# Patient Record
Sex: FEMALE | Race: WHITE | NOT HISPANIC OR LATINO | Employment: OTHER | URBAN - METROPOLITAN AREA
[De-identification: names, ages, dates, MRNs, and addresses within clinical notes are randomized per-mention and may not be internally consistent; named-entity substitution may affect disease eponyms.]

---

## 2017-01-02 ENCOUNTER — COMMUNICATION - HEALTHEAST (OUTPATIENT)
Dept: CARDIOLOGY | Facility: CLINIC | Age: 72
End: 2017-01-02

## 2017-01-02 DIAGNOSIS — I48.91 A-FIB (H): ICD-10-CM

## 2017-02-06 DIAGNOSIS — I10 HTN, GOAL BELOW 140/90: ICD-10-CM

## 2017-02-06 DIAGNOSIS — E11.9 TYPE 2 DIABETES MELLITUS WITHOUT COMPLICATION, WITHOUT LONG-TERM CURRENT USE OF INSULIN (H): Primary | ICD-10-CM

## 2017-02-06 DIAGNOSIS — Z13.6 CARDIOVASCULAR SCREENING; LDL GOAL LESS THAN 130: ICD-10-CM

## 2017-02-06 NOTE — TELEPHONE ENCOUNTER
Metformin         Last Written Prescription Date: 12/12/2016  Last Fill Quantity: 30, # refills: 0  Last Office Visit with [facility] or [facility] prescribing provider:  03/10/2016        BP Readings from Last 3 Encounters:   06/27/16 104/75   03/10/16 113/82   12/09/14 121/79     No results found for this basename: microl  No results found for this basename: microalbumin  CREATININE   Date Value Ref Range Status   03/10/2016 1.19* 0.52 - 1.04 mg/dL Final   ]  GFR ESTIMATE   Date Value Ref Range Status   03/10/2016 45* >60 mL/min/1.7m2 Final     Comment:     Non  GFR Calc   12/09/2014 46* >60 mL/min/1.7m2 Final     Comment:     Non  GFR Calc   08/16/2013 58* >60 mL/min/1.7m2 Final     GFR ESTIMATE IF BLACK   Date Value Ref Range Status   03/10/2016 54* >60 mL/min/1.7m2 Final     Comment:      GFR Calc   12/09/2014 55* >60 mL/min/1.7m2 Final     Comment:      GFR Calc   08/16/2013 70 >60 mL/min/1.7m2 Final     CHOL      185   3/10/2016  HDL       45   3/10/2016  LDL      113   3/10/2016  TRIG      134   3/10/2016  CHOLHDLRATIO      3.7   12/9/2014  AST       27   4/10/2009  No results found for this basename: alt  A1C      6.6   10/26/2016  A1C      6.9   3/10/2016  A1C      6.4   12/9/2014  POTASSIUM   Date Value Ref Range Status   03/10/2016 4.3 3.4 - 5.3 mmol/L Final       Serafin TOMLINSON (R)

## 2017-02-07 RX ORDER — SIMVASTATIN 20 MG
20 TABLET ORAL AT BEDTIME
Qty: 30 TABLET | Refills: 0 | Status: SHIPPED | OUTPATIENT
Start: 2017-02-07 | End: 2017-02-22

## 2017-02-07 NOTE — TELEPHONE ENCOUNTER
Patient called and told of the need to have an appt, scheduled for 2/22/17 at 9:20 Dr. Stockton.  Labs are ordered for the patient to and the refills granted 1 more time. Sofia MOTT RN

## 2017-02-22 ENCOUNTER — OFFICE VISIT (OUTPATIENT)
Dept: FAMILY MEDICINE | Facility: CLINIC | Age: 72
End: 2017-02-22
Payer: COMMERCIAL

## 2017-02-22 VITALS
RESPIRATION RATE: 18 BRPM | DIASTOLIC BLOOD PRESSURE: 82 MMHG | HEIGHT: 68 IN | BODY MASS INDEX: 44.41 KG/M2 | WEIGHT: 293 LBS | HEART RATE: 93 BPM | SYSTOLIC BLOOD PRESSURE: 129 MMHG

## 2017-02-22 DIAGNOSIS — E11.9 TYPE 2 DIABETES MELLITUS WITHOUT COMPLICATION, WITHOUT LONG-TERM CURRENT USE OF INSULIN (H): Primary | ICD-10-CM

## 2017-02-22 DIAGNOSIS — I10 HTN, GOAL BELOW 140/90: ICD-10-CM

## 2017-02-22 DIAGNOSIS — J45.20 MILD INTERMITTENT ASTHMA WITHOUT COMPLICATION: ICD-10-CM

## 2017-02-22 DIAGNOSIS — Z11.59 NEED FOR HEPATITIS C SCREENING TEST: ICD-10-CM

## 2017-02-22 DIAGNOSIS — I48.0 PAROXYSMAL ATRIAL FIBRILLATION (H): ICD-10-CM

## 2017-02-22 DIAGNOSIS — G47.00 PERSISTENT INSOMNIA: ICD-10-CM

## 2017-02-22 LAB
ANION GAP SERPL CALCULATED.3IONS-SCNC: 6 MMOL/L (ref 3–14)
BUN SERPL-MCNC: 30 MG/DL (ref 7–30)
CALCIUM SERPL-MCNC: 9.1 MG/DL (ref 8.5–10.1)
CHLORIDE SERPL-SCNC: 97 MMOL/L (ref 94–109)
CHOLEST SERPL-MCNC: 124 MG/DL
CO2 SERPL-SCNC: 30 MMOL/L (ref 20–32)
CREAT SERPL-MCNC: 1.06 MG/DL (ref 0.52–1.04)
CREAT UR-MCNC: 187 MG/DL
GFR SERPL CREATININE-BSD FRML MDRD: 51 ML/MIN/1.7M2
GLUCOSE SERPL-MCNC: 125 MG/DL (ref 70–99)
HBA1C MFR BLD: 6.4 % (ref 4.3–6)
HCV AB SERPL QL IA: NORMAL
HDLC SERPL-MCNC: 47 MG/DL
LDLC SERPL CALC-MCNC: 57 MG/DL
MICROALBUMIN UR-MCNC: 9 MG/L
MICROALBUMIN/CREAT UR: 5.02 MG/G CR (ref 0–25)
NONHDLC SERPL-MCNC: 77 MG/DL
POTASSIUM SERPL-SCNC: 4 MMOL/L (ref 3.4–5.3)
SODIUM SERPL-SCNC: 133 MMOL/L (ref 133–144)
TRIGL SERPL-MCNC: 99 MG/DL
TSH SERPL DL<=0.005 MIU/L-ACNC: 2.16 MU/L (ref 0.4–4)

## 2017-02-22 PROCEDURE — 99214 OFFICE O/P EST MOD 30 MIN: CPT | Performed by: FAMILY MEDICINE

## 2017-02-22 PROCEDURE — 84443 ASSAY THYROID STIM HORMONE: CPT | Performed by: FAMILY MEDICINE

## 2017-02-22 PROCEDURE — 83036 HEMOGLOBIN GLYCOSYLATED A1C: CPT | Performed by: FAMILY MEDICINE

## 2017-02-22 PROCEDURE — 86803 HEPATITIS C AB TEST: CPT | Performed by: FAMILY MEDICINE

## 2017-02-22 PROCEDURE — 36415 COLL VENOUS BLD VENIPUNCTURE: CPT | Performed by: FAMILY MEDICINE

## 2017-02-22 PROCEDURE — 80048 BASIC METABOLIC PNL TOTAL CA: CPT | Performed by: FAMILY MEDICINE

## 2017-02-22 PROCEDURE — 99207 C FOOT EXAM  NO CHARGE: CPT | Performed by: FAMILY MEDICINE

## 2017-02-22 PROCEDURE — 82043 UR ALBUMIN QUANTITATIVE: CPT | Performed by: FAMILY MEDICINE

## 2017-02-22 PROCEDURE — 80061 LIPID PANEL: CPT | Performed by: FAMILY MEDICINE

## 2017-02-22 RX ORDER — TEMAZEPAM 15 MG/1
15 CAPSULE ORAL
Qty: 90 CAPSULE | Refills: 1 | Status: SHIPPED | OUTPATIENT
Start: 2017-02-22 | End: 2017-10-30

## 2017-02-22 RX ORDER — SIMVASTATIN 20 MG
20 TABLET ORAL AT BEDTIME
Qty: 90 TABLET | Refills: 3 | Status: SHIPPED | OUTPATIENT
Start: 2017-02-22 | End: 2018-01-22

## 2017-02-22 RX ORDER — VALSARTAN AND HYDROCHLOROTHIAZIDE 160; 25 MG/1; MG/1
1 TABLET ORAL EVERY MORNING
Qty: 90 TABLET | Refills: 3 | Status: SHIPPED | OUTPATIENT
Start: 2017-02-22 | End: 2018-01-22

## 2017-02-22 NOTE — MR AVS SNAPSHOT
After Visit Summary   2/22/2017    Romy Hurley    MRN: [id number]           Patient Information     Date Of Birth          1945        Visit Information        Provider Department      2/22/2017 9:20 AM Adeola Stockton MD [facility]        Today's Diagnoses     Type 2 diabetes mellitus without complication, without long-term current use of insulin (H)    -  1    HTN, goal below 140/90        Persistent insomnia        Paroxysmal atrial fibrillation (H)        Need for hepatitis C screening test        Mild intermittent asthma without complication          Care Instructions          Thank you for choosing [facility].  You may be receiving a survey in the mail from [employer] regarding your visit today.  Please take a few minutes to complete and return the survey to let us know how we are doing.      Our Clinic hours are:  Mondays    7:20 am - 7 pm  Tues -  Fri  7:20 am - 5 pm    Clinic Phone: [phone]    The clinic lab opens at 7:30 am Mon - Fri and appointments are required.    [facility]  Ph. [phone]  Monday-Thursday 8 am - 7pm  Tues/Wed/Fri 8 am - 5:30 pm               Follow-ups after your visit        Your next 10 appointments already scheduled     May 09, 2017  1:00 PM CDT   Diabetic Education with Adeola [address], [address]   [facility]    [address] [place] [address] [phone]              Who to contact     If you have questions or need follow up information about today's clinic visit or your schedule please contact [facility] directly at [phone].  Normal or non-critical lab and imaging results will be communicated to you by MyChart, letter or phone within 4 business days after the clinic has received the results. If you do not hear from us within 7 days, please contact the clinic through MyChart or phone. If you have a critical or abnormal  "lab result, we will notify you by phone as soon as possible.  Submit refill requests through [employer] or call your pharmacy and they will forward the refill request to us. Please allow 3 business days for your refill to be completed.          Additional Information About Your Visit        [employer] Information     [employer] lets you send messages to your doctor, view your test results, renew your prescriptions, schedule appointments and more. To sign up, go to [email or web address] . Click on \"Log in\" on the left side of the screen, which will take you to the Welcome page. Then click on \"Sign up Now\" on the right side of the page.     You will be asked to enter the access code listed below, as well as some personal information. Please follow the directions to create your username and password.     Your access code is: [email or web address]  Expires: 2017  9:48 AM     Your access code will  in 90 days. If you need help or a new code, please call your [place] clinic or [phone].        Care EveryWhere ID     This is your Care EveryWhere ID. This could be used by other organizations to access your [place] medical records  [id number]        Your Vitals Were     Pulse Respirations Height Breastfeeding? BMI (Body Mass Index)       93 18 5' 8\" (1.727 m) No 45.01 kg/m2        Blood Pressure from Last 3 Encounters:   17 129/82   16 104/75   03/10/16 113/82    Weight from Last 3 Encounters:   17 296 lb (134.3 kg)   16 299 lb 12.8 oz (136 kg)   16 299 lb (135.6 kg)              We Performed the Following     Albumin Random Urine Quantitative     Basic metabolic panel     Hemoglobin A1c     Hepatitis C antibody     Lipid panel reflex to direct LDL     TSH with free T4 reflex          Today's Medication Changes          These changes are accurate as of: 17  9:49 AM.  If you have any questions, ask your nurse or doctor.               These medicines have changed or have updated " prescriptions.        Dose/Directions    metFORMIN 500 MG tablet   Commonly known as:  GLUCOPHAGE   This may have changed:  additional instructions   Used for:  Type 2 diabetes mellitus without complication, without long-term current use of insulin (H)   Changed by:  Adeola Stockton MD        Dose:  500 mg   Take 1 tablet (500 mg) by mouth daily (with dinner)   Quantity:  90 tablet   Refills:  3       simvastatin 20 MG tablet   Commonly known as:  ZOCOR   This may have changed:  additional instructions   Used for:  Type 2 diabetes mellitus without complication, without long-term current use of insulin (H)   Changed by:  Adeola Stockton MD        Dose:  20 mg   Take 1 tablet (20 mg) by mouth At Bedtime   Quantity:  90 tablet   Refills:  3            Where to get your medicines      These medications were sent to [facility] [address] [place] [address] AT [address]  [address] [facility] [address]     Phone:  [phone]     metFORMIN 500 MG tablet    simvastatin 20 MG tablet    valsartan-hydrochlorothiazide 160-25 MG per tablet         Some of these will need a paper prescription and others can be bought over the counter.  Ask your nurse if you have questions.     Bring a paper prescription for each of these medications     temazepam 15 MG capsule                Primary Care Provider Office Phone # Fax #    Adeola Stockton [phone]       [facility] [address] [facility] [address]        Thank you!     Thank you for choosing [facility]  for your care. Our goal is always to provide you with excellent care. Hearing back from our patients is one way we can continue to improve our services. Please take a few minutes to complete the written survey that you may receive in the mail after your visit with us. Thank you!             Your Updated Medication List - Protect others around you: Learn how to safely use,  store and throw away your medicines at www.disposemymeds.org.          This list is accurate as of: 2/22/17  9:49 AM.  Always use your most recent med list.                   Brand Name Dispense Instructions for use    blood glucose monitoring lancets     1 Box    Use to test blood sugars 1 times daily or as directed.       blood glucose monitoring test strip    ONE TOUCH VERIO IQ    100 each    Use to test blood sugars 1 times daily or as directed.       FLECAINIDE ACETATE PO      Take 50 mg by mouth 2 times daily.    Indications: Atrial Fibrillation       fluticasone-salmeterol 250-50 MCG/DOSE diskus inhaler    ADVAIR DISKUS    3 Inhaler    Inhale 1 puff into the lungs 2 times daily       levalbuterol 45 MCG/ACT Inhaler    XOPENEX HFA    1 Inhaler    Inhale 2 puffs into the lungs every 8 hours as needed for shortness of breath / dyspnea Needs an appt for next refills       metFORMIN 500 MG tablet    GLUCOPHAGE    90 tablet    Take 1 tablet (500 mg) by mouth daily (with dinner)       metoprolol 100 MG tablet    LOPRESSOR    1 tablet    Take 1 tablet by mouth 2 times daily.       simvastatin 20 MG tablet    ZOCOR    90 tablet    Take 1 tablet (20 mg) by mouth At Bedtime       temazepam 15 MG capsule    RESTORIL    90 capsule    Take 1 capsule (15 mg) by mouth nightly as needed for sleep       valsartan-hydrochlorothiazide 160-25 MG per tablet    DIOVAN HCT    90 tablet    Take 1 tablet by mouth every morning       XARELTO 20 MG Tabs tablet   Generic drug:  rivaroxaban ANTICOAGULANT      Take 20 mg by mouth daily (with dinner)

## 2017-02-22 NOTE — NURSING NOTE
"Chief Complaint   Patient presents with     Diabetes       Initial /82  Pulse 93  Resp 18  Ht 5' 8\" (1.727 m)  Wt 296 lb (134.3 kg)  Breastfeeding? No  BMI 45.01 kg/m2 Estimated body mass index is 45.01 kg/(m^2) as calculated from the following:    Height as of this encounter: 5' 8\" (1.727 m).    Weight as of this encounter: 296 lb (134.3 kg).  Medication Reconciliation: complete    "

## 2017-02-22 NOTE — PATIENT INSTRUCTIONS
Thank you for choosing [facility].  You may be receiving a survey in the mail from [facility] regarding your visit today.  Please take a few minutes to complete and return the survey to let us know how we are doing.      Our Clinic hours are:  Mondays    7:20 am - 7 pm  Tues -  Fri  7:20 am - 5 pm    Clinic Phone: [phone]    The clinic lab opens at 7:30 am Mon - Fri and appointments are required.    [facility]. [phone]  Monday-Thursday 8 am - 7pm  Tues/Wed/Fri 8 am - 5:30 pm

## 2017-02-22 NOTE — PROGRESS NOTES
"  SUBJECTIVE:                                                    Romy Hurley is a 71 year old female who presents to clinic today for the following health issues:        Diabetes Follow-up      Patient is checking blood sugars: 2 times a week.  Numbers run about 100-120.     Diabetic concerns: None     Symptoms of hypoglycemia (low blood sugar): none     Paresthesias (numbness or burning in feet) or sores: No     Date of last diabetic eye exam: due     Hyperlipidemia Follow-Up      Rate your low fat/cholesterol diet?: fair    Taking statin?  Yes, possible muscle aches from statin    Other lipid medications/supplements?:  none     Hypertension Follow-up      Outpatient blood pressures are being checked at home.  Results are 120/70.    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  Now they're both going [place] three times a week.  Some days better than others.  Trying to get him more active and moving.    She's working on treating a bedsore for him.   He's got advanced scleroderma and he got his G tube out for a while.      Also has a new grandbaby.  This is their first biologic grandchild.  Baby girl.  They live only 10 miles from them.       ROS: 5 point ROS negative except as noted above in HPI, including Gen., Resp., CV, GI &  system review.      /82  Pulse 93  Resp 18  Ht 5' 8\" (1.727 m)  Wt 296 lb (134.3 kg)  Breastfeeding? No  BMI 45.01 kg/m2     EXAM: GENERAL APPEARANCE: Alert, no acute distress  RESP: lungs clear to auscultation   CV: irregular rhythm-irregularly irregular, no murmur  ABDOMEN: soft, no organomegaly, masses or tenderness  MS: extremities normal, no peripheral edema  Foot exam normal - normal monofilament exam  PSYCH: mentation " appears normal., affect and mood normal, tearful when talking about her husbands CABG    Results for orders placed or performed in visit on 02/22/17   Hemoglobin A1c   Result Value Ref Range    Hemoglobin A1C 6.4 (H) 4.3 - 6.0 %         ASSESSMENT/PLAN:      ICD-10-CM    1. Type 2 diabetes mellitus without complication, without long-term current use of insulin (H) E11.9 metFORMIN (GLUCOPHAGE) 500 MG tablet     simvastatin (ZOCOR) 20 MG tablet     TSH with free T4 reflex     Albumin Random Urine Quantitative     Hemoglobin A1c     Lipid panel reflex to direct LDL     Basic metabolic panel   2. HTN, goal below 140/90 I10 valsartan-hydrochlorothiazide (DIOVAN HCT) 160-25 MG per tablet     Basic metabolic panel   3. Persistent insomnia G47.00 temazepam (RESTORIL) 15 MG capsule   4. Paroxysmal atrial fibrillation (H) I48.0    5. Need for hepatitis C screening test Z11.59 Hepatitis C antibody   6. Mild intermittent asthma without complication J45.20      Overall doing well.  Medications refill.  Labs obtained today.         Patient Instructions         Thank you for choosing [facility].  You may be receiving a survey in the mail from [employer] regarding your visit today.  Please take a few minutes to complete and return the survey to let us know how we are doing.      Our Clinic hours are:  Mondays    7:20 am - 7 pm  Tues -  Fri  7:20 am - 5 pm    Clinic Phone: [phone]    The clinic lab opens at 7:30 am Mon - Fri and appointments are required.    [facility]. [phone]  Monday-Thursday 8 am - 7pm  Tues/Wed/Fri 8 am - 5:30 pm

## 2017-02-22 NOTE — PROGRESS NOTES
Kidney function stable to slightly improved, good news.    Cholesterol is good.   HgbA1c is very good at 6.4%.   Thyroid is in normal range.     Adeola Stockton M.D.

## 2017-02-23 ASSESSMENT — ASTHMA QUESTIONNAIRES: ACT_TOTALSCORE: 25

## 2017-03-13 ENCOUNTER — COMMUNICATION - HEALTHEAST (OUTPATIENT)
Dept: ADMINISTRATIVE | Facility: CLINIC | Age: 72
End: 2017-03-13

## 2017-07-10 ENCOUNTER — COMMUNICATION - HEALTHEAST (OUTPATIENT)
Dept: CARDIOLOGY | Facility: CLINIC | Age: 72
End: 2017-07-10

## 2017-07-10 DIAGNOSIS — I48.91 A-FIB (H): ICD-10-CM

## 2017-08-31 ENCOUNTER — OFFICE VISIT - HEALTHEAST (OUTPATIENT)
Dept: CARDIOLOGY | Facility: CLINIC | Age: 72
End: 2017-08-31

## 2017-08-31 ENCOUNTER — TRANSFERRED RECORDS (OUTPATIENT)
Dept: HEALTH INFORMATION MANAGEMENT | Facility: CLINIC | Age: 72
End: 2017-08-31

## 2017-08-31 DIAGNOSIS — E66.9 OBESITY, UNSPECIFIED: ICD-10-CM

## 2017-08-31 DIAGNOSIS — I48.19 PERSISTENT ATRIAL FIBRILLATION (H): ICD-10-CM

## 2017-08-31 DIAGNOSIS — I10 ESSENTIAL HYPERTENSION WITH GOAL BLOOD PRESSURE LESS THAN 140/90: ICD-10-CM

## 2017-08-31 LAB
ATRIAL RATE - MUSE: 44 BPM
DIASTOLIC BLOOD PRESSURE - MUSE: NORMAL MMHG
INTERPRETATION ECG - MUSE: NORMAL
P AXIS - MUSE: NORMAL DEGREES
PR INTERVAL - MUSE: NORMAL MS
QRS DURATION - MUSE: 94 MS
QT - MUSE: 396 MS
QTC - MUSE: 465 MS
R AXIS - MUSE: -19 DEGREES
SYSTOLIC BLOOD PRESSURE - MUSE: NORMAL MMHG
T AXIS - MUSE: -6 DEGREES
VENTRICULAR RATE- MUSE: 83 BPM

## 2017-08-31 ASSESSMENT — MIFFLIN-ST. JEOR: SCORE: 1878.67

## 2017-09-11 ENCOUNTER — HOSPITAL ENCOUNTER (OUTPATIENT)
Dept: CARDIOLOGY | Facility: HOSPITAL | Age: 72
Discharge: HOME | End: 2017-09-11
Attending: INTERNAL MEDICINE

## 2017-09-11 DIAGNOSIS — I48.19 PERSISTENT ATRIAL FIBRILLATION (H): ICD-10-CM

## 2017-09-11 LAB
AORTIC ROOT: 2.9 CM
AR DECEL SLOPE: 1930 MM/S2
AR PEAK VELOCITY: 353 CM/S
AV REGURGITANT PEAK GRADIENT: 49.8 MMHG
AV REGURGITATION PRESSURE HALF TIME: 534 MS
BSA FOR ECHO PROCEDURE: 2.52 M2
CV BLOOD PRESSURE: NORMAL MMHG
CV ECHO HEIGHT: 67.5 IN
CV ECHO WEIGHT: 295 LBS
DOP CALC LVOT AREA: 2.83 CM2
DOP CALC LVOT DIAMETER: 1.9 CM
DOP CALC LVOT PEAK VEL: 134 CM/S
DOP CALC LVOT STROKE VOLUME: 67.7 CM3
DOP CALCLVOT PEAK VEL VTI: 23.9 CM
EJECTION FRACTION: 65 %
EJECTION FRACTION: 65 % (ref 55–75)
LEFT ATRIUM AREA: 21.7 CM2
LEFT ATRIUM LENGTH: 5.6 CM
LEFT ATRIUM SIZE: 4.9 CM
LEFT ATRIUM TO AORTIC ROOT RATIO: 1.69 NO UNITS
LEFT VENTRICLE DIASTOLIC VOLUME INDEX: 27.6 CM3/M2 (ref 34–74)
LEFT VENTRICLE DIASTOLIC VOLUME: 69.6 CM3 (ref 46–106)
LEFT VENTRICLE SYSTOLIC VOLUME INDEX: 9.6 CM3/M2 (ref 11–31)
LEFT VENTRICLE SYSTOLIC VOLUME: 24.1 CM3 (ref 14–42)
LEFT VENTRICULAR OUTFLOW TRACT MEAN GRADIENT: 4 MMHG
LEFT VENTRICULAR OUTFLOW TRACT MEAN VELOCITY: 85.9 CM/S
LEFT VENTRICULAR OUTFLOW TRACT PEAK GRADIENT: 7 MMHG
LV STROKE VOLUME INDEX: 26.9 ML/M2
MITRAL VALVE DECELERATION SLOPE: 7170 MM/S2
MITRAL VALVE PRESSURE HALF-TIME: 57 MS
MV AVERAGE E/E' RATIO: 11.6 CM/S
MV E'TISSUE VEL-LAT: 12.5 CM/S
MV E'TISSUE VEL-MED: 11.2 CM/S
MV LATERAL E/E' RATIO: 11
MV MEDIAL E/E' RATIO: 12.3
MV PEAK E VELOCITY: 138 CM/S
MV VALVE AREA PRESSURE 1/2 METHOD: 3.9 CM2
NUC REST DIASTOLIC VOLUME INDEX: 4720 LBS
NUC REST SYSTOLIC VOLUME INDEX: 67.5 IN
TRICUSPID REGURGITATION PEAK PRESSURE GRADIENT: 27.7 MMHG
TRICUSPID VALVE ANULAR PLANE SYSTOLIC EXCURSION: 2.4 CM
TRICUSPID VALVE PEAK REGURGITANT VELOCITY: 263 CM/S

## 2017-09-11 ASSESSMENT — MIFFLIN-ST. JEOR: SCORE: 1878.67

## 2017-09-18 ENCOUNTER — TRANSFERRED RECORDS (OUTPATIENT)
Dept: HEALTH INFORMATION MANAGEMENT | Facility: CLINIC | Age: 72
End: 2017-09-18

## 2017-09-18 ENCOUNTER — AMBULATORY - HEALTHEAST (OUTPATIENT)
Dept: CARDIOLOGY | Facility: CLINIC | Age: 72
End: 2017-09-18

## 2017-09-18 ENCOUNTER — OFFICE VISIT - HEALTHEAST (OUTPATIENT)
Dept: CARDIOLOGY | Facility: CLINIC | Age: 72
End: 2017-09-18

## 2017-09-18 DIAGNOSIS — I10 ESSENTIAL HYPERTENSION WITH GOAL BLOOD PRESSURE LESS THAN 140/90: ICD-10-CM

## 2017-09-18 DIAGNOSIS — I48.19 PERSISTENT ATRIAL FIBRILLATION (H): ICD-10-CM

## 2017-09-18 ASSESSMENT — MIFFLIN-ST. JEOR: SCORE: 1911.56

## 2017-09-26 ENCOUNTER — TRANSFERRED RECORDS (OUTPATIENT)
Dept: HEALTH INFORMATION MANAGEMENT | Facility: CLINIC | Age: 72
End: 2017-09-26

## 2017-09-26 ENCOUNTER — AMBULATORY - HEALTHEAST (OUTPATIENT)
Dept: CARDIOLOGY | Facility: CLINIC | Age: 72
End: 2017-09-26

## 2017-10-02 ENCOUNTER — TRANSFERRED RECORDS (OUTPATIENT)
Dept: HEALTH INFORMATION MANAGEMENT | Facility: CLINIC | Age: 72
End: 2017-10-02

## 2017-10-02 ENCOUNTER — AMBULATORY - HEALTHEAST (OUTPATIENT)
Dept: CARDIOLOGY | Facility: CLINIC | Age: 72
End: 2017-10-02

## 2017-10-06 ENCOUNTER — COMMUNICATION - HEALTHEAST (OUTPATIENT)
Dept: CARDIOLOGY | Facility: CLINIC | Age: 72
End: 2017-10-06

## 2017-10-06 DIAGNOSIS — I48.91 A-FIB (H): ICD-10-CM

## 2017-10-11 ENCOUNTER — TRANSFERRED RECORDS (OUTPATIENT)
Dept: HEALTH INFORMATION MANAGEMENT | Facility: CLINIC | Age: 72
End: 2017-10-11

## 2017-10-11 ENCOUNTER — ANESTHESIA - HEALTHEAST (OUTPATIENT)
Dept: CARDIOLOGY | Facility: CLINIC | Age: 72
End: 2017-10-11

## 2017-10-19 ENCOUNTER — COMMUNICATION - HEALTHEAST (OUTPATIENT)
Dept: CARDIOLOGY | Facility: CLINIC | Age: 72
End: 2017-10-19

## 2017-10-19 DIAGNOSIS — I48.91 A-FIB (H): ICD-10-CM

## 2017-10-24 ENCOUNTER — OFFICE VISIT - HEALTHEAST (OUTPATIENT)
Dept: CARDIOLOGY | Facility: CLINIC | Age: 72
End: 2017-10-24

## 2017-10-24 ENCOUNTER — TRANSFERRED RECORDS (OUTPATIENT)
Dept: HEALTH INFORMATION MANAGEMENT | Facility: CLINIC | Age: 72
End: 2017-10-24

## 2017-10-24 ENCOUNTER — COMMUNICATION - HEALTHEAST (OUTPATIENT)
Dept: CARDIOLOGY | Facility: CLINIC | Age: 72
End: 2017-10-24

## 2017-10-24 ENCOUNTER — AMBULATORY - HEALTHEAST (OUTPATIENT)
Dept: CARDIOLOGY | Facility: CLINIC | Age: 72
End: 2017-10-24

## 2017-10-24 DIAGNOSIS — I48.19 PERSISTENT ATRIAL FIBRILLATION (H): ICD-10-CM

## 2017-10-24 DIAGNOSIS — F41.1 GENERALIZED ANXIETY DISORDER: ICD-10-CM

## 2017-10-24 ASSESSMENT — MIFFLIN-ST. JEOR: SCORE: 1885.48

## 2017-10-30 DIAGNOSIS — G47.00 PERSISTENT INSOMNIA: ICD-10-CM

## 2017-10-31 ENCOUNTER — AMBULATORY - HEALTHEAST (OUTPATIENT)
Dept: CARDIOLOGY | Facility: CLINIC | Age: 72
End: 2017-10-31

## 2017-10-31 ENCOUNTER — COMMUNICATION - HEALTHEAST (OUTPATIENT)
Dept: CARDIOLOGY | Facility: CLINIC | Age: 72
End: 2017-10-31

## 2017-10-31 DIAGNOSIS — I48.91 A-FIB (H): ICD-10-CM

## 2017-11-01 RX ORDER — TEMAZEPAM 15 MG/1
CAPSULE ORAL
Qty: 90 CAPSULE | Refills: 0 | Status: SHIPPED | OUTPATIENT
Start: 2017-11-01 | End: 2018-01-22

## 2017-11-01 NOTE — TELEPHONE ENCOUNTER
Routing refill request to provider for review/approval because:  Drug not on the [facility] refill protocol     Routing to provider.    Noemí KWONG Rn

## 2017-11-02 ENCOUNTER — ANESTHESIA - HEALTHEAST (OUTPATIENT)
Dept: CARDIOLOGY | Facility: CLINIC | Age: 72
End: 2017-11-02

## 2017-11-02 ENCOUNTER — TRANSFERRED RECORDS (OUTPATIENT)
Dept: HEALTH INFORMATION MANAGEMENT | Facility: CLINIC | Age: 72
End: 2017-11-02

## 2017-11-04 ENCOUNTER — COMMUNICATION - HEALTHEAST (OUTPATIENT)
Dept: CARDIOLOGY | Facility: CLINIC | Age: 72
End: 2017-11-04

## 2017-11-04 DIAGNOSIS — I48.91 A-FIB (H): ICD-10-CM

## 2017-11-20 ENCOUNTER — TRANSFERRED RECORDS (OUTPATIENT)
Dept: HEALTH INFORMATION MANAGEMENT | Facility: CLINIC | Age: 72
End: 2017-11-20

## 2017-11-20 ENCOUNTER — SURGERY - HEALTHEAST (OUTPATIENT)
Dept: CARDIOLOGY | Facility: CLINIC | Age: 72
End: 2017-11-20

## 2017-11-20 ENCOUNTER — OFFICE VISIT - HEALTHEAST (OUTPATIENT)
Dept: CARDIOLOGY | Facility: CLINIC | Age: 72
End: 2017-11-20

## 2017-11-20 ENCOUNTER — AMBULATORY - HEALTHEAST (OUTPATIENT)
Dept: CARDIOLOGY | Facility: CLINIC | Age: 72
End: 2017-11-20

## 2017-11-20 DIAGNOSIS — I48.91 ATRIAL FIBRILLATION (H): ICD-10-CM

## 2017-11-20 DIAGNOSIS — I48.19 PERSISTENT ATRIAL FIBRILLATION (H): ICD-10-CM

## 2017-11-20 ASSESSMENT — MIFFLIN-ST. JEOR: SCORE: 1900.22

## 2017-11-22 ENCOUNTER — COMMUNICATION - HEALTHEAST (OUTPATIENT)
Dept: CARDIOLOGY | Facility: CLINIC | Age: 72
End: 2017-11-22

## 2017-12-06 ENCOUNTER — HOSPITAL ENCOUNTER (OUTPATIENT)
Dept: CARDIOLOGY | Facility: CLINIC | Age: 72
Discharge: HOME | End: 2017-12-06
Attending: INTERNAL MEDICINE | Admitting: INTERNAL MEDICINE

## 2017-12-06 ENCOUNTER — ANESTHESIA - HEALTHEAST (OUTPATIENT)
Dept: CARDIOLOGY | Facility: CLINIC | Age: 72
End: 2017-12-06

## 2017-12-06 DIAGNOSIS — I48.91 ATRIAL FIBRILLATION (H): ICD-10-CM

## 2017-12-06 LAB
ATRIAL RATE - MUSE: 60 BPM
DIASTOLIC BLOOD PRESSURE - MUSE: NORMAL MMHG
INTERPRETATION ECG - MUSE: NORMAL
P AXIS - MUSE: 41 DEGREES
PR INTERVAL - MUSE: 218 MS
QRS DURATION - MUSE: 94 MS
QT - MUSE: 476 MS
QTC - MUSE: 476 MS
R AXIS - MUSE: -15 DEGREES
SYSTOLIC BLOOD PRESSURE - MUSE: NORMAL MMHG
T AXIS - MUSE: -11 DEGREES
VENTRICULAR RATE- MUSE: 60 BPM

## 2017-12-06 ASSESSMENT — MIFFLIN-ST. JEOR: SCORE: 1929.38

## 2018-01-08 ENCOUNTER — OFFICE VISIT - HEALTHEAST (OUTPATIENT)
Dept: CARDIOLOGY | Facility: CLINIC | Age: 73
End: 2018-01-08

## 2018-01-08 ENCOUNTER — TRANSFERRED RECORDS (OUTPATIENT)
Dept: HEALTH INFORMATION MANAGEMENT | Facility: CLINIC | Age: 73
End: 2018-01-08

## 2018-01-08 DIAGNOSIS — I10 ESSENTIAL HYPERTENSION: ICD-10-CM

## 2018-01-08 DIAGNOSIS — I48.19 PERSISTENT ATRIAL FIBRILLATION (H): ICD-10-CM

## 2018-01-08 DIAGNOSIS — N18.30 STAGE III CHRONIC KIDNEY DISEASE (H): ICD-10-CM

## 2018-01-08 LAB
ANION GAP SERPL CALCULATED.3IONS-SCNC: 11 MMOL/L (ref 5–18)
ATRIAL RATE - MUSE: 61 BPM
BUN SERPL-MCNC: 26 MG/DL (ref 8–28)
CALCIUM SERPL-MCNC: 9.5 MG/DL (ref 8.5–10.5)
CHLORIDE BLD-SCNC: 96 MMOL/L (ref 98–107)
CO2 SERPL-SCNC: 31 MMOL/L (ref 22–31)
CREAT SERPL-MCNC: 0.86 MG/DL (ref 0.6–1.1)
CREAT SERPL-MCNC: 0.86 MG/DL (ref 0.6–1.1)
DIASTOLIC BLOOD PRESSURE - MUSE: NORMAL MMHG
GFR SERPL CREATININE-BSD FRML MDRD: >60 ML/MIN/1.73M2
GFR SERPL CREATININE-BSD FRML MDRD: >60 ML/MIN/1.73M2
GLUCOSE BLD-MCNC: 79 MG/DL (ref 70–125)
GLUCOSE SERPL-MCNC: 79 MG/DL (ref 70–125)
INTERPRETATION ECG - MUSE: NORMAL
P AXIS - MUSE: 31 DEGREES
POTASSIUM BLD-SCNC: 4.2 MMOL/L (ref 3.5–5)
POTASSIUM SERPL-SCNC: 4.2 MMOL/L (ref 3.5–5)
PR INTERVAL - MUSE: 194 MS
QRS DURATION - MUSE: 92 MS
QT - MUSE: 448 MS
QTC - MUSE: 450 MS
R AXIS - MUSE: -17 DEGREES
SODIUM SERPL-SCNC: 138 MMOL/L (ref 136–145)
SYSTOLIC BLOOD PRESSURE - MUSE: NORMAL MMHG
T AXIS - MUSE: -4 DEGREES
VENTRICULAR RATE- MUSE: 61 BPM

## 2018-01-08 ASSESSMENT — MIFFLIN-ST. JEOR: SCORE: 1902.49

## 2018-01-09 ENCOUNTER — COMMUNICATION - HEALTHEAST (OUTPATIENT)
Dept: CARDIOLOGY | Facility: CLINIC | Age: 73
End: 2018-01-09

## 2018-01-09 DIAGNOSIS — I48.19 PERSISTENT ATRIAL FIBRILLATION (H): ICD-10-CM

## 2018-01-10 ENCOUNTER — OFFICE VISIT - HEALTHEAST (OUTPATIENT)
Dept: SLEEP MEDICINE | Facility: CLINIC | Age: 73
End: 2018-01-10

## 2018-01-10 ENCOUNTER — TRANSFERRED RECORDS (OUTPATIENT)
Dept: HEALTH INFORMATION MANAGEMENT | Facility: CLINIC | Age: 73
End: 2018-01-10

## 2018-01-10 DIAGNOSIS — R06.83 SNORING: ICD-10-CM

## 2018-01-10 DIAGNOSIS — G47.21 CIRCADIAN RHYTHM SLEEP DISORDER, DELAYED SLEEP PHASE TYPE: ICD-10-CM

## 2018-01-10 DIAGNOSIS — G47.8 SLEEP DYSFUNCTION WITH SLEEP STAGE DISTURBANCE: ICD-10-CM

## 2018-01-10 DIAGNOSIS — G47.00 PERSISTENT INSOMNIA: ICD-10-CM

## 2018-01-10 ASSESSMENT — MIFFLIN-ST. JEOR: SCORE: 1895.68

## 2018-01-11 ENCOUNTER — AMBULATORY - HEALTHEAST (OUTPATIENT)
Dept: SLEEP MEDICINE | Facility: CLINIC | Age: 73
End: 2018-01-11

## 2018-01-22 ENCOUNTER — TELEPHONE (OUTPATIENT)
Dept: FAMILY MEDICINE | Facility: CLINIC | Age: 73
End: 2018-01-22

## 2018-01-22 ENCOUNTER — RECORDS - HEALTHEAST (OUTPATIENT)
Dept: ADMINISTRATIVE | Facility: OTHER | Age: 73
End: 2018-01-22

## 2018-01-22 DIAGNOSIS — I10 HTN, GOAL BELOW 140/90: ICD-10-CM

## 2018-01-22 DIAGNOSIS — G47.00 PERSISTENT INSOMNIA: ICD-10-CM

## 2018-01-22 DIAGNOSIS — E11.9 TYPE 2 DIABETES MELLITUS WITHOUT COMPLICATION, WITHOUT LONG-TERM CURRENT USE OF INSULIN (H): ICD-10-CM

## 2018-01-22 RX ORDER — VALSARTAN AND HYDROCHLOROTHIAZIDE 160; 25 MG/1; MG/1
1 TABLET ORAL EVERY MORNING
Qty: 90 TABLET | Refills: 0 | Status: SHIPPED | OUTPATIENT
Start: 2018-01-22 | End: 2018-03-21

## 2018-01-22 RX ORDER — PAROXETINE 10 MG/1
10 TABLET, FILM COATED ORAL AT BEDTIME
Qty: 1 TABLET | Refills: 0 | COMMUNITY
Start: 2018-01-22 | End: 2018-03-21

## 2018-01-22 RX ORDER — SIMVASTATIN 20 MG
20 TABLET ORAL AT BEDTIME
Qty: 90 TABLET | Refills: 0 | Status: SHIPPED | OUTPATIENT
Start: 2018-01-22 | End: 2018-03-21

## 2018-01-22 RX ORDER — TEMAZEPAM 15 MG/1
CAPSULE ORAL
Qty: 90 CAPSULE | Refills: 0 | Status: SHIPPED | OUTPATIENT
Start: 2018-01-22 | End: 2018-03-21

## 2018-01-22 NOTE — TELEPHONE ENCOUNTER
Panel Management Review      Patient has the following on her problem list:     Asthma review     ACT Total Scores 2/22/2017   ACT TOTAL SCORE -   ASTHMA ER VISITS -   ASTHMA HOSPITALIZATIONS -   ACT TOTAL SCORE (Goal Greater than or Equal to 20) 25   In the past 12 months, how many times did you visit the emergency room for your asthma without being admitted to the hospital? 0   In the past 12 months, how many times were you hospitalized overnight because of your asthma? 0      1. Is Asthma diagnosis on the Problem List? Yes    2. Is Asthma listed on Health Maintenance? Yes    3. Patient is due for:  ACT and AAP    Diabetes    ASA: Not Required     Last A1C  Lab Results   Component Value Date    A1C 6.4 02/22/2017    A1C 6.6 10/26/2016    A1C 6.9 03/10/2016    A1C 6.4 12/09/2014     A1C tested: Passed    Last LDL:    Lab Results   Component Value Date    CHOL 124 02/22/2017     Lab Results   Component Value Date    HDL 47 02/22/2017     Lab Results   Component Value Date    LDL 57 02/22/2017     Lab Results   Component Value Date    TRIG 99 02/22/2017     Lab Results   Component Value Date    CHOLHDLRATIO 3.7 12/09/2014     Lab Results   Component Value Date    NHDL 77 02/22/2017       Is the patient on a Statin? YES             Is the patient on Aspirin? NO    Medications     HMG CoA Reductase Inhibitors    simvastatin (ZOCOR) 20 MG tablet          Last three blood pressure readings:  BP Readings from Last 3 Encounters:   02/22/17 129/82   06/27/16 104/75   03/10/16 113/82       Date of last diabetes office visit: 2/22/17     Tobacco History:     History   Smoking Status     Former Smoker     Quit date: 1/1/1981   Smokeless Tobacco     Never Used               Composite cancer screening  Chart review shows that this patient is due/due soon for the following Fecal Colorectal (FIT)  Summary:    Patient is due/failing the following:   A1C, FIT and LDL    Action needed:   Patient needs office visit for diabetes and  to complete Fit test.    Type of outreach:    Phone, spoke to patient.  Patient is in [place] and will be coming back in March and at that time will be scheduling when she returns. Patient is hoping to get a refill until she can get back.     Questions for provider review:    None                                                                                                                                    Ashwini Weems MA       Chart routed to Care Team .    \

## 2018-01-26 ENCOUNTER — COMMUNICATION - HEALTHEAST (OUTPATIENT)
Dept: SLEEP MEDICINE | Facility: CLINIC | Age: 73
End: 2018-01-26

## 2018-01-26 DIAGNOSIS — R06.83 SNORING: ICD-10-CM

## 2018-01-26 DIAGNOSIS — G47.34 SLEEP RELATED HYPOXIA: ICD-10-CM

## 2018-03-05 ENCOUNTER — COMMUNICATION - HEALTHEAST (OUTPATIENT)
Dept: CARDIOLOGY | Facility: CLINIC | Age: 73
End: 2018-03-05

## 2018-03-05 ENCOUNTER — TELEPHONE (OUTPATIENT)
Dept: FAMILY MEDICINE | Facility: CLINIC | Age: 73
End: 2018-03-05

## 2018-03-05 DIAGNOSIS — I48.19 PERSISTENT ATRIAL FIBRILLATION (H): ICD-10-CM

## 2018-03-05 DIAGNOSIS — E11.9 TYPE 2 DIABETES MELLITUS WITHOUT COMPLICATION, WITHOUT LONG-TERM CURRENT USE OF INSULIN (H): ICD-10-CM

## 2018-03-05 NOTE — TELEPHONE ENCOUNTER
"Requested Prescriptions   Pending Prescriptions Disp Refills     metFORMIN (GLUCOPHAGE) 500 MG tablet [Pharmacy Med Name: METFORMIN 500MG TABLETS]  Last Written Prescription Date:  02/22/17  Last Fill Quantity: 90,  # refills: 3   Last office visit: 2/22/2017 with prescribing provider:  02/22/17   Future Office Visit:     90 tablet 0     Sig: TAKE 1 TABLET(500 MG) BY MOUTH DAILY WITH DINNER    Biguanide Agents Failed    3/5/2018 12:36 PM       Failed - Blood pressure less than 140/90 in past 6 months    BP Readings from Last 3 Encounters:   02/22/17 129/82   06/27/16 104/75   03/10/16 113/82          Failed - Patient has documented LDL within the past 12 mos.    Recent Labs   Lab Test  02/22/17   0953   LDL  57          Failed - Patient has had a Microalbumin in the past 12 mos.    Recent Labs   Lab Test  02/22/17   0953   MICROL  9   UMALCR  5.02          Failed - Patient has documented A1c within the specified period of time.    Recent Labs   Lab Test  02/22/17   0953   A1C  6.4*          Failed - Recent (6 mo) or future (30 days) visit within the authorizing provider's specialty    Patient had office visit in the last 6 months or has a visit in the next 30 days with authorizing provider.  See \"Patient Info\" tab in inbasket, or \"Choose Columns\" in Meds & Orders section of the refill encounter.           Passed - Patient is age 10 or older       Passed - Patient's CR is NOT>1.4 OR Patient's EGFR is NOT<45 within past 12 mos.    Recent Labs   Lab Test  02/22/17   0953   GFRESTIMATED  51*   GFRESTBLACK  62     Recent Labs   Lab Test  02/22/17   0953   CR  1.06*          Passed - Patient does NOT have a diagnosis of CHF.       Passed - Patient is not pregnant       Passed - Patient has not had a positive pregnancy test within the past 12 mos.           "

## 2018-03-05 NOTE — TELEPHONE ENCOUNTER
Routing refill request to provider for review/approval because:  Labs out of range:  See below    Johanne Lovelace RN

## 2018-03-19 ENCOUNTER — RECORDS - HEALTHEAST (OUTPATIENT)
Dept: SLEEP MEDICINE | Facility: CLINIC | Age: 73
End: 2018-03-19

## 2018-03-19 DIAGNOSIS — G47.34 IDIOPATHIC SLEEP RELATED NONOBSTRUCTIVE ALVEOLAR HYPOVENTILATION: ICD-10-CM

## 2018-03-19 DIAGNOSIS — R06.83 SNORING: ICD-10-CM

## 2018-03-21 ENCOUNTER — TELEPHONE (OUTPATIENT)
Dept: FAMILY MEDICINE | Facility: CLINIC | Age: 73
End: 2018-03-21

## 2018-03-21 ENCOUNTER — OFFICE VISIT (OUTPATIENT)
Dept: FAMILY MEDICINE | Facility: CLINIC | Age: 73
End: 2018-03-21
Payer: COMMERCIAL

## 2018-03-21 VITALS
SYSTOLIC BLOOD PRESSURE: 117 MMHG | WEIGHT: 293 LBS | DIASTOLIC BLOOD PRESSURE: 74 MMHG | OXYGEN SATURATION: 93 % | HEIGHT: 68 IN | HEART RATE: 60 BPM | RESPIRATION RATE: 18 BRPM | TEMPERATURE: 96.7 F | BODY MASS INDEX: 44.41 KG/M2

## 2018-03-21 DIAGNOSIS — J45.20 MILD INTERMITTENT ASTHMA WITHOUT COMPLICATION: ICD-10-CM

## 2018-03-21 DIAGNOSIS — Z00.00 MEDICARE ANNUAL WELLNESS VISIT, SUBSEQUENT: Primary | ICD-10-CM

## 2018-03-21 DIAGNOSIS — F43.23 ADJUSTMENT DISORDER WITH MIXED ANXIETY AND DEPRESSED MOOD: ICD-10-CM

## 2018-03-21 DIAGNOSIS — I10 HTN, GOAL BELOW 140/90: ICD-10-CM

## 2018-03-21 DIAGNOSIS — G47.00 PERSISTENT INSOMNIA: ICD-10-CM

## 2018-03-21 DIAGNOSIS — Z12.11 SPECIAL SCREENING FOR MALIGNANT NEOPLASMS, COLON: ICD-10-CM

## 2018-03-21 DIAGNOSIS — E11.9 TYPE 2 DIABETES MELLITUS WITHOUT COMPLICATION, WITHOUT LONG-TERM CURRENT USE OF INSULIN (H): ICD-10-CM

## 2018-03-21 LAB
ANION GAP SERPL CALCULATED.3IONS-SCNC: 5 MMOL/L (ref 3–14)
BUN SERPL-MCNC: 22 MG/DL (ref 7–30)
CALCIUM SERPL-MCNC: 8.8 MG/DL (ref 8.5–10.1)
CHLORIDE SERPL-SCNC: 101 MMOL/L (ref 94–109)
CHOLEST SERPL-MCNC: 117 MG/DL
CO2 SERPL-SCNC: 32 MMOL/L (ref 20–32)
CREAT SERPL-MCNC: 0.88 MG/DL (ref 0.52–1.04)
CREAT UR-MCNC: 151 MG/DL
GFR SERPL CREATININE-BSD FRML MDRD: 64 ML/MIN/1.7M2
GLUCOSE SERPL-MCNC: 131 MG/DL (ref 70–99)
HBA1C MFR BLD: 6.4 % (ref 4.3–6)
HDLC SERPL-MCNC: 43 MG/DL
LDLC SERPL CALC-MCNC: 50 MG/DL
MICROALBUMIN UR-MCNC: 8 MG/L
MICROALBUMIN/CREAT UR: 4.99 MG/G CR (ref 0–25)
NONHDLC SERPL-MCNC: 74 MG/DL
POTASSIUM SERPL-SCNC: 4.3 MMOL/L (ref 3.4–5.3)
SODIUM SERPL-SCNC: 138 MMOL/L (ref 133–144)
TRIGL SERPL-MCNC: 120 MG/DL

## 2018-03-21 PROCEDURE — 82043 UR ALBUMIN QUANTITATIVE: CPT | Performed by: FAMILY MEDICINE

## 2018-03-21 PROCEDURE — 36415 COLL VENOUS BLD VENIPUNCTURE: CPT | Performed by: FAMILY MEDICINE

## 2018-03-21 PROCEDURE — 80061 LIPID PANEL: CPT | Performed by: FAMILY MEDICINE

## 2018-03-21 PROCEDURE — G0439 PPPS, SUBSEQ VISIT: HCPCS | Performed by: FAMILY MEDICINE

## 2018-03-21 PROCEDURE — 80048 BASIC METABOLIC PNL TOTAL CA: CPT | Performed by: FAMILY MEDICINE

## 2018-03-21 PROCEDURE — 83036 HEMOGLOBIN GLYCOSYLATED A1C: CPT | Performed by: FAMILY MEDICINE

## 2018-03-21 PROCEDURE — 99213 OFFICE O/P EST LOW 20 MIN: CPT | Mod: 25 | Performed by: FAMILY MEDICINE

## 2018-03-21 RX ORDER — LEVALBUTEROL TARTRATE 45 UG/1
2 AEROSOL, METERED ORAL EVERY 8 HOURS PRN
Qty: 1 INHALER | Refills: 3 | Status: SHIPPED | OUTPATIENT
Start: 2018-03-21 | End: 2019-03-21

## 2018-03-21 RX ORDER — SIMVASTATIN 20 MG
20 TABLET ORAL AT BEDTIME
Qty: 90 TABLET | Refills: 3 | Status: SHIPPED | OUTPATIENT
Start: 2018-03-21 | End: 2019-03-21

## 2018-03-21 RX ORDER — MAGNESIUM CARB/ALUMINUM HYDROX 105-160MG
250 TABLET,CHEWABLE ORAL
COMMUNITY
End: 2019-12-16

## 2018-03-21 RX ORDER — PAROXETINE 10 MG/1
10 TABLET, FILM COATED ORAL AT BEDTIME
Qty: 90 TABLET | Refills: 3 | Status: SHIPPED | OUTPATIENT
Start: 2018-03-21 | End: 2019-03-21

## 2018-03-21 RX ORDER — SOTALOL HYDROCHLORIDE 120 MG/1
120 TABLET ORAL
COMMUNITY
Start: 2018-01-09 | End: 2019-12-16

## 2018-03-21 RX ORDER — VALSARTAN AND HYDROCHLOROTHIAZIDE 160; 25 MG/1; MG/1
1 TABLET ORAL EVERY MORNING
Qty: 90 TABLET | Refills: 3 | Status: SHIPPED | OUTPATIENT
Start: 2018-03-21 | End: 2018-07-27

## 2018-03-21 RX ORDER — CHOLECALCIFEROL (VITAMIN D3) 50 MCG
4000 TABLET ORAL
COMMUNITY
End: 2022-08-23

## 2018-03-21 RX ORDER — TEMAZEPAM 15 MG/1
CAPSULE ORAL
Qty: 90 CAPSULE | Refills: 3 | Status: SHIPPED | OUTPATIENT
Start: 2018-03-21 | End: 2018-11-15

## 2018-03-21 ASSESSMENT — PAIN SCALES - GENERAL: PAINLEVEL: NO PAIN (0)

## 2018-03-21 NOTE — TELEPHONE ENCOUNTER
[place] [facility] [place] 3/21/18 by Rosa Larry    Prior Authorization Retail Medication Request    Medication/Dose: Levalbuterol  ICD code (if different than what is on RX):    Previously Tried and Failed:  Advair; albuterol; proventil; ventolin; proair; singulair  Rationale:       Insurance Name:  [facility]  Insurance ID:  [id number]      Pharmacy Information (if different than what is on RX)  Name:    Phone:

## 2018-03-21 NOTE — TELEPHONE ENCOUNTER
Per fax received from Amanda - Levalbuterol is not covered by patient's Insurance Company  Dr. Stockton - Please choose:  1.  Change medication that is not covered to a different medication and send new prescription to patient's pharmacy?  2.  Patient will need to pay for the non-covered medication out-of-pocket?   3.  Try for Prior Authorization with Insurance Company to get medication covered?        P.A. Phone #:  [phone]       P.A.  ID#:  [id number]

## 2018-03-21 NOTE — MR AVS SNAPSHOT
After Visit Summary   3/21/2018    Romy Hurley    MRN: [id number]           Patient Information     Date Of Birth          1945        Visit Information        Provider Department      3/21/2018 9:40 AM Adeola Stockton MD [facility]        Today's Diagnoses     Medicare annual wellness visit, subsequent    -  1    Special screening for malignant neoplasms, colon        Type 2 diabetes mellitus without complication, without long-term current use of insulin (H)        HTN, goal below 140/90        Persistent insomnia        Mild intermittent asthma without complication        Adjustment disorder with mixed anxiety and depressed mood          Care Instructions          Thank you for choosing [facility].  You may be receiving a survey in the mail from [employer] regarding your visit today.  Please take a few minutes to complete and return the survey to let us know how we are doing.      Our Clinic hours are:  Mondays    7:20 am - 7 pm  Tues -  Fri  7:20 am - 5 pm    Clinic Phone: [phone]    The clinic lab opens at 7:30 am Mon - Fri and appointments are required.    [facility]  Ph. [phone]  Monday-Thursday 8 am - 7pm  Tues/Wed/Fri 8 am - 5:30 pm           Preventive Health Recommendations    Female Ages 65 +    Yearly exam:     See your health care provider every year in order to  o Review health changes.   o Discuss preventive care.    o Review your medicines if your doctor has prescribed any.      You no longer need a yearly Pap test unless you've had an abnormal Pap test in the past 10 years. If you have vaginal symptoms, such as bleeding or discharge, be sure to talk with your provider about a Pap test.      Every 1 to 2 years, have a mammogram.  If you are over 69, talk with your health care provider about whether or not you want to continue having screening mammograms.      Every 10 years, have a colonoscopy. Or, have a yearly FIT test  (stool test). These exams will check for colon cancer.       Have a cholesterol test every 5 years, or more often if your doctor advises it.       Have a diabetes test (fasting glucose) every three years. If you are at risk for diabetes, you should have this test more often.       At age 65, have a bone density scan (DEXA) to check for osteoporosis (brittle bone disease).    Shots:    Get a flu shot each year.    Get a tetanus shot every 10 years.    Talk to your doctor about your pneumonia vaccines. There are now two you should receive - Pneumovax (PPSV 23) and Prevnar (PCV 13).    Talk to your doctor about the shingles vaccine.    Talk to your doctor about the hepatitis B vaccine.    Nutrition:     Eat at least 5 servings of fruits and vegetables each day.      Eat whole-grain bread, whole-wheat pasta and brown rice instead of white grains and rice.      Talk to your provider about Calcium and Vitamin D.     Lifestyle    Exercise at least 150 minutes a week (30 minutes a day, 5 days a week). This will help you control your weight and prevent disease.      Limit alcohol to one drink per day.      No smoking.       Wear sunscreen to prevent skin cancer.       See your dentist twice a year for an exam and cleaning.      See your eye doctor every 1 to 2 years to screen for conditions such as glaucoma, macular degeneration and cataracts.          Follow-ups after your visit        Future tests that were ordered for you today     Open Future Orders        Priority Expected Expires Ordered    Fecal colorectal cancer screen (FIT) Routine 4/11/2018 6/13/2018 3/21/2018            Who to contact     If you have questions or need follow up information about today's clinic visit or your schedule please contact [facility] directly at [phone].  Normal or non-critical lab and imaging results will be communicated to you by MyChart, letter or phone within 4 business days after the clinic has received the  "results. If you do not hear from us within 7 days, please contact the clinic through [employer] or phone. If you have a critical or abnormal lab result, we will notify you by phone as soon as possible.  Submit refill requests through [employer] or call your pharmacy and they will forward the refill request to us. Please allow 3 business days for your refill to be completed.          Additional Information About Your Visit        [employer] Information     [employer] gives you secure access to your electronic health record. If you see a primary care provider, you can also send messages to your care team and make appointments. If you have questions, please call your primary care clinic.  If you do not have a primary care provider, please call [phone] and they will assist you.        Care EveryWhere ID     This is your Care EveryWhere ID. This could be used by other organizations to access your [facility] medical records  [id number]        Your Vitals Were     Pulse Temperature Respirations Height Pulse Oximetry Breastfeeding?    60 96.7  F (35.9  C) (Tympanic) 18 5' 7.5\" (1.715 m) 93% No    BMI (Body Mass Index)                   45.83 kg/m2            Blood Pressure from Last 3 Encounters:   03/21/18 117/74   02/22/17 129/82   06/27/16 104/75    Weight from Last 3 Encounters:   03/21/18 297 lb (134.7 kg)   02/22/17 296 lb (134.3 kg)   11/08/16 299 lb 12.8 oz (136 kg)              We Performed the Following     Albumin Random Urine Quantitative with Creat Ratio     Asthma Action Plan (AAP)     Basic metabolic panel     Hemoglobin A1c     Lipid panel reflex to direct LDL Fasting     OFFICE/OUTPT VISIT,JILLEVL III          Today's Medication Changes          These changes are accurate as of 3/21/18 10:21 AM.  If you have any questions, ask your nurse or doctor.               Stop taking these medicines if you haven't already. Please contact your care team if you have questions.     FLECAINIDE ACETATE PO   Stopped by:  " Adeola Stockton MD           metoprolol tartrate 100 MG tablet   Commonly known as:  LOPRESSOR   Stopped by:  Adeola Stockton MD                Where to get your medicines      These medications were sent to [id number] - [place] [id number] [facility] AT [facility] OF [address]  [address] [facility] [address]     Phone:  [phone]     fluticasone-salmeterol 250-50 MCG/DOSE diskus inhaler    levalbuterol 45 MCG/ACT Inhaler    metFORMIN 500 MG tablet    PARoxetine 10 MG tablet    simvastatin 20 MG tablet    valsartan-hydrochlorothiazide 160-25 MG per tablet         Some of these will need a paper prescription and others can be bought over the counter.  Ask your nurse if you have questions.     Bring a paper prescription for each of these medications     temazepam 15 MG capsule                Primary Care Provider Office Phone # Fax #    Adeola Stockton [phone] [address] [facility] [address]        Equal Access to Services     [facility] AH: Hadii aad ku hadasho Soomaali, waaxda luqadaha, qaybta kaalmada adeegyada, waxay idiin hayaan adeeg kharash lajayshree . So [facility] [phone].    ATENCIÓN: Si habla español, tiene a mendez disposición servicios gratuitos de asistencia lingüística. [facility] [phone].    We comply with applicable federal civil rights laws and [place] laws. We do not discriminate on the basis of race, color, national origin, age, disability, sex, sexual orientation, or gender identity.            Thank you!     Thank you for choosing [facility]  for your care. Our goal is always to provide you with excellent care. Hearing back from our patients is one way we can continue to improve our services. Please take a few minutes to complete the written survey that you may receive in the mail after your visit with us. Thank you!             Your Updated Medication List - Protect others around you: Learn how to  safely use, store and throw away your medicines at www.disposemymeds.org.          This list is accurate as of 3/21/18 10:21 AM.  Always use your most recent med list.                   Brand Name Dispense Instructions for use Diagnosis    blood glucose monitoring lancets     1 Box    Use to test blood sugars 1 times daily or as directed.    Uncomplicated type 2 diabetes mellitus (H)       blood glucose monitoring test strip    ONETOUCH VERIO IQ    100 each    Use to test blood sugars 1 times daily or as directed.    Uncomplicated type 2 diabetes mellitus (H)       ELIQUIS 5 MG tablet   Generic drug:  apixaban ANTICOAGULANT      TAKE ONE TABLET BY MOUTH TWICE DAILY        fluticasone-salmeterol 250-50 MCG/DOSE diskus inhaler    ADVAIR DISKUS    1 Inhaler    Inhale 1 puff into the lungs 2 times daily    Mild intermittent asthma without complication       levalbuterol 45 MCG/ACT Inhaler    XOPENEX HFA    1 Inhaler    Inhale 2 puffs into the lungs every 8 hours as needed for shortness of breath / dyspnea Needs an appt for next refills    Mild intermittent asthma without complication       metFORMIN 500 MG tablet    GLUCOPHAGE    90 tablet    TAKE 1 TABLET(500 MG) BY MOUTH DAILY WITH DINNER    Type 2 diabetes mellitus without complication, without long-term current use of insulin (H)       MULTIVITAMIN+ PO           PARoxetine 10 MG tablet    PAXIL    90 tablet    Take 1 tablet (10 mg) by mouth At Bedtime    Adjustment disorder with mixed anxiety and depressed mood       simvastatin 20 MG tablet    ZOCOR    90 tablet    Take 1 tablet (20 mg) by mouth At Bedtime    Type 2 diabetes mellitus without complication, without long-term current use of insulin (H)       SM MAGNESIUM CITRATE 1.745 GM/30ML solution   Generic drug:  magnesium citrate      Take 250 mg by mouth        sotalol 120 MG tablet    BETAPACE     Take 120 mg by mouth        temazepam 15 MG capsule    RESTORIL    90 capsule    TAKE 1 CAPSULE BY MOUTH NIGHTLY  AS NEEDED FOR SLEEP    Persistent insomnia       valsartan-hydrochlorothiazide 160-25 MG per tablet    DIOVAN HCT    90 tablet    Take 1 tablet by mouth every morning    HTN, goal below 140/90       vitamin D 2000 UNITS tablet      Take 4,000 Units by mouth        XARELTO 20 MG Tabs tablet   Generic drug:  rivaroxaban ANTICOAGULANT      Take 20 mg by mouth daily (with dinner)

## 2018-03-21 NOTE — PROGRESS NOTES
HgbA1c stable at 6.4%, kidney function stable.  No protein in the urine.  Lipids look good.    Adeola Stockton M.D.

## 2018-03-21 NOTE — NURSING NOTE
"Chief Complaint   Patient presents with     Wellness Visit       Initial /74  Pulse 60  Temp 96.7  F (35.9  C) (Tympanic)  Resp 18  Ht 5' 7.5\" (1.715 m)  Wt 297 lb (134.7 kg)  SpO2 93%  Breastfeeding? No  BMI 45.83 kg/m2 Estimated body mass index is 45.83 kg/(m^2) as calculated from the following:    Height as of this encounter: 5' 7.5\" (1.715 m).    Weight as of this encounter: 297 lb (134.7 kg).  Medication Reconciliation: complete    "

## 2018-03-21 NOTE — PATIENT INSTRUCTIONS
Thank you for choosing [facility].  You may be receiving a survey in the mail from [employer] regarding your visit today.  Please take a few minutes to complete and return the survey to let us know how we are doing.      Our Clinic hours are:  Mondays    7:20 am - 7 pm  Tues - Fri  7:20 am - 5 pm    Clinic Phone: [phone]    The clinic lab opens at 7:30 am Mon - Fri and appointments are required.    [facility]. [phone]  Monday-Thursday 8 am - 7pm  Tues/Wed/Fri 8 am - 5:30 pm           Preventive Health Recommendations    Female Ages 65 +    Yearly exam:     See your health care provider every year in order to  o Review health changes.   o Discuss preventive care.    o Review your medicines if your doctor has prescribed any.      You no longer need a yearly Pap test unless you've had an abnormal Pap test in the past 10 years. If you have vaginal symptoms, such as bleeding or discharge, be sure to talk with your provider about a Pap test.      Every 1 to 2 years, have a mammogram.  If you are over 69, talk with your health care provider about whether or not you want to continue having screening mammograms.      Every 10 years, have a colonoscopy. Or, have a yearly FIT test (stool test). These exams will check for colon cancer.       Have a cholesterol test every 5 years, or more often if your doctor advises it.       Have a diabetes test (fasting glucose) every three years. If you are at risk for diabetes, you should have this test more often.       At age 65, have a bone density scan (DEXA) to check for osteoporosis (brittle bone disease).    Shots:    Get a flu shot each year.    Get a tetanus shot every 10 years.    Talk to your doctor about your pneumonia vaccines. There are now two you should receive - Pneumovax (PPSV 23) and Prevnar (PCV 13).    Talk to your doctor about the shingles vaccine.    Talk to your doctor about the hepatitis B vaccine.    Nutrition:      Eat at least 5 servings of fruits and vegetables each day.      Eat whole-grain bread, whole-wheat pasta and brown rice instead of white grains and rice.      Talk to your provider about Calcium and Vitamin D.     Lifestyle    Exercise at least 150 minutes a week (30 minutes a day, 5 days a week). This will help you control your weight and prevent disease.      Limit alcohol to one drink per day.      No smoking.       Wear sunscreen to prevent skin cancer.       See your dentist twice a year for an exam and cleaning.      See your eye doctor every 1 to 2 years to screen for conditions such as glaucoma, macular degeneration and cataracts.

## 2018-03-21 NOTE — TELEPHONE ENCOUNTER
Please do prior authorization.  Patient has paroxysmal atrial fibrillation that has been made worse by regular albuterol.  The levalbuterol has less side effects and less tachycardia associated with it an she has been stable on this for many years.    Adeola Stockton M.D.

## 2018-03-22 ENCOUNTER — RECORDS - HEALTHEAST (OUTPATIENT)
Dept: ADMINISTRATIVE | Facility: OTHER | Age: 73
End: 2018-03-22

## 2018-03-22 ASSESSMENT — ASTHMA QUESTIONNAIRES: ACT_TOTALSCORE: 20

## 2018-03-23 ENCOUNTER — COMMUNICATION - HEALTHEAST (OUTPATIENT)
Dept: SLEEP MEDICINE | Facility: CLINIC | Age: 73
End: 2018-03-23

## 2018-03-23 NOTE — TELEPHONE ENCOUNTER
Central Prior Authorization Team   Phone: [phone]    PA Initiation    Medication: Levalbuterol  Insurance Company: [employer] - Phone [phone] Fax [phone]  Pharmacy Filling the Rx: [email or web address] [address] [place] [address] AVE AT [address]  Filling Pharmacy Phone: [phone]  Filling Pharmacy Fax:    Start Date: 3/23/2018

## 2018-03-26 ENCOUNTER — COMMUNICATION - HEALTHEAST (OUTPATIENT)
Dept: SLEEP MEDICINE | Facility: CLINIC | Age: 73
End: 2018-03-26

## 2018-03-27 ENCOUNTER — OFFICE VISIT - HEALTHEAST (OUTPATIENT)
Dept: CARDIOLOGY | Facility: CLINIC | Age: 73
End: 2018-03-27

## 2018-03-27 ENCOUNTER — TRANSFERRED RECORDS (OUTPATIENT)
Dept: HEALTH INFORMATION MANAGEMENT | Facility: CLINIC | Age: 73
End: 2018-03-27

## 2018-03-27 DIAGNOSIS — I48.19 PERSISTENT ATRIAL FIBRILLATION (H): ICD-10-CM

## 2018-03-27 DIAGNOSIS — I49.9 ARRHYTHMIA: ICD-10-CM

## 2018-03-27 ASSESSMENT — MIFFLIN-ST. JEOR: SCORE: 1909.29

## 2018-03-27 NOTE — TELEPHONE ENCOUNTER
Prior Authorization Approval    Authorization Effective Date: 2/21/2018  Authorization Expiration Date: 3/23/2019  Medication: Levalbuterol  Approved Dose/Quantity:    Reference #:     Insurance Company: [employer] - Phone [phone] Fax [phone]  Expected CoPay: 54.99     CoPay Card Available:      Foundation Assistance Needed:    Which Pharmacy is filling the prescription (Not needed for infusion/clinic administered): [facility] [address] [place] [address] AT [address]  Pharmacy Notified: Yes  Patient Notified: Yes

## 2018-03-27 NOTE — TELEPHONE ENCOUNTER
Prior Authorization Approval    Authorization Effective Date: 2/21/2018  Authorization Expiration Date: 3/23/2019  Medication: Levalbuterol  Approved Dose/Quantity:    Reference #:     Insurance Company: [employer] - Phone [phone] Fax [phone]  Expected CoPay: 54.99     CoPay Card Available:      Foundation Assistance Needed:    Which Pharmacy is filling the prescription (Not needed for infusion/clinic administered): [facility] [address] [place] [address] AT [address]  Pharmacy Notified: Yes  Patient Notified: Yes

## 2018-03-28 LAB
ATRIAL RATE - MUSE: 77 BPM
DIASTOLIC BLOOD PRESSURE - MUSE: NORMAL MMHG
INTERPRETATION ECG - MUSE: NORMAL
P AXIS - MUSE: NORMAL DEGREES
PR INTERVAL - MUSE: NORMAL MS
QRS DURATION - MUSE: 80 MS
QT - MUSE: 374 MS
QTC - MUSE: 467 MS
R AXIS - MUSE: -11 DEGREES
SYSTOLIC BLOOD PRESSURE - MUSE: NORMAL MMHG
T AXIS - MUSE: 10 DEGREES
VENTRICULAR RATE- MUSE: 94 BPM

## 2018-03-29 PROCEDURE — 82274 ASSAY TEST FOR BLOOD FECAL: CPT | Performed by: FAMILY MEDICINE

## 2018-03-31 LAB — HEMOCCULT STL QL IA: NEGATIVE

## 2018-04-02 DIAGNOSIS — Z12.11 SPECIAL SCREENING FOR MALIGNANT NEOPLASMS, COLON: ICD-10-CM

## 2018-04-06 ENCOUNTER — COMMUNICATION - HEALTHEAST (OUTPATIENT)
Dept: CARDIOLOGY | Facility: CLINIC | Age: 73
End: 2018-04-06

## 2018-04-10 ENCOUNTER — OFFICE VISIT - HEALTHEAST (OUTPATIENT)
Dept: SLEEP MEDICINE | Facility: CLINIC | Age: 73
End: 2018-04-10

## 2018-04-10 DIAGNOSIS — G47.21 CIRCADIAN RHYTHM SLEEP DISORDER, DELAYED SLEEP PHASE TYPE: ICD-10-CM

## 2018-04-10 DIAGNOSIS — G47.8 SLEEP DYSFUNCTION WITH SLEEP STAGE DISTURBANCE: ICD-10-CM

## 2018-04-10 DIAGNOSIS — G47.33 OSA (OBSTRUCTIVE SLEEP APNEA): ICD-10-CM

## 2018-04-10 ASSESSMENT — MIFFLIN-ST. JEOR: SCORE: 1873

## 2018-05-03 ENCOUNTER — MYC REFILL (OUTPATIENT)
Dept: FAMILY MEDICINE | Facility: CLINIC | Age: 73
End: 2018-05-03

## 2018-05-03 DIAGNOSIS — I10 HTN, GOAL BELOW 140/90: ICD-10-CM

## 2018-05-03 DIAGNOSIS — E11.9 TYPE 2 DIABETES MELLITUS WITHOUT COMPLICATION, WITHOUT LONG-TERM CURRENT USE OF INSULIN (H): ICD-10-CM

## 2018-05-03 RX ORDER — VALSARTAN AND HYDROCHLOROTHIAZIDE 160; 25 MG/1; MG/1
1 TABLET ORAL EVERY MORNING
Qty: 90 TABLET | Refills: 3 | Status: CANCELLED | OUTPATIENT
Start: 2018-05-03

## 2018-05-03 RX ORDER — SIMVASTATIN 20 MG
20 TABLET ORAL AT BEDTIME
Qty: 90 TABLET | Refills: 3 | Status: CANCELLED | OUTPATIENT
Start: 2018-05-03

## 2018-05-03 NOTE — TELEPHONE ENCOUNTER
Message from [employer]:  Original authorizing provider: MD Romy Jung would like a refill of the following medications:  valsartan-hydrochlorothiazide (DIOVAN HCT) 160-25 MG per tablet [Adeola Stockton MD]  simvastatin (ZOCOR) 20 MG tablet [Adeola Stockton MD]    Preferred pharmacy: [facility] [address] [place] [address] AT [address]    Comment:

## 2018-06-04 ENCOUNTER — COMMUNICATION - HEALTHEAST (OUTPATIENT)
Dept: CARDIOLOGY | Facility: CLINIC | Age: 73
End: 2018-06-04

## 2018-06-04 DIAGNOSIS — I48.19 PERSISTENT ATRIAL FIBRILLATION (H): ICD-10-CM

## 2018-06-25 ENCOUNTER — OFFICE VISIT - HEALTHEAST (OUTPATIENT)
Dept: CARDIOLOGY | Facility: CLINIC | Age: 73
End: 2018-06-25

## 2018-06-25 ENCOUNTER — TRANSFERRED RECORDS (OUTPATIENT)
Dept: HEALTH INFORMATION MANAGEMENT | Facility: CLINIC | Age: 73
End: 2018-06-25

## 2018-06-25 DIAGNOSIS — G47.33 OSA (OBSTRUCTIVE SLEEP APNEA): ICD-10-CM

## 2018-06-25 DIAGNOSIS — I48.19 PERSISTENT ATRIAL FIBRILLATION (H): ICD-10-CM

## 2018-06-25 DIAGNOSIS — I10 ESSENTIAL HYPERTENSION: ICD-10-CM

## 2018-06-25 ASSESSMENT — MIFFLIN-ST. JEOR: SCORE: 1818.57

## 2018-07-26 ENCOUNTER — MYC MEDICAL ADVICE (OUTPATIENT)
Dept: FAMILY MEDICINE | Facility: CLINIC | Age: 73
End: 2018-07-26

## 2018-07-26 DIAGNOSIS — I10 HTN, GOAL BELOW 140/90: Primary | ICD-10-CM

## 2018-07-26 NOTE — TELEPHONE ENCOUNTER
Dr. Stockton,    This patient is on Valsartan/HCTZ and has received a letter from [employer] about the impurity problem with it.  She is asking for a substitute for the Valsartan/HCTZ. Sofia MOTT RN

## 2018-07-27 RX ORDER — LOSARTAN POTASSIUM AND HYDROCHLOROTHIAZIDE 25; 100 MG/1; MG/1
1 TABLET ORAL DAILY
Qty: 90 TABLET | Refills: 3 | Status: SHIPPED | OUTPATIENT
Start: 2018-07-27 | End: 2019-03-21

## 2018-07-27 NOTE — TELEPHONE ENCOUNTER
Change to losartan/hctz 100/25mg.  Sent to pharmacy.    Notify patient.     Adeola Stockton M.D.

## 2018-09-04 ENCOUNTER — COMMUNICATION - HEALTHEAST (OUTPATIENT)
Dept: CARDIOLOGY | Facility: CLINIC | Age: 73
End: 2018-09-04

## 2018-09-04 DIAGNOSIS — I48.19 PERSISTENT ATRIAL FIBRILLATION (H): ICD-10-CM

## 2018-10-06 ENCOUNTER — COMMUNICATION - HEALTHEAST (OUTPATIENT)
Dept: CARDIOLOGY | Facility: CLINIC | Age: 73
End: 2018-10-06

## 2018-10-06 DIAGNOSIS — I48.19 PERSISTENT ATRIAL FIBRILLATION (H): ICD-10-CM

## 2018-11-12 ENCOUNTER — TELEPHONE (OUTPATIENT)
Dept: FAMILY MEDICINE | Facility: CLINIC | Age: 73
End: 2018-11-12

## 2018-11-12 DIAGNOSIS — G47.00 PERSISTENT INSOMNIA: ICD-10-CM

## 2018-11-12 NOTE — TELEPHONE ENCOUNTER
Requested Prescriptions   Pending Prescriptions Disp Refills     temazepam (RESTORIL) 15 MG capsule [Pharmacy Med Name: TEMAZEPAM 15MG CAPSULES] 90 capsule 0     Sig: TAKE ONE CAPSULE BY MOUTH EVERY NIGHT AT BEDTIME AS NEEDED FOR SLEEP    There is no refill protocol information for this order        temazepam (RESTORIL) 15 MG capsule [Pharmacy Med Name: TEMAZEPAM 15MG CAPSULES] 90 capsule 0     Sig: TAKE ONE CAPSULE BY MOUTH EVERY NIGHT AT BEDTIME AS NEEDED FOR SLEEP    There is no refill protocol information for this order        Last Written Prescription Date:  3/21/18  Last Fill Quantity: 90,  # refills: 3   Last office visit: 3/21/2018 with prescribing provider:  SONIA   Future Office Visit:

## 2018-11-13 RX ORDER — TEMAZEPAM 15 MG/1
CAPSULE ORAL
Qty: 90 CAPSULE | Refills: 0 | OUTPATIENT
Start: 2018-11-13

## 2018-11-13 NOTE — TELEPHONE ENCOUNTER
Duplicate  Medication Detail      Disp Refills Start End SOLOMON   temazepam (RESTORIL) 15 MG capsule 90 capsule 3 3/21/2018  No   Sig: TAKE 1 CAPSULE BY MOUTH NIGHTLY AS NEEDED FOR SLEEP   Class: Local Print   Order: [id number]     Geir CLEMENS RN

## 2018-11-15 RX ORDER — TEMAZEPAM 15 MG/1
CAPSULE ORAL
Qty: 90 CAPSULE | Refills: 1 | Status: SHIPPED | OUTPATIENT
Start: 2018-11-15 | End: 2019-03-21

## 2018-11-15 NOTE — TELEPHONE ENCOUNTER
Routing refill request to provider for review/approval because:  Drug not on the [facility] refill protocol   Geri CLEMENS RN

## 2018-11-15 NOTE — TELEPHONE ENCOUNTER
Pt called for refill on Temazepam - Pt needs new Rx sent to pharmacy - This med Rx is only good for 6 mo, even though it was written for 1 year.  No need to call patient back, unless there are questions or problems.

## 2018-12-22 ENCOUNTER — HOSPITAL ENCOUNTER (EMERGENCY)
Facility: CLINIC | Age: 73
Discharge: HOME | End: 2018-12-22
Attending: PHYSICIAN ASSISTANT | Admitting: PHYSICIAN ASSISTANT
Payer: COMMERCIAL

## 2018-12-22 VITALS
DIASTOLIC BLOOD PRESSURE: 83 MMHG | RESPIRATION RATE: 14 BRPM | TEMPERATURE: 96 F | HEART RATE: 95 BPM | OXYGEN SATURATION: 99 % | SYSTOLIC BLOOD PRESSURE: 123 MMHG

## 2018-12-22 DIAGNOSIS — M79.675 TOE PAIN, LEFT: ICD-10-CM

## 2018-12-22 PROCEDURE — 99214 OFFICE O/P EST MOD 30 MIN: CPT | Mod: Z6 | Performed by: PHYSICIAN ASSISTANT

## 2018-12-22 PROCEDURE — G0463 HOSPITAL OUTPT CLINIC VISIT: HCPCS | Performed by: PHYSICIAN ASSISTANT

## 2018-12-22 RX ORDER — CEPHALEXIN 500 MG/1
500 CAPSULE ORAL 4 TIMES DAILY
Qty: 28 CAPSULE | Refills: 0 | Status: SHIPPED | OUTPATIENT
Start: 2018-12-22 | End: 2019-03-21

## 2018-12-22 NOTE — ED PROVIDER NOTES
History     Chief Complaint   Patient presents with     Toe Pain     HPI  Romy Hurley is a 73 year old female asthmatic with history significant for type 2 diabetes, paroxysmal atrial fibrillation, mild intermittent asthma, adjustment disorder who presents with urgent care with concern over possible infection in her left great toe.  Patient states beginning yesterday she noted pruritus, erythema, swelling, warmth to the touch in her left great toe.  She did have some pain initially which seems to have improved.  She denies any fever, chills, myalgias, new onset cough, chest pains, dyspnea, wheezing he however states that she has likely been in atrial fibrillation since last night.  She is on sotalol and Eliquis for this.  She states concern over the possibility of infection given her history of diabetes mellitus and that given upcoming holiday she may not be able to get into see her PCP if symptoms worsen.      Problem List:    Patient Active Problem List    Diagnosis Date Noted     Adjustment disorder with mixed anxiety and depressed mood 03/21/2018     Priority: Medium     Type 2 diabetes mellitus without complication, without long-term current use of insulin (H) 02/22/2017     Priority: Medium     Mild intermittent asthma without complication 03/10/2016     Priority: Medium     Persistent insomnia 03/10/2016     Priority: Medium     Health Care Home 08/06/2013     Priority: Medium     Yeni Hidalgo RN-PHN   [phone]  A / [facility] Care Coordinator /                   Advanced directives, counseling/discussion 05/21/2012     Priority: Medium     Advance Directive received and scanned. Click on Code in the patient header to view. 6/21/2012          Paroxysmal atrial fibrillation (H) 04/05/2012     Priority: Medium     Presented to [facility] with chest pain  Afib with RVR    Lexican nuclear stress done 3/29/2012  Negative for inducible ischemia  EF 70%  Increased  "metoprolol from 25 to 50 mg twice daily     Cardioversion 2012       CARDIOVASCULAR SCREENING; LDL GOAL LESS THAN 130 10/31/2010     Priority: Medium     Insomnia 03/10/2010     Priority: Medium     HTN, goal below 140/90 03/10/2010     Priority: Medium     3/10/10- pt has with her results over the past two months- all normal at home and was \"low\" in the hospital with her knee replacement.       Total knee replacement status 2010     Priority: Medium     Osteoarthritis 2010     Priority: Medium     Mild intermittent asthma 10/31/2007     Priority: Medium      Past Medical History:    Past Medical History:   Diagnosis Date     Mild intermittent asthma      Past Surgical History:    Past Surgical History:   Procedure Laterality Date     ARTHROSCOPY SHOULDER RT/LT  11    right with subacromial decompression and rotator cuff repair, massive     HC CARDIOVERSION  2012    [facility] - done for a fib     JOINT REPLACEMTN, KNEE RT/LT  2010    LEFT     JOINT REPLACEMTN, KNEE RT/LT  2010    right     Family History:    Family History   Problem Relation Age of Onset     Genitourinary Problems Mother         Ovarian Cancer     Gastrointestinal Disease Mother         Had gallbladder romoved     Cancer Father         Lung     Gastrointestinal Disease Father         Had gallbladder romoved     C.A.D. Paternal Grandfather         Coronary     Gastrointestinal Disease Brother         Had gallbladder romoved     Depression Sister         DDD     Social History:  Marital Status:   [2]  Social History     Tobacco Use     Smoking status: Former Smoker     Last attempt to quit: 1981     Years since quittin.9     Smokeless tobacco: Never Used   Substance Use Topics     Alcohol use: Yes     Comment: very rarely     Drug use: No      Medications:      cephALEXin (KEFLEX) 500 MG capsule   apixaban ANTICOAGULANT (ELIQUIS) 5 MG tablet   blood glucose monitoring (ONE TOUCH DELICA) lancets "   blood glucose monitoring (ONE TOUCH VERIO IQ) test strip   Cholecalciferol (VITAMIN D) 2000 UNITS tablet   fluticasone-salmeterol (ADVAIR DISKUS) 250-50 MCG/DOSE diskus inhaler   levalbuterol (XOPENEX HFA) 45 MCG/ACT Inhaler   losartan-hydrochlorothiazide (HYZAAR) 100-25 MG per tablet   magnesium citrate (SM MAGNESIUM CITRATE) 1.745 GM/30ML solution   metFORMIN (GLUCOPHAGE) 500 MG tablet   Multiple Vitamin (MULTIVITAMIN+ PO)   PARoxetine (PAXIL) 10 MG tablet   rivaroxaban ANTICOAGULANT (XARELTO) 20 MG TABS tablet   simvastatin (ZOCOR) 20 MG tablet   sotalol (BETAPACE) 120 MG tablet   temazepam (RESTORIL) 15 MG capsule     Review of Systems  CONSTITUTIONAL:NEGATIVE for fever, chills, change in weight  INTEGUMENTARY/SKIN: POSITIVE for erythema of left toe NEGATIVE for ecchymosis, lacerations, abrasions or rashes   RESP:NEGATIVE for significant cough or SOB  MUSCULOSKELETAL: POSITIVE  for left great toe pain, pruritis and NEGATIVE for other concerning new onset arthralgias or myalgias   NEURO: NEGATIVE for new onset numbness, weakness   Physical Exam   BP: 123/83  Pulse: 95  Temp: 96  F (35.6  C)  Resp: 14  SpO2: 99 %  Physical Exam   Constitutional: She appears well-developed and well-nourished. No distress.   Cardiovascular: An irregularly irregular rhythm present. Exam reveals no gallop and no friction rub.   No murmur heard.  Pulmonary/Chest: Effort normal and breath sounds normal. No respiratory distress. She has no wheezes. She has no rales.   Musculoskeletal:        Left ankle: Normal.        Left foot: There is swelling. There is normal range of motion, no bony tenderness, normal capillary refill, no crepitus, no deformity and no laceration.        Feet:    Skin: Skin is warm and dry. Capillary refill takes less than 2 seconds. No rash noted. There is erythema (on the plantar lateral surface of left great toe). No pallor.     ED Course        Procedures               Critical Care time:  none             No  results found for this or any previous visit (from the past 24 hour(s)).    Medications - No data to display    Assessments & Plan (with Medical Decision Making)     I have reviewed the nursing notes.    I have reviewed the findings, diagnosis, plan and need for follow up with the patient.          Medication List      Started    cephALEXin 500 MG capsule  Commonly known as:  KEFLEX  500 mg, Oral, 4 TIMES DAILY          Final diagnoses:   Toe pain, left     73-year-old female with past medical history significant for paroxysmal atrial fibrillation, type 2 diabetes mellitus, asth,a, adjustment disorder who presents to the urgent care with concern over possible infection of her left great toe after developing left great toe pain, erythema, swelling, pruritus within the last 24 hours.  Patient had stable vital signs upon arrival.  Physical exam findings as described above did show some mild erythema, swelling of the left great toe.  There is no significant warmth to suggest cellulitis.  No evidence of paronychia, felon.  No evidence of joint involvement to suggest gout/inflammatory arthritis, septic arthritis.  I did discuss risk/benefits of obtaining imaging of her toe to rule out bony abnormality however given absence of recent trauma patient agreed to defer.  I have low suspicion for bacterial infection at this time however given upcoming holidays and potential difficulty in seeing primary provider, I did agree to provide prescription for antibiotic to be used only if symptoms worsen.  Differential would also include allergic reaction, insect bite/sting, tinea pedis.  Follow up with primary care provider if no improvement within the next 5-7 days.  Worrisome reasons to return to the ER/UC sooner discussed.    Disclaimer: This note consists of symbols derived from keyboarding, dictation, and/or voice recognition software. As a result, there may be errors in the script that have gone undetected.  Please consider this  when interpreting information found in the chart.      12/22/2018   [facility] EMERGENCY DEPARTMENT     Elizabeth Taveras PA-C  12/27/18 1800

## 2018-12-22 NOTE — ED AVS SNAPSHOT
[facility] Emergency Department  [address] [address]  Phone:  [phone]  Fax:  [phone]                                    Romy Hurley   MRN: [id number]    Department:  [facility] Emergency Department   Date of Visit:  12/22/2018           After Visit Summary Signature Page    I have received my discharge instructions, and my questions have been answered. I have discussed any challenges I see with this plan with the nurse or doctor.    ..........................................................................................................................................  Patient/Patient Representative Signature      ..........................................................................................................................................  Patient Representative Print Name and Relationship to Patient    ..................................................               ................................................  Date                                   Time    ..........................................................................................................................................  Reviewed by Signature/Title    ...................................................              ..............................................  Date                                               Time          22EPIC Rev 08/18

## 2019-02-21 ENCOUNTER — TELEPHONE (OUTPATIENT)
Dept: FAMILY MEDICINE | Facility: CLINIC | Age: 74
End: 2019-02-21

## 2019-02-21 NOTE — TELEPHONE ENCOUNTER
Panel Management Review      Patient has the following on her problem list:     Asthma review     ACT Total Scores 3/21/2018   ACT TOTAL SCORE -   ASTHMA ER VISITS -   ASTHMA HOSPITALIZATIONS -   ACT TOTAL SCORE (Goal Greater than or Equal to 20) 20   In the past 12 months, how many times did you visit the emergency room for your asthma without being admitted to the hospital? 0   In the past 12 months, how many times were you hospitalized overnight because of your asthma? 0      1. Is Asthma diagnosis on the Problem List? Yes    2. Is Asthma listed on Health Maintenance? Yes    3. Patient is due for:  ACT    Diabetes    ASA: Failed    Last A1C  Lab Results   Component Value Date    A1C 6.4 03/21/2018    A1C 6.4 02/22/2017    A1C 6.6 10/26/2016    A1C 6.9 03/10/2016    A1C 6.4 12/09/2014     A1C tested: MONITOR    Last LDL:    Lab Results   Component Value Date    CHOL 117 03/21/2018     Lab Results   Component Value Date    HDL 43 03/21/2018     Lab Results   Component Value Date    LDL 50 03/21/2018     Lab Results   Component Value Date    TRIG 120 03/21/2018     Lab Results   Component Value Date    CHOLHDLRATIO 3.7 12/09/2014     Lab Results   Component Value Date    NHDL 74 03/21/2018       Is the patient on a Statin? YES             Is the patient on Aspirin? NO    Medications     HMG CoA Reductase Inhibitors     simvastatin (ZOCOR) 20 MG tablet             Last three blood pressure readings:  BP Readings from Last 3 Encounters:   12/22/18 123/83   03/21/18 117/74   02/22/17 129/82       Date of last diabetes office visit: 3/21/18     Tobacco History:     History   Smoking Status     Former Smoker     Quit date: 1/1/1981   Smokeless Tobacco     Never Used           Composite cancer screening  Chart review shows that this patient is due/due soon for the following Mammogram  Summary:    Patient is due/failing the following:   ACT, MAMMOGRAM and PHQ9    Action needed:   Patient needs to do ACT., Patient needs  to do PHQ9. and Patient needs referral/order: mammogram    Type of outreach:    Phone, spoke to patient.  in [place] and will schedule HM screening when she returns.     Questions for provider review:    None                                                                                                                                    Ashwini Weems MA       Chart routed to Care Team .

## 2019-02-26 ENCOUNTER — COMMUNICATION - HEALTHEAST (OUTPATIENT)
Dept: CARDIOLOGY | Facility: CLINIC | Age: 74
End: 2019-02-26

## 2019-02-26 DIAGNOSIS — I48.19 PERSISTENT ATRIAL FIBRILLATION (H): ICD-10-CM

## 2019-03-20 ENCOUNTER — OFFICE VISIT - HEALTHEAST (OUTPATIENT)
Dept: CARDIOLOGY | Facility: CLINIC | Age: 74
End: 2019-03-20

## 2019-03-20 ENCOUNTER — TRANSFERRED RECORDS (OUTPATIENT)
Dept: HEALTH INFORMATION MANAGEMENT | Facility: CLINIC | Age: 74
End: 2019-03-20

## 2019-03-20 DIAGNOSIS — G47.33 OSA (OBSTRUCTIVE SLEEP APNEA): ICD-10-CM

## 2019-03-20 DIAGNOSIS — I48.19 PERSISTENT ATRIAL FIBRILLATION (H): ICD-10-CM

## 2019-03-20 DIAGNOSIS — I10 ESSENTIAL HYPERTENSION: ICD-10-CM

## 2019-03-20 ASSESSMENT — MIFFLIN-ST. JEOR: SCORE: 1818.57

## 2019-03-21 ENCOUNTER — TRANSFERRED RECORDS (OUTPATIENT)
Dept: HEALTH INFORMATION MANAGEMENT | Facility: CLINIC | Age: 74
End: 2019-03-21

## 2019-03-21 ENCOUNTER — OFFICE VISIT (OUTPATIENT)
Dept: FAMILY MEDICINE | Facility: CLINIC | Age: 74
End: 2019-03-21
Payer: COMMERCIAL

## 2019-03-21 VITALS
SYSTOLIC BLOOD PRESSURE: 126 MMHG | RESPIRATION RATE: 18 BRPM | BODY MASS INDEX: 42.28 KG/M2 | OXYGEN SATURATION: 98 % | HEIGHT: 68 IN | TEMPERATURE: 97.8 F | DIASTOLIC BLOOD PRESSURE: 74 MMHG | WEIGHT: 279 LBS | HEART RATE: 61 BPM

## 2019-03-21 DIAGNOSIS — F43.23 ADJUSTMENT DISORDER WITH MIXED ANXIETY AND DEPRESSED MOOD: ICD-10-CM

## 2019-03-21 DIAGNOSIS — Z00.00 MEDICARE ANNUAL WELLNESS VISIT, SUBSEQUENT: Primary | ICD-10-CM

## 2019-03-21 DIAGNOSIS — J45.20 MILD INTERMITTENT ASTHMA WITHOUT COMPLICATION: ICD-10-CM

## 2019-03-21 DIAGNOSIS — G47.00 PERSISTENT INSOMNIA: ICD-10-CM

## 2019-03-21 DIAGNOSIS — I10 HTN, GOAL BELOW 140/90: ICD-10-CM

## 2019-03-21 DIAGNOSIS — Z12.11 SPECIAL SCREENING FOR MALIGNANT NEOPLASMS, COLON: ICD-10-CM

## 2019-03-21 DIAGNOSIS — E66.01 MORBID OBESITY (H): ICD-10-CM

## 2019-03-21 DIAGNOSIS — E11.9 TYPE 2 DIABETES MELLITUS WITHOUT COMPLICATION, WITHOUT LONG-TERM CURRENT USE OF INSULIN (H): ICD-10-CM

## 2019-03-21 DIAGNOSIS — I48.0 PAROXYSMAL ATRIAL FIBRILLATION (H): ICD-10-CM

## 2019-03-21 LAB
ANION GAP SERPL CALCULATED.3IONS-SCNC: 1 MMOL/L (ref 3–14)
ATRIAL RATE - MUSE: 56 BPM
BUN SERPL-MCNC: 30 MG/DL (ref 7–30)
CALCIUM SERPL-MCNC: 9.5 MG/DL (ref 8.5–10.1)
CHLORIDE SERPL-SCNC: 99 MMOL/L (ref 94–109)
CHOLEST SERPL-MCNC: 121 MG/DL
CO2 SERPL-SCNC: 34 MMOL/L (ref 20–32)
CREAT SERPL-MCNC: 0.96 MG/DL (ref 0.52–1.04)
CREAT UR-MCNC: 81 MG/DL
DIASTOLIC BLOOD PRESSURE - MUSE: NORMAL MMHG
GFR SERPL CREATININE-BSD FRML MDRD: 59 ML/MIN/{1.73_M2}
GLUCOSE SERPL-MCNC: 114 MG/DL (ref 70–99)
HBA1C MFR BLD: 6.2 % (ref 0–5.6)
HDLC SERPL-MCNC: 51 MG/DL
INTERPRETATION ECG - MUSE: NORMAL
LDLC SERPL CALC-MCNC: 53 MG/DL
MICROALBUMIN UR-MCNC: <5 MG/L
MICROALBUMIN/CREAT UR: NORMAL MG/G CR (ref 0–25)
NONHDLC SERPL-MCNC: 70 MG/DL
P AXIS - MUSE: 0 DEGREES
POTASSIUM SERPL-SCNC: 4.2 MMOL/L (ref 3.4–5.3)
PR INTERVAL - MUSE: 216 MS
QRS DURATION - MUSE: 84 MS
QT - MUSE: 462 MS
QTC - MUSE: 445 MS
R AXIS - MUSE: -19 DEGREES
SODIUM SERPL-SCNC: 134 MMOL/L (ref 133–144)
SYSTOLIC BLOOD PRESSURE - MUSE: NORMAL MMHG
T AXIS - MUSE: -12 DEGREES
TRIGL SERPL-MCNC: 85 MG/DL
TSH SERPL DL<=0.005 MIU/L-ACNC: 1.59 MU/L (ref 0.4–4)
VENTRICULAR RATE- MUSE: 56 BPM

## 2019-03-21 PROCEDURE — 80061 LIPID PANEL: CPT | Performed by: FAMILY MEDICINE

## 2019-03-21 PROCEDURE — 80048 BASIC METABOLIC PNL TOTAL CA: CPT | Performed by: FAMILY MEDICINE

## 2019-03-21 PROCEDURE — 36415 COLL VENOUS BLD VENIPUNCTURE: CPT | Performed by: FAMILY MEDICINE

## 2019-03-21 PROCEDURE — G0439 PPPS, SUBSEQ VISIT: HCPCS | Performed by: FAMILY MEDICINE

## 2019-03-21 PROCEDURE — 84443 ASSAY THYROID STIM HORMONE: CPT | Performed by: FAMILY MEDICINE

## 2019-03-21 PROCEDURE — 99213 OFFICE O/P EST LOW 20 MIN: CPT | Mod: 25 | Performed by: FAMILY MEDICINE

## 2019-03-21 PROCEDURE — 83036 HEMOGLOBIN GLYCOSYLATED A1C: CPT | Performed by: FAMILY MEDICINE

## 2019-03-21 PROCEDURE — 82043 UR ALBUMIN QUANTITATIVE: CPT | Performed by: FAMILY MEDICINE

## 2019-03-21 PROCEDURE — 82274 ASSAY TEST FOR BLOOD FECAL: CPT | Performed by: FAMILY MEDICINE

## 2019-03-21 RX ORDER — LOSARTAN POTASSIUM AND HYDROCHLOROTHIAZIDE 25; 100 MG/1; MG/1
1 TABLET ORAL DAILY
Qty: 90 TABLET | Refills: 3 | Status: SHIPPED | OUTPATIENT
Start: 2019-03-21 | End: 2019-10-21 | Stop reason: ALTCHOICE

## 2019-03-21 RX ORDER — LEVALBUTEROL TARTRATE 45 UG/1
2 AEROSOL, METERED ORAL EVERY 8 HOURS PRN
Qty: 1 INHALER | Refills: 3 | Status: SHIPPED | OUTPATIENT
Start: 2019-03-21 | End: 2020-10-01

## 2019-03-21 RX ORDER — SIMVASTATIN 20 MG
20 TABLET ORAL AT BEDTIME
Qty: 90 TABLET | Refills: 3 | Status: SHIPPED | OUTPATIENT
Start: 2019-03-21 | End: 2020-03-31

## 2019-03-21 RX ORDER — TEMAZEPAM 15 MG/1
CAPSULE ORAL
Qty: 90 CAPSULE | Refills: 1 | Status: SHIPPED | OUTPATIENT
Start: 2019-03-21 | End: 2019-09-25

## 2019-03-21 RX ORDER — PAROXETINE 10 MG/1
10 TABLET, FILM COATED ORAL AT BEDTIME
Qty: 90 TABLET | Refills: 3 | Status: SHIPPED | OUTPATIENT
Start: 2019-03-21 | End: 2020-03-31

## 2019-03-21 ASSESSMENT — MIFFLIN-ST. JEOR: SCORE: 1821.1

## 2019-03-21 ASSESSMENT — PAIN SCALES - GENERAL: PAINLEVEL: NO PAIN (0)

## 2019-03-21 ASSESSMENT — PATIENT HEALTH QUESTIONNAIRE - PHQ9: SUM OF ALL RESPONSES TO PHQ QUESTIONS 1-9: 0

## 2019-03-21 NOTE — PROGRESS NOTES
"  SUBJECTIVE:   Romy Hurley is a 73 year old female who presents for Preventive Visit.      Are you in the first 12 months of your Medicare Part B coverage?  No    Physical Health:    In general, how would you rate your overall physical health? Fair due to Afib    Outside of work, how many days during the week do you exercise? 2-3 days/week    Outside of work, approximately how many minutes a day do you exercise?15-30 minutes    If you drink alcohol do you typically have >3 drinks per day or >7 drinks per week? No    Do you usually eat at least 4 servings of fruit and vegetables a day, include whole grains & fiber and avoid regularly eating high fat or \"junk\" foods? Yes    Do you have any problems taking medications regularly?  No    Do you have any side effects from medications? none    Needs assistance for the following daily activities: no assistance needed    Which of the following safety concerns are present in your home?  none identified     Hearing impairment: No- wears hearing aids    In the past 6 months, have you been bothered by leaking of urine? Yes when laughing and coughing    Mental Health:    In general, how would you rate your overall mental or emotional health? good  PHQ-2 Score:      Do you feel safe in your environment? Yes    Do you have a Health Care Directive? Yes: Advance Directive has been received and scanned.    Additional concerns to address?  No    Fall risk:  Fallen 2 or more times in the past year?: No  Any fall with injury in the past year?: No    Cognitive Screenin) Repeat 3 items (Leader, Season, Table)    2) Clock draw: NORMAL  3) 3 item recall: Recalls 3 objects  Results: 3 items recalled: COGNITIVE IMPAIRMENT LESS LIKELY    Mini-CogTM Copyright KANCHAN Crump. Licensed by the author for use in [facility]; reprinted with permission (heidi@.[facility]). All rights reserved.      Do you have sleep apnea, excessive snoring or daytime drowsiness?: yes- sees Sleep " MD        Diabetes Follow-up      Patient is checking blood sugars: rarely.  Results range from 100 to 110    Diabetic concerns: None     Symptoms of hypoglycemia (low blood sugar): none     Paresthesias (numbness or burning in feet) or sores: No     Date of last diabetic eye exam: due    Diabetes Management Resources    Hyperlipidemia Follow-Up      Rate your low fat/cholesterol diet?: good    Taking statin?  Yes, no muscle aches from statin    Other lipid medications/supplements?:  none    Hypertension Follow-up      Outpatient blood pressures are not being checked.    Low Salt Diet: no added salt    BP Readings from Last 2 Encounters:   19 126/74   18 123/83     Hemoglobin A1C (%)   Date Value   2018 6.4 (H)   2017 6.4 (H)     LDL Cholesterol Calculated (mg/dL)   Date Value   2018 50   2017 57       Reviewed and updated as needed this visit by clinical staff  Tobacco  Allergies  Meds  Med Hx  Surg Hx  Fam Hx  Soc Hx        Reviewed and updated as needed this visit by Provider        Social History     Tobacco Use     Smoking status: Former Smoker     Last attempt to quit: 1981     Years since quittin.2     Smokeless tobacco: Never Used   Substance Use Topics     Alcohol use: Yes     Comment: very rarely                           Current providers sharing in care for this patient include:   Patient Care Team:  Adeola Stockton MD as PCP - General  Adeola Stockton MD as Assigned PCP    The following health maintenance items are reviewed in Epic and correct as of today:  Health Maintenance   Topic Date Due     EYE EXAM Q1 YEAR  1946     DEPRESSION ACTION PLAN  1965     PHQ-9 Q6 MONTHS  1965     DEXA SCAN SCREENING (SYSTEM ASSIGNED)  2012     ZOSTER IMMUNIZATION (2 of 3) 2013     ADVANCE DIRECTIVE PLANNING Q5 YRS  2017     FOOT EXAM Q1 YEAR  2018     INFLUENZA VACCINE (1) 2018     ASTHMA CONTROL TEST Q6 MOS  2018  "    A1C Q6 MO  09/21/2018     MAMMO SCREEN Q2 YR (SYSTEM ASSIGNED)  10/26/2018     TSH W/ FREE T4 REFLEX Q2 YEAR  02/22/2019     CREATININE Q1 YEAR  03/21/2019     MEDICARE ANNUAL WELLNESS VISIT  03/21/2019     ASTHMA ACTION PLAN Q1 YR  03/21/2019     FALL RISK ASSESSMENT  03/21/2019     LIPID MONITORING Q1 YEAR  03/21/2019     MICROALBUMIN Q1 YEAR  03/21/2019     FIT Q1 YR  03/29/2019     DTAP/TDAP/TD IMMUNIZATION (2 - Td) 07/01/2021     PNEUMOCOCCAL IMMUNIZATION 65+ LOW/MEDIUM RISK  Completed     HEPATITIS C SCREENING  Completed     IPV IMMUNIZATION  Aged Out     MENINGITIS IMMUNIZATION  Aged Out     Labs reviewed in EPIC  BP Readings from Last 3 Encounters:   03/21/19 126/74   12/22/18 123/83   03/21/18 117/74    Wt Readings from Last 3 Encounters:   03/21/19 126.6 kg (279 lb)   03/21/18 134.7 kg (297 lb)   02/22/17 134.3 kg (296 lb)                  Pneumonia Vaccine: UTD  Mammogram Screening: DUE    ROS:  Constitutional, HEENT, pulmonary, gi and gu systems are negative, except as otherwise noted.    Still in and out of a fib - has an apt with EP cardiologist to consider ablation.      OBJECTIVE:   /74   Pulse 61   Temp 97.8  F (36.6  C) (Tympanic)   Resp 18   Ht 1.715 m (5' 7.5\")   Wt 126.6 kg (279 lb)   SpO2 98%   BMI 43.05 kg/m   Estimated body mass index is 43.05 kg/m  as calculated from the following:    Height as of this encounter: 1.715 m (5' 7.5\").    Weight as of this encounter: 126.6 kg (279 lb).  EXAM:   GENERAL: healthy, alert and no distress  EYES: Eyes grossly normal to inspection, PERRL and conjunctivae and sclerae normal  HENT: ear canals and TM's normal, nose and mouth without ulcers or lesions  NECK: no adenopathy, no asymmetry, masses, or scars and thyroid normal to palpation  RESP: lungs clear to auscultation - no rales, rhonchi or wheezes  CV: regular rate and rhythm, normal S1 S2, no S3 or S4, no murmur, click or rub, no peripheral edema and peripheral pulses strong  ABDOMEN: " soft, nontender, no hepatosplenomegaly, no masses and bowel sounds normal  MS: no gross musculoskeletal defects noted, no edema  SKIN: no suspicious lesions or rashes  NEURO: Normal strength and tone, mentation intact and speech normal  PSYCH: mentation appears normal, affect normal/bright    Diagnostic Test Results:  pending    ASSESSMENT / PLAN:   1. Medicare annual wellness visit, subsequent       2. Mild intermittent asthma without complication     - fluticasone-salmeterol (ADVAIR DISKUS) 250-50 MCG/DOSE inhaler; Inhale 1 puff into the lungs 2 times daily  Dispense: 1 Inhaler; Refill: 3  - levalbuterol (XOPENEX HFA) 45 MCG/ACT inhaler; Inhale 2 puffs into the lungs every 8 hours as needed for shortness of breath / dyspnea Needs an appt for next refills  Dispense: 1 Inhaler; Refill: 3    3. HTN, goal below 140/90   well controlled  - losartan-hydrochlorothiazide (HYZAAR) 100-25 MG tablet; Take 1 tablet by mouth daily  Dispense: 90 tablet; Refill: 3  - Basic metabolic panel    4. Type 2 diabetes mellitus without complication, without long-term current use of insulin (H)   HgbA1c stable/good control at 6.2%  - Lipid panel reflex to direct LDL Fasting  - Albumin Random Urine Quantitative with Creat Ratio  - Hemoglobin A1c  - blood glucose monitoring (ONE TOUCH DELICA) lancets; Use to test blood sugars 1 times daily or as directed.  Dispense: 100 each; Refill: 3  - blood glucose (ONETOUCH VERIO IQ) test strip; Use to test blood sugars 1 times daily or as directed.  Dispense: 100 each; Refill: 11  - metFORMIN (GLUCOPHAGE) 500 MG tablet; TAKE 1 TABLET(500 MG) BY MOUTH DAILY WITH DINNER  Dispense: 90 tablet; Refill: 3  - simvastatin (ZOCOR) 20 MG tablet; Take 1 tablet (20 mg) by mouth At Bedtime  Dispense: 90 tablet; Refill: 3  - TSH with free T4 reflex    5. Adjustment disorder with mixed anxiety and depressed mood     - PARoxetine (PAXIL) 10 MG tablet; Take 1 tablet (10 mg) by mouth At Bedtime  Dispense: 90 tablet;  "Refill: 3    6. Persistent insomnia     - temazepam (RESTORIL) 15 MG capsule; TAKE 1 CAPSULE BY MOUTH NIGHTLY AS NEEDED FOR SLEEP  Dispense: 90 capsule; Refill: 1    7. Special screening for malignant neoplasms, colon     - Fecal colorectal cancer screen (FIT); Future    8. Paroxysmal atrial fibrillation (H)  Seeing cardiology, considering ablation    9. Morbid obesity (H)         End of Life Planning:  Patient currently has an advanced directive: No.  I have verified the patient's ablity to prepare an advanced directive/make health care decisions.  Literature was provided to assist patient in preparing an advanced directive.    COUNSELING:  Reviewed preventive health counseling, as reflected in patient instructions       Regular exercise       Healthy diet/nutrition    BP Readings from Last 1 Encounters:   03/21/19 126/74     Estimated body mass index is 43.05 kg/m  as calculated from the following:    Height as of this encounter: 1.715 m (5' 7.5\").    Weight as of this encounter: 126.6 kg (279 lb).      Weight management plan: Discussed healthy diet and exercise guidelines     reports that she quit smoking about 38 years ago. she has never used smokeless tobacco.      Appropriate preventive services were discussed with this patient, including applicable screening as appropriate for cardiovascular disease, diabetes, osteopenia/osteoporosis, and glaucoma.  As appropriate for age/gender, discussed screening for colorectal cancer, prostate cancer, breast cancer, and cervical cancer. Checklist reviewing preventive services available has been given to the patient.    Reviewed patients plan of care and provided an AVS. The Basic Care Plan (routine screening as documented in Health Maintenance) for Romy meets the Care Plan requirement. This Care Plan has been established and reviewed with the Patient.    Counseling Resources:  ATP IV Guidelines  Pooled Cohorts Equation Calculator  Breast Cancer Risk Calculator  FRAX " Risk Assessment  ICSI Preventive Guidelines  Dietary Guidelines for Americans, 2010  USDA's MyPlate  ASA Prophylaxis  Lung CA Screening    Adeola Stockton MD  [facility]

## 2019-03-21 NOTE — PATIENT INSTRUCTIONS
Health Maintenance reviewed and plan for update discussed.  Patient asked to schedule her mammogram  complete Fit   Have diabetic eye exam and send us the result    Shingles shot:  The new Shingrix is supposed to be more effective at preventing shingles.  Even if you had Zostavax, this is recommended. I encourage people to check with their pharmacy (or ours) and have them run it through to check the cost.  It's often better covered through your pharmacy benefit.  It is a two part vaccine series - one now and one in 2 months.      Our Clinic hours are:  Mondays    7:20 am - 7 pm  Tues -  Fri  7:20 am - 5 pm    Clinic Phone: [phone]    The clinic lab opens at 7:30 am Mon - Fri and appointments are required.    [facility]. [phone]  Monday  8 am - 7pm  Tues - Fri 8 am - 5:30 pm         Preventive Health Recommendations    See your health care provider every year to    Review health changes.     Discuss preventive care.      Review your medicines if your doctor has prescribed any.      You no longer need a yearly Pap test unless you've had an abnormal Pap test in the past 10 years. If you have vaginal symptoms, such as bleeding or discharge, be sure to talk with your provider about a Pap test.      Every 1 to 2 years, have a mammogram.  If you are over 69, talk with your health care provider about whether or not you want to continue having screening mammograms.      Every 10 years, have a colonoscopy. Or, have a yearly FIT test (stool test). These exams will check for colon cancer.       Have a cholesterol test every 5 years, or more often if your doctor advises it.       Have a diabetes test (fasting glucose) every three years. If you are at risk for diabetes, you should have this test more often.       At age 65, have a bone density scan (DEXA) to check for osteoporosis (brittle bone disease).    Shots:    Get a flu shot each year.    Get a tetanus shot every 10 years.    Talk to  your doctor about your pneumonia vaccines. There are now two you should receive - Pneumovax (PPSV 23) and Prevnar (PCV 13).    Talk to your pharmacist about the shingles vaccine.    Talk to your doctor about the hepatitis B vaccine.    Nutrition:     Eat at least 5 servings of fruits and vegetables each day.      Eat whole-grain bread, whole-wheat pasta and brown rice instead of white grains and rice.      Get adequate Calcium and Vitamin D.     Lifestyle    Exercise at least 150 minutes a week (30 minutes a day, 5 days a week). This will help you control your weight and prevent disease.      Limit alcohol to one drink per day.      No smoking.       Wear sunscreen to prevent skin cancer.       See your dentist twice a year for an exam and cleaning.      See your eye doctor every 1 to 2 years to screen for conditions such as glaucoma, macular degeneration and cataracts.    Personalized Prevention Plan  You are due for the preventive services outlined below.  Your care team is available to assist you in scheduling these services.  If you have already completed any of these items, please share that information with your care team to update in your medical record.  Health Maintenance Due   Topic Date Due     Eye Exam - yearly  05/04/1946     Depression Action Plan Review  05/04/1965     Depression Assessment - every 6 months  05/04/1965     Bone Density Screening (Dexa)  05/04/2012     Zoster (Shingles) Vaccine (2 of 3) 02/21/2013     Discuss Advance Directive Planning  05/21/2017     Diabetic Foot Exam - yearly  02/22/2018     Flu Vaccine (1) 09/01/2018     Asthma Control Test - every 6 months  09/21/2018     A1C (Diabetes) Lab - every 6 months  09/21/2018     Mammogram - every 2 years  10/26/2018     Thyroid Function Lab (TSH) - every 2 years  02/22/2019     Creatinine Lab - yearly  03/21/2019     Annual Wellness Visit  03/21/2019     Asthma Action Plan - yearly  03/21/2019     FALL RISK ASSESSMENT  03/21/2019      Cholesterol Lab - yearly  03/21/2019     Microalbumin Lab - yearly  03/21/2019     Colon Cancer Screening - FIT Test - yearly  03/29/2019

## 2019-03-21 NOTE — RESULT ENCOUNTER NOTE
Romy,    All of the labs were normal or acceptable. Kidney function is mildly decreased, make sure you're drinking plenty of water.    Please contact my office if you have questions.    Adeola Stockton M.D.

## 2019-03-22 ASSESSMENT — ASTHMA QUESTIONNAIRES: ACT_TOTALSCORE: 25

## 2019-03-23 LAB — HEMOCCULT STL QL IA: NEGATIVE

## 2019-03-25 DIAGNOSIS — Z12.11 SPECIAL SCREENING FOR MALIGNANT NEOPLASMS, COLON: ICD-10-CM

## 2019-04-04 ENCOUNTER — COMMUNICATION - HEALTHEAST (OUTPATIENT)
Dept: CARDIOLOGY | Facility: CLINIC | Age: 74
End: 2019-04-04

## 2019-04-04 DIAGNOSIS — I48.19 PERSISTENT ATRIAL FIBRILLATION (H): ICD-10-CM

## 2019-05-13 ENCOUNTER — TELEPHONE (OUTPATIENT)
Dept: FAMILY MEDICINE | Facility: CLINIC | Age: 74
End: 2019-05-13

## 2019-05-24 ENCOUNTER — COMMUNICATION - HEALTHEAST (OUTPATIENT)
Dept: CARDIOLOGY | Facility: CLINIC | Age: 74
End: 2019-05-24

## 2019-05-24 ENCOUNTER — AMBULATORY - HEALTHEAST (OUTPATIENT)
Dept: CARDIOLOGY | Facility: CLINIC | Age: 74
End: 2019-05-24

## 2019-05-24 DIAGNOSIS — I48.91 AFIB (H): ICD-10-CM

## 2019-05-24 DIAGNOSIS — I48.19 PERSISTENT ATRIAL FIBRILLATION (H): ICD-10-CM

## 2019-05-28 ENCOUNTER — TRANSFERRED RECORDS (OUTPATIENT)
Dept: HEALTH INFORMATION MANAGEMENT | Facility: CLINIC | Age: 74
End: 2019-05-28

## 2019-06-03 ENCOUNTER — TRANSFERRED RECORDS (OUTPATIENT)
Dept: HEALTH INFORMATION MANAGEMENT | Facility: CLINIC | Age: 74
End: 2019-06-03

## 2019-06-12 ENCOUNTER — HOSPITAL ENCOUNTER (OUTPATIENT)
Dept: MAMMOGRAPHY | Facility: CLINIC | Age: 74
Discharge: HOME | End: 2019-06-12
Attending: FAMILY MEDICINE | Admitting: FAMILY MEDICINE
Payer: COMMERCIAL

## 2019-06-12 DIAGNOSIS — Z12.31 SCREENING MAMMOGRAM, ENCOUNTER FOR: ICD-10-CM

## 2019-06-12 PROCEDURE — 77063 BREAST TOMOSYNTHESIS BI: CPT

## 2019-07-15 ENCOUNTER — TRANSFERRED RECORDS (OUTPATIENT)
Dept: HEALTH INFORMATION MANAGEMENT | Facility: CLINIC | Age: 74
End: 2019-07-15

## 2019-07-23 ENCOUNTER — TRANSFERRED RECORDS (OUTPATIENT)
Dept: HEALTH INFORMATION MANAGEMENT | Facility: CLINIC | Age: 74
End: 2019-07-23

## 2019-08-23 ENCOUNTER — COMMUNICATION - HEALTHEAST (OUTPATIENT)
Dept: CARDIOLOGY | Facility: CLINIC | Age: 74
End: 2019-08-23

## 2019-08-23 DIAGNOSIS — I48.19 PERSISTENT ATRIAL FIBRILLATION (H): ICD-10-CM

## 2019-08-26 ENCOUNTER — TRANSFERRED RECORDS (OUTPATIENT)
Dept: HEALTH INFORMATION MANAGEMENT | Facility: CLINIC | Age: 74
End: 2019-08-26

## 2019-09-25 DIAGNOSIS — G47.00 PERSISTENT INSOMNIA: ICD-10-CM

## 2019-09-26 RX ORDER — TEMAZEPAM 15 MG/1
CAPSULE ORAL
Qty: 90 CAPSULE | Refills: 1 | Status: SHIPPED | OUTPATIENT
Start: 2019-09-26 | End: 2019-12-16 | Stop reason: ALTCHOICE

## 2019-09-26 NOTE — TELEPHONE ENCOUNTER
Requested Prescriptions   Pending Prescriptions Disp Refills     temazepam (RESTORIL) 15 MG capsule [Pharmacy Med Name: TEMAZEPAM 15MG CAPSULES] 90 capsule 0     Sig: TAKE ONE CAPSULE BY MOUTH NIGHTLY AS NEEDED FOR SLEEP       There is no refill protocol information for this order        Last Written Prescription Date:  3/21/19  Last Fill Quantity: 90,  # refills: 1   Last office visit: 3/21/2019 with prescribing provider:     Future Office Visit:    Routing refill request to provider for review/approval because:  Drug not on the [facility], P or [facility] refill protocol or controlled substance

## 2019-10-15 ENCOUNTER — TRANSFERRED RECORDS (OUTPATIENT)
Dept: HEALTH INFORMATION MANAGEMENT | Facility: CLINIC | Age: 74
End: 2019-10-15

## 2019-10-15 ENCOUNTER — OFFICE VISIT - HEALTHEAST (OUTPATIENT)
Dept: CARDIOLOGY | Facility: CLINIC | Age: 74
End: 2019-10-15

## 2019-10-15 DIAGNOSIS — I48.19 PERSISTENT ATRIAL FIBRILLATION (H): ICD-10-CM

## 2019-10-15 DIAGNOSIS — G47.33 OSA (OBSTRUCTIVE SLEEP APNEA): ICD-10-CM

## 2019-10-15 DIAGNOSIS — I10 ESSENTIAL HYPERTENSION: ICD-10-CM

## 2019-10-15 ASSESSMENT — MIFFLIN-ST. JEOR: SCORE: 1835.35

## 2019-10-21 ENCOUNTER — TELEPHONE (OUTPATIENT)
Dept: FAMILY MEDICINE | Facility: CLINIC | Age: 74
End: 2019-10-21

## 2019-10-21 ENCOUNTER — MYC MEDICAL ADVICE (OUTPATIENT)
Dept: FAMILY MEDICINE | Facility: CLINIC | Age: 74
End: 2019-10-21

## 2019-10-21 ENCOUNTER — COMMUNICATION - HEALTHEAST (OUTPATIENT)
Dept: SLEEP MEDICINE | Facility: CLINIC | Age: 74
End: 2019-10-21

## 2019-10-21 ENCOUNTER — COMMUNICATION - HEALTHEAST (OUTPATIENT)
Dept: CARDIOLOGY | Facility: CLINIC | Age: 74
End: 2019-10-21

## 2019-10-21 DIAGNOSIS — I10 HTN, GOAL BELOW 140/90: Primary | ICD-10-CM

## 2019-10-21 RX ORDER — HYDROCHLOROTHIAZIDE 25 MG/1
25 TABLET ORAL DAILY
Qty: 90 TABLET | Refills: 1 | Status: SHIPPED | OUTPATIENT
Start: 2019-10-21 | End: 2020-03-24

## 2019-10-21 RX ORDER — LOSARTAN POTASSIUM 100 MG/1
100 TABLET ORAL DAILY
Qty: 90 TABLET | Refills: 1 | Status: SHIPPED | OUTPATIENT
Start: 2019-10-21 | End: 2020-03-24

## 2019-10-21 NOTE — TELEPHONE ENCOUNTER
Reason for Call:  Prescription Isssue    Detailed comments: Per fax from Amanda - Losartan/HCTZ is on long term backorder.  Can med be changed to 2 separate Rxs?  Fax to Clinic RN's desk.      Phone Number Pharmacy can be reached at: Other phone number:  [phone]    Best Time: any    Can we leave a detailed message on this number? YES    Call taken on 10/21/2019 at 8:01 AM by Sandra Dorado

## 2019-10-22 ENCOUNTER — COMMUNICATION - HEALTHEAST (OUTPATIENT)
Dept: CARDIOLOGY | Facility: CLINIC | Age: 74
End: 2019-10-22

## 2019-10-23 ENCOUNTER — AMBULATORY - HEALTHEAST (OUTPATIENT)
Dept: CARDIOLOGY | Facility: CLINIC | Age: 74
End: 2019-10-23

## 2019-10-23 ENCOUNTER — SURGERY - HEALTHEAST (OUTPATIENT)
Dept: CARDIOLOGY | Facility: CLINIC | Age: 74
End: 2019-10-23

## 2019-10-23 ENCOUNTER — COMMUNICATION - HEALTHEAST (OUTPATIENT)
Dept: CARDIOLOGY | Facility: CLINIC | Age: 74
End: 2019-10-23

## 2019-10-23 DIAGNOSIS — I48.19 PERSISTENT ATRIAL FIBRILLATION (H): ICD-10-CM

## 2019-10-23 DIAGNOSIS — I48.91 ATRIAL FIBRILLATION, UNSPECIFIED TYPE (H): ICD-10-CM

## 2019-10-25 ENCOUNTER — OFFICE VISIT - HEALTHEAST (OUTPATIENT)
Dept: SLEEP MEDICINE | Facility: CLINIC | Age: 74
End: 2019-10-25

## 2019-10-25 DIAGNOSIS — G47.33 OBSTRUCTIVE SLEEP APNEA: ICD-10-CM

## 2019-10-25 DIAGNOSIS — G47.8 SLEEP DYSFUNCTION WITH SLEEP STAGE DISTURBANCE: ICD-10-CM

## 2019-10-25 DIAGNOSIS — E66.01 MORBID OBESITY (H): ICD-10-CM

## 2019-10-25 ASSESSMENT — MIFFLIN-ST. JEOR: SCORE: 1850.32

## 2019-10-28 NOTE — NURSING NOTE
Faxed prescription to patient's pharmacy.     How Severe Is Your Skin Lesion?: mild Has Your Skin Lesion Been Treated?: not been treated Is This A New Presentation, Or A Follow-Up?: Skin Lesions

## 2019-11-01 ENCOUNTER — TRANSFERRED RECORDS (OUTPATIENT)
Dept: HEALTH INFORMATION MANAGEMENT | Facility: CLINIC | Age: 74
End: 2019-11-01

## 2019-11-01 LAB — RETINOPATHY: NORMAL

## 2019-11-04 ENCOUNTER — HOSPITAL ENCOUNTER (OUTPATIENT)
Dept: CT IMAGING | Facility: CLINIC | Age: 74
Discharge: HOME | End: 2019-11-04
Attending: INTERNAL MEDICINE

## 2019-11-04 DIAGNOSIS — I48.19 PERSISTENT ATRIAL FIBRILLATION (H): ICD-10-CM

## 2019-11-04 DIAGNOSIS — I48.91 ATRIAL FIBRILLATION, UNSPECIFIED TYPE (H): ICD-10-CM

## 2019-11-04 LAB
BSA FOR ECHO PROCEDURE: 2.5 M2
CREAT BLD-MCNC: 1.1 MG/DL (ref 0.6–1.1)
GFR SERPL CREATININE-BSD FRML MDRD: 49 ML/MIN/1.73M2

## 2019-11-04 ASSESSMENT — MIFFLIN-ST. JEOR: SCORE: 1850.32

## 2019-11-06 ENCOUNTER — AMBULATORY - HEALTHEAST (OUTPATIENT)
Dept: CARDIOLOGY | Facility: CLINIC | Age: 74
End: 2019-11-06

## 2019-11-06 ENCOUNTER — COMMUNICATION - HEALTHEAST (OUTPATIENT)
Dept: CARDIOLOGY | Facility: CLINIC | Age: 74
End: 2019-11-06

## 2019-11-06 DIAGNOSIS — I48.19 PERSISTENT ATRIAL FIBRILLATION (H): ICD-10-CM

## 2019-11-08 ENCOUNTER — HEALTH MAINTENANCE LETTER (OUTPATIENT)
Age: 74
End: 2019-11-08

## 2019-11-20 ENCOUNTER — COMMUNICATION - HEALTHEAST (OUTPATIENT)
Dept: CARDIOLOGY | Facility: CLINIC | Age: 74
End: 2019-11-20

## 2019-11-20 DIAGNOSIS — I48.19 PERSISTENT ATRIAL FIBRILLATION (H): ICD-10-CM

## 2019-12-03 ENCOUNTER — OFFICE VISIT - HEALTHEAST (OUTPATIENT)
Dept: CARDIOLOGY | Facility: CLINIC | Age: 74
End: 2019-12-03

## 2019-12-03 ENCOUNTER — COMMUNICATION - HEALTHEAST (OUTPATIENT)
Dept: CARDIOLOGY | Facility: CLINIC | Age: 74
End: 2019-12-03

## 2019-12-03 ENCOUNTER — AMBULATORY - HEALTHEAST (OUTPATIENT)
Dept: CARDIOLOGY | Facility: CLINIC | Age: 74
End: 2019-12-03

## 2019-12-03 DIAGNOSIS — I48.19 PERSISTENT ATRIAL FIBRILLATION (H): ICD-10-CM

## 2019-12-03 DIAGNOSIS — G47.33 OSA (OBSTRUCTIVE SLEEP APNEA): ICD-10-CM

## 2019-12-03 DIAGNOSIS — I10 ESSENTIAL HYPERTENSION: ICD-10-CM

## 2019-12-03 LAB
ANION GAP SERPL CALCULATED.3IONS-SCNC: 9 MMOL/L (ref 5–18)
BUN SERPL-MCNC: 23 MG/DL (ref 8–28)
CALCIUM SERPL-MCNC: 9.5 MG/DL (ref 8.5–10.5)
CHLORIDE BLD-SCNC: 99 MMOL/L (ref 98–107)
CO2 SERPL-SCNC: 30 MMOL/L (ref 22–31)
CREAT SERPL-MCNC: 1.05 MG/DL (ref 0.6–1.1)
ERYTHROCYTE [DISTWIDTH] IN BLOOD BY AUTOMATED COUNT: 14.4 % (ref 11–14.5)
GFR SERPL CREATININE-BSD FRML MDRD: 52 ML/MIN/1.73M2
GLUCOSE BLD-MCNC: 115 MG/DL (ref 70–125)
HCT VFR BLD AUTO: 41.4 % (ref 35–47)
HGB BLD-MCNC: 13.4 G/DL (ref 12–16)
MCH RBC QN AUTO: 28.8 PG (ref 27–34)
MCHC RBC AUTO-ENTMCNC: 32.4 G/DL (ref 32–36)
MCV RBC AUTO: 89 FL (ref 80–100)
PLATELET # BLD AUTO: 255 THOU/UL (ref 140–440)
PMV BLD AUTO: 9.5 FL (ref 8.5–12.5)
POTASSIUM BLD-SCNC: 4.3 MMOL/L (ref 3.5–5)
RBC # BLD AUTO: 4.65 MILL/UL (ref 3.8–5.4)
SODIUM SERPL-SCNC: 138 MMOL/L (ref 136–145)
WBC: 8.2 THOU/UL (ref 4–11)

## 2019-12-04 LAB
ATRIAL RATE - MUSE: 54 BPM
DIASTOLIC BLOOD PRESSURE - MUSE: NORMAL
INTERPRETATION ECG - MUSE: NORMAL
P AXIS - MUSE: 65 DEGREES
PR INTERVAL - MUSE: 210 MS
QRS DURATION - MUSE: 92 MS
QT - MUSE: 464 MS
QTC - MUSE: 440 MS
R AXIS - MUSE: -15 DEGREES
SYSTOLIC BLOOD PRESSURE - MUSE: NORMAL
T AXIS - MUSE: -4 DEGREES
VENTRICULAR RATE- MUSE: 54 BPM

## 2019-12-05 ENCOUNTER — ANESTHESIA - HEALTHEAST (OUTPATIENT)
Dept: CARDIOLOGY | Facility: CLINIC | Age: 74
End: 2019-12-05

## 2019-12-05 ENCOUNTER — SURGERY - HEALTHEAST (OUTPATIENT)
Dept: CARDIOLOGY | Facility: CLINIC | Age: 74
End: 2019-12-05

## 2019-12-06 ENCOUNTER — SURGERY - HEALTHEAST (OUTPATIENT)
Dept: CARDIOLOGY | Facility: CLINIC | Age: 74
End: 2019-12-06

## 2019-12-06 ASSESSMENT — MIFFLIN-ST. JEOR: SCORE: 1838.98

## 2019-12-07 ASSESSMENT — MIFFLIN-ST. JEOR
SCORE: 1850.78
SCORE: 1851.68

## 2019-12-10 ENCOUNTER — AMBULATORY - HEALTHEAST (OUTPATIENT)
Dept: CARDIOLOGY | Facility: CLINIC | Age: 74
End: 2019-12-10

## 2019-12-10 DIAGNOSIS — I48.19 PERSISTENT ATRIAL FIBRILLATION (H): ICD-10-CM

## 2019-12-10 ASSESSMENT — MIFFLIN-ST. JEOR: SCORE: 1858.94

## 2019-12-13 NOTE — PROGRESS NOTES
Subjective     Romy Hurley is a 74 year old female who presents to clinic today for the following health issues:    HPI       Hospital Follow-up Visit:    Hospital/Nursing Home/IP Rehab Facility: [facility]  Date of Admission: 12/6/19  Date of Discharge: 12/7/19  Reason(s) for Admission: A-fib - ablation            Problems taking medications regularly:  None       Medication changes since discharge: start sotalol       Problems adhering to non-medication therapy:      Summary of hospitalization:  CareEverywhere information obtained and reviewed  Diagnostic Tests/Treatments reviewed.  Follow up needed: cardiology   Other Healthcare Providers Involved in Patient s Care:         None  Update since discharge: improved, energy level has improved.     Post Discharge Medication Reconciliation: discharge medications reconciled and changed, per note/orders (see AVS).  Plan of care communicated with patient     Coding guidelines for this visit:  Type of Medical   Decision Making Face-to-Face Visit       within 7 Days of discharge Face-to-Face Visit        within 14 days of discharge   Moderate Complexity [id number]   High Complexity [id number]            Diabetes Follow-up      How often are you checking your blood sugar? Not at all    What concerns do you have today about your diabetes? None     Do you have any of these symptoms? (Select all that apply)  No numbness or tingling in feet.  No redness, sores or blisters on feet.  No complaints of excessive thirst.  No reports of blurry vision.  No significant changes to weight.     Have you had a diabetic eye exam in the last 12 months? Yes- Date of last eye exam: 9/4/2019    BP Readings from Last 2 Encounters:   12/16/19 118/70   03/21/19 126/74     Hemoglobin A1C (%)   Date Value   03/21/2019 6.2 (H)   03/21/2018 6.4 (H)     LDL Cholesterol Calculated (mg/dL)   Date Value   03/21/2019 53   03/21/2018 50         Wants to try something other than temazepam.   "Waking at 4 am (not going to bed until midnight or 1 am).    Has not tried other sleep aids.       Reviewed and updated as needed this visit by Provider         Review of Systems   ROS COMP: Constitutional, HEENT, cardiovascular, pulmonary, gi and gu systems are negative, except as otherwise noted.      Objective    /70   Pulse 70   Temp 97.6  F (36.4  C) (Tympanic)   Resp 20   Ht 1.715 m (5' 7.5\")   Wt 128.8 kg (284 lb)   SpO2 99%   BMI 43.82 kg/m    Body mass index is 43.82 kg/m .  Physical Exam   GENERAL: healthy, alert and no distress  NECK: no adenopathy, no asymmetry, masses, or scars and thyroid normal to palpation  RESP: lungs clear to auscultation - no rales, rhonchi or wheezes  CV: regular rate and rhythm, normal S1 S2, no S3 or S4, no murmur, click or rub, no peripheral edema and peripheral pulses strong  ABDOMEN: soft, nontender, no hepatosplenomegaly, no masses and bowel sounds normal  MS: no gross musculoskeletal defects noted, no edema    Diagnostic Test Results:  Labs reviewed in Epic- outside records reviewed        Assessment & Plan     1. Paroxysmal atrial fibrillation (H)  S/p ablation 12/6/2019 with cardiology  On metoprolol 50 mg twice daily now.   - metoprolol tartrate (LOPRESSOR) 50 MG tablet; Take 1 tablet (50 mg) by mouth 2 times daily  Dispense: 1 tablet; Refill: 0    2. Type 2 diabetes mellitus without complication, without long-term current use of insulin (H)  Not checking blood sugars at home.  Due for full diabetes check in March/April ([place] go [place] 2 [place] so she will schedule when they are back).   - Hemoglobin A1c    3. Persistent insomnia  Discontinue the temazepam.   Start trazodone 50 mg daily  - traZODone (DESYREL) 50 MG tablet; Take 1 tablet (50 mg) by mouth At Bedtime  Dispense: 90 tablet; Refill: 1     BMI:   Estimated body mass index is 43.82 kg/m  as calculated from the following:    Height as of this encounter: 1.715 m (5' 7.5\").    Weight as of this " encounter: 128.8 kg (284 lb).   Weight management plan: Discussed healthy diet and exercise guidelines            Return in about 3 months (around 3/16/2020) for diabetes recheck.    Adeola Stockton MD  [facility]

## 2019-12-16 ENCOUNTER — OFFICE VISIT (OUTPATIENT)
Dept: FAMILY MEDICINE | Facility: CLINIC | Age: 74
End: 2019-12-16
Payer: COMMERCIAL

## 2019-12-16 VITALS
RESPIRATION RATE: 20 BRPM | HEIGHT: 68 IN | OXYGEN SATURATION: 99 % | SYSTOLIC BLOOD PRESSURE: 118 MMHG | TEMPERATURE: 97.6 F | BODY MASS INDEX: 43.04 KG/M2 | DIASTOLIC BLOOD PRESSURE: 70 MMHG | HEART RATE: 70 BPM | WEIGHT: 284 LBS

## 2019-12-16 DIAGNOSIS — G47.00 PERSISTENT INSOMNIA: ICD-10-CM

## 2019-12-16 DIAGNOSIS — I48.0 PAROXYSMAL ATRIAL FIBRILLATION (H): Primary | ICD-10-CM

## 2019-12-16 DIAGNOSIS — E11.9 TYPE 2 DIABETES MELLITUS WITHOUT COMPLICATION, WITHOUT LONG-TERM CURRENT USE OF INSULIN (H): ICD-10-CM

## 2019-12-16 LAB — HBA1C MFR BLD: 6 % (ref 0–5.6)

## 2019-12-16 PROCEDURE — 83036 HEMOGLOBIN GLYCOSYLATED A1C: CPT | Performed by: FAMILY MEDICINE

## 2019-12-16 PROCEDURE — 36415 COLL VENOUS BLD VENIPUNCTURE: CPT | Performed by: FAMILY MEDICINE

## 2019-12-16 PROCEDURE — 99214 OFFICE O/P EST MOD 30 MIN: CPT | Performed by: FAMILY MEDICINE

## 2019-12-16 RX ORDER — TRAZODONE HYDROCHLORIDE 50 MG/1
50 TABLET, FILM COATED ORAL AT BEDTIME
Qty: 90 TABLET | Refills: 1 | Status: SHIPPED | OUTPATIENT
Start: 2019-12-16 | End: 2020-06-25

## 2019-12-16 RX ORDER — METOPROLOL TARTRATE 50 MG
50 TABLET ORAL 2 TIMES DAILY
Qty: 1 TABLET | Refills: 0 | COMMUNITY
Start: 2019-12-16 | End: 2020-10-01

## 2019-12-16 ASSESSMENT — MIFFLIN-ST. JEOR: SCORE: 1838.78

## 2019-12-16 ASSESSMENT — PAIN SCALES - GENERAL: PAINLEVEL: NO PAIN (0)

## 2019-12-16 NOTE — PATIENT INSTRUCTIONS
Our Clinic hours are:  Mondays    7:20 am - 7 pm  Tues -  Fri  7:20 am - 5 pm    Clinic Phone: [phone]    The clinic lab opens at 7:30 am Mon - Fri and appointments are required.    [facility]. [phone]  Monday  8 am - 7pm  Tues - Fri 8 am - 5:30 pm

## 2019-12-17 ASSESSMENT — ASTHMA QUESTIONNAIRES: ACT_TOTALSCORE: 25

## 2019-12-27 ENCOUNTER — COMMUNICATION - HEALTHEAST (OUTPATIENT)
Dept: CARDIOLOGY | Facility: CLINIC | Age: 74
End: 2019-12-27

## 2019-12-27 ENCOUNTER — AMBULATORY - HEALTHEAST (OUTPATIENT)
Dept: CARDIOLOGY | Facility: CLINIC | Age: 74
End: 2019-12-27

## 2019-12-27 DIAGNOSIS — I48.0 PAROXYSMAL ATRIAL FIBRILLATION (H): ICD-10-CM

## 2019-12-27 LAB
ATRIAL RATE - MUSE: 375 BPM
DIASTOLIC BLOOD PRESSURE - MUSE: NORMAL
INTERPRETATION ECG - MUSE: NORMAL
P AXIS - MUSE: NORMAL
PR INTERVAL - MUSE: NORMAL
QRS DURATION - MUSE: 70 MS
QT - MUSE: 340 MS
QTC - MUSE: 406 MS
R AXIS - MUSE: -11 DEGREES
SYSTOLIC BLOOD PRESSURE - MUSE: NORMAL
T AXIS - MUSE: -3 DEGREES
VENTRICULAR RATE- MUSE: 86 BPM

## 2019-12-27 ASSESSMENT — MIFFLIN-ST. JEOR: SCORE: 1845.79

## 2019-12-30 ENCOUNTER — AMBULATORY - HEALTHEAST (OUTPATIENT)
Dept: CARDIOLOGY | Facility: CLINIC | Age: 74
End: 2019-12-30

## 2019-12-30 DIAGNOSIS — I10 ESSENTIAL HYPERTENSION: ICD-10-CM

## 2019-12-30 DIAGNOSIS — I48.19 PERSISTENT ATRIAL FIBRILLATION (H): ICD-10-CM

## 2019-12-30 DIAGNOSIS — I48.0 PAROXYSMAL ATRIAL FIBRILLATION (H): ICD-10-CM

## 2020-01-01 DIAGNOSIS — E11.9 TYPE 2 DIABETES MELLITUS WITHOUT COMPLICATION, WITHOUT LONG-TERM CURRENT USE OF INSULIN (H): ICD-10-CM

## 2020-01-01 DIAGNOSIS — I10 HTN, GOAL BELOW 140/90: ICD-10-CM

## 2020-01-02 RX ORDER — HYDROCHLOROTHIAZIDE 25 MG/1
TABLET ORAL
Qty: 90 TABLET | Refills: 1 | OUTPATIENT
Start: 2020-01-02

## 2020-01-02 NOTE — TELEPHONE ENCOUNTER
Requested Prescriptions   Pending Prescriptions Disp Refills     metFORMIN (GLUCOPHAGE) 500 MG tablet [Pharmacy Med Name: METFORMIN 500MG TABLETS] 90 tablet 3     Sig: TAKE 1 TABLET(500 MG) BY MOUTH DAILY WITH DINNER  Last Written Prescription Date:  3/21/2019  Last Fill Quantity: 90,  # refills: 3   Last office visit: 12/16/2019 with prescribing provider:  Mahin    Future Office Visit:           Biguanide Agents Passed - 1/1/2020  7:09 PM        Passed - Blood pressure less than 140/90 in past 6 months     BP Readings from Last 3 Encounters:   12/16/19 118/70   03/21/19 126/74   12/22/18 123/83                 Passed - Patient has documented LDL within the past 12 mos.     Recent Labs   Lab Test 03/21/19  1103   LDL 53             Passed - Patient has had a Microalbumin in the past 15 mos.     Recent Labs   Lab Test 03/21/19  1104   MICROL <5   UMALCR Unable to calculate due to low value             Passed - Patient is age 10 or older        Passed - Patient has documented A1c within the specified period of time.     If HgbA1C is 8 or greater, it needs to be on file within the past 3 months.  If less than 8, must be on file within the past 6 months.     Recent Labs   Lab Test 12/16/19  1003   A1C 6.0*             Passed - Patient's CR is NOT>1.4 OR Patient's EGFR is NOT<45 within past 12 mos.     Recent Labs   Lab Test 03/21/19  1103   GFRESTIMATED 59*   GFRESTBLACK 69       Recent Labs   Lab Test 03/21/19  1103   CR 0.96             Passed - Patient does NOT have a diagnosis of CHF.        Passed - Medication is active on med list        Passed - Patient is not pregnant        Passed - Patient has not had a positive pregnancy test within the past 12 mos.         Passed - Recent (6 mo) or future (30 days) visit within the authorizing provider's specialty     Patient had office visit in the last 6 months or has a visit in the next 30 days with authorizing provider or within the authorizing provider's specialty.   "See \"Patient Info\" tab in inbasket, or \"Choose Columns\" in Meds & Orders section of the refill encounter.            hydrochlorothiazide (HYDRODIURIL) 25 MG tablet [Pharmacy Med Name: HYDROCHLOROTHIAZIDE 25MG TABLETS] 90 tablet 1     Sig: TAKE 1 TABLET BY MOUTH DAILY  Last Written Prescription Date:  10/21/2019  Last Fill Quantity: 90,  # refills: 1   Last office visit: 12/16/2019 with prescribing provider:  Mahin    Future Office Visit:           Diuretics (Including Combos) Protocol Passed - 1/1/2020  7:09 PM        Passed - Blood pressure under 140/90 in past 12 months     BP Readings from Last 3 Encounters:   12/16/19 118/70   03/21/19 126/74   12/22/18 123/83                 Passed - Recent (12 mo) or future (30 days) visit within the authorizing provider's specialty     Patient has had an office visit with the authorizing provider or a provider within the authorizing providers department within the previous 12 mos or has a future within next 30 days. See \"Patient Info\" tab in [employer], or \"Choose Columns\" in Meds & Orders section of the refill encounter.              Passed - Medication is active on med list        Passed - Patient is age 18 or older        Passed - No active pregancy on record        Passed - Normal serum creatinine on file in past 12 months     Recent Labs   Lab Test 03/21/19  1103   CR 0.96              Passed - Normal serum potassium on file in past 12 months     Recent Labs   Lab Test 03/21/19  1103   POTASSIUM 4.2                    Passed - Normal serum sodium on file in past 12 months     Recent Labs   Lab Test 03/21/19  1103                 Passed - No positive pregnancy test in past 12 months          "

## 2020-01-06 ENCOUNTER — COMMUNICATION - HEALTHEAST (OUTPATIENT)
Dept: CARDIOLOGY | Facility: CLINIC | Age: 75
End: 2020-01-06

## 2020-01-06 DIAGNOSIS — I48.19 PERSISTENT ATRIAL FIBRILLATION (H): ICD-10-CM

## 2020-01-08 ENCOUNTER — OFFICE VISIT - HEALTHEAST (OUTPATIENT)
Dept: SLEEP MEDICINE | Facility: CLINIC | Age: 75
End: 2020-01-08

## 2020-01-08 DIAGNOSIS — G47.33 OBSTRUCTIVE SLEEP APNEA: ICD-10-CM

## 2020-01-08 DIAGNOSIS — G47.10 HYPERSOMNIA: ICD-10-CM

## 2020-01-08 ASSESSMENT — MIFFLIN-ST. JEOR: SCORE: 1845.79

## 2020-01-14 ENCOUNTER — OFFICE VISIT - HEALTHEAST (OUTPATIENT)
Dept: CARDIOLOGY | Facility: CLINIC | Age: 75
End: 2020-01-14

## 2020-01-14 DIAGNOSIS — I48.19 PERSISTENT ATRIAL FIBRILLATION (H): ICD-10-CM

## 2020-01-14 DIAGNOSIS — Z86.79 STATUS POST ABLATION OPERATION FOR ARRHYTHMIA: ICD-10-CM

## 2020-01-14 DIAGNOSIS — G47.33 OSA ON CPAP: ICD-10-CM

## 2020-01-14 DIAGNOSIS — I10 ESSENTIAL HYPERTENSION: ICD-10-CM

## 2020-01-14 DIAGNOSIS — Z98.890 STATUS POST ABLATION OPERATION FOR ARRHYTHMIA: ICD-10-CM

## 2020-01-14 ASSESSMENT — MIFFLIN-ST. JEOR: SCORE: 1836.72

## 2020-01-20 ENCOUNTER — AMBULATORY - HEALTHEAST (OUTPATIENT)
Dept: CARDIOLOGY | Facility: CLINIC | Age: 75
End: 2020-01-20

## 2020-01-20 ENCOUNTER — SURGERY - HEALTHEAST (OUTPATIENT)
Dept: CARDIOLOGY | Facility: CLINIC | Age: 75
End: 2020-01-20

## 2020-01-20 ENCOUNTER — COMMUNICATION - HEALTHEAST (OUTPATIENT)
Dept: CARDIOLOGY | Facility: CLINIC | Age: 75
End: 2020-01-20

## 2020-01-20 DIAGNOSIS — I48.0 PAROXYSMAL ATRIAL FIBRILLATION (H): ICD-10-CM

## 2020-01-20 LAB
ATRIAL RATE - MUSE: NORMAL
DIASTOLIC BLOOD PRESSURE - MUSE: NORMAL
INTERPRETATION ECG - MUSE: NORMAL
P AXIS - MUSE: NORMAL
PR INTERVAL - MUSE: NORMAL
QRS DURATION - MUSE: 74 MS
QT - MUSE: 344 MS
QTC - MUSE: 411 MS
R AXIS - MUSE: -11 DEGREES
SYSTOLIC BLOOD PRESSURE - MUSE: NORMAL
T AXIS - MUSE: 6 DEGREES
VENTRICULAR RATE- MUSE: 86 BPM

## 2020-01-20 ASSESSMENT — MIFFLIN-ST. JEOR: SCORE: 1849.87

## 2020-01-24 ENCOUNTER — HOSPITAL ENCOUNTER (OUTPATIENT)
Dept: CARDIOLOGY | Facility: CLINIC | Age: 75
Discharge: HOME | End: 2020-01-24
Attending: INTERNAL MEDICINE | Admitting: INTERNAL MEDICINE

## 2020-01-24 DIAGNOSIS — I48.0 PAROXYSMAL ATRIAL FIBRILLATION (H): ICD-10-CM

## 2020-01-24 LAB
ATRIAL RATE - MUSE: 63 BPM
DIASTOLIC BLOOD PRESSURE - MUSE: NORMAL
INTERPRETATION ECG - MUSE: NORMAL
P AXIS - MUSE: 47 DEGREES
PR INTERVAL - MUSE: 206 MS
QRS DURATION - MUSE: 86 MS
QT - MUSE: 444 MS
QTC - MUSE: 454 MS
R AXIS - MUSE: -12 DEGREES
SYSTOLIC BLOOD PRESSURE - MUSE: NORMAL
T AXIS - MUSE: -12 DEGREES
VENTRICULAR RATE- MUSE: 63 BPM

## 2020-01-25 ENCOUNTER — ANESTHESIA - HEALTHEAST (OUTPATIENT)
Dept: CARDIOLOGY | Facility: CLINIC | Age: 75
End: 2020-01-25

## 2020-03-23 ENCOUNTER — TRANSFERRED RECORDS (OUTPATIENT)
Dept: HEALTH INFORMATION MANAGEMENT | Facility: CLINIC | Age: 75
End: 2020-03-23

## 2020-03-23 LAB — PHQ9 SCORE: 5

## 2020-03-24 DIAGNOSIS — E11.9 TYPE 2 DIABETES MELLITUS WITHOUT COMPLICATION, WITHOUT LONG-TERM CURRENT USE OF INSULIN (H): ICD-10-CM

## 2020-03-24 DIAGNOSIS — I10 HTN, GOAL BELOW 140/90: ICD-10-CM

## 2020-03-24 RX ORDER — HYDROCHLOROTHIAZIDE 25 MG/1
TABLET ORAL
Qty: 90 TABLET | Refills: 1 | Status: SHIPPED | OUTPATIENT
Start: 2020-03-24 | End: 2020-10-01

## 2020-03-24 RX ORDER — LOSARTAN POTASSIUM 100 MG/1
TABLET ORAL
Qty: 90 TABLET | Refills: 1 | Status: SHIPPED | OUTPATIENT
Start: 2020-03-24 | End: 2020-10-01

## 2020-03-31 DIAGNOSIS — E11.9 TYPE 2 DIABETES MELLITUS WITHOUT COMPLICATION, WITHOUT LONG-TERM CURRENT USE OF INSULIN (H): ICD-10-CM

## 2020-03-31 DIAGNOSIS — F43.23 ADJUSTMENT DISORDER WITH MIXED ANXIETY AND DEPRESSED MOOD: ICD-10-CM

## 2020-03-31 RX ORDER — PAROXETINE 10 MG/1
TABLET, FILM COATED ORAL
Qty: 90 TABLET | Refills: 3 | Status: SHIPPED | OUTPATIENT
Start: 2020-03-31 | End: 2020-10-01

## 2020-03-31 RX ORDER — SIMVASTATIN 20 MG
TABLET ORAL
Qty: 90 TABLET | Refills: 3 | Status: SHIPPED | OUTPATIENT
Start: 2020-03-31 | End: 2021-03-18

## 2020-04-20 ENCOUNTER — COMMUNICATION - HEALTHEAST (OUTPATIENT)
Dept: SLEEP MEDICINE | Facility: CLINIC | Age: 75
End: 2020-04-20

## 2020-06-24 DIAGNOSIS — G47.00 PERSISTENT INSOMNIA: ICD-10-CM

## 2020-06-25 RX ORDER — TRAZODONE HYDROCHLORIDE 50 MG/1
TABLET, FILM COATED ORAL
Qty: 90 TABLET | Refills: 1 | Status: SHIPPED | OUTPATIENT
Start: 2020-06-25 | End: 2020-10-01

## 2020-10-01 ENCOUNTER — NURSE TRIAGE (OUTPATIENT)
Dept: NURSING | Facility: CLINIC | Age: 75
End: 2020-10-01

## 2020-10-01 ENCOUNTER — OFFICE VISIT (OUTPATIENT)
Dept: FAMILY MEDICINE | Facility: CLINIC | Age: 75
End: 2020-10-01
Payer: COMMERCIAL

## 2020-10-01 VITALS
HEART RATE: 80 BPM | OXYGEN SATURATION: 98 % | RESPIRATION RATE: 20 BRPM | HEIGHT: 68 IN | SYSTOLIC BLOOD PRESSURE: 132 MMHG | WEIGHT: 279 LBS | BODY MASS INDEX: 42.28 KG/M2 | TEMPERATURE: 97.8 F | DIASTOLIC BLOOD PRESSURE: 78 MMHG

## 2020-10-01 DIAGNOSIS — I10 HTN, GOAL BELOW 140/90: ICD-10-CM

## 2020-10-01 DIAGNOSIS — E66.01 MORBID OBESITY (H): ICD-10-CM

## 2020-10-01 DIAGNOSIS — N18.31 STAGE 3A CHRONIC KIDNEY DISEASE (H): ICD-10-CM

## 2020-10-01 DIAGNOSIS — Z23 NEED FOR PROPHYLACTIC VACCINATION AND INOCULATION AGAINST INFLUENZA: ICD-10-CM

## 2020-10-01 DIAGNOSIS — E11.9 TYPE 2 DIABETES MELLITUS WITHOUT COMPLICATION, WITHOUT LONG-TERM CURRENT USE OF INSULIN (H): Primary | ICD-10-CM

## 2020-10-01 DIAGNOSIS — Z12.11 SPECIAL SCREENING FOR MALIGNANT NEOPLASMS, COLON: ICD-10-CM

## 2020-10-01 DIAGNOSIS — F43.23 ADJUSTMENT DISORDER WITH MIXED ANXIETY AND DEPRESSED MOOD: ICD-10-CM

## 2020-10-01 DIAGNOSIS — G47.00 PERSISTENT INSOMNIA: ICD-10-CM

## 2020-10-01 DIAGNOSIS — J45.20 MILD INTERMITTENT ASTHMA WITHOUT COMPLICATION: ICD-10-CM

## 2020-10-01 PROBLEM — N18.30 CKD (CHRONIC KIDNEY DISEASE) STAGE 3, GFR 30-59 ML/MIN (H): Status: ACTIVE | Noted: 2020-10-01

## 2020-10-01 LAB
ALBUMIN SERPL-MCNC: 3.5 G/DL (ref 3.4–5)
ALP SERPL-CCNC: 63 U/L (ref 40–150)
ALT SERPL W P-5'-P-CCNC: 25 U/L (ref 0–50)
ANION GAP SERPL CALCULATED.3IONS-SCNC: 3 MMOL/L (ref 3–14)
AST SERPL W P-5'-P-CCNC: 19 U/L (ref 0–45)
BILIRUB SERPL-MCNC: 0.4 MG/DL (ref 0.2–1.3)
BUN SERPL-MCNC: 25 MG/DL (ref 7–30)
CALCIUM SERPL-MCNC: 9.7 MG/DL (ref 8.5–10.1)
CHLORIDE SERPL-SCNC: 104 MMOL/L (ref 94–109)
CHOLEST SERPL-MCNC: 133 MG/DL
CO2 SERPL-SCNC: 31 MMOL/L (ref 20–32)
CREAT SERPL-MCNC: 1.23 MG/DL (ref 0.52–1.04)
CREAT UR-MCNC: 99 MG/DL
GFR SERPL CREATININE-BSD FRML MDRD: 43 ML/MIN/{1.73_M2}
GLUCOSE SERPL-MCNC: 122 MG/DL (ref 70–99)
HBA1C MFR BLD: 6.3 % (ref 0–5.6)
HDLC SERPL-MCNC: 46 MG/DL
LDLC SERPL CALC-MCNC: 48 MG/DL
MICROALBUMIN UR-MCNC: 7 MG/L
MICROALBUMIN/CREAT UR: 7.06 MG/G CR (ref 0–25)
NONHDLC SERPL-MCNC: 87 MG/DL
POTASSIUM SERPL-SCNC: 3.8 MMOL/L (ref 3.4–5.3)
PROT SERPL-MCNC: 8.2 G/DL (ref 6.8–8.8)
SODIUM SERPL-SCNC: 138 MMOL/L (ref 133–144)
TRIGL SERPL-MCNC: 197 MG/DL

## 2020-10-01 PROCEDURE — 90662 IIV NO PRSV INCREASED AG IM: CPT | Performed by: FAMILY MEDICINE

## 2020-10-01 PROCEDURE — G0008 ADMIN INFLUENZA VIRUS VAC: HCPCS | Performed by: FAMILY MEDICINE

## 2020-10-01 PROCEDURE — 99214 OFFICE O/P EST MOD 30 MIN: CPT | Mod: 25 | Performed by: FAMILY MEDICINE

## 2020-10-01 PROCEDURE — 96127 BRIEF EMOTIONAL/BEHAV ASSMT: CPT | Performed by: FAMILY MEDICINE

## 2020-10-01 PROCEDURE — 80053 COMPREHEN METABOLIC PANEL: CPT | Performed by: FAMILY MEDICINE

## 2020-10-01 PROCEDURE — 80061 LIPID PANEL: CPT | Performed by: FAMILY MEDICINE

## 2020-10-01 PROCEDURE — 82043 UR ALBUMIN QUANTITATIVE: CPT | Performed by: FAMILY MEDICINE

## 2020-10-01 PROCEDURE — 99207 PR FOOT EXAM NO CHARGE: CPT | Performed by: FAMILY MEDICINE

## 2020-10-01 PROCEDURE — 36415 COLL VENOUS BLD VENIPUNCTURE: CPT | Performed by: FAMILY MEDICINE

## 2020-10-01 PROCEDURE — 83036 HEMOGLOBIN GLYCOSYLATED A1C: CPT | Performed by: FAMILY MEDICINE

## 2020-10-01 RX ORDER — BLOOD SUGAR DIAGNOSTIC
STRIP MISCELLANEOUS
Qty: 100 EACH | Refills: 11 | Status: SHIPPED | OUTPATIENT
Start: 2020-10-01

## 2020-10-01 RX ORDER — LEVALBUTEROL TARTRATE 45 UG/1
2 AEROSOL, METERED ORAL EVERY 8 HOURS PRN
Qty: 1 INHALER | Refills: 3 | Status: SHIPPED | OUTPATIENT
Start: 2020-10-01 | End: 2021-07-23

## 2020-10-01 RX ORDER — LOSARTAN POTASSIUM 100 MG/1
100 TABLET ORAL DAILY
Qty: 90 TABLET | Refills: 1 | Status: CANCELLED
Start: 2020-10-01

## 2020-10-01 RX ORDER — LEVALBUTEROL TARTRATE 45 UG/1
2 AEROSOL, METERED ORAL EVERY 8 HOURS PRN
Qty: 1 INHALER | Refills: 3 | Status: SHIPPED | OUTPATIENT
Start: 2020-10-01 | End: 2020-10-01

## 2020-10-01 RX ORDER — HYDROCHLOROTHIAZIDE 25 MG/1
25 TABLET ORAL DAILY
Qty: 90 TABLET | Refills: 3 | Status: SHIPPED | OUTPATIENT
Start: 2020-10-01 | End: 2021-07-23

## 2020-10-01 RX ORDER — PAROXETINE 10 MG/1
TABLET, FILM COATED ORAL
Qty: 90 TABLET | Refills: 3 | Status: SHIPPED | OUTPATIENT
Start: 2020-10-01 | End: 2021-07-23

## 2020-10-01 RX ORDER — LOSARTAN POTASSIUM 100 MG/1
100 TABLET ORAL DAILY
Qty: 90 TABLET | Refills: 3 | Status: SHIPPED | OUTPATIENT
Start: 2020-10-01 | End: 2021-07-23

## 2020-10-01 RX ORDER — TRAZODONE HYDROCHLORIDE 50 MG/1
50 TABLET, FILM COATED ORAL AT BEDTIME
Qty: 90 TABLET | Refills: 3 | Status: SHIPPED | OUTPATIENT
Start: 2020-10-01 | End: 2020-12-29

## 2020-10-01 RX ORDER — SOTALOL HYDROCHLORIDE 80 MG/1
80 TABLET ORAL 2 TIMES DAILY
COMMUNITY
Start: 2020-08-22 | End: 2022-10-13

## 2020-10-01 RX ORDER — HYDROCHLOROTHIAZIDE 25 MG/1
25 TABLET ORAL DAILY
Qty: 90 TABLET | Refills: 1 | Status: CANCELLED
Start: 2020-10-01

## 2020-10-01 ASSESSMENT — ASTHMA QUESTIONNAIRES
QUESTION_4 LAST FOUR WEEKS HOW OFTEN HAVE YOU USED YOUR RESCUE INHALER OR NEBULIZER MEDICATION (SUCH AS ALBUTEROL): NOT AT ALL
QUESTION_3 LAST FOUR WEEKS HOW OFTEN DID YOUR ASTHMA SYMPTOMS (WHEEZING, COUGHING, SHORTNESS OF BREATH, CHEST TIGHTNESS OR PAIN) WAKE YOU UP AT NIGHT OR EARLIER THAN USUAL IN THE MORNING: NOT AT ALL
ACT_TOTALSCORE: 25
QUESTION_1 LAST FOUR WEEKS HOW MUCH OF THE TIME DID YOUR ASTHMA KEEP YOU FROM GETTING AS MUCH DONE AT WORK, SCHOOL OR AT HOME: NONE OF THE TIME
QUESTION_2 LAST FOUR WEEKS HOW OFTEN HAVE YOU HAD SHORTNESS OF BREATH: NOT AT ALL
QUESTION_5 LAST FOUR WEEKS HOW WOULD YOU RATE YOUR ASTHMA CONTROL: COMPLETELY CONTROLLED

## 2020-10-01 ASSESSMENT — PATIENT HEALTH QUESTIONNAIRE - PHQ9
5. POOR APPETITE OR OVEREATING: NEARLY EVERY DAY
SUM OF ALL RESPONSES TO PHQ QUESTIONS 1-9: 7

## 2020-10-01 ASSESSMENT — PAIN SCALES - GENERAL: PAINLEVEL: EXTREME PAIN (8)

## 2020-10-01 ASSESSMENT — ANXIETY QUESTIONNAIRES
7. FEELING AFRAID AS IF SOMETHING AWFUL MIGHT HAPPEN: NEARLY EVERY DAY
3. WORRYING TOO MUCH ABOUT DIFFERENT THINGS: NEARLY EVERY DAY
IF YOU CHECKED OFF ANY PROBLEMS ON THIS QUESTIONNAIRE, HOW DIFFICULT HAVE THESE PROBLEMS MADE IT FOR YOU TO DO YOUR WORK, TAKE CARE OF THINGS AT HOME, OR GET ALONG WITH OTHER PEOPLE: VERY DIFFICULT
2. NOT BEING ABLE TO STOP OR CONTROL WORRYING: NEARLY EVERY DAY
1. FEELING NERVOUS, ANXIOUS, OR ON EDGE: MORE THAN HALF THE DAYS
5. BEING SO RESTLESS THAT IT IS HARD TO SIT STILL: NOT AT ALL
GAD7 TOTAL SCORE: 16
6. BECOMING EASILY ANNOYED OR IRRITABLE: MORE THAN HALF THE DAYS

## 2020-10-01 ASSESSMENT — MIFFLIN-ST. JEOR: SCORE: 1811.1

## 2020-10-01 NOTE — PROGRESS NOTES
Subjective     Romy Hurley is a 75 year old female who presents to clinic today for the following health issues:    HPI       Chief Complaint   Patient presents with     Diabetes     Flu Shot     Health Maintenance     fit     Medication Reconciliation     just re-started prrozac          Diabetes Follow-up    How often are you checking your blood sugar? A few times a week  What time of day are you checking your blood sugars (select all that apply)?  Before meals  Have you had any blood sugars above 200?  No  Have you had any blood sugars below 70?  No    What symptoms do you notice when your blood sugar is low?  None    What concerns do you have today about your diabetes? None     Do you have any of these symptoms? (Select all that apply)  Numbness in feet    Have you had a diabetic eye exam in the last 12 months? Yes- Date of last eye exam: 11/2019,  Location: [facility]      Hyperlipidemia Follow-Up      Are you regularly taking any medication or supplement to lower your cholesterol?   Yes- simvastatin    Are you having muscle aches or other side effects that you think could be caused by your cholesterol lowering medication?  No    Hypertension Follow-up      Do you check your blood pressure regularly outside of the clinic? Yes     Are you following a low salt diet? No    Are your blood pressures ever more than 140 on the top number (systolic) OR more   than 90 on the bottom number (diastolic), for example 140/90? No    BP Readings from Last 2 Encounters:   10/01/20 132/78   12/16/19 118/70     Hemoglobin A1C (%)   Date Value   12/16/2019 6.0 (H)   03/21/2019 6.2 (H)     LDL Cholesterol Calculated (mg/dL)   Date Value   03/21/2019 53   03/21/2018 50       Depression and Anxiety Follow-Up    How are you doing with your depression since your last visit? Worsened due to the world    How are you doing with your anxiety since your last visit?  Worsened     Are you having other symptoms that might be associated with  depression or anxiety? Yes:  worrying more so    Have you had a significant life event? OTHER: covid-19     Do you have any concerns with your use of alcohol or other drugs? No    Social History     Tobacco Use     Smoking status: Former Smoker     Quit date: 1981     Years since quittin.7     Smokeless tobacco: Never Used   Substance Use Topics     Alcohol use: Yes     Comment: very rarely     Drug use: No     PHQ 3/21/2019 10/1/2020   PHQ-9 Total Score 0 7   Q9: Thoughts of better off dead/self-harm past 2 weeks Not at all Not at all     SURINDER-7 SCORE 10/1/2020   Total Score 16     Last PHQ-9 10/1/2020   1.  Little interest or pleasure in doing things 1   2.  Feeling down, depressed, or hopeless 3   3.  Trouble falling or staying asleep, or sleeping too much 0   4.  Feeling tired or having little energy 3   5.  Poor appetite or overeating 0   6.  Feeling bad about yourself 0   7.  Trouble concentrating 0   8.  Moving slowly or restless 0   Q9: Thoughts of better off dead/self-harm past 2 weeks 0   PHQ-9 Total Score 7   Difficulty at work, home, or with people -     SURINDER-7  10/1/2020   1. Feeling nervous, anxious, or on edge 2   2. Not being able to stop or control worrying 3   3. Worrying too much about different things 3   4. Trouble relaxing 3   5. Being so restless that it is hard to sit still 0   6. Becoming easily annoyed or irritable 2   7. Feeling afraid, as if something awful might happen 3   SURINDER-7 Total Score 16   If you checked any problems, how difficult have they made it for you to do your work, take care of things at home, or get along with other people? Very difficult       Suicide Assessment Five-step Evaluation and Treatment (SAFE-T)    Asthma Follow-Up    Was ACT completed today?    Yes    ACT Total Scores 10/1/2020   ACT TOTAL SCORE -   ASTHMA ER VISITS -   ASTHMA HOSPITALIZATIONS -   ACT TOTAL SCORE (Goal Greater than or Equal to 20) 25   In the past 12 months, how many times did you visit  "the emergency room for your asthma without being admitted to the hospital? 0   In the past 12 months, how many times were you hospitalized overnight because of your asthma? 0          How many days per week do you miss taking your asthma controller medication?  0    Please describe any recent triggers for your asthma: seasonal allergies    Have you had any Emergency Room Visits, Urgent Care Visits, or Hospital Admissions since your last office visit?  No      How many servings of fruits and vegetables do you eat daily?  2-3    On average, how many sweetened beverages do you drink each day (Examples: soda, juice, sweet tea, etc.  Do NOT count diet or artificially sweetened beverages)?   0    How many days per week do you exercise enough to make your heart beat faster? 3 or less    How many minutes a day do you exercise enough to make your heart beat faster? 9 or less    How many days per week do you miss taking your medication? 0        Review of Systems   Constitutional, HEENT, cardiovascular, pulmonary, gi and gu systems are negative, except as otherwise noted.      Objective    /78   Pulse 80   Temp 97.8  F (36.6  C) (Tympanic)   Resp 20   Ht 1.715 m (5' 7.5\")   Wt 126.6 kg (279 lb)   SpO2 98%   Breastfeeding No   BMI 43.05 kg/m    Body mass index is 43.05 kg/m .  Physical Exam   GENERAL: healthy, alert and no distress  EYES: Eyes grossly normal to inspection, PERRL and conjunctivae and sclerae normal  NECK: no adenopathy, no asymmetry, masses, or scars and thyroid normal to palpation  RESP: lungs clear to auscultation - no rales, rhonchi or wheezes  CV: regular rate and rhythm, normal S1 S2, no S3 or S4, no murmur, click or rub, no peripheral edema and peripheral pulses strong  ABDOMEN: soft, nontender, no hepatosplenomegaly, no masses and bowel sounds normal  MS: no gross musculoskeletal defects noted, no edema  SKIN: no suspicious lesions or rashes  NEURO: Normal strength and tone, mentation " intact and speech normal  PSYCH: tearful    Diabetic Foot Screen:  Any complaints of increased pain or numbness ? No  Is there a foot ulcer now or a history of foot ulcer? No  Does the foot have an abnormal shape? No  Are the nails thick, too long or ingrown? No  Are there any redness or open areas? No         Sensation Testing done at all points on the diagram with monofilament     Right Foot: Sensation Normal at all points  Left Foot: Sensation Normal at all points     Risk Category: 0- No loss of protective sensation  Performed by Adeola Stockton MD                  Results for orders placed or performed in visit on 10/01/20 (from the past 24 hour(s))   Hemoglobin A1c   Result Value Ref Range    Hemoglobin A1C 6.3 (H) 0 - 5.6 %           Assessment & Plan     Type 2 diabetes mellitus without complication, without long-term current use of insulin (H)   well controlled  - Hemoglobin A1c  - Lipid panel reflex to direct LDL Non-fasting  - FOOT EXAM  - blood glucose (ONETOUCH VERIO IQ) test strip; Use to test blood sugars 1 times daily or as directed.    Stage 3a chronic kidney disease     - Comprehensive metabolic panel  - Albumin Random Urine Quantitative with Creat Ratio    HTN, goal below 140/90  Stable, well controlled  - Comprehensive metabolic panel  - hydrochlorothiazide (HYDRODIURIL) 25 MG tablet; Take 1 tablet (25 mg) by mouth daily  - losartan (COZAAR) 100 MG tablet; Take 1 tablet (100 mg) by mouth daily    Morbid obesity (H)       Special screening for malignant neoplasms, colon     - Fecal colorectal cancer screen (FIT); Future    Mild intermittent asthma without complication     - fluticasone-salmeterol (ADVAIR DISKUS) 250-50 MCG/DOSE inhaler; Inhale 1 puff into the lungs 2 times daily  - levalbuterol (XOPENEX HFA) 45 MCG/ACT inhaler; Inhale 2 puffs into the lungs every 8 hours as needed for shortness of breath / dyspnea Needs an appt for next refills    Adjustment disorder with mixed anxiety and  depressed mood  Just restarted this  - PARoxetine (PAXIL) 10 MG tablet; TAKE 1 TABLET(10 MG) BY MOUTH AT BEDTIME    Persistent insomnia     - traZODone (DESYREL) 50 MG tablet; Take 1 tablet (50 mg) by mouth At Bedtime              No follow-ups on file.    Adeola Stockton MD  [facility]

## 2020-10-01 NOTE — TELEPHONE ENCOUNTER
"Requested Prescriptions   Pending Prescriptions Disp Refills     hydrochlorothiazide (HYDRODIURIL) 25 MG tablet 90 tablet 1     Sig: Take 1 tablet (25 mg) by mouth daily       Diuretics (Including Combos) Protocol Failed - 10/1/2020 10:13 AM        Failed - Recent (12 mo) or future (30 days) visit within the authorizing provider's specialty     Patient has had an office visit with the authorizing provider or a provider within the authorizing providers department within the previous 12 mos or has a future within next 30 days. See \"Patient Info\" tab in inbasket, or \"Choose Columns\" in Meds & Orders section of the refill encounter.              Failed - Normal serum creatinine on file in past 12 months     Recent Labs   Lab Test 03/21/19  1103   CR 0.96              Failed - Normal serum potassium on file in past 12 months     Recent Labs   Lab Test 03/21/19  1103   POTASSIUM 4.2                    Failed - Normal serum sodium on file in past 12 months     Recent Labs   Lab Test 03/21/19  1103                 Passed - Blood pressure under 140/90 in past 12 months     BP Readings from Last 3 Encounters:   12/16/19 118/70   03/21/19 126/74   12/22/18 123/83                 Passed - Medication is active on med list        Passed - Patient is age 18 or older        Passed - No active pregancy on record        Passed - No positive pregnancy test in past 12 months           losartan (COZAAR) 100 MG tablet 90 tablet 1     Sig: Take 1 tablet (100 mg) by mouth daily       Angiotensin-II Receptors Failed - 10/1/2020 10:13 AM        Failed - Recent (12 mo) or future (30 days) visit within the authorizing provider's specialty     Patient has had an office visit with the authorizing provider or a provider within the authorizing providers department within the previous 12 mos or has a future within next 30 days. See \"Patient Info\" tab in inbasket, or \"Choose Columns\" in Meds & Orders section of the refill encounter.          "     Failed - Normal serum creatinine on file in past 12 months     Recent Labs   Lab Test 03/21/19  1103   CR 0.96       Ok to refill medication if creatinine is low          Failed - Normal serum potassium on file in past 12 months     Recent Labs   Lab Test 03/21/19  1103   POTASSIUM 4.2                    Passed - Last blood pressure under 140/90 in past 12 months     BP Readings from Last 3 Encounters:   12/16/19 118/70   03/21/19 126/74   12/22/18 123/83                 Passed - Medication is active on med list        Passed - Patient is age 18 or older        Passed - No active pregnancy on record        Passed - No positive pregnancy test in past 12 months

## 2020-10-01 NOTE — TELEPHONE ENCOUNTER
Patient requesting refill for the following medications:    Hydrochlorothiazide (HYDRODIURIL) 25 mg tablet  Last filled 3/24/2020, 90 tablets, 1 refill    Losartan (COZAAR) 100 mg tablet  Last filled 3/24/2020, 90 tablets, 1 refill    [facility] (on file)    Requests call back to confirm prescriptions have been sent [phone].    Goldie Mittal RN  [facility]    Reason for Disposition    Caller requesting a NON-URGENT new prescription or refill and triager unable to refill per unit policy    Protocols used: MEDICATION QUESTION CALL-A-AH

## 2020-10-01 NOTE — LETTER
October 2, 2020      Romy Hurley  [address] [facility] [place] [id number]        Dear ,    We are writing to inform you of your test results.    Kidney function has declined slightly since last year.  I would like you to try increasing fluid intake and rechecking this number in a month (lab only).     Liver function is normal.       Cholesterol looks good.     HgbA1c is 6.3% - well controlled as we discussed.     Resulted Orders   Comprehensive metabolic panel   Result Value Ref Range    Sodium 138 133 - 144 mmol/L    Potassium 3.8 3.4 - 5.3 mmol/L    Chloride 104 94 - 109 mmol/L    Carbon Dioxide 31 20 - 32 mmol/L    Anion Gap 3 3 - 14 mmol/L    Glucose 122 (H) 70 - 99 mg/dL      Comment:      Non Fasting    Urea Nitrogen 25 7 - 30 mg/dL    Creatinine 1.23 (H) 0.52 - 1.04 mg/dL    GFR Estimate 43 (L) >60 mL/min/[1.73_m2]      Comment:      Non  GFR Calc  Starting 12/18/2018, serum creatinine based estimated GFR (eGFR) will be   calculated using the Chronic Kidney Disease Epidemiology Collaboration   (CKD-EPI) equation.      GFR Estimate If Black 50 (L) >60 mL/min/[1.73_m2]      Comment:       GFR Calc  Starting 12/18/2018, serum creatinine based estimated GFR (eGFR) will be   calculated using the Chronic Kidney Disease Epidemiology Collaboration   (CKD-EPI) equation.      Calcium 9.7 8.5 - 10.1 mg/dL    Bilirubin Total 0.4 0.2 - 1.3 mg/dL    Albumin 3.5 3.4 - 5.0 g/dL    Protein Total 8.2 6.8 - 8.8 g/dL    Alkaline Phosphatase 63 40 - 150 U/L    ALT 25 0 - 50 U/L    AST 19 0 - 45 U/L   Hemoglobin A1c   Result Value Ref Range    Hemoglobin A1C 6.3 (H) 0 - 5.6 %      Comment:      Normal <5.7% Prediabetes 5.7-6.4%  Diabetes 6.5% or higher - adopted from ADA   consensus guidelines.     Lipid panel reflex to direct LDL Non-fasting   Result Value Ref Range    Cholesterol 133 <200 mg/dL    Triglycerides 197 (H) <150 mg/dL      Comment:      Borderline high:   150-199 mg/dl  High:             200-499 mg/dl  Very high:       >499 mg/dl  Non Fasting      HDL Cholesterol 46 (L) >49 mg/dL    LDL Cholesterol Calculated 48 <100 mg/dL      Comment:      Desirable:       <100 mg/dl    Non HDL Cholesterol 87 <130 mg/dL   Albumin Random Urine Quantitative with Creat Ratio   Result Value Ref Range    Creatinine Urine 99 mg/dL    Albumin Urine mg/L 7 mg/L    Albumin Urine mg/g Cr 7.06 0 - 25 mg/g Cr       If you have any questions or concerns, please call the clinic at the number listed above.       Sincerely,        Adeola Stockton MD

## 2020-10-02 ASSESSMENT — ASTHMA QUESTIONNAIRES: ACT_TOTALSCORE: 25

## 2020-10-02 ASSESSMENT — ANXIETY QUESTIONNAIRES: GAD7 TOTAL SCORE: 16

## 2020-10-21 ENCOUNTER — TRANSFERRED RECORDS (OUTPATIENT)
Dept: HEALTH INFORMATION MANAGEMENT | Facility: CLINIC | Age: 75
End: 2020-10-21

## 2020-10-26 ENCOUNTER — TRANSFERRED RECORDS (OUTPATIENT)
Dept: HEALTH INFORMATION MANAGEMENT | Facility: CLINIC | Age: 75
End: 2020-10-26

## 2020-11-16 ENCOUNTER — COMMUNICATION - HEALTHEAST (OUTPATIENT)
Dept: CARDIOLOGY | Facility: CLINIC | Age: 75
End: 2020-11-16

## 2020-11-16 DIAGNOSIS — I48.0 PAROXYSMAL ATRIAL FIBRILLATION (H): ICD-10-CM

## 2020-11-23 ENCOUNTER — COMMUNICATION - HEALTHEAST (OUTPATIENT)
Dept: CARDIOLOGY | Facility: CLINIC | Age: 75
End: 2020-11-23

## 2020-11-23 DIAGNOSIS — I48.19 PERSISTENT ATRIAL FIBRILLATION (H): ICD-10-CM

## 2020-12-06 ENCOUNTER — HEALTH MAINTENANCE LETTER (OUTPATIENT)
Age: 75
End: 2020-12-06

## 2020-12-14 ENCOUNTER — AMBULATORY - HEALTHEAST (OUTPATIENT)
Dept: CARDIOLOGY | Facility: CLINIC | Age: 75
End: 2020-12-14

## 2020-12-14 ENCOUNTER — COMMUNICATION - HEALTHEAST (OUTPATIENT)
Dept: CARDIOLOGY | Facility: CLINIC | Age: 75
End: 2020-12-14

## 2020-12-14 DIAGNOSIS — I48.0 PAROXYSMAL ATRIAL FIBRILLATION (H): ICD-10-CM

## 2020-12-18 ENCOUNTER — COMMUNICATION - HEALTHEAST (OUTPATIENT)
Dept: CARDIOLOGY | Facility: CLINIC | Age: 75
End: 2020-12-18

## 2020-12-18 DIAGNOSIS — I48.0 PAROXYSMAL ATRIAL FIBRILLATION (H): ICD-10-CM

## 2020-12-29 ENCOUNTER — MYC MEDICAL ADVICE (OUTPATIENT)
Dept: FAMILY MEDICINE | Facility: CLINIC | Age: 75
End: 2020-12-29

## 2020-12-29 DIAGNOSIS — G47.00 PERSISTENT INSOMNIA: ICD-10-CM

## 2020-12-29 RX ORDER — TRAZODONE HYDROCHLORIDE 50 MG/1
50 TABLET, FILM COATED ORAL AT BEDTIME
Qty: 90 TABLET | Refills: 2 | Status: SHIPPED | OUTPATIENT
Start: 2020-12-29 | End: 2021-12-06

## 2020-12-29 NOTE — TELEPHONE ENCOUNTER
Prescription approved per [facility] [place].  Vivian Zamorano RN

## 2020-12-30 NOTE — TELEPHONE ENCOUNTER
Care team: is there a system wide plan for this that I was not aware of?  Are we keeping a list.  Please notify patient what the system plan is (check with management to see if there is one).    Agree patient is high risk, but I don't know that we have a plan for this as of yet.    Adeola Stockton M.D.

## 2021-01-18 ENCOUNTER — TRANSFERRED RECORDS (OUTPATIENT)
Dept: HEALTH INFORMATION MANAGEMENT | Facility: CLINIC | Age: 76
End: 2021-01-18

## 2021-01-31 ENCOUNTER — AMBULATORY - HEALTHEAST (OUTPATIENT)
Dept: SURGERY | Facility: CLINIC | Age: 76
End: 2021-01-31

## 2021-01-31 DIAGNOSIS — Z11.59 ENCOUNTER FOR SCREENING FOR OTHER VIRAL DISEASES: ICD-10-CM

## 2021-02-01 ENCOUNTER — OFFICE VISIT (OUTPATIENT)
Dept: FAMILY MEDICINE | Facility: CLINIC | Age: 76
End: 2021-02-01
Payer: COMMERCIAL

## 2021-02-01 VITALS
HEART RATE: 63 BPM | OXYGEN SATURATION: 97 % | RESPIRATION RATE: 16 BRPM | WEIGHT: 275 LBS | SYSTOLIC BLOOD PRESSURE: 112 MMHG | BODY MASS INDEX: 41.68 KG/M2 | TEMPERATURE: 97.1 F | DIASTOLIC BLOOD PRESSURE: 62 MMHG | HEIGHT: 68 IN

## 2021-02-01 DIAGNOSIS — M19.079 ANKLE ARTHRITIS: ICD-10-CM

## 2021-02-01 DIAGNOSIS — I10 HTN, GOAL BELOW 140/90: ICD-10-CM

## 2021-02-01 DIAGNOSIS — N18.31 STAGE 3A CHRONIC KIDNEY DISEASE (H): ICD-10-CM

## 2021-02-01 DIAGNOSIS — J45.20 MILD INTERMITTENT ASTHMA WITHOUT COMPLICATION: ICD-10-CM

## 2021-02-01 DIAGNOSIS — E66.01 MORBID OBESITY (H): ICD-10-CM

## 2021-02-01 DIAGNOSIS — I48.0 PAROXYSMAL ATRIAL FIBRILLATION (H): ICD-10-CM

## 2021-02-01 DIAGNOSIS — Z01.818 PREOPERATIVE EXAMINATION: Primary | ICD-10-CM

## 2021-02-01 DIAGNOSIS — E11.9 TYPE 2 DIABETES MELLITUS WITHOUT COMPLICATION, WITHOUT LONG-TERM CURRENT USE OF INSULIN (H): ICD-10-CM

## 2021-02-01 LAB
ANION GAP SERPL CALCULATED.3IONS-SCNC: 2 MMOL/L (ref 3–14)
BASOPHILS # BLD AUTO: 0 10E9/L (ref 0–0.2)
BASOPHILS NFR BLD AUTO: 0.3 %
BUN SERPL-MCNC: 28 MG/DL (ref 7–30)
CALCIUM SERPL-MCNC: 9.5 MG/DL (ref 8.5–10.1)
CHLORIDE SERPL-SCNC: 100 MMOL/L (ref 94–109)
CO2 SERPL-SCNC: 33 MMOL/L (ref 20–32)
CREAT SERPL-MCNC: 1.08 MG/DL (ref 0.52–1.04)
DIFFERENTIAL METHOD BLD: NORMAL
EOSINOPHIL # BLD AUTO: 0.1 10E9/L (ref 0–0.7)
EOSINOPHIL NFR BLD AUTO: 1.9 %
ERYTHROCYTE [DISTWIDTH] IN BLOOD BY AUTOMATED COUNT: 14.5 % (ref 10–15)
GFR SERPL CREATININE-BSD FRML MDRD: 51 ML/MIN/{1.73_M2}
GLUCOSE SERPL-MCNC: 94 MG/DL (ref 70–99)
HCT VFR BLD AUTO: 40.2 % (ref 35–47)
HGB BLD-MCNC: 12.7 G/DL (ref 11.7–15.7)
LYMPHOCYTES # BLD AUTO: 2 10E9/L (ref 0.8–5.3)
LYMPHOCYTES NFR BLD AUTO: 26.3 %
MCH RBC QN AUTO: 28.5 PG (ref 26.5–33)
MCHC RBC AUTO-ENTMCNC: 31.6 G/DL (ref 31.5–36.5)
MCV RBC AUTO: 90 FL (ref 78–100)
MONOCYTES # BLD AUTO: 0.7 10E9/L (ref 0–1.3)
MONOCYTES NFR BLD AUTO: 9.2 %
NEUTROPHILS # BLD AUTO: 4.7 10E9/L (ref 1.6–8.3)
NEUTROPHILS NFR BLD AUTO: 62.3 %
PLATELET # BLD AUTO: 270 10E9/L (ref 150–450)
POTASSIUM SERPL-SCNC: 3.9 MMOL/L (ref 3.4–5.3)
RBC # BLD AUTO: 4.46 10E12/L (ref 3.8–5.2)
SODIUM SERPL-SCNC: 135 MMOL/L (ref 133–144)
WBC # BLD AUTO: 7.5 10E9/L (ref 4–11)

## 2021-02-01 PROCEDURE — 80048 BASIC METABOLIC PNL TOTAL CA: CPT | Performed by: FAMILY MEDICINE

## 2021-02-01 PROCEDURE — 36415 COLL VENOUS BLD VENIPUNCTURE: CPT | Performed by: FAMILY MEDICINE

## 2021-02-01 PROCEDURE — 93000 ELECTROCARDIOGRAM COMPLETE: CPT | Performed by: FAMILY MEDICINE

## 2021-02-01 PROCEDURE — 99214 OFFICE O/P EST MOD 30 MIN: CPT | Performed by: FAMILY MEDICINE

## 2021-02-01 PROCEDURE — 85025 COMPLETE CBC W/AUTO DIFF WBC: CPT | Performed by: FAMILY MEDICINE

## 2021-02-01 ASSESSMENT — MIFFLIN-ST. JEOR: SCORE: 1796.92

## 2021-02-01 NOTE — NURSING NOTE
"Initial /62   Pulse 63   Temp 97.1  F (36.2  C) (Tympanic)   Resp 16   Ht 1.721 m (5' 7.75\")   Wt 124.7 kg (275 lb)   SpO2 97%   BMI 42.12 kg/m   Estimated body mass index is 42.12 kg/m  as calculated from the following:    Height as of this encounter: 1.721 m (5' 7.75\").    Weight as of this encounter: 124.7 kg (275 lb). .      "

## 2021-02-01 NOTE — PROGRESS NOTES
[facility]  [address]  [facility] [address]  Phone: [phone]  Primary Provider: Adeola Scott  Pre-op Performing Provider: ADEOLA SCOTT    PREOPERATIVE EVALUATION:  Today's date: 2/1/2021    Romy Hurley is a 75 year old female who presents for a preoperative evaluation.    Surgical Information:  Surgery/Procedure: Right Ankle replacement or fusion  Surgery Location: [facility]  Surgeon: Vidal  Surgery Date: 2/19/2021  Time of Surgery: TBD  Where patient plans to recover: At home with family  Fax number for surgical facility: Note does not need to be faxed, will be available electronically in Epic.    Type of Anesthesia Anticipated: General    Subjective     HPI related to upcoming procedure: patient with right ankle arthritis that has failed conservative therapy.    Preop Questions 2/1/2021   1. Have you ever had a heart attack or stroke? No   2. Have you ever had surgery on your heart or blood vessels, such as a stent placement, a coronary artery bypass, or surgery on an artery in your head, neck, heart, or legs? No   3. Do you have chest pain with activity? No   4. Do you have a history of  heart failure? No   5. Do you currently have a cold, bronchitis or symptoms of other infection? No   6. Do you have a cough, shortness of breath, or wheezing? No   7. Do you or anyone in your family have previous history of blood clots? No   8. Do you or does anyone in your family have a serious bleeding problem such as prolonged bleeding following surgeries or cuts? No   9. Have you ever had problems with anemia or been told to take iron pills? No   10. Have you had any abnormal blood loss such as black, tarry or bloody stools, or abnormal vaginal bleeding? No   11. Have you ever had a blood transfusion? No   12. Are you willing to have a blood transfusion if it is medically needed before, during, or after your surgery? Yes   13. Have you or any of your relatives ever had  problems with anesthesia? No   14. Do you have sleep apnea, excessive snoring or daytime drowsiness? No   15. Do you have any artifical heart valves or other implanted medical devices like a pacemaker, defibrillator, or continuous glucose monitor? No   16. Do you have artificial joints? YES - both knees   17. Are you allergic to latex? No       Health Care Directive:  Patient does not have a Health Care Directive or Living Will: Advance Directive received and scanned. Click on Code in the patient header to view.    Preoperative Review of :   reviewed - no record of controlled substances prescribed.       Status of Chronic Conditions:  A-FIB - Patient has a longstanding history of chronic A-fib currently on rhythm control. Current treatment regimen includes Apixaban for stroke prevention and denies significant symptoms of lightheadedness, palpitations or dyspnea.     ASTHMA - Patient has a longstanding history of moderate-severe Asthma . Patient has been doing well overall noting NO SYMPTOMS and continues on medication regimen consisting of as needed Advair without adverse reactions or side effects.     DEPRESSION - Patient has a long history of Depression of moderate severity requiring medication for control with recent symptoms being stable..Current symptoms of depression include none.     DIABETES - Patient has a longstanding history of DiabetesType Type II . Patient is being treated with diet and denies significant side effects. Control has been good. Complicating factors include but are not limited to: hypertension and hyperlipidemia.     HYPERTENSION - Patient has longstanding history of HTN , currently denies any symptoms referable to elevated blood pressure. Specifically denies chest pain, palpitations, dyspnea, orthopnea, PND or peripheral edema. Blood pressure readings have been in normal range. Current medication regimen is as listed below. Patient denies any side effects of medication.       Review  "of Systems  CONSTITUTIONAL: NEGATIVE for fever, chills, change in weight  ENT/MOUTH: NEGATIVE for ear, mouth and throat problems  RESP: NEGATIVE for significant cough or SOB  CV: NEGATIVE for chest pain, palpitations or peripheral edema    Patient Active Problem List    Diagnosis Date Noted     Type 2 diabetes mellitus without complication, without long-term current use of insulin (H) 02/22/2017     Priority: High     CKD (chronic kidney disease) stage 3, GFR 30-59 ml/min (H) 10/01/2020     Priority: Medium     Morbid obesity (H) 03/21/2019     Priority: Medium     Adjustment disorder with mixed anxiety and depressed mood 03/21/2018     Priority: Medium     Mild intermittent asthma without complication 03/10/2016     Priority: Medium     Persistent insomnia 03/10/2016     Priority: Medium     Advanced directives, counseling/discussion 05/21/2012     Priority: Medium     Advance Directive received and scanned. Click on Code in the patient header to view. 6/21/2012          Paroxysmal atrial fibrillation (H) 04/05/2012     Priority: Medium     [facility] with chest pain  Afib with RVR    [facility] nuclear stress done 3/29/2012  Negative for inducible ischemia  EF 70%  Increased metoprolol from 25 to 50 mg twice daily     Cardioversion 5/2012       Insomnia 03/10/2010     Priority: Medium     HTN, goal below 140/90 03/10/2010     Priority: Medium     3/10/10- pt has with her results over the past two months- all normal at home and was \"low\" in the hospital with her knee replacement.       Mild intermittent asthma 10/31/2007     Priority: Medium     Health Care Home 08/06/2013     Priority: Low     Yeni Hidalgo RN-PHN   [phone]  [facility] Care Coordinator /                   CARDIOVASCULAR SCREENING; LDL GOAL LESS THAN 130 10/31/2010     Priority: Low     Total knee replacement status 02/17/2010     Priority: Low     Osteoarthritis 01/25/2010     Priority: Low    "   Past Medical History:   Diagnosis Date     Mild intermittent asthma      Past Surgical History:   Procedure Laterality Date     ARTHROSCOPY SHOULDER RT/LT  7/6/11    right with subacromial decompression and rotator cuff repair, massive     HC CARDIOVERSION  5/31/2012    [facility] - done for a fib     JOINT REPLACEMTN, KNEE RT/LT  2/1/2010    LEFT     JOINT REPLACEMTN, KNEE RT/LT  6/7/2010    right     Current Outpatient Medications   Medication Sig Dispense Refill     apixaban ANTICOAGULANT (ELIQUIS) 5 MG tablet TAKE ONE TABLET BY MOUTH TWICE DAILY       blood glucose (ONETOUCH VERIO IQ) test strip Use to test blood sugars 1 times daily or as directed. 100 each 11     blood glucose monitoring (ONE TOUCH DELICA) lancets Use to test blood sugars 1 times daily or as directed. 100 each 3     Cholecalciferol (VITAMIN D) 2000 UNITS tablet Take 4,000 Units by mouth       CRANBERRY PO        fluticasone-salmeterol (ADVAIR DISKUS) 250-50 MCG/DOSE inhaler Inhale 1 puff into the lungs 2 times daily 1 Inhaler 3     hydrochlorothiazide (HYDRODIURIL) 25 MG tablet Take 1 tablet (25 mg) by mouth daily 90 tablet 3     levalbuterol (XOPENEX HFA) 45 MCG/ACT inhaler Inhale 2 puffs into the lungs every 8 hours as needed for shortness of breath / dyspnea 1 Inhaler 3     losartan (COZAAR) 100 MG tablet Take 1 tablet (100 mg) by mouth daily 90 tablet 3     metFORMIN (GLUCOPHAGE) 500 MG tablet TAKE 1 TABLET(500 MG) BY MOUTH DAILY WITH DINNER 90 tablet 3     PARoxetine (PAXIL) 10 MG tablet TAKE 1 TABLET(10 MG) BY MOUTH AT BEDTIME 90 tablet 3     simvastatin (ZOCOR) 20 MG tablet TAKE 1 TABLET(20 MG) BY MOUTH AT BEDTIME 90 tablet 3     sotalol (BETAPACE) 80 MG tablet        traZODone (DESYREL) 50 MG tablet Take 1 tablet (50 mg) by mouth At Bedtime 90 tablet 2       Allergies   Allergen Reactions     Macrobid [Nitrofurantoin Anhydrous] Shortness Of Breath     Ace Inhibitors Cough     Corticosteroids         Social History     Tobacco  "Use     Smoking status: Former Smoker     Quit date: 1981     Years since quittin.1     Smokeless tobacco: Never Used   Substance Use Topics     Alcohol use: Yes     Comment: very rarely     Family History   Problem Relation Age of Onset     Genitourinary Problems Mother         Ovarian Cancer     Gastrointestinal Disease Mother         Had gallbladder romoved     Cancer Father         Lung     Gastrointestinal Disease Father         Had gallbladder romoved     C.A.D. Paternal Grandfather         Coronary     Gastrointestinal Disease Brother         Had gallbladder romoved     Depression Sister         DDD     History   Drug Use No         Objective     /62   Pulse 63   Temp 97.1  F (36.2  C) (Tympanic)   Resp 16   Ht 1.721 m (5' 7.75\")   Wt 124.7 kg (275 lb)   SpO2 97%   BMI 42.12 kg/m      Physical Exam  GENERAL APPEARANCE: healthy, alert and no distress  HENT: ear canals and TM's normal and nose and mouth without ulcers or lesions  RESP: lungs clear to auscultation - no rales, rhonchi or wheezes  CV: regular rate and rhythm, normal S1 S2, no S3 or S4 and no murmur, click or rub   ABDOMEN: soft, nontender, no HSM or masses and bowel sounds normal  NEURO: Normal strength and tone, sensory exam grossly normal, mentation intact and speech normal    Recent Labs   Lab Test 10/01/20  1604 19  1003 19  1103     --  134   POTASSIUM 3.8  --  4.2   CR 1.23*  --  0.96   A1C 6.3* 6.0* 6.2*        Diagnostics:  Labs pending at this time.  Results will be reviewed when available.   EKG: appears normal, NSR, normal axis, normal intervals, no acute ST/T changes c/w ischemia, no LVH by voltage criteria, unchanged from previous tracings    Revised Cardiac Risk Index (RCRI):  The patient has the following serious cardiovascular risks for perioperative complications:   - No serious cardiac risks = 0 points     RCRI Interpretation: 0 points: Class I (very low risk - 0.4% complication " rate)           Assessment & Plan   The proposed surgical procedure is considered INTERMEDIATE risk.    Preoperative examination     - Basic metabolic panel  - CBC with platelets and differential  - Asymptomatic COVID-19 Virus (Coronavirus) by PCR; Future    Ankle arthritis       Type 2 diabetes mellitus without complication, without long-term current use of insulin (H)  Well controlled, diet alone    HTN, goal below 140/90  Well controlled  Hold hydrochlorothiazide the day of surgery, continue ARB/bblocker    Mild intermittent asthma without complication   stable    Paroxysmal atrial fibrillation (H)  Hold eliquis 72 hours before surgery  - EKG 12-lead complete w/read - Clinics    Stage 3a chronic kidney disease       Morbid obesity (H)            Risks and Recommendations:  The patient has the following additional risks and recommendations for perioperative complications:   - Morbid obesity (BMI >40)       Diabetes:  - Patient is not on insulin therapy: regular NPO guidelines can be followed.          Medication Instructions:   - apixaban (Eliquis), edoxaban (Savaysa), rivaroxaban (Xarelto): CrCl <50 mL/min. HOLD 72  hours before procedure.     - ACE/ARB: May be continued on the day of surgery.    - Beta Blockers: Continue taking on the day of surgery.   - Diuretics: HOLD on the day of surgery.   - Statins: Continue taking on the day of surgery.    - SSRIs, SNRIs, TCAs, Antipsychotics: Continue without modification.    - LABA, inhaled corticosteroid, long-acting anticholinergics: Continue without modification. Only takes when ill, doesn't take regularly    RECOMMENDATION:  APPROVAL GIVEN to proceed with proposed procedure, without further diagnostic evaluation.    Signed Electronically by: Adeola Stockton MD    Copy of this evaluation report is provided to requesting physician.    [facility] Guidelines    Revised Cardiac Risk Index

## 2021-02-03 ENCOUNTER — TRANSFERRED RECORDS (OUTPATIENT)
Dept: HEALTH INFORMATION MANAGEMENT | Facility: CLINIC | Age: 76
End: 2021-02-03

## 2021-02-03 LAB — RETINOPATHY: NEGATIVE

## 2021-02-09 ENCOUNTER — COMMUNICATION - HEALTHEAST (OUTPATIENT)
Dept: CARDIOLOGY | Facility: CLINIC | Age: 76
End: 2021-02-09

## 2021-02-16 DIAGNOSIS — Z01.818 PREOPERATIVE EXAMINATION: ICD-10-CM

## 2021-02-16 LAB
LABORATORY COMMENT REPORT: NORMAL
SARS-COV-2 RNA RESP QL NAA+PROBE: NEGATIVE
SARS-COV-2 RNA RESP QL NAA+PROBE: NORMAL
SPECIMEN SOURCE: NORMAL
SPECIMEN SOURCE: NORMAL

## 2021-02-16 PROCEDURE — U0005 INFEC AGEN DETEC AMPLI PROBE: HCPCS | Performed by: FAMILY MEDICINE

## 2021-02-16 PROCEDURE — U0003 INFECTIOUS AGENT DETECTION BY NUCLEIC ACID (DNA OR RNA); SEVERE ACUTE RESPIRATORY SYNDROME CORONAVIRUS 2 (SARS-COV-2) (CORONAVIRUS DISEASE [COVID-19]), AMPLIFIED PROBE TECHNIQUE, MAKING USE OF HIGH THROUGHPUT TECHNOLOGIES AS DESCRIBED BY CMS-2020-01-R: HCPCS | Performed by: FAMILY MEDICINE

## 2021-02-17 ASSESSMENT — MIFFLIN-ST. JEOR: SCORE: 1773.21

## 2021-02-18 ENCOUNTER — COMMUNICATION - HEALTHEAST (OUTPATIENT)
Dept: SCHEDULING | Facility: CLINIC | Age: 76
End: 2021-02-18

## 2021-02-19 ENCOUNTER — ANESTHESIA - HEALTHEAST (OUTPATIENT)
Dept: SURGERY | Facility: CLINIC | Age: 76
End: 2021-02-19

## 2021-02-19 ENCOUNTER — TRANSFERRED RECORDS (OUTPATIENT)
Dept: HEALTH INFORMATION MANAGEMENT | Facility: CLINIC | Age: 76
End: 2021-02-19

## 2021-02-19 ENCOUNTER — SURGERY - HEALTHEAST (OUTPATIENT)
Dept: SURGERY | Facility: CLINIC | Age: 76
End: 2021-02-19

## 2021-02-19 ASSESSMENT — MIFFLIN-ST. JEOR
SCORE: 1783.42
SCORE: 1783.42

## 2021-02-25 ENCOUNTER — TRANSFERRED RECORDS (OUTPATIENT)
Dept: HEALTH INFORMATION MANAGEMENT | Facility: CLINIC | Age: 76
End: 2021-02-25

## 2021-02-25 ENCOUNTER — OFFICE VISIT - HEALTHEAST (OUTPATIENT)
Dept: CARDIOLOGY | Facility: CLINIC | Age: 76
End: 2021-02-25

## 2021-02-25 DIAGNOSIS — I48.0 PAROXYSMAL A-FIB (H): ICD-10-CM

## 2021-03-04 ENCOUNTER — OFFICE VISIT - HEALTHEAST (OUTPATIENT)
Dept: CARDIOLOGY | Facility: CLINIC | Age: 76
End: 2021-03-04

## 2021-03-04 DIAGNOSIS — I48.0 PAROXYSMAL ATRIAL FIBRILLATION (H): ICD-10-CM

## 2021-03-08 ENCOUNTER — TRANSFERRED RECORDS (OUTPATIENT)
Dept: HEALTH INFORMATION MANAGEMENT | Facility: CLINIC | Age: 76
End: 2021-03-08

## 2021-03-09 ENCOUNTER — OFFICE VISIT - HEALTHEAST (OUTPATIENT)
Dept: CARDIOLOGY | Facility: CLINIC | Age: 76
End: 2021-03-09

## 2021-03-09 DIAGNOSIS — I48.0 PAROXYSMAL ATRIAL FIBRILLATION (H): ICD-10-CM

## 2021-03-16 ENCOUNTER — OFFICE VISIT - HEALTHEAST (OUTPATIENT)
Dept: CARDIOLOGY | Facility: CLINIC | Age: 76
End: 2021-03-16

## 2021-03-16 DIAGNOSIS — L23.1 CONTACT DERMATITIS DUE TO ADHESIVES, UNSPECIFIED CONTACT DERMATITIS TYPE: ICD-10-CM

## 2021-03-18 ENCOUNTER — TELEPHONE (OUTPATIENT)
Dept: FAMILY MEDICINE | Facility: CLINIC | Age: 76
End: 2021-03-18

## 2021-03-18 DIAGNOSIS — E11.9 TYPE 2 DIABETES MELLITUS WITHOUT COMPLICATION, WITHOUT LONG-TERM CURRENT USE OF INSULIN (H): ICD-10-CM

## 2021-03-18 RX ORDER — SIMVASTATIN 20 MG
20 TABLET ORAL AT BEDTIME
Qty: 90 TABLET | Refills: 0 | Status: SHIPPED | OUTPATIENT
Start: 2021-03-18 | End: 2021-07-23

## 2021-03-18 NOTE — TELEPHONE ENCOUNTER
Pt called and read back message below will call back to schedule,    Sofia Stevens  [facility]

## 2021-03-18 NOTE — TELEPHONE ENCOUNTER
Reason for call:  Medication   If this is a refill request, has the caller requested the refill from the pharmacy already? Yes  Will the patient be using a [facility]? No  Name of the pharmacy and phone number for the current request: [facility]    Name of the medication requested: Metformin    Other request: Patient needs medication refilled - for metformin- patient will be out next week    Phone number to reach patient:  Home number on file [phone] (home)    Best Time:  During the day     Can we leave a detailed message on this number?  YES    Travel screening: Not Applicable

## 2021-04-05 ENCOUNTER — TRANSFERRED RECORDS (OUTPATIENT)
Dept: HEALTH INFORMATION MANAGEMENT | Facility: CLINIC | Age: 76
End: 2021-04-05

## 2021-04-05 ENCOUNTER — MEDICAL CORRESPONDENCE (OUTPATIENT)
Dept: HEALTH INFORMATION MANAGEMENT | Facility: CLINIC | Age: 76
End: 2021-04-05

## 2021-04-06 ENCOUNTER — HOSPITAL ENCOUNTER (OUTPATIENT)
Dept: PHYSICAL THERAPY | Facility: CLINIC | Age: 76
Setting detail: THERAPIES SERIES
End: 2021-04-06
Attending: ORTHOPAEDIC SURGERY
Payer: COMMERCIAL

## 2021-04-06 PROCEDURE — 97161 PT EVAL LOW COMPLEX 20 MIN: CPT | Mod: GP | Performed by: PHYSICAL THERAPIST

## 2021-04-06 PROCEDURE — 97140 MANUAL THERAPY 1/> REGIONS: CPT | Mod: GP | Performed by: PHYSICAL THERAPIST

## 2021-04-06 PROCEDURE — 97110 THERAPEUTIC EXERCISES: CPT | Mod: GP | Performed by: PHYSICAL THERAPIST

## 2021-04-06 NOTE — PROGRESS NOTES
[facility]          OUTPATIENT PHYSICAL THERAPY ORTHOPEDIC EVALUATION  PLAN OF TREATMENT FOR OUTPATIENT REHABILITATION  (COMPLETE FOR INITIAL CLAIMS ONLY)  Patient's Last Name, First Name, M.I.  YOB: 1945  Romy Hurley    Provider s Name:  [facility]   Medical Record No.  [id number]   Start of Care Date:  04/06/21   Onset Date:  02/19/21   Type:     _X__PT   ___OT   ___SLP Medical Diagnosis:  (P) s/p R ankle arthroplasty     PT Diagnosis:  (P) R ankle stiffness; weakness   Visits from SOC:  1      _________________________________________________________________________________  Plan of Treatment/Functional Goals:  (P) balance training, manual therapy, ROM, strengthening, stretching, gait training           Goals     Goal Description: (P) ambulate independently community distances no AD  Target Date: (P) 06/15/21       Goal Description: (P) ambulate independently inside home with CAM boot and no AD  Target Date: (P) 05/04/21       Goal Description: (P) up/down full flight of stairs reciprocal pattern one rail with pain 2/10 or less  Target Date: (P) 06/15/21       Goal Description: (P) Independent in HEP to continue strengthening on own  Target Date: (P) 06/15/21          Therapy Frequency:  (P) 2 times/Week(for 2 weeks then decrease to 1x/week)  Predicted Duration of Therapy Intervention:  (P) 10 weeks    Tania Veloz, PT, DPT                 I CERTIFY THE NEED FOR THESE SERVICES FURNISHED UNDER        THIS PLAN OF TREATMENT AND WHILE UNDER MY CARE .             Physician Signature               Date    X_____________________________________________________                             Certification Date From:  (P) 04/06/21   Certification Date To:  (P) 06/18/21    Referring Provider:  Dr Vidal Drake [facility]     Initial Assessment        See Epic Evaluation Start of Care Date: 04/06/21

## 2021-04-06 NOTE — PROGRESS NOTES
04/06/21 1500   General Information   Type of Visit Initial OP Ortho PT Evaluation   Start of Care Date 04/06/21   Referring Physician Dr Vidal Drake TCO    Patient/Family Goals Statement walk   Orders Evaluate and Treat   Orders Comment P-AROM, strengthening; WBAT in CAM boot transition out of CAM as able   Date of Order 04/05/21   Certification Required? Yes   Medical Diagnosis s/p R ankle arthroplasty 2/19/2021   Surgical/Medical history reviewed Yes   Weight-Bearing Status - RLE weight-bearing as tolerated;other (see comments)   Special Instructions in CAM boot   Body Part(s)   Body Part(s) Ankle/Foot   Presentation and Etiology   Pertinent history of current problem (include personal factors and/or comorbidities that impact the POC) 74 yo female s/p R ankle arthroplasty 2/19/2021 with hammer toe repair; was in cast NWB x 2 weeks then CAM Boot for standing only now allowed to transition to gait with CAM an WBAT; PMH:  Astme, deperssion DM, obesity; heart problems s/p both TKAs; s/p R shoulder RCR   Impairments B. Decreased WB tolerance;C. Swelling;D. Decreased ROM;E. Decreased flexibility;F. Decreased strength and endurance   Functional Limitations perform activities of daily living;perform desired leisure / sports activities   Onset date of current episode/exacerbation 02/19/21   Pain rating (0-10 point scale) Best (/10);Worst (/10)   Best (/10) 0   Worst (/10) 7   Pain quality A. Sharp;C. Aching   Frequency of pain/symptoms C. With activity   Pain/symptoms exacerbated by B. Walking   Pain/symptoms eased by A. Sitting;C. Rest;I. OTC medication(s);H. Cold   Progression of symptoms since onset: Improved   Prior Level of Function   Functional Level Prior Comment using knee scooter   Current Level of Function   Current Community Support Family/friend caregiver   Patient role/employment history F. Retired   Living environment House/townhome   Home/community accessibility wc accessible with ramp to enter    Current equipment-Gait/Locomotion Other  (knee scooter/rover)   Fall Risk Screen   Fall screen completed by PT   Have you fallen 2 or more times in the past year? No   Have you fallen and had an injury in the past year? No   Is patient a fall risk? No   Abuse Screen (yes response referral indicated)   Feels Unsafe at Home or Work/School no   Feels Threatened by Someone no   Does Anyone Try to Keep You From Having Contact with Others or Doing Things Outside Your Home? no   Physical Signs of Abuse Present no   Ankle/Foot Objective Findings   Side (if bilateral, select both right and left) Right   Observation NAD; did not bring CAM boot today   Integumentary scar midline distal tib to mid foot   Gait/Locomotion no assessed no CAM boot   Palpation unremarkable   Right DF (Knee Ext) AROM 7   Right PF AROM 15   Right Calcanceal Inversion AROM ~7*   Right Great Toe Flexion AROM 45* passive   Right DF/Inversion Strength 4   Right DF/Eversion Strength 4   Right PF/Inversion Strength 4   Right PF/Eversion Strength 4 pain   Right PF Strength 3   Planned Therapy Interventions   Planned Therapy Interventions balance training;manual therapy;ROM;strengthening;stretching;gait training   Clinical Impression   Criteria for Skilled Therapeutic Interventions Met yes, treatment indicated   PT Diagnosis R ankle stiffness; weakness   Influenced by the following impairments stiffness; muscle weakness; swelling; pain with wt bearing   Functional limitations due to impairments difficulty with ADLs and gait   Clinical Presentation Stable/Uncomplicated   Clinical Decision Making (Complexity) Low complexity   Therapy Frequency 2 times/Week  (for 2 weeks then decrease to 1x/week)   Predicted Duration of Therapy Intervention (days/wks) 10 weeks   Risk & Benefits of therapy have been explained Yes   Patient, Family & other staff in agreement with plan of care Yes   Clinical Impression Comments Pt will benefit from skilled intervention to  decrease pian, improve strength, AROM and functional mobility so she can continue livong independently in own home   Education Assessment   Preferred Learning Style Listening;Reading;Demonstration;Pictures/video   Barriers to Learning No barriers   ORTHO GOALS   PT Ortho Eval Goals 1;2;3;4   Ortho Goal 1   Goal Description ambulate independently community distances no AD   Target Date 06/15/21   Ortho Goal 2   Goal Description ambulate independently inside home with CAM boot and no AD   Target Date 05/04/21   Ortho Goal 3   Goal Description up/down full flight of stairs reciprocal pattern one rail with pain 2/10 or less   Target Date 06/15/21   Ortho Goal 4   Goal Description Independent in HEP to continue strengthening on own   Target Date 06/15/21   Total Evaluation Time   PT Eval, Low Complexity Minutes (00643) 15   Therapy Certification   Certification date from 04/06/21   Certification date to 06/18/21   Medical Diagnosis s/p R ankle arthroplasty

## 2021-04-09 ENCOUNTER — HOSPITAL ENCOUNTER (OUTPATIENT)
Dept: PHYSICAL THERAPY | Facility: CLINIC | Age: 76
Setting detail: THERAPIES SERIES
End: 2021-04-09
Attending: ORTHOPAEDIC SURGERY
Payer: COMMERCIAL

## 2021-04-09 PROCEDURE — 97116 GAIT TRAINING THERAPY: CPT | Mod: GP | Performed by: PHYSICAL THERAPIST

## 2021-04-09 PROCEDURE — 97110 THERAPEUTIC EXERCISES: CPT | Mod: GP | Performed by: PHYSICAL THERAPIST

## 2021-04-11 ENCOUNTER — HEALTH MAINTENANCE LETTER (OUTPATIENT)
Age: 76
End: 2021-04-11

## 2021-04-13 ENCOUNTER — HOSPITAL ENCOUNTER (OUTPATIENT)
Dept: PHYSICAL THERAPY | Facility: CLINIC | Age: 76
Setting detail: THERAPIES SERIES
End: 2021-04-13
Attending: ORTHOPAEDIC SURGERY
Payer: COMMERCIAL

## 2021-04-13 PROCEDURE — 97110 THERAPEUTIC EXERCISES: CPT | Mod: GP | Performed by: PHYSICAL THERAPIST

## 2021-04-16 ENCOUNTER — HOSPITAL ENCOUNTER (OUTPATIENT)
Dept: PHYSICAL THERAPY | Facility: CLINIC | Age: 76
Setting detail: THERAPIES SERIES
End: 2021-04-16
Attending: ORTHOPAEDIC SURGERY
Payer: COMMERCIAL

## 2021-04-16 PROCEDURE — 97140 MANUAL THERAPY 1/> REGIONS: CPT | Mod: GP | Performed by: PHYSICAL THERAPIST

## 2021-04-16 PROCEDURE — 97110 THERAPEUTIC EXERCISES: CPT | Mod: GP | Performed by: PHYSICAL THERAPIST

## 2021-04-23 ENCOUNTER — HOSPITAL ENCOUNTER (OUTPATIENT)
Dept: PHYSICAL THERAPY | Facility: CLINIC | Age: 76
Setting detail: THERAPIES SERIES
End: 2021-04-23
Attending: ORTHOPAEDIC SURGERY
Payer: COMMERCIAL

## 2021-04-23 PROCEDURE — 97140 MANUAL THERAPY 1/> REGIONS: CPT | Mod: GP | Performed by: PHYSICAL THERAPIST

## 2021-04-25 ENCOUNTER — COMMUNICATION - HEALTHEAST (OUTPATIENT)
Dept: CARDIOLOGY | Facility: CLINIC | Age: 76
End: 2021-04-25

## 2021-04-25 DIAGNOSIS — I48.19 PERSISTENT ATRIAL FIBRILLATION (H): ICD-10-CM

## 2021-04-27 ENCOUNTER — HOSPITAL ENCOUNTER (OUTPATIENT)
Dept: PHYSICAL THERAPY | Facility: CLINIC | Age: 76
Setting detail: THERAPIES SERIES
End: 2021-04-27
Attending: ORTHOPAEDIC SURGERY
Payer: COMMERCIAL

## 2021-04-27 PROCEDURE — 97140 MANUAL THERAPY 1/> REGIONS: CPT | Mod: GP | Performed by: PHYSICAL THERAPIST

## 2021-04-27 PROCEDURE — 97110 THERAPEUTIC EXERCISES: CPT | Mod: GP | Performed by: PHYSICAL THERAPIST

## 2021-05-10 ENCOUNTER — TRANSFERRED RECORDS (OUTPATIENT)
Dept: HEALTH INFORMATION MANAGEMENT | Facility: CLINIC | Age: 76
End: 2021-05-10

## 2021-05-11 ENCOUNTER — HOSPITAL ENCOUNTER (OUTPATIENT)
Dept: PHYSICAL THERAPY | Facility: CLINIC | Age: 76
Setting detail: THERAPIES SERIES
End: 2021-05-11
Attending: ORTHOPAEDIC SURGERY
Payer: COMMERCIAL

## 2021-05-11 PROCEDURE — 97110 THERAPEUTIC EXERCISES: CPT | Mod: GP | Performed by: PHYSICAL THERAPIST

## 2021-05-11 PROCEDURE — 97116 GAIT TRAINING THERAPY: CPT | Mod: GP | Performed by: PHYSICAL THERAPIST

## 2021-05-11 NOTE — PROGRESS NOTES
Romy Hurley  1945  Vidal Drake MD  [facility] ORTHOPED[facility]  [address]  [place] [address]  Diagnosis: s/p R total ankle replacement Physical Therapy Progress Note  05/11/21 1400   Signing Clinician's Name / Credentials   Signing clinician's name / credentials Tania Veloz PT, DPT   Session Number   Session Number 7 [employer] care medicare; soc 4/6/2021   Progress Note/Recertification   Progress Note Due Date 05/06/21   Recertification Due Date 06/18/21   Adult Goals   PT Ortho Eval Goals 1;2;3;4   Ortho Goal 1   Goal Description ambulate independently community distances no AD   Target Date 06/15/21   Date Met 05/11/21   Ortho Goal 2   Goal Description ambulate independently inside home with CAM boot and no AD   Target Date 05/04/21   Date Met 05/11/21   Ortho Goal 3   Goal Description up/down full flight of stairs reciprocal pattern one rail with pain 2/10 or less   Target Date 06/15/21   Date Met 05/11/21   Ortho Goal 4   Goal Description Independent in HEP to continue strengthening on own   Target Date 06/15/21   Subjective Report   Subjective Report no walker; still uses walker (30 min) in morning b/c stiff; in evening if up a lot    Objective Measure 1   Objective Measure AROM R ankle   Details 5* DF; 25* PF prone   Objective Measure 2   Objective Measure PROM    Details 18* DF standing stretch   Therapeutic Procedure/exercise   Therapeutic Procedures: strength, endurance, ROM, flexibillity minutes (85862) 10   Skilled Intervention education and exercise instruction with manual facilitation, tactile and verbal cueing to decrease pain, improve AROM, Strength and functional mobility   Patient Response trunk rotates to L in R SLS   Treatment Detail instructed in SLS; red TB for ER; reviewed and encouraged to continue gastroc stretch in standing   Gait Training   Gait Training Minutes, includes stair climbing (28594) 15   Skilled Intervention gait training    Patient  "Response c/o tightness in ankle down steps reciprocal pattern; thigh girth impacts abiltiy to decrease LATANYA   Treatment Detail up/down stairs reciprocal pattern working on alignment, balance and strength; walking on level attention to decreasing LATANYA, engaging belly and but to decrease lateral wt shift and use more pelvic rotation   Progress with attention has less latearal \"wobble\" during gait   Education   Learner Patient   Readiness Acceptance   Method Booklet/handout;Explanation;Demonstration   Response Needs Reinforcement;Demonstrates Understanding;Verbalizes Understanding   Education Comments i   Plan   Home program added above, continue previous   Updates to plan of care see 1x every 2 weeks to allow more time for body to strengthen   Plan for next session heel raise   Total Session Time   Timed Code Treatment Minutes 25   Total Treatment Time (sum of timed and untimed services) 25     "

## 2021-05-26 ENCOUNTER — TRANSFERRED RECORDS (OUTPATIENT)
Dept: HEALTH INFORMATION MANAGEMENT | Facility: CLINIC | Age: 76
End: 2021-05-26

## 2021-05-26 VITALS — DIASTOLIC BLOOD PRESSURE: 78 MMHG | SYSTOLIC BLOOD PRESSURE: 112 MMHG | HEART RATE: 70 BPM

## 2021-05-31 VITALS — HEIGHT: 69 IN | WEIGHT: 293 LBS | BODY MASS INDEX: 43.4 KG/M2

## 2021-05-31 VITALS — BODY MASS INDEX: 43.4 KG/M2 | HEIGHT: 69 IN | WEIGHT: 293 LBS

## 2021-05-31 VITALS — WEIGHT: 293 LBS | HEIGHT: 68 IN | BODY MASS INDEX: 44.41 KG/M2

## 2021-05-31 VITALS — HEIGHT: 68 IN | BODY MASS INDEX: 44.41 KG/M2 | WEIGHT: 293 LBS

## 2021-05-31 VITALS — HEIGHT: 68 IN | WEIGHT: 293 LBS | BODY MASS INDEX: 44.41 KG/M2

## 2021-06-01 VITALS — WEIGHT: 293 LBS | HEIGHT: 68 IN | BODY MASS INDEX: 44.41 KG/M2

## 2021-06-01 VITALS — BODY MASS INDEX: 44.25 KG/M2 | WEIGHT: 292 LBS | HEIGHT: 68 IN

## 2021-06-01 VITALS — BODY MASS INDEX: 42.44 KG/M2 | WEIGHT: 280 LBS | HEIGHT: 68 IN

## 2021-06-02 ENCOUNTER — RECORDS - HEALTHEAST (OUTPATIENT)
Dept: ADMINISTRATIVE | Facility: CLINIC | Age: 76
End: 2021-06-02

## 2021-06-02 VITALS — BODY MASS INDEX: 42.44 KG/M2 | WEIGHT: 280 LBS | HEIGHT: 68 IN

## 2021-06-02 NOTE — TELEPHONE ENCOUNTER
----- Message from Dorian Garland MD sent at 10/15/2019  2:08 PM CDT -----  [facility]  These contact patient next week regarding whether or not she wishes to move forward with PVI.  Also make sure that she is following up with Dr. Holland regarding her sleep study.  Schedule: 4 hours (no double up)  Mapping system: REGINA only  Cardiac CT/MRI: Yes  KAMINI: No as long as she is compliant with her Eliquis  Thanks  Dorian

## 2021-06-02 NOTE — PROGRESS NOTES
Order for Durable Medical Equipment was processed and equipment ordered.     DME provider: [facility]    Date Faxed: 10/25/2019    Ordering Provider: Kevin Holland DO    PAP Order Type: New Device Order    Fax Number: IN HOUSE DME: [id number]

## 2021-06-02 NOTE — PROGRESS NOTES
[facility] HEART [facility]  Arrhythmia Clinic  Dorian RAMIRO Dada    Referring:      Assessment:         Persistent atrial fibrillation: 7-year history of recurrent symptomatic atrial fibrillation.  The patient has failed antiarrhythmic drug therapy (sotalol) with frequent breakthrough.  She is a candidate for ablation of her arrhythmia.  The underlying pathophysiology and roles for antiarrhythmic drug therapy and ablation were discussed with the patient in detail.  The risks, benefits, and expected outcomes from an ablation procedure were discussed with the patient. The patient will consider this information before rendering a decision regarding her treatment preference.  The patient is appropriately on oral anticoagulant therapy given her elevated AID0MS2-VGOw score of 4.  Long-term the patient may be a candidate for left atrial appendage closure.  A total of 45 minutes was spent discussing the patient's underlying condition and treatment options.    Obstructive sleep apnea: The patient had a sleep study which did demonstrate evidence of some nocturnal disordered breathing.  The patient was offered therapy but wanted to discuss this further with me.  I discussed the link between untreated obstructive sleep apnea and recurrent atrial fibrillation.  I have strongly encouraged her to visit with  again regarding either a repeat home sleep study and initiating therapy.    Hypertension: Patient's blood pressure is at target on her current medical therapy.      Recommendations:    No change in the patient's current medications.    The patient will be contacted later this week or early next week regarding whether or not she wishes to move forward with ablation.    If the patient decides to move forward then she would need to undergo cardiac MRI or CT.    Follow-up in A. fib clinic with the EP nurse practitioner in 2 months.      Patient Active Problem List   Diagnosis     Obesity     Essential hypertension      "Persistent atrial fibrillation     Osteoarthritis     Insomnia     Mild intermittent asthma     Type 2 diabetes mellitus without complication, without long-term current use of insulin (H)     Persistent insomnia     Total knee replacement status     Stage III chronic kidney disease (H)     SHARA (obstructive sleep apnea)       Subjective:  Romy Hurley (74 y.o. female) returns to the arrhythmia clinic for discussion of treatment options for her atrial fibrillation.  The patient was diagnosed in 2012 and her symptoms consist of palpitations, feeling \"tired\" and having sweats.  The patient has had gradual progression of her arrhythmia with initial suppression on sotalol but now recurrence of breakthrough despite increasing doses of sotalol.  The patient notes that most of her episodes of atrial fibrillation last between 2 and 3 days and resolve spontaneously without cardioversion.  She is currently experiencing 3 of these episodes on average per month.  She reports no lightheadedness, chest pain, or syncope.  She is not experiencing any exertional dyspnea or chest pain.  She has had a sleep study which did show evidence of nocturnal disordered sleeping but she feels that this was a poor representation of her normal sleep hygiene.  She reports no other recent changes in her general healthcare status.    Current Outpatient Medications   Medication Sig Dispense Refill     acetaminophen (TYLENOL) 500 MG tablet Take 1,000 mg by mouth every 6 (six) hours as needed for pain.       cholecalciferol, vitamin D3, 2,000 unit Tab Take 4,000 Units by mouth daily.        ELIQUIS 5 mg Tab tablet TAKE 1 TABLET (5 MG TOTAL) BY MOUTH 2 (TWO) TIMES A DAY. DUE FOR LABS AND SEE CARDIOLOGY 180 tablet 0     FLUTICASONE/SALMETEROL (ADVAIR DISKUS INHL) Inhale 2 puffs daily as needed.        levalbuterol (XOPENEX HFA) 45 mcg/actuation inhaler Inhale 1-2 puffs every 4 (four) hours as needed for wheezing.       MAGNESIUM CITRATE ORAL Take 250 " "mg by mouth 2 (two) times a day.        metFORMIN (GLUCOPHAGE) 500 MG tablet Take 250 mg by mouth 2 (two) times a day with meals.        PARoxetine (PAXIL) 10 MG tablet Take 10 mg by mouth daily.  3     simvastatin (ZOCOR) 10 MG tablet Take 10 mg by mouth at bedtime.       sotalol (BETAPACE) 120 MG tablet TAKE 1 TABLET(120 MG) BY MOUTH TWICE DAILY 180 tablet 3     temazepam (RESTORIL) 15 mg capsule Take 1 capsule by mouth as needed.       valsartan-hydrochlorothiazide (DIOVAN-HCT) 160-25 mg per tablet Take 1 tablet by mouth daily.       No current facility-administered medications for this visit.        Review of Systems:   General: WNL  Eyes: WNL  Ears/Nose/Throat: WNL  Lungs: Shortness of Breath  Heart: Shortness of Breath with activity, Irregular Heartbeat(palpitpations)  Stomach: WNL  Bladder: WNL  Muscle/Joints: Joint Pain  Skin: WNL  Nervous System: WNL  Mental Health: WNL     Blood: WNL    Family History  Family History   Problem Relation Age of Onset     Ovarian cancer Mother 82     Lung cancer Father 75     Sleep apnea Father      Snoring Father      Brain cancer Sister 57        Brain Ca x 1 year     No Medical Problems Brother      No Medical Problems Sister      No Medical Problems Sister        Social History   reports that she quit smoking about 38 years ago. Her smoking use included cigarettes. She smoked 0.00 packs per day. She has never used smokeless tobacco. She reports current alcohol use. She reports that she does not use drugs.    Objective:   Vital signs in last 24 hours:  /80 (Patient Site: Left Arm, Patient Position: Sitting, Cuff Size: Adult Large)   Pulse 60   Resp 12   Ht 5' 8\" (1.727 m)   Wt (!) 283 lb 11.2 oz (128.7 kg)   BMI 43.14 kg/m    Weight: Weight: (!) 283 lb 11.2 oz (128.7 kg)     Physical Exam:  General: The patient is alert oriented to person place and situation.  The patient is in no acute distress at the time of my evaluation.  Eyes: Pupils are equal, round, and " reactive to light.  Conjunctiva and sclera are clear.  ENT: Oral mucosa is moist and without redness. No evident nasal discharge.  Pulmonary: Lungs are clear bilaterally with no rales, rhonchi, or wheezes.    Cardiovascular exam: Rhythm is irregular. S1 and S2 are normal. No significant murmur is present. JVP is normal. Lower extremities demonstrate trace edema bilaterally.  Distal pulses are intact bilaterally.  Abdomen is obese, soft, and nontender.  Musculoskeletal: Spine is straight. Extremities without deformity.  Neuro: Gait demonstrates a mild limp.     Skin is warm, dry, and otherwise intact.      Cardiographics:   Echo dated September 11, 2017    Indications     Dx: Persistent atrial fibrillation (H) [I48.1 (ICD-10-CM)]   Summary     4. Normal left ventricular size and systolic performance with a visually estimated ejection fraction of 65%.   5. There is mild aortic insufficiency.  6. There is mild biatrial enlargement.     When compared to the prior real-time echocardiogram dated 29 March 2012, mild aortic insufficiency is now noted.  Otherwise, there has been little appreciable interval change.        Lab Results:   Lab Results   Component Value Date     01/08/2018    K 4.2 01/08/2018    CL 96 (L) 01/08/2018    CO2 31 01/08/2018    BUN 26 01/08/2018    CREATININE 0.86 01/08/2018    CALCIUM 9.5 01/08/2018     Lab Results   Component Value Date    WBC 7.7 06/07/2010    HGB 13.1 07/06/2011    HCT 35.8 06/07/2010    MCV 85 06/07/2010     06/07/2010     Lab Results   Component Value Date    INR 1.16 (H) 05/31/2012         Outside record review:

## 2021-06-02 NOTE — TELEPHONE ENCOUNTER
Phone call from patient stating that she would like to proceed with PVI, she states she has an apt to see Dr. Armijo on Friday 10-25-19.   Orders placed and sent to procedure scheduling for apt time.  Pt states understanding.

## 2021-06-02 NOTE — PATIENT INSTRUCTIONS - HE
Equipment Instructions    We will process your PAP order and send it to a Durable Medical Equipment (DME) provider.    The medical equipment company should call you within 7 days.  If you have not heard from the company, please contact them to see if they received your order and are planning to call you.    Please call us at [phone] if you are unable to contact the medical equipment company or if they do not have the order.    If you are starting a new PAP machine, please call us after you use it the first night to let us know how it went. This call also helps us know that you received your equipment and that everything is ready. Please use our central phone number [phone]    Contact information for [employer] company:    [email or web address] Tel: [phone]    Please bring your machine, mask, hose and power cord on the next clinical visit with me. Thank you.

## 2021-06-02 NOTE — PROGRESS NOTES
1945  Home:[phone] (home) Cell:[phone] (mobile)  Emergency Contact: Gregoria Covarrubias [phone]    +++Important patient information for CSC/Cath Lab staff : None+++    [facility] EP Cath Lab Procedure Order   Ablation Type:  PVI- Atrial Fibrillation  Specific location of pathway: Left     Diagnosis:  AF  Anticipated Case Duration:  PVI- 5 hr, would need to limit schedule to allow no further ablations, but ok for device implants to follow on scheduled date   Scheduling Needs/Timeframe:  Next Available Please call pt to schedule    MD Preference: Dr Dada VARGAS Assist:  No Specific MD:  N/A    Current Device: None None  Device Company/Device Rep Needed for Procedure: None    Pre-Procedural Testing needed: Pulmonary Vein CT/MRI  Mapping System Required:  [place]  ICE Needed:  Yes  Special equipment/Staff needed for case: None  Anesthesia:  General-Whole Case  Research Protocol:  No    [facility] EP Cath Lab Prep   Ordering Provider: Dr Garland  Ordering Date: 10/23/2019  Orders Status: Intial order placed and Order set placed    H&P:  Schedule apt with EP NP to complete within 1 wk of scheduled PVI  PCP: Adeola Stockton MD, [phone]    Pre-op Labs: Done within 7 days    Medical Records Pertinent for Procedure:  Cardioversion 10-11-17, Holter 9-11-17 Afib, Echo 9-11-17 EF 65% and EKG 3-20-19 SB w/1AVB @56,  3-27-18 Afib @ 94    Patient Education:    Teach with Patient: Teach with pt will be completed same day as pre-op H&P-see additional clinic note    Risks Reviewed:     Pulmonary Vein/AF/Radiofrequency Ablation  In addition to standard risks for Radiofrequency Ablation, there is:    <2% for significant pulmonary vein stenosis    <2% risk for embolic events    <1% risk for esophageal fistula    <1% risk death    Pulmonary Vein Isolation / Cryoablation Risks:    1-2% risk for phrenic nerve paralysis    <1% risk for pulmonary vein stenosis    Risk of esophageal irritation with no incidence of atrial-esophageal fistula    Rare  cryoballoon rupture    <1% risk death         Cardiac Catheter Ablation    <1% risk for the following: hypotension, hemorrhage, vascular injury including perforation of vein, artery or heart, thrombophlebitis, systemic or pulmonic emboli; cardiac perforation, (tamponade), infection, pneumothorax, arrhythmias, proarrhythmic effects of drugs, radiation exposure.    1-2% complete heart block (for AVNRT or septol accessory pathway).    <0.5% CVA or MI    <0.1% death    If external defibrillation is needed, 75% risk for superficial burn.    1-2% tamponade and aortic puncture with left sided transeptal approach for left side REGINA - increase risk of CVA to <2%.    Late arrhythmia recurrences depends upon the primary rhythm disturbance.      Pre-Procedure Instructions that were Reviewed with Patient:  NPO after midnight, Remove all jewelry prior to coming in for procedure, Shower prior to arrival, Notified patient of time and date of procedure by CV , Transportation arrangements needed s/p procedure, Post-procedure follow up process, Sedation plan/orders and Pre-procedure letter was sent to pt by CV     Pre-Procedure Medication Instructions:  Instructions given to pt regarding anticoagulants: Eliquis- instructed to continue anticoagulation uninterrupted through their procedure  Instructions given to pt regarding antiarrhythmic medication: Sotalol; Pt instructed to hold 3 days prior to procedure  Instructions for medication, other than anticoagulants/antiarrhythmics listed above, given to pt: to hold all morning medications with the exception of Eliquis am of procedure.      Allergies   Allergen Reactions     Ace Inhibitors      Cortisone Acetate      Methylprednisolone Anxiety     Turns red       Current Outpatient Medications:      acetaminophen (TYLENOL) 500 MG tablet, Take 1,000 mg by mouth every 6 (six) hours as needed for pain., Disp: , Rfl:      cholecalciferol, vitamin D3, 2,000 unit Tab, Take 4,000  Units by mouth daily. , Disp: , Rfl:      ELIQUIS 5 mg Tab tablet, TAKE 1 TABLET (5 MG TOTAL) BY MOUTH 2 (TWO) TIMES A DAY. DUE FOR LABS AND SEE CARDIOLOGY, Disp: 180 tablet, Rfl: 0     FLUTICASONE/SALMETEROL (ADVAIR DISKUS INHL), Inhale 2 puffs daily as needed. , Disp: , Rfl:      levalbuterol (XOPENEX HFA) 45 mcg/actuation inhaler, Inhale 1-2 puffs every 4 (four) hours as needed for wheezing., Disp: , Rfl:      MAGNESIUM CITRATE ORAL, Take 250 mg by mouth 2 (two) times a day. , Disp: , Rfl:      metFORMIN (GLUCOPHAGE) 500 MG tablet, Take 250 mg by mouth 2 (two) times a day with meals. , Disp: , Rfl:      PARoxetine (PAXIL) 10 MG tablet, Take 10 mg by mouth daily., Disp: , Rfl: 3     simvastatin (ZOCOR) 10 MG tablet, Take 10 mg by mouth at bedtime., Disp: , Rfl:      sotalol (BETAPACE) 120 MG tablet, TAKE 1 TABLET(120 MG) BY MOUTH TWICE DAILY, Disp: 180 tablet, Rfl: 3     temazepam (RESTORIL) 15 mg capsule, Take 1 capsule by mouth as needed., Disp: , Rfl:      valsartan-hydrochlorothiazide (DIOVAN-HCT) 160-25 mg per tablet, Take 1 tablet by mouth daily., Disp: , Rfl:     Documentation Date:10/23/2019 9:11 AM  Nano Turner RN

## 2021-06-02 NOTE — PROGRESS NOTES
Dear Adeola Moon MD  [address] [facility] [id number]  [place] [address],    Thank you for the opportunity to participate in the care of Romy Hurley.     She is a 74 y.o. y/o female patient who comes to the sleep medicine clinic for follow up.  Since the patient's last clinical visit with me, she has had a serious discussion with her cardiologist about the possibility of ablation.  Her cardiologist convinced her to start CPAP therapy.    Past Medical History:   Diagnosis Date     Asthma      Atrial fibrillation (H)      Diabetes mellitus (H)      Hypertension      Insomnia      Obesity      Osteoarthritis        Past Surgical History:   Procedure Laterality Date      SECTION       REPLACEMENT TOTAL KNEE BILATERAL       SHOULDER ARTHROSCOPY W/ ROTATOR CUFF REPAIR Right        Social History     Socioeconomic History     Marital status:      Spouse name: Not on file     Number of children: 1     Years of education: Not on file     Highest education level: Not on file   Occupational History     Occupation: retired     Comment: [employer]   Social Needs     Financial resource strain: Not on file     Food insecurity:     Worry: Not on file     Inability: Not on file     Transportation needs:     Medical: Not on file     Non-medical: Not on file   Tobacco Use     Smoking status: Former Smoker     Packs/day: 0.00     Types: Cigarettes     Last attempt to quit: 1981     Years since quittin.4     Smokeless tobacco: Never Used   Substance and Sexual Activity     Alcohol use: Yes     Comment: Rare (2-3 month)     Drug use: No     Sexual activity: Yes     Partners: Male     Birth control/protection: Post-menopausal   Lifestyle     Physical activity:     Days per week: Not on file     Minutes per session: Not on file     Stress: Not on file   Relationships     Social connections:     Talks on phone: Not on file     Gets together: Not on file     Attends Evangelical  service: Not on file     Active member of club or organization: Not on file     Attends meetings of clubs or organizations: Not on file     Relationship status: Not on file     Intimate partner violence:     Fear of current or ex partner: Not on file     Emotionally abused: Not on file     Physically abused: Not on file     Forced sexual activity: Not on file   Other Topics Concern     Not on file   Social History Narrative     Not on file       Current Outpatient Medications   Medication Sig Dispense Refill     acetaminophen (TYLENOL) 500 MG tablet Take 1,000 mg by mouth every 6 (six) hours as needed for pain.       cholecalciferol, vitamin D3, 2,000 unit Tab Take 4,000 Units by mouth daily.        ELIQUIS 5 mg Tab tablet TAKE 1 TABLET (5 MG TOTAL) BY MOUTH 2 (TWO) TIMES A DAY. DUE FOR LABS AND SEE CARDIOLOGY 180 tablet 0     FLUTICASONE/SALMETEROL (ADVAIR DISKUS INHL) Inhale 2 puffs daily as needed.        levalbuterol (XOPENEX HFA) 45 mcg/actuation inhaler Inhale 1-2 puffs every 4 (four) hours as needed for wheezing.       MAGNESIUM CITRATE ORAL Take 250 mg by mouth 2 (two) times a day.        metFORMIN (GLUCOPHAGE) 500 MG tablet Take 250 mg by mouth 2 (two) times a day with meals.        PARoxetine (PAXIL) 10 MG tablet Take 10 mg by mouth daily.  3     simvastatin (ZOCOR) 20 MG tablet Take 20 mg by mouth at bedtime.         3     sotalol (BETAPACE) 120 MG tablet TAKE 1 TABLET(120 MG) BY MOUTH TWICE DAILY 180 tablet 3     temazepam (RESTORIL) 15 mg capsule Take 1 capsule by mouth as needed.       valsartan-hydrochlorothiazide (DIOVAN-HCT) 160-25 mg per tablet Take 1 tablet by mouth daily.       No current facility-administered medications for this visit.        Allergies   Allergen Reactions     Ace Inhibitors      Cortisone Acetate      Methylprednisolone Anxiety     Turns red       Epworths Sleepiness Scale 1/10/2018 10/25/2019   Sitting and reading 1 0   Watching TV 1 1   Sitting, inactive in a public place  "(e.g. a theatre or a meeting) 0 0   As a passenger in a car for an hour without a break 0 0   Lying down to rest in the afternoon when circumstances permit 1 0   Sitting and talking to someone 0 0   Sitting quietly after a lunch without alcohol 0 0   In a car, while stopped for a few minutes in traffic 0 0   Total score 3 1       Physical Exam:  /50 (Patient Site: Right Arm, Patient Position: Sitting, Cuff Size: Adult Large)   Pulse 70   Ht 5' 8\" (1.727 m)   Wt (!) 287 lb (130.2 kg)   SpO2 98%   BMI 43.64 kg/m    BMI:Body mass index is 43.64 kg/m .   GEN: NAD, obese  Psych: normal mood, normal affect       Assessment and Plan:  In summary Romy Hurley is a 74 y.o. year old female who is here for follow-up.    1.  Obstructive sleep apnea  Since the patient does not have any preference with regards to durable medical equipment company we will use [employer] as the durable medical equipment company.  I recommend the patient try to use the machine as much she can and to follow-up with me on the next clinical visit.  2.  Other sleep disturbance  3.  Obesity     Patient verbalized understanding of these issues, agrees with the plan and all questions were answered today. Patient was given an opportuntity to voice any other symptoms or concerns not listed above. Patient did not have any other symptoms or concerns.      Kevin Holland DO  Board Certified in Internal Medicine and Sleep Medicine  [facility].    (Note created with Dragon voice recognition and unintended spelling errors and word substitutions may occur)       "

## 2021-06-03 VITALS
HEART RATE: 70 BPM | WEIGHT: 287 LBS | BODY MASS INDEX: 43.5 KG/M2 | DIASTOLIC BLOOD PRESSURE: 50 MMHG | SYSTOLIC BLOOD PRESSURE: 100 MMHG | HEIGHT: 68 IN | OXYGEN SATURATION: 98 %

## 2021-06-03 VITALS — BODY MASS INDEX: 43.5 KG/M2 | HEIGHT: 68 IN | WEIGHT: 287 LBS

## 2021-06-03 VITALS
RESPIRATION RATE: 12 BRPM | WEIGHT: 283.7 LBS | HEIGHT: 68 IN | DIASTOLIC BLOOD PRESSURE: 80 MMHG | SYSTOLIC BLOOD PRESSURE: 120 MMHG | HEART RATE: 60 BPM | BODY MASS INDEX: 43 KG/M2

## 2021-06-03 NOTE — PATIENT INSTRUCTIONS - HE
Romy Hurley,    It was a pleasure to see you today at the [facility].     My recommendations after this visit include:    Continue Eliquis 5 mg twice daily    Stop taking sotalol.  Start metoprolol tartrate 50 mg twice daily.  Start famotidine (Pepcid) 20 mg twice daily, to continue for 3 weeks after ablation.    Pulmonary vein isolation ablation with Dr. Garland on 12/6/2019  Bring CPAP to the hospital to use after ablation    Post ablation check 12/10/2019  Follow-up 6 weeks after ablation  Follow-up with Dr. Garland 3 months after ablation    Breanne Augustin, CNP  [facility], Electrophysiology  [phone]  EP nurses [phone]

## 2021-06-03 NOTE — PROGRESS NOTES
PVI education was completed:     See documented H&P completed by NP in EPIC    Reviewed pre and post op PVI procedure with pt, pre-procedure letter reviewed and given to pt- see letter in EPIC under documentation, see additional EP PVI prep note for details on procedure/prep, risks of procedure discussed, answered pt questions and concerns regarding procedure.    Discussed importance of continuing anticoagulation uninterrupted pre and post ablation, pt denies missing any doses of their anticoagulant, pt denies issues with gamboa placement in the past, instructions given to pt to start Pepcid 20mg BID 3 days prior to PVI, and to continue 3 wks s/p PVI, prescription was sent for pepcid 20mg BID and instructions given to pt to stop Sotalol today, 3 days prior to PVI.    Answered all questions, pt states understanding and reviewed contact information for further questions or concerns.

## 2021-06-04 VITALS
WEIGHT: 286.9 LBS | BODY MASS INDEX: 43.48 KG/M2 | RESPIRATION RATE: 16 BRPM | HEART RATE: 92 BPM | SYSTOLIC BLOOD PRESSURE: 118 MMHG | HEIGHT: 68 IN | DIASTOLIC BLOOD PRESSURE: 70 MMHG

## 2021-06-04 VITALS
DIASTOLIC BLOOD PRESSURE: 70 MMHG | HEIGHT: 68 IN | HEART RATE: 64 BPM | SYSTOLIC BLOOD PRESSURE: 132 MMHG | WEIGHT: 288.9 LBS | RESPIRATION RATE: 16 BRPM | BODY MASS INDEX: 43.78 KG/M2

## 2021-06-04 VITALS
DIASTOLIC BLOOD PRESSURE: 72 MMHG | OXYGEN SATURATION: 96 % | RESPIRATION RATE: 16 BRPM | SYSTOLIC BLOOD PRESSURE: 118 MMHG | WEIGHT: 287 LBS | BODY MASS INDEX: 43.64 KG/M2 | HEART RATE: 55 BPM

## 2021-06-04 VITALS — WEIGHT: 287.1 LBS | BODY MASS INDEX: 43.51 KG/M2 | HEIGHT: 68 IN

## 2021-06-04 VITALS
WEIGHT: 284 LBS | DIASTOLIC BLOOD PRESSURE: 72 MMHG | RESPIRATION RATE: 16 BRPM | HEART RATE: 76 BPM | BODY MASS INDEX: 43.04 KG/M2 | HEIGHT: 68 IN | SYSTOLIC BLOOD PRESSURE: 124 MMHG

## 2021-06-04 VITALS
WEIGHT: 286 LBS | HEART RATE: 64 BPM | HEIGHT: 68 IN | DIASTOLIC BLOOD PRESSURE: 58 MMHG | SYSTOLIC BLOOD PRESSURE: 104 MMHG | OXYGEN SATURATION: 96 % | BODY MASS INDEX: 43.35 KG/M2

## 2021-06-04 VITALS
WEIGHT: 286 LBS | HEART RATE: 85 BPM | DIASTOLIC BLOOD PRESSURE: 80 MMHG | BODY MASS INDEX: 43.35 KG/M2 | SYSTOLIC BLOOD PRESSURE: 108 MMHG | RESPIRATION RATE: 12 BRPM | HEIGHT: 68 IN

## 2021-06-04 NOTE — TELEPHONE ENCOUNTER
----- Message from Renetta Karan sent at 1/6/2020 10:40 AM CST -----  Regarding: JUDIE  General phone call:    Caller: Pham  Primary cardiologist: MORENA Raymundo    Detailed reason for call: Patient had ablation on 12/6 and states she is having increasing shortness of breath and thinks it is due to metoprolol. She does not believe she is in afib.     Best phone number: [phone]  Best time to contact: any   Ok to leave a detailed message? yes  Device? No     Additional Info:

## 2021-06-04 NOTE — PROGRESS NOTES
Patient notes that there was not a sotalol prescription available to her when she went to [employer], so she called on-call physician.  She took what she understood she was supposed to take which was 75 mg of metoprolol tartrate twice daily.  Over the weekend, she converted back to normal rhythm.  Rhythm is regular.  Heart rate and blood pressure checked and are stable.  She was advised if she does note any lightheadedness with standing to call us, we could cut her dose down to 50 mg twice daily, however since the 75 mg is working and she is tolerating continue present management.  EKG canceled.

## 2021-06-04 NOTE — PROGRESS NOTES
Patient in for EKG after complaining of skipped beats.  Post PVI 12/6  EKG confirms afib at 86bpm.    Patient is on metoprolol 25mg two times a day and eliquis 5mg two times a day.     BP today was 108/80.    She was previously on sotalol.     Recommendations?  Thanks  Sydnee

## 2021-06-04 NOTE — ANESTHESIA PREPROCEDURE EVALUATION
Anesthesia Evaluation      Patient summary reviewed     Airway   Mallampati: II   Pulmonary - normal exam   (+) asthma  sleep apnea on CPAP, , a smoker (Former)                         Cardiovascular - normal exam  Exercise tolerance: > or = 4 METS  (+) hypertension, dysrhythmias (a.fib), ,     ECG reviewed  Rhythm: regular  Rate: normal,         Neuro/Psych - negative ROS     Endo/Other    (+) diabetes mellitus type 2, obesity (BMI 45),      GI/Hepatic/Renal       Other findings: Echo 9/11/17  Summary    1. Normal left ventricular size and systolic performance with a visually estimated ejection fraction of 65%.   2. There is mild aortic insufficiency.  3. There is mild biatrial enlargement    Hgb=13.4  Ufx=764  Dc=224  K=4.3        Dental - normal exam                          Anesthesia Plan  Planned anesthetic: general endotracheal  GAETT  Antiemetics  Longer case might necessitate glucose checks  ASA 3   Induction: intravenous   Anesthetic plan and risks discussed with: patient    Post-op plan: routine recovery

## 2021-06-04 NOTE — PATIENT INSTRUCTIONS - HE
Your anticoagulation medication Eliquis:    It is important to remain on your anticoagulation medication uninterrupted after your ablation to reduce your risk of a stroke or heart attack, do not stop this medication    Please remain on your aspirin for 1 month, then you can stop    Healing from your pulmonary vein ablation:    Stay well hydrated, and increase your fluid intake during this recovery period    High protein foods aide in your bodies healing process    No aggressive or aerobic activity for 7-10 days, and do not lift more than 10 pounds for 7 days     Increase your activity gradually over the next 5-10 days, working back to your normal daily activity    Please call me if any of the following occur:    Episodes of Atrial Fibrillation lasting greater than 4 hours, or if you notice the episodes are increasing in frequency or duration    If you develop shortness of breath, dizziness, or unresolving chest pains     Changes at your groin sites including swelling, hardening, drainage, increase in bruising, or an increase in pain    If you develop a temperature greater than 100.5 degrees (especially weeks 2-5 post     Procedure)      Call 911 if you are having symptoms of a stroke; difficulty with your speech, problems walking, difficulty with balance, vision disturbances, facial drooping or numbness, and muscle weakness on one side of your body     Your follow up appointments are as follows:    You will be seen by the electrophysiologist nurse practitioner in 6 weeks    You will have a 14 day ambulatory cardiac monitor in 3 months to assess your rhythm, this will be scheduled at your appointment with the electrophysiologist nurse practitioner    You will be seen by the electrophysiologist nurse practitioner again in 3 months    Sincerely,  Yessenia Jensen RN [phone]    After hours please contact the on call service at # [phone]

## 2021-06-04 NOTE — TELEPHONE ENCOUNTER
"Return call to patient.  She states that ever since she has been taking the higher dose of Metoprolol, sometime on 12-28 or 12-29, she has been feeling an increase in shortness of breath and has a persistent cough, feels like she has \"crud\" in her throat all the time and feels as though she wants to drink and cough all the time to clear it.    She states her heart rate is in the 60's and she is very confident that she is in SR.    Pt with a hx of PVI with SWA on 12-6-19, one episode of afib noted on 12-27-19, see ekg.  Pt was to start Sotalol but due to some miscommunication she did not and converted to SR on her own.  She started taking a higher dose of Metoprolol at that time.  Medications include Metoprolol 75 mg two times a day, Eliquis 5 mg.  ECHO on 9-11-17 shows EF 65%.    Pt does have an apt with Lucien Beach on 1-14-20.    Pt wonders about taking a lower dose of Metoprolol?  Notes from Khloe Deshpande on 12-30-19 state:      \"She was advised if she does note any lightheadedness with standing to call us, we could cut her dose down to 50 mg twice daily, however since the 75 mg is working and she is tolerating continue present management.\"    Will review with Breanne Augustin in Khloe's absence and call patient with recommendations.  "

## 2021-06-04 NOTE — PROGRESS NOTES
Post Procedural PVI Follow Up Visit    Pt is seen in clinic today status post PVI with Dr Garland on 12/6/19.     General Assessment:   Pts vital signs stable, weight compared to pre procedural weight and is stable, lung sounds clear, heart rate regular, heart sounds S1 and S2 present, palpable radial and pedal pulses and no edema/fluid retention noted.   Pt denies generalized or localized pain abnormal to healing s/p, difficulty swallowing, SOB, thoat pain, heart burn, chest pain, urinary retention/difficulty and s/s of infection.  Pt is tolerating advancement in activity well, staying well hydrated, has a good appetite, eating well balanced meals and gradually working into baseline activity.     Rhythm Assessment:   Pt denies palpitations, irregularities in HR or rhythm and symptoms or sustained AF episodes.    Procedure Site Assessment:   Pts right groin has 2 puncture sites, is C/D/I, no drainage, large amount of bruising noted around puncture sites, 1 suture in place, suture removed, groin is soft, and pt denies pain at the site. Left groin has 1 puncture sites, is C/D/I, no drainage, small amount of amount of bruising noted around puncture site, 1 suture in place, sutures removed, groin is soft, and pt denies pain at the site. No bruits we heard bilaterally, and pt has strong bilateral femoral and pedal pulses present. All puncture sites were left open to air.    Small soft ridge noted bilaterally above sutures.  Unchanged with pressure, pt denies pain or difference in groins since she was discharged.  Educated pt that it is likely scar tissue, but to call with any changes or pain at groin sites and we will obtain US of groins.    Anticoagulation/Medication:  Pt was instructed to remain on Eliquis without interruption until seen for 3mo follow-up, for further instructions/managment.  Reviewed with pt importance of anticoagulation s/p PVI, reviewed with pt s/s of bleeding while on anticoagulation s/p PVI and  encouraged to call with any questions or concerns about anticoagulants  Pt will continue ASA 81mg Daily for 1 mo s/p PVI    Education completed with pt at this visit:  Reviewed post-op PVI healing process, follow up office visit requirements, physical restrictions, nutritional requirements, when to contact EP-RN/EP-MD, contact information was given to the pt for further concerns or questions and pt verbalized understanding    12/10/2019 1:45 PM  Yessenia Jensen RN

## 2021-06-04 NOTE — TELEPHONE ENCOUNTER
----- Message from Lisa Kirby RN sent at 12/27/2019  9:23 AM CST -----    ----- Message -----  From: Aretha Martell  Sent: 12/27/2019   9:04 AM CST  To: Gregoria Lorenzo RN    Caller: Patient    Primary cardiologist: Dr. Turner    Detailed reason for call: Patient stated that she had an ablation on 12/6/19 but is having arrhythmia now. Please advise.     New or active symptoms? Yes    Best phone number: [phone]    Best time to contact: Today    Ok to leave a detailed message? Yes    Device? No

## 2021-06-04 NOTE — ANESTHESIA CARE TRANSFER NOTE
Last vitals:   Vitals:    12/06/19 1323   BP: 124/74   Pulse: 63   Resp: 20   Temp: 36.7  C (98.1  F)   SpO2: 95%     Patient's level of consciousness is drowsy  Spontaneous respirations: yes  Maintains airway independently: yes  Dentition unchanged: yes  Oropharynx: oropharynx clear of all foreign objects    QCDR Measures:  ASA# 20 - Surgical Safety Checklist: WHO surgical safety checklist completed prior to induction    PQRS# 430 - Adult PONV Prevention: 4558F-1P - Medical reason for NOT administering combination therapy  ASA# 8 - Peds PONV Prevention: NA - Not pediatric patient, not GA or 2 or more risk factors NOT present  PQRS# 424 - Diane-op Temp Management: 4559F - At least one body temp DOCUMENTED => 35.5C or 95.9F within required timeframe  PQRS# 426 - PACU Transfer Protocol:NA - Patient did not go to PACU  ASA# 14 - Acute Post-op Pain: ASA14B - Patient did NOT experience pain >= 7 out of 10

## 2021-06-04 NOTE — TELEPHONE ENCOUNTER
Patient called and is s/p PVI early December.  She is calling today noting skipped beats consistently.  She is unsure if it's atrial fibrillation or not as it feels different and symptoms are minimal.    EKG ordered to check rhythm, patient will come in today.  SHIV

## 2021-06-04 NOTE — ANESTHESIA POSTPROCEDURE EVALUATION
Patient: Romy Hurley  EP Ablation PVI - 530AM ADMIT, GEN ANES - WHOLE CASE, NO DEV, REGINA - NOTIFIED 10/23, 5HRS W/ICE, H&P - 12/16, TEACH - EP RN  Anesthesia type: general    Patient location: Telemetry/Step Down Unit  Last vitals:   Vitals Value Taken Time   /67 12/6/2019  5:00 PM   Temp 36.4  C (97.6  F) 12/6/2019  3:30 PM   Pulse 70 12/6/2019  5:07 PM   Resp 20 12/6/2019  4:02 PM   SpO2 98 % 12/6/2019  5:07 PM   Vitals shown include unvalidated device data.  Post vital signs: stable  Level of consciousness: awake and responds to simple questions  Post-anesthesia pain: pain controlled  Post-anesthesia nausea and vomiting: no  Pulmonary: unassisted, return to baseline  Cardiovascular: stable and blood pressure at baseline  Hydration: adequate  Anesthetic events: no    QCDR Measures:  ASA# 11 - Diane-op Cardiac Arrest: ASA11B - Patient did NOT experience unanticipated cardiac arrest  ASA# 12 - Diane-op Mortality Rate: ASA12B - Patient did NOT die  ASA# 13 - PACU Re-Intubation Rate: ASA13B - Patient did NOT require a new airway mgmt  ASA# 10 - Composite Anes Safety: ASA10A - No serious adverse event    Additional Notes:

## 2021-06-04 NOTE — PROGRESS NOTES
Spoke with patient and reviewed medication changes.  Patient verbalizes understanding and agrees to plan.  EKG ordered.  Scheduling updated.  RX sent to pharmacy.  SHIV

## 2021-06-05 VITALS — HEIGHT: 68 IN | BODY MASS INDEX: 41.26 KG/M2 | WEIGHT: 272.25 LBS

## 2021-06-05 NOTE — PROGRESS NOTES
Discharged to home after EKG confirmed NSR and cardioversion was cancelled. Pt will follow up in heart clinic and continue current medications.

## 2021-06-05 NOTE — TELEPHONE ENCOUNTER
Breanne,       Sending this to you since Jan is out and patient saw you pre-PVI.  Patient most recently saw Jan 1/14  post pvi with SWA on 12/6.    She had one episode of about 2 to 3 days and converted on an increased dose of metoprolol (sotalol was prescribed but there was a miscommunication)  Her metoprolol was decreased back to 50mg two times a day due to some dyspnea on exertion.    Patient remains on eliquis 5mg without missed doses.    Patient is calling today, she went into afib on Saturday night and remains in it this morning.  She denies triggers, dehydration or illness.  Heart rates running in the 80's.  Symptoms include fatigue and shortness of breath.    She has not put on the MATEUSZ monitor yet.    Recommendations?    Thanks  Sydnee

## 2021-06-05 NOTE — PROCEDURES
Procedures    Cardioversion canceled.  Patient arrived in sinus rhythm.  Pham Hurley is a very pleasant 74-year-old woman with a history of persistent atrial fibrillation for which she underwent pulmonary vein isolation ablation with Dr. Garland on 12/6/2019.  She had an episode of A. fib a couple of weeks after ablation that converted back to sinus rhythm after an increase in metoprolol dose.  She again had recurrence of A. fib associated with fatigue and dyspnea on exertion on 1/18/2020.  She was started on sotalol in place of metoprolol and presented for cardioversion today, but arrived in sinus rhythm.  EKG was personally reviewed, shows sinus rhythm at 63 bpm, QT/QTc interval measures 444/454 ms.  We discussed that she is still within 3 months of ablation, so this is not necessarily indicative of true recurrence.  Medications reviewed.  Continue sotalol 80 mg twice daily.  Follow up with Khloe Deshpande CNP on 2/17/2020 as scheduled.

## 2021-06-05 NOTE — PATIENT INSTRUCTIONS - HE
Pressure Desensitization Protocol    1.  Put the mask on your face without putting the straps on while doing an activity that you enjoy such as watching TV, listening to the radio, surfing the Internet, or reading.  Try to do this for 15 minutes a day for one week.    2.  Put the mask on your face with the straps on while doing activity that you enjoy.  Try to do this for 15 minutes a day for another week.    3.  Put the mask on and attach the hose to the machine and turn on the machine.  Try to focus on activities that you enjoy for 15 minutes.  Perform this activity for one week.    4.  The mask on and attach the hose in the machine while doing an activity as you enjoy for 30 minutes.  Try this for one week.    5.  Repeat step 4.  Until you can increase the hours of usage to 2 hours.    May go back to previous steps if there is any discomfort at any time.  Also try to sleep with the machine every night for as long as you can tolerate it.

## 2021-06-05 NOTE — PATIENT INSTRUCTIONS - HE
Romy Hurley,    It was a pleasure to see you today at the [facility].     My recommendations after this visit include:    Stop aspirin.    Make an appointment to see Dr. Garland in May.    When you get back from [place], please call to schedule 2 week one patch monitor to be placed at least a month before your appointment with Dr. Garland.   It can be placed at [facility], [facility] or [facility] and you mail it back when you are done.  Call if you have any issues with your insurance covering the monitor cost.      Please call if you have any symptoms of atrial fibrillation especially before the monitor is placed.      My contact information:  Lucien Beach CNP  After Hours or Scheduling  [phone]  My Nurse---Jonathan Vazquez [phone]

## 2021-06-05 NOTE — TELEPHONE ENCOUNTER
Spoke with patient, reviewed need for EKG.  Patient is agreeable, will come in today.  Order placed  JW

## 2021-06-05 NOTE — PROGRESS NOTES
1945  Home:[phone] (home) Cell:[phone] (mobile)  Emergency Contact: Gregoria Covarrubias [phone]    +++Important patient information for CSC/Cath Lab staff : PT IS ON ELIQUIS, NEW SOTALOL START 1/22/2020, AN IS DIABETIC AND SHARA/CPAP+++    [facility] EP Cath Lab Procedure Order   Cardioversion:    Cardioversion    PT IS ON ELIQUIS AND NO MISSED DOSES     Diagnosis:  AF  Anticipated Case Duration:  Standard  Scheduling Needs/Timeframe:  PT IS SET FOR FRIDAY 1/24/2020 AT 12 WITH DAVID CHIRINOS CNP    Current Device: None None  Device Company/Device Rep Needed for Procedure: None    Anesthesia:  General-CV Only  Research Protocol:  No    [facility] EP Cath Lab Prep   Ordering Provider: Khloe Deshpande  Ordering Date: 1/20/2020  Orders Status: Intial order placed and Order set placed    H&P:  Compled by DANIA FLORES CNP on 1/14/2020 if scheduled within 30 days, pt to schedule with PMD if procedure outside of this timeframe  PCP: Adeola Stockton MD, [phone]    Pre-op Labs: N/A for procedure    Medical Records Pertinent for Procedure:  N/A    Patient Education:    PT HAS A  FOR PROCEDURE THAT WILL STAY WITH HER AND BE PRESENT FOR DISCHARGE  PT INSTRUCTED TO HOLD ANY VITAMINS, MINERALS, CALCIUM, IRON OR SUPPLEMENTS THE MORNING OF CV  PT INSTRUCTED TO BATHE OR SHOWER BEFORE COMING IN  PT INSTRUCTED TO LEAVE JEWELRY AT HOME  PT INSTRUCTED NO GUM CHEWING, MINTS, OR CANDY THE MORNING OF CV  PT IS ON ELIQUIS NO MISSED DOSES  PT INSTRUCTED PER KHLOE TO STOP HIS METOPROLOL ON 1/22/2020  PT NEW SOTALOL START 1/22/2020  PT IS SHARA/CPAP  PT IS DIABETIC AND IS TO HOLD HER METFORMIN THE MORNING OF CV  PT IS TO HAVE FOLLOW UP WITH KHLOE IN 3 WEEKS AND  HAS BEEN NOTIFIED          Teach with Patient: Completed via phone on 1/20/2020    Risks Reviewed:     Cardioversion    >90% acute success rate, <10% failure to convert or   reverts shortly after cardioversion.    <1% embolic event of (CVA, pulmonary embolism, or    1. other site).    75% risk for superficial burn.  Risks associated with general anesthesia will be addressed by the Anesthesiology Department    Pre-Procedure Instructions that were Reviewed with Patient:  NPO after midnight, Remove all jewelry prior to coming in for procedure, Shower prior to arrival, Transportation arrangements needed s/p procedure, Post-procedure follow up process and Sedation plan/orders    Pre-Procedure Medication Instructions:  Instructions given to pt regarding anticoagulants: Eliquis- instructed to continue anticoagulation uninterrupted through their procedure  Instructions given to pt regarding antiarrhythmic medication: Sotalol; Pt instructed to continue medication prior to procedure  Instructions for medication, other than anticoagulants/antiarrhythmics listed above, given to pt: to take all morning medications with small sips of water, with the exception of OTC supplements and MVI    Allergies   Allergen Reactions     Ace Inhibitors      Cortisone Acetate      Methylprednisolone Anxiety     Turns red       Current Outpatient Medications:      acetaminophen (TYLENOL) 500 MG tablet, Take 1,000 mg by mouth every 6 (six) hours as needed for pain., Disp: , Rfl:      apixaban (ELIQUIS) 5 mg Tab tablet, Take 1 tablet (5 mg total) by mouth 2 (two) times a day., Disp: 180 tablet, Rfl: 3     cholecalciferol, vitamin D3, 2,000 unit Tab, Take 4,000 Units by mouth daily. , Disp: , Rfl:      famotidine (PEPCID) 20 MG tablet, Take 1 tablet (20 mg total) by mouth 2 (two) times a day., Disp: 48 tablet, Rfl: 0     FLUTICASONE/SALMETEROL (ADVAIR DISKUS INHL), Inhale 2 puffs daily as needed. , Disp: , Rfl:      hydroCHLOROthiazide (HYDRODIURIL) 25 MG tablet, Take 25 mg by mouth daily., Disp: , Rfl:      levalbuterol (XOPENEX HFA) 45 mcg/actuation inhaler, Inhale 1-2 puffs every 4 (four) hours as needed for wheezing., Disp: , Rfl:      losartan (COZAAR) 100 MG tablet, Take 100 mg by mouth daily., Disp: ,  Rfl:      metFORMIN (GLUCOPHAGE) 500 MG tablet, Take 250 mg by mouth 2 (two) times a day with meals. , Disp: , Rfl:      metoprolol tartrate (LOPRESSOR) 50 MG tablet, Take 1 tablet (50 mg total) by mouth 2 (two) times a day., Disp: 60 tablet, Rfl: 0     simvastatin (ZOCOR) 20 MG tablet, Take 20 mg by mouth at bedtime.    , Disp: , Rfl: 3     sotalol (BETAPACE) 80 MG tablet, Take 1 tablet (80 mg total) by mouth 2 (two) times a day. To replace metoprolol, Disp: 90 tablet, Rfl: 2     temazepam (RESTORIL) 15 mg capsule, Take 1 capsule by mouth at bedtime as needed for sleep or anxiety. , Disp: , Rfl:      traZODone (DESYREL) 50 MG tablet, TK 1 T PO AT BEDTIME, Disp: , Rfl:     Documentation Date:1/20/2020 2:43 PM  Jonathan Vazquez LPN

## 2021-06-05 NOTE — TELEPHONE ENCOUNTER
Noted.  Phone call to patient and reviewed the below recommendations.  Instructed her to call with continued symptoms or further concerns.

## 2021-06-05 NOTE — PROGRESS NOTES
Patient in for EKG after feeling she went back into afib this weekend.    EKG confirms afib at 86bpm  QT/QTC  344/411    Patient reports fatigue and shortness of breath.     Discussed above with Khloe Soto's NP.  Ok to start sotalol 3 days prior to cardioversion and stop metoprolol when sotalol is started.    This was discussed with patient who is agreeable.  RX sent to pharmacy.    Order for cardioversion placed.  Will get 12-lead EKG at cardioversion.    SHIV

## 2021-06-05 NOTE — PROGRESS NOTES
Dear Adeola Moon MD  [id number]  [place] [address],    Thank you for the opportunity to participate in the care of Romy Hurley.     She is a 74 y.o. y/o female patient who comes to the sleep medicine clinic for follow up.  This is the patient's first clinical visit since starting CPAP therapy.  She states that she does not like her CPAP machine or the mask.  She has not noticed any improvement in her quality of sleep.  She will continue trying to get used to the machine.     Assessment and Plan:  In summary Romy Hurley is a 74 y.o. year old female who is here for follow-up.    1. Obstructive sleep apnea  I will lower the patient's CPAP pressure range to 6-13 CWP.  I encouraged the patient to use machine and so far she can.  I also directed the patient to follow-up with her durable medical equipment company to get another mask fitting.  - CPAP DME Sleep Medicine [facility]    2. Hypersomnia  No change     Compliance Download data for 30 days:  Pressure setting: APAP 5-20 CWP  Residual AHI: 6.5 events per hour  Leak: Minimal  Compliance: 50%  Mask Tolerance: Poor  Skin irritation: None      Past Medical History:   Diagnosis Date     Asthma      Atrial fibrillation (H) 2012     Diabetes mellitus (H)      Hypertension      Insomnia      Obesity      SHARA on CPAP      Osteoarthritis        Past Surgical History:   Procedure Laterality Date      SECTION       EP ABLATION AFLUTTER  2019    Procedure: EP Ablation Atrial Flutter;  Surgeon: Dorian Garland MD;  Location: [facility];  Service: Cardiology     EP ABLATION PVI Left 2019    Procedure: EP Ablation PVI;  Surgeon: Dorian Garland MD;  Location: [facility];  Service: Cardiology     REPLACEMENT TOTAL KNEE BILATERAL       SHOULDER ARTHROSCOPY W/ ROTATOR CUFF REPAIR Right        Social History     Socioeconomic History     Marital status:      Spouse name: Not on file     Number of children: 1      Years of education: Not on file     Highest education level: Not on file   Occupational History     Occupation: retired     Comment: [employer]   Social Needs     Financial resource strain: Not on file     Food insecurity:     Worry: Not on file     Inability: Not on file     Transportation needs:     Medical: Not on file     Non-medical: Not on file   Tobacco Use     Smoking status: Former Smoker     Packs/day: 0.00     Types: Cigarettes     Last attempt to quit: 1981     Years since quittin.6     Smokeless tobacco: Never Used   Substance and Sexual Activity     Alcohol use: Yes     Comment: Rare (2-3 month)     Drug use: No     Sexual activity: Yes     Partners: Male     Birth control/protection: Post-menopausal   Lifestyle     Physical activity:     Days per week: Not on file     Minutes per session: Not on file     Stress: Not on file   Relationships     Social connections:     Talks on phone: Not on file     Gets together: Not on file     Attends Christian service: Not on file     Active member of club or organization: Not on file     Attends meetings of clubs or organizations: Not on file     Relationship status: Not on file     Intimate partner violence:     Fear of current or ex partner: Not on file     Emotionally abused: Not on file     Physically abused: Not on file     Forced sexual activity: Not on file   Other Topics Concern     Not on file   Social History Narrative     Not on file       Current Outpatient Medications   Medication Sig Dispense Refill     acetaminophen (TYLENOL) 500 MG tablet Take 1,000 mg by mouth every 6 (six) hours as needed for pain.       apixaban (ELIQUIS) 5 mg Tab tablet Take 1 tablet (5 mg total) by mouth 2 (two) times a day. 180 tablet 3     aspirin 81 MG EC tablet Take 1 tablet (81 mg total) by mouth daily.  0     cholecalciferol, vitamin D3, 2,000 unit Tab Take 4,000 Units by mouth daily.        famotidine (PEPCID) 20 MG tablet Take 1  "tablet (20 mg total) by mouth 2 (two) times a day. 48 tablet 0     FLUTICASONE/SALMETEROL (ADVAIR DISKUS INHL) Inhale 2 puffs daily as needed.        hydroCHLOROthiazide (HYDRODIURIL) 25 MG tablet Take 25 mg by mouth daily.       levalbuterol (XOPENEX HFA) 45 mcg/actuation inhaler Inhale 1-2 puffs every 4 (four) hours as needed for wheezing.       losartan (COZAAR) 100 MG tablet Take 100 mg by mouth daily.       metFORMIN (GLUCOPHAGE) 500 MG tablet Take 250 mg by mouth 2 (two) times a day with meals.        metoprolol tartrate (LOPRESSOR) 50 MG tablet Take 1 tablet (50 mg total) by mouth 2 (two) times a day. 60 tablet 0     PARoxetine (PAXIL) 10 MG tablet Take 10 mg by mouth daily.  3     simvastatin (ZOCOR) 20 MG tablet Take 20 mg by mouth at bedtime.         3     temazepam (RESTORIL) 15 mg capsule Take 1 capsule by mouth at bedtime as needed for sleep or anxiety.        traZODone (DESYREL) 50 MG tablet TK 1 T PO AT BEDTIME       No current facility-administered medications for this visit.        Allergies   Allergen Reactions     Ace Inhibitors      Cortisone Acetate      Methylprednisolone Anxiety     Turns red       Epworths Sleepiness Scale 1/10/2018 10/25/2019 1/8/2020   Sitting and reading 1 0 1   Watching TV 1 1 1   Sitting, inactive in a public place (e.g. a theatre or a meeting) 0 0 0   As a passenger in a car for an hour without a break 0 0 0   Lying down to rest in the afternoon when circumstances permit 1 0 1   Sitting and talking to someone 0 0 0   Sitting quietly after a lunch without alcohol 0 0 0   In a car, while stopped for a few minutes in traffic 0 0 0   Total score 3 1 3       Physical Exam:  /58 (Patient Site: Right Arm, Patient Position: Sitting, Cuff Size: Adult Large)   Pulse 64   Ht 5' 8\" (1.727 m)   Wt (!) 286 lb (129.7 kg)   SpO2 96%   BMI 43.49 kg/m    BMI:Body mass index is 43.49 kg/m .   GEN: NAD, morbidly obese  Psych: normal mood, normal affect    Labs/Studies:    I " reviewed the efficacy and compliance report from his device. Data summarized on the HPI and the PAP compliance flow sheet.     Patient verbalized understanding of these issues, agrees with the plan and all questions were answered today. Patient was given an opportuntity to voice any other symptoms or concerns not listed above. Patient did not have any other symptoms or concerns.      Kevin Holland DO  Board Certified in Internal Medicine and Sleep Medicine    (Note created with Dragon voice recognition and unintended spelling errors and word substitutions may occur)

## 2021-06-05 NOTE — PROGRESS NOTES
An order for durable medical equipment was processed, and equipment has been ordered. See details below:    Date: 1/8/2020    Equipment Ordered: CPAP pressure change (clinic performed)    Company: [employer]     Fax: Poncho Bansal ( [facility] [place] [employer] [facility])    Ordering Provider: Kevin Holland CMA 1/8/2020 11:01 AM

## 2021-06-08 ENCOUNTER — OFFICE VISIT - HEALTHEAST (OUTPATIENT)
Dept: CARDIOLOGY | Facility: CLINIC | Age: 76
End: 2021-06-08

## 2021-06-08 DIAGNOSIS — I48.0 PAROXYSMAL ATRIAL FIBRILLATION (H): ICD-10-CM

## 2021-06-08 DIAGNOSIS — I48.19 PERSISTENT ATRIAL FIBRILLATION (H): ICD-10-CM

## 2021-06-08 DIAGNOSIS — I10 ESSENTIAL HYPERTENSION: ICD-10-CM

## 2021-06-08 DIAGNOSIS — G47.33 OSA ON CPAP: ICD-10-CM

## 2021-06-08 ASSESSMENT — MIFFLIN-ST. JEOR: SCORE: 1786.82

## 2021-06-09 LAB
ATRIAL RATE - MUSE: 60 BPM
DIASTOLIC BLOOD PRESSURE - MUSE: NORMAL
INTERPRETATION ECG - MUSE: NORMAL
P AXIS - MUSE: 60 DEGREES
PR INTERVAL - MUSE: 200 MS
QRS DURATION - MUSE: 94 MS
QT - MUSE: 440 MS
QTC - MUSE: 440 MS
R AXIS - MUSE: -12 DEGREES
SYSTOLIC BLOOD PRESSURE - MUSE: NORMAL
T AXIS - MUSE: 0 DEGREES
VENTRICULAR RATE- MUSE: 60 BPM

## 2021-06-12 NOTE — PROGRESS NOTES
Thank you for asking the [facility] team to see Romy Hurley      Assessment/Plan:     Persistent atrial fibrillation. On flecanide 50mg BID and metoprolol. Will start eliquis BID as she did not tolerate xarelto due to upper airway discomfort. CHADS VASC score of 4. EKG today shows afib, rate controlled. Will obtain echo (last in 2012) and holter to look at rate control. Will refer to afib clinic for cardioversion once appropriate anticoagulation duration reached.     Discussed exercise and weight loss. She will call with any trouble in the mean time.     F/U 1 year        Current History:   Romy Hurley is a 72 y.o.  obese, diabetic woman with initially paroxysmal atrial fibrillation, who I met a couple of years ago.  She initially failed cardioversion on metoprolol, but did have a successful cardioversion on flecainide. At a higher dose of flecainide, 100 mg twice daily, she had quite a bit of shortness of breath, but has tolerated 50 mg of flecainide twice daily with metoprolol 50 twice daily. She was initially on Pradaxa around the time of her cardioversion, and then xarelto for CHADS-VASc score of 4 for gender, hypertension, age > 65 and diabetes.     She returns for annual f/u and notes she tried many times to tolerate the xarelto but each time had thickness in her neck with trouble swallowing and shortness of breath. She is now just on aspirin 325mg daily. She has felt fatigued and more short of breath with activities over the past month or so and attributes that to being back in afib. She blames insomnia for the afib.      Past Medical History:     Past Medical History:   Diagnosis Date     Atrial fibrillation      Diabetes mellitus      Hypertension      Obesity        Past Surgical History:   History reviewed. No pertinent surgical history.    Family History:   History reviewed. No pertinent family history.    Social History:    reports that she has quit smoking. She does not have any  "smokeless tobacco history on file.    Meds:     Current Outpatient Prescriptions   Medication Sig Note     acetaminophen (TYLENOL) 500 MG tablet Take 1,000 mg by mouth every 6 (six) hours as needed for pain.      aspirin 325 MG tablet Take 325 mg by mouth daily.      flecainide (TAMBOCOR) 50 MG tablet TAKE ONE TABLET BY MOUTH TWICE DAILY      metFORMIN (GLUCOPHAGE) 500 MG tablet  5/17/2016: Received from: External Pharmacy     metoprolol tartrate (LOPRESSOR) 50 MG tablet Take 1 tablet (50 mg total) by mouth 2 (two) times a day.      simvastatin (ZOCOR) 20 MG tablet  5/17/2016: Received from: External Pharmacy     temazepam (RESTORIL) 15 mg capsule Take 1 capsule by mouth as needed. 6/9/2015: Received from: External Pharmacy Received Sig:      valsartan-hydrochlorothiazide (DIOVAN-HCT) 160-25 mg per tablet Take 1 tablet by mouth daily.      FLUTICASONE/SALMETEROL (ADVAIR DISKUS INHL) Inhale.      levalbuterol (XOPENEX HFA) 45 mcg/actuation inhaler Inhale 1-2 puffs every 4 (four) hours as needed for wheezing.      rivaroxaban (XARELTO) 20 mg Tab Take 1 tablet (20 mg total) by mouth daily with supper.        Allergies:   Ace inhibitors; Cortisone acetate; and Methylprednisolone    Review of Systems:   Review of Systems:   General: WNL  Eyes: WNL  Ears/Nose/Throat: Hearing Loss  Lungs: Shortness of Breath, Wheezing  Heart: Shortness of Breath with activity, Irregular Heartbeat  Stomach: WNL  Bladder: WNL  Muscle/Joints: WNL  Skin: WNL  Nervous System: Dizziness  Mental Health: Anxiety     Blood: WNL       Objective:      Physical Exam  @LASTENCWT:3@  5' 7.5\" (1.715 m)  @BMI:3@  /76 (Patient Site: Right Arm, Patient Position: Sitting, Cuff Size: Adult Large)  Pulse 80  Resp 18  Ht 5' 7.5\" (1.715 m)  Wt (!) 295 lb (133.8 kg)  SpO2 98%  BMI 45.52 kg/m2    General Appearance:   Alert, cooperative and in no acute distress. Obese.   HEENT:  No scleral icterus; the mucous membranes were pink and moist.   Neck: JVP " flat. No thyromegaly. No HJR   Chest: The spine was straight. The chest was symmetric.   Lungs:   Respirations unlabored; the lungs are clear to auscultation.   Cardiovascular:   S1 and S2 irregular and without murmur. No clicks or rubs. No carotid bruits noted. Right DP, PT, and radial pulses 2+. Left DP, PT, and radial pulses 2+.   Abdomen:  No organomegaly, masses, bruits, or tenderness. Bowels sounds are present   Extremities: No cyanosis, clubbing, or edema.   Skin: No xanthelasma.   Neurologic: Mood and affect are appropriate.         Lab Review   Lab Results   Component Value Date     (L) 02/02/2010    K 4.2 05/31/2012    K 4.2 07/06/2011    K 4.0 06/08/2010     02/02/2010    CO2 28 02/02/2010    BUN 23 (H) 02/02/2010    CREATININE 1.00 02/02/2010    CALCIUM 8.3 (L) 02/02/2010     Lab Results   Component Value Date    WBC 7.7 06/07/2010    WBC 8.2 02/01/2010    HGB 13.1 07/06/2011    HGB 10.2 (L) 06/09/2010    HGB 10.0 (L) 06/08/2010    HCT 35.8 06/07/2010    HCT 42.3 02/01/2010    MCV 85 06/07/2010    MCV 87 02/01/2010     06/07/2010     02/01/2010     Lab Results   Component Value Date    TRIG 114 03/27/2012    HDL 37 (L) 03/27/2012     No results found for: BNP      Geeta Rooney M.D.

## 2021-06-13 NOTE — ANESTHESIA POSTPROCEDURE EVALUATION
Patient: Romy Hurley  Anesthesia type: general  Cardioversion  Patient location: PACU  Last vitals:   Vitals:    11/02/17 1415   BP: 96/59   Pulse: 60   Resp:    Temp:    SpO2:      Post vital signs: stable  Level of consciousness: awake and responds to simple questions  Post-anesthesia pain: pain controlled  Post-anesthesia nausea and vomiting: no  Pulmonary: unassisted, return to baseline  Cardiovascular: stable and blood pressure at baseline  Hydration: adequate  Anesthetic events: no    QCDR Measures:  ASA# 11 - Diane-op Cardiac Arrest: ASA11B - Patient did NOT experience unanticipated cardiac arrest  ASA# 12 - Diane-op Mortality Rate: ASA12B - Patient did NOT die  ASA# 13 - PACU Re-Intubation Rate: ASA13B - Patient did NOT require a new airway mgmt  ASA# 10 - Composite Anes Safety: ASA10A - No serious adverse event    Additional Notes:

## 2021-06-13 NOTE — PROGRESS NOTES
"Thank you for asking the [facility] team to see Romy Hurley      Assessment/Plan:   Recurrent persistent atrial fibrillation, back in afib now for over a week. She is currently on flecanide 50mg BID, will increase to 100mg BID and consider a re-try at cardioversion. Continue metoprolol with eliquis BID. Will refer to our atrial fibrillation ablation colleagues to discuss ablation. She feels much better when in sinus rhythm.    Pham also really feels that she needs help with general anxiety. Her PCP has offered medication in the past. I am going to prescribe paxil 10mg daily today but have asked her to talk to her PCP and set up an appointment for f/u of this in the next week.      Discussed exercise and weight loss.       F/U 6 months          Current History:   Romy Hurley is a 72 y.o.  obese, diabetic woman with initially paroxysmal atrial fibrillation, who I met a couple of years ago.  She initially failed cardioversion on metoprolol, but did have a successful cardioversion on flecainide. At a higher dose of flecainide, 100 mg twice daily, she had quite a bit of shortness of breath, but tolerated 50 mg of flecainide twice daily with metoprolol 50 twice daily. She is on eliquis for CHADS-VASc score of 4 for gender, hypertension, age > 65 and diabetes.    Pham saw me in August with recurrent afib. She underwent cardioversion  and maintained sinus rhythm for about 2 days but had an emotional incident with her  an went back into afib. She felt \"great\" for that time she was in sinus rhythm.  She notes running to get here from a  today and that her heart rate in afib has otherwise been rate controlled. Holter prior to her cardioversion did show rate control and echo showed EF 65% with mild biatrial enlargement.        Past Medical History:     Past Medical History:   Diagnosis Date     Asthma      Atrial fibrillation      Diabetes mellitus      Hypertension      Insomnia  "     Obesity      Osteoarthritis        Past Surgical History:     Past Surgical History:   Procedure Laterality Date      SECTION       REPLACEMENT TOTAL KNEE BILATERAL       SHOULDER ARTHROSCOPY W/ ROTATOR CUFF REPAIR Right        Family History:     Family History   Problem Relation Age of Onset     Ovarian cancer Mother      Lung cancer Father      Brain cancer Sister      No Medical Problems Brother      No Medical Problems Sister      No Medical Problems Sister        Social History:    reports that she has quit smoking. She has never used smokeless tobacco. She reports that she does not drink alcohol or use illicit drugs.    Meds:     Current Outpatient Prescriptions   Medication Sig     acetaminophen (TYLENOL) 500 MG tablet Take 1,000 mg by mouth every 6 (six) hours as needed for pain.     apixaban (ELIQUIS) 5 mg Tab tablet Take 1 tablet (5 mg total) by mouth 2 (two) times a day.     cholecalciferol, vitamin D3, 2,000 unit Tab Take 2,000 Units by mouth daily.     flecainide (TAMBOCOR) 50 MG tablet Take 1 tablet (50 mg total) by mouth 2 (two) times a day.     FLUTICASONE/SALMETEROL (ADVAIR DISKUS INHL) Inhale 2 puffs daily as needed.      levalbuterol (XOPENEX HFA) 45 mcg/actuation inhaler Inhale 1-2 puffs every 4 (four) hours as needed for wheezing.     metFORMIN (GLUCOPHAGE) 500 MG tablet Take 250 mg by mouth 2 (two) times a day with meals.      metoprolol tartrate (LOPRESSOR) 50 MG tablet Take 1 tablet (50 mg total) by mouth 2 (two) times a day.     simvastatin (ZOCOR) 20 MG tablet Take 20 mg by mouth at bedtime.      temazepam (RESTORIL) 15 mg capsule Take 1 capsule by mouth as needed.     valsartan-hydrochlorothiazide (DIOVAN-HCT) 160-25 mg per tablet Take 1 tablet by mouth daily.       Allergies:   Ace inhibitors; Cortisone acetate; and Methylprednisolone    Review of Systems:   Review of Systems:   General: WNL  Eyes: WNL  Ears/Nose/Throat: WNL  Lungs: Shortness of Breath, Wheezing  Heart:  "Shortness of Breath with activity  Stomach: WNL  Bladder: WNL  Muscle/Joints: WNL  Skin: WNL  Nervous System: WNL  Mental Health: Anxiety     Blood: WNL       Objective:      Physical Exam  @LASTENCWT:3@  5' 8.5\" (1.74 m)  @BMI:3@  /74 (Patient Site: Left Arm, Patient Position: Sitting, Cuff Size: Adult Large)  Pulse (!) 120 Comment: 100's  Resp 20  Ht 5' 8.5\" (1.74 m)  Wt (!) 293 lb (132.9 kg)  BMI 43.9 kg/m2    General Appearance:   Alert, cooperative and in no acute distress.   HEENT:  No scleral icterus; the mucous membranes were pink and moist.   Neck: JVP flat. No thyromegaly. No HJR   Chest: The spine was straight. The chest was symmetric.   Lungs:   Respirations unlabored; the lungs are clear to auscultation.   Cardiovascular:   S1 and S2 irregular and without murmur. No clicks or rubs. No carotid bruits noted. Right DP, PT, and radial pulses 2+. Left DP, PT, and radial pulses 2+.   Abdomen:  No organomegaly, masses, bruits, or tenderness. Bowels sounds are present   Extremities: No cyanosis, clubbing, or edema.   Skin: No xanthelasma.   Neurologic: Mood and affect are appropriate.         Lab Review   Lab Results   Component Value Date     (L) 02/02/2010    K 4.6 10/11/2017    K 4.2 05/31/2012    K 4.2 07/06/2011     02/02/2010    CO2 28 02/02/2010    BUN 23 (H) 02/02/2010    CREATININE 1.00 02/02/2010    CALCIUM 8.3 (L) 02/02/2010     Lab Results   Component Value Date    WBC 7.7 06/07/2010    WBC 8.2 02/01/2010    HGB 13.1 07/06/2011    HGB 10.2 (L) 06/09/2010    HGB 10.0 (L) 06/08/2010    HCT 35.8 06/07/2010    HCT 42.3 02/01/2010    MCV 85 06/07/2010    MCV 87 02/01/2010     06/07/2010     02/01/2010     Lab Results   Component Value Date    TRIG 114 03/27/2012    HDL 37 (L) 03/27/2012     No results found for: BNP      Geeta Rooney M.D.  "

## 2021-06-13 NOTE — ANESTHESIA PREPROCEDURE EVALUATION
Anesthesia Evaluation      Patient summary reviewed   No history of anesthetic complications     Airway   Mallampati: II  Neck ROM: full   Pulmonary - normal exam    breath sounds clear to auscultation  (+) asthma   well controlled,   (-) shortness of breath, recent URI, sleep apnea                         Cardiovascular   Exercise tolerance: > or = 4 METS  (+) hypertension, dysrhythmias, ,     ECG reviewed  Rhythm: irregular        Neuro/Psych    (-) no CVA    Endo/Other    (+) diabetes mellitus type 2, arthritis, obesity,      GI/Hepatic/Renal            Dental    (+) caps                       Anesthesia Plan  Planned anesthetic: general mask and total IV anesthesia    ASA 3   Induction: intravenous   Anesthetic plan and risks discussed with: patient    Post-op plan: routine recovery

## 2021-06-13 NOTE — PROGRESS NOTES
Following for CV from Jan, no device   Pt is on Eliquis and go after 9/29   Pt set for 10/11 will teach 10/4  Pt has my direct number for contact and agrees to plan.

## 2021-06-13 NOTE — ANESTHESIA PREPROCEDURE EVALUATION
Anesthesia Evaluation      Patient summary reviewed   No history of anesthetic complications     Airway   Mallampati: II  Neck ROM: full   Pulmonary - normal exam    breath sounds clear to auscultation  (+) asthma    (-) shortness of breath, recent URI, sleep apnea                         Cardiovascular   Exercise tolerance: > or = 4 METS  (+) hypertension, dysrhythmias, ,     ECG reviewed  Rhythm: irregular        Neuro/Psych    (-) no CVA    Endo/Other    (+) diabetes mellitus type 2, arthritis, obesity,      GI/Hepatic/Renal            Dental    (+) caps                       Anesthesia Plan  Planned anesthetic: general mask and total IV anesthesia    ASA 3   Induction: intravenous   Anesthetic plan and risks discussed with: patient    Post-op plan: routine recovery

## 2021-06-13 NOTE — ANESTHESIA POSTPROCEDURE EVALUATION
Patient: Romy Hurley  * No procedures listed *  Anesthesia type: general    Patient location: PACU  Last vitals:   Vitals:    10/11/17 1325   BP:    Pulse: 61   Resp: 15   Temp:    SpO2: 95%     Post vital signs: stable  Level of consciousness: awake and responds to simple questions  Post-anesthesia pain: pain controlled  Post-anesthesia nausea and vomiting: no  Pulmonary: unassisted, return to baseline  Cardiovascular: stable and blood pressure at baseline  Hydration: adequate  Anesthetic events: no    QCDR Measures:  ASA# 11 - Diane-op Cardiac Arrest: ASA11B - Patient did NOT experience unanticipated cardiac arrest  ASA# 12 - Diane-op Mortality Rate: ASA12B - Patient did NOT die  ASA# 13 - PACU Re-Intubation Rate: ASA13B - Patient did NOT require a new airway mgmt  ASA# 10 - Composite Anes Safety: ASA10A - No serious adverse event    Additional Notes:

## 2021-06-13 NOTE — PROGRESS NOTES
Pt was seen in clinic today CV ordered and pt will be ready to go after 9/29  Pt is aware and will call next week to check meds and set up  Pt has my direct number to call.

## 2021-06-13 NOTE — ANESTHESIA CARE TRANSFER NOTE
Last vitals:   Vitals:    10/11/17 1317   BP: 117/66   Pulse: 64   Resp: 20   Temp:    SpO2: 100%     Patient's level of consciousness is awake  Spontaneous respirations: yes  Maintains airway independently: yes  Dentition unchanged: yes  Oropharynx: oropharynx clear of all foreign objects    QCDR Measures:  ASA# 20 - Surgical Safety Checklist: WHO surgical safety checklist completed prior to induction  PQRS# 430 - Adult PONV Prevention: NA - Not adult patient, not GA or 3 or more risk factors NOT present  ASA# 8 - Peds PONV Prevention: NA - Not pediatric patient, not GA or 2 or more risk factors NOT present  PQRS# 424 - Diane-op Temp Management: 4559F - At least one body temp DOCUMENTED => 35.5C or 95.9F within required timeframe  PQRS# 426 - PACU Transfer Protocol: - Transfer of care checklist used  ASA# 14 - Acute Post-op Pain: ASA14B - Patient did NOT experience pain >= 7 out of 10

## 2021-06-13 NOTE — PROGRESS NOTES
1945  [phone] (home)  There is no such number on file (mobile).    +++Important patient information for [facility]/Cath Lab staff : PT IS DIABETIC, ON METFORMIN, FLECAINIDE, METORPOLOL AND ELIQUIS+++    [facility] EP Cath Lab Procedure Order   Cardioversion:    Cardioversion     PT IS ON ELIQUIS SINCE 9/7 AND NO MISSED DOSES      Diagnosis:  AF  Anticipated Case Duration:  Standard  Scheduling Needs/Timeframe:  PTIS SET FOR WED 10/11/2017 AT 11 30    Current Device: None None  Device Company/Device Rep Needed for Procedure: None    Anesthesia:  General-CV Only  Research Protocol:  No    [facility] EP Cath Lab Prep   Ordering Provider: Lucien Beach NP  Ordering Date: 9/18/2017  Orders Status: Intial order placed and Order set placed  EP NC Contact: Miri Vazquez LPN    H&P:  Compled by LUCIEN BEACH CNP on 9/18/2017  PCP: Adeola Stockton MD, [phone]    Pre-op Labs: N/A for procedure    Medical Records Pertinent for Procedure:  N/A    Patient Education:    PT WILL BE DROPPED OFF AND PICKED UP FOR PROCEDURE  PT INSTRUCTED TO HOLD ANY VITAMINS, MINERALS, CALCIUM, IRON OR SUPPLEMENTS THE MORNING OF CV  PT IS DIABETIC AND INSTRUCTED TO HOLD HER METFORMIN, THE MORNING OF CV   PT IS ON ELIQUIS AND NO MISSED DOSES   PT INSTRUCTED TO TAKE HER METOPROLOL, FLECAINIDE, ELIQUIS THE MORNING OF CV  PT HAS A FOLLOW UP WITH DR MOCK 10/24 AND IS TO KEEP THAT APPT.    Teach with Patient: Completed via phone on 10/2/2017    Risks Reviewed:     Cardioversion    >90% acute success rate, <10% failure to convert or   reverts shortly after cardioversion.    <1% embolic event of (CVA, pulmonary embolism, or   other site).    75% risk for superficial burn.  Risks associated with general anesthesia will be addressed by the Anesthesiology Department    Consent: Will be obtained in [facility] day of procedure    Pre-Procedure Instructions that were Reviewed with Patient:  NPO after midnight, Notified patient of time and date of procedure by CV ,  Transportation arrangements needed s/p procedure, Post-procedure follow up process and Sedation plan/orders    Pre-Procedure Medication Instructions:  Instructions given to pt regarding anticoagulants: Eliquis- instructed to continue anticoagulation uninterrupted through their procedure  Instructions given to pt regarding antiarrhythmic medication: Flecainide; Pt instructed to continue medication prior to procedure  Instructions for medication, other than anticoagulants/antiarrhythmics listed above, given to pt: to take all morning medications with small sips of water, with the exception of OTC supplements and MVI    Allergies   Allergen Reactions     Ace Inhibitors      Cortisone Acetate      Methylprednisolone Anxiety     Turns red       Current Outpatient Prescriptions:      acetaminophen (TYLENOL) 500 MG tablet, Take 1,000 mg by mouth every 6 (six) hours as needed for pain., Disp: , Rfl:      apixaban (ELIQUIS) 5 mg Tab tablet, Take 1 tablet (5 mg total) by mouth 2 (two) times a day., Disp: 60 tablet, Rfl: 11     cholecalciferol, vitamin D3, 2,000 unit Tab, Take 2,000 Units by mouth daily., Disp: , Rfl:      flecainide (TAMBOCOR) 50 MG tablet, TAKE ONE TABLET BY MOUTH TWICE DAILY, Disp: 180 tablet, Rfl: 2     FLUTICASONE/SALMETEROL (ADVAIR DISKUS INHL), Inhale 2 puffs daily as needed. , Disp: , Rfl:      levalbuterol (XOPENEX HFA) 45 mcg/actuation inhaler, Inhale 1-2 puffs every 4 (four) hours as needed for wheezing., Disp: , Rfl:      metFORMIN (GLUCOPHAGE) 500 MG tablet, Take 250 mg by mouth 2 (two) times a day with meals. , Disp: , Rfl:      metoprolol tartrate (LOPRESSOR) 50 MG tablet, Take 1 tablet (50 mg total) by mouth 2 (two) times a day., Disp: 180 tablet, Rfl: 0     simvastatin (ZOCOR) 20 MG tablet, Take 20 mg by mouth at bedtime. , Disp: , Rfl:      temazepam (RESTORIL) 15 mg capsule, Take 1 capsule by mouth as needed., Disp: , Rfl:      valsartan-hydrochlorothiazide (DIOVAN-HCT) 160-25 mg per tablet,  Take 1 tablet by mouth daily., Disp: , Rfl:

## 2021-06-13 NOTE — ANESTHESIA CARE TRANSFER NOTE
Last vitals:   Vitals:    11/02/17 1321   BP: 91/54   Pulse: 60   Resp: 18   Temp:    SpO2: 99%     Patient's level of consciousness is drowsy  Spontaneous respirations: yes  Maintains airway independently: yes  Dentition unchanged: yes  Oropharynx: oropharynx clear of all foreign objects    QCDR Measures:  ASA# 20 - Surgical Safety Checklist: WHO surgical safety checklist completed prior to induction  PQRS# 430 - Adult PONV Prevention: NA - Not adult patient, not GA or 3 or more risk factors NOT present  ASA# 8 - Peds PONV Prevention: NA - Not pediatric patient, not GA or 2 or more risk factors NOT present  PQRS# 424 - Diane-op Temp Management: NA - MAC anesthesia or case < 60 minutes  PQRS# 426 - PACU Transfer Protocol:NA - Patient did not go to PACU  ASA# 14 - Acute Post-op Pain: NA - Patient under age 10y or did not go to PACU

## 2021-06-13 NOTE — PROGRESS NOTES
[facility] HEART [facility]   Arrhythmia Clinic    Assessment/Plan:  Diagnoses and all orders for this visit:    Persistent atrial fibrillation and since having good control prior to this particular episode recommended that Pat continue flecainide 50 mg p.o. twice daily plus metoprolol tartrate 50 mg p.o. twice daily.  If she has frequent breakthrough after cardioversion or additional persistent episode, I would recommend that we increase flecainide to 100 mg p.o. twice daily and change metoprolol to metoprolol succinate 50 mg p.o. daily.  I think her shortness of breath is likely due to chronotropic incompetence on previous higher dose of flecainide plus metoprolol.  She seems clearly symptomatic.  I discussed that she would be a candidate for ablation if she was interest in getting off antiarrhythmics though we have several options yet.  At this point, Alexsander is  not in good health and I think she wants to stay on medication if possible.  To follow-up with Dr. Rooney or myself in 5-6 weeks.  She started Eliquis on September 7.  She denies missing any doses since start.  She denies any complications on it.  [id number] score of 4.  I spent time discussing cardioversion and prep for this.  See after visit summary for more details.  -     EP Cardioversion External; Future; Expected date: 9/18/17    Essential hypertension with goal blood pressure less than 140/90 and well-controlled.    Other orders  -     cholecalciferol, vitamin D3, 2,000 unit Tab; Take 2,000 Units by mouth daily.  -     Vital signs; Standing  -     Oxygen nasal cannula; Standing  -     Inpatient consult to Anesthesiology Reason for Consult? cardioversion; Did you contact the consulting MD? No; Consult priority: Today (urgent); Communication for MD: No phone communication necessary for now; Standing  -     Insert and maintain IV; Standing  -     lidocaine (PF) 10 mg/mL (1 %) injection 0.1-0.3 mL (XYLOCAINE-MPF); Inject 0.1-0.3 mL under the skin as  needed (for IV insertion).  -     sodium chloride 0.9%; Infuse 25 mL/hr into a venous catheter continuous.  -     Saline lock IV; Standing  -     sodium chloride 0.9 % flush 3 mL (NS); Infuse 3 mL into a venous catheter Line Care.  -     Verify informed consent for Elective Cardioversion; Standing  -     NPO 8 hours prior to procedure; Standing  -     Notify Anesthesiologist -; Standing  -     Emergency equipment at bedside; Standing  -     Bedside Glucose (obtain on all diabetic patients if other blood is not required for labs); Standing  -     Potassium; Standing      40 minutes were spent in face-to-face counseling regarding above diagnoses and options for treatment.      ______________________________________________________________________    Subjective:    I had the opportunity to see Romy Hurley at the [facility]. Romy Hurley is a 72 y.o. female and here for EP follow-up for persistent atrial fibrillation.  Pham tells me she had a first episode of atrial fib was persistent and had a cardioversion and then prevent paroxysmal until this episode.  She is complaining of fatigue and shortness of breath with exertion with A. fib.  She is able to tell if she is in A. fib or not.  Pham had shortness of breath with flecainide 100 mg p.o. twice daily plus metoprolol tartrate 50 mg p.o. twice daily.  She tells me she had good control of her atrial fib with infrequent episodes until this particular episode.  In the past when she would go into A. fib she would take 100 mg of flecainide and usually back in rhythm within a few hours.  She has not needed to do that very often.  I discussed with Pham that I think she may have been having chronotropic incompetence on combination of flecainide 100 mg plus metoprolol tartrate twice daily.  Pham knows quite a bit about atrial fib because her  is Alexsander Hurley and he has A. Fib and watchman device.    This visit will serve as history and physical for  cardioversion.  See problem list for more details.  Medical, past medical, surgical and social history reviewed and updated as this had not been completed in the E HR.  Meds and allergies reviewed.       ______________________________________________________________________    Problem List:  Patient Active Problem List   Diagnosis     Obesity     HTN, goal below 140/90     Persistent atrial fibrillation     Osteoarthritis     Insomnia     Mild intermittent asthma     Type 2 diabetes mellitus without complication, without long-term current use of insulin     Persistent insomnia     Total knee replacement status     Medical History:  Past Medical History:   Diagnosis Date     Atrial fibrillation      Diabetes mellitus      Hypertension      Obesity      Surgical History:  Past Surgical History:   Procedure Laterality Date      SECTION       REPLACEMENT TOTAL KNEE BILATERAL       SHOULDER ARTHROSCOPY W/ ROTATOR CUFF REPAIR Right      Social History:  Social History   Substance Use Topics     Smoking status: Former Smoker     Smokeless tobacco: None     Alcohol use None        Review of Systems: Review of Systems:   General: WNL  Eyes: WNL  Ears/Nose/Throat: WNL  Lungs: WNL  Heart: WNL  Stomach: WNL  Bladder: WNL  Muscle/Joints: WNL  Skin: WNL  Nervous System: WNL  Mental Health: WNL     Blood: WNL      Family History:  Family History   Problem Relation Age of Onset     Ovarian cancer Mother      Lung cancer Father      Brain cancer Sister      No Medical Problems Brother      No Medical Problems Sister      No Medical Problems Sister          Allergies:  Allergies   Allergen Reactions     Ace Inhibitors      Cortisone Acetate      Methylprednisolone Anxiety     Turns red     Medications:  Current Outpatient Prescriptions   Medication Sig Dispense Refill     acetaminophen (TYLENOL) 500 MG tablet Take 1,000 mg by mouth every 6 (six) hours as needed for pain.       apixaban (ELIQUIS) 5 mg Tab tablet Take 1 tablet  "(5 mg total) by mouth 2 (two) times a day. 60 tablet 11     cholecalciferol, vitamin D3, 2,000 unit Tab Take 2,000 Units by mouth daily.       flecainide (TAMBOCOR) 50 MG tablet TAKE ONE TABLET BY MOUTH TWICE DAILY 180 tablet 2     FLUTICASONE/SALMETEROL (ADVAIR DISKUS INHL) Inhale 2 puffs daily as needed.        levalbuterol (XOPENEX HFA) 45 mcg/actuation inhaler Inhale 1-2 puffs every 4 (four) hours as needed for wheezing.       metFORMIN (GLUCOPHAGE) 500 MG tablet Take 250 mg by mouth 2 (two) times a day with meals.        metoprolol tartrate (LOPRESSOR) 50 MG tablet Take 1 tablet (50 mg total) by mouth 2 (two) times a day. 180 tablet 0     simvastatin (ZOCOR) 20 MG tablet Take 20 mg by mouth at bedtime.        temazepam (RESTORIL) 15 mg capsule Take 1 capsule by mouth as needed.       valsartan-hydrochlorothiazide (DIOVAN-HCT) 160-25 mg per tablet Take 1 tablet by mouth daily.       No current facility-administered medications for this visit.        Objective:   Vital signs:  /68 (Patient Site: Right Arm, Patient Position: Sitting, Cuff Size: Adult Large)  Pulse 76  Resp 16  Ht 5' 9\" (1.753 m)  Wt (!) 297 lb (134.7 kg)  BMI 43.86 kg/m2      Physical Exam:    GENERAL APPEARANCE: Alert, cooperative and in no acute distress.  HEENT: No scleral icterus. No Xanthelasma. Oral mucuos membranes pink and moist.  NECK: JVP Nl.   CHEST: clear to auscultation  CARDIOVASCULAR: S1, S2 without murmur ,clicks or rubs. Irregular, irregular.  Radial and posterior tibial pulses are intact and symetric.  EXTREMITIES: No cyanosis, clubbing or edema.    Results personally reviewed:  Results for orders placed during the hospital encounter of 09/11/17   Echo Complete [ECH10] 09/11/2017    Narrative 1. Normal left ventricular size and systolic performance with a visually   estimated ejection fraction of 65%.   2. There is mild aortic insufficiency.  3. There is mild biatrial enlargement.    When compared to the prior " real-time echocardiogram dated 29 March 2012,   mild aortic insufficiency is now noted.  Otherwise, there has been little   appreciable interval change.    August 2017 24-hour Holter shows A. fib throughout with controlled ventricular response of 83 on average.  This is on flecainide 50 mg p.o. twice daily plus metoprolol tartrate 50 mg p.o. twice daily.      Results for orders placed or performed in visit on 08/31/17   ECG Clinic - Today   Result Value Ref Range    SYSTOLIC BLOOD PRESSURE  mmHg    DIASTOLIC BLOOD PRESSURE  mmHg    VENTRICULAR RATE 83 BPM    ATRIAL RATE 44 BPM    P-R INTERVAL  ms    QRS DURATION 94 ms    Q-T INTERVAL 396 ms    QTC CALCULATION (BEZET) 465 ms    P Axis  degrees    R AXIS -19 degrees    T AXIS -6 degrees    MUSE DIAGNOSIS       Atrial fibrillation  Minimal voltage criteria for LVH, may be normal variant  Abnormal ECG  When compared with ECG of 31-MAY-2012 08:41,  Atrial fibrillation has replaced Sinus rhythm    Confirmed by ESSIE MOCK MD LOC:[facility] ([id number]) on 8/31/2017 3:07:24 PM         TSH:   Lab Results   Component Value Date    TSH 2.8 04/25/2012     BNP: No results found for: BNP  BMP:  Lab Results   Component Value Date    CREATININE 1.00 02/02/2010    BUN 23 (H) 02/02/2010     (L) 02/02/2010    K 4.2 05/31/2012     02/02/2010    CO2 28 02/02/2010       This note has been dictated using voice recognition software. Any grammatical or context distortions are unintentional and inherent to the software.    DANIA FLORES RN, [facility]  [phone]

## 2021-06-13 NOTE — PROGRESS NOTES
1945  [phone] (home)  [phone] (mobile)    +++Important patient information for [facility]/Cath Lab staff : PT IS ON ELIQUIS, FLECAINIDE, AND METOPROLOL, AND IS DIABETIC+++    [facility] EP Cath Lab Procedure Order   Cardioversion:    Cardioversion    PT IS ON ELIQUIS AND NO MISSED DOSES, STARTED 9/7    Diagnosis:  AF  Anticipated Case Duration:  Standard  Scheduling Needs/Timeframe:  PT IS SET FOR THURS 11/2/2017 AT 11 30    Current Device: None None  Device Company/Device Rep Needed for Procedure: None    Anesthesia:  General-CV Only  Research Protocol:  No    [facility] EP Cath Lab Prep   Ordering Provider: DR MOCK  Ordering Date: 10/24/2017  Orders Status: Intial order placed and Order set placed  EP NC Contact: Miri Vazquez LPN    H&P:  Compled by DR MOCK on 10/24/2017  PCP: Adeola Stockton MD, [phone]    Pre-op Labs: N/A for procedure    Medical Records Pertinent for Procedure:  N/A    Patient Education:    PT HAS A  FOR PROCEDURE  PT HAS BEEN INSTRUCTED TO HOLD ANY VITAMINS, MINERALS, CALCIUM, IRON OR SUPPLEMENTS THE MORNING OF CV  PT IS DIABETIC, AND WILL HOLD HER METFORMIN THE MORNING OF CV  PT IS ON ELIQUIS AND NO MISSED DOSES, START DATE 9/7  PT INSTRUCTED TO TAKE METOPROLOL 50 MG TAB TAKE 1/2 TAB STARING DAY OF CV  PT HAS A FOLLOW UP SET UP KYLE PEREZ ON 11/20 IS TO KEEP THAT APPT.  PT HAD CV 10/11/2017 WITH DANIA FLORES CNP      Teach with Patient: Completed via phone on 10/31/2017    Risks Reviewed:     Cardioversion    >90% acute success rate, <10% failure to convert or   reverts shortly after cardioversion.    <1% embolic event of (CVA, pulmonary embolism, or   other site).    75% risk for superficial burn.  Risks associated with general anesthesia will be addressed by the Anesthesiology Department    Consent: Will be obtained in [facility] day of procedure    Pre-Procedure Instructions that were Reviewed with Patient:  NPO after midnight, Notified patient of time and date of procedure by  CV , Transportation arrangements needed s/p procedure, Post-procedure follow up process and Sedation plan/orders    Pre-Procedure Medication Instructions:  Instructions given to pt regarding anticoagulants: Eliquis- instructed to continue anticoagulation uninterrupted through their procedure  Instructions given to pt regarding antiarrhythmic medication: Flecainide; Pt instructed to continue medication prior to procedure  Instructions for medication, other than anticoagulants/antiarrhythmics listed above, given to pt: to take all morning medications with small sips of water, with the exception of OTC supplements and MVI    Allergies   Allergen Reactions     Ace Inhibitors      Cortisone Acetate      Methylprednisolone Anxiety     Turns red       Current Outpatient Prescriptions:      acetaminophen (TYLENOL) 500 MG tablet, Take 1,000 mg by mouth every 6 (six) hours as needed for pain., Disp: , Rfl:      apixaban (ELIQUIS) 5 mg Tab tablet, Take 1 tablet (5 mg total) by mouth 2 (two) times a day., Disp: 60 tablet, Rfl: 11     cholecalciferol, vitamin D3, 2,000 unit Tab, Take 2,000 Units by mouth daily., Disp: , Rfl:      flecainide (TAMBOCOR) 50 MG tablet, Take 2 tablets (100 mg total) by mouth 2 (two) times a day., Disp: 120 tablet, Rfl: 11     FLUTICASONE/SALMETEROL (ADVAIR DISKUS INHL), Inhale 2 puffs daily as needed. , Disp: , Rfl:      levalbuterol (XOPENEX HFA) 45 mcg/actuation inhaler, Inhale 1-2 puffs every 4 (four) hours as needed for wheezing., Disp: , Rfl:      metFORMIN (GLUCOPHAGE) 500 MG tablet, Take 250 mg by mouth 2 (two) times a day with meals. , Disp: , Rfl:      metoprolol tartrate (LOPRESSOR) 50 MG tablet, Take 1 tablet (50 mg total) by mouth 2 (two) times a day., Disp: 180 tablet, Rfl: 2     PARoxetine (PAXIL) 10 MG tablet, Take 1 tablet (10 mg total) by mouth every morning., Disp: 30 tablet, Rfl: 11     simvastatin (ZOCOR) 20 MG tablet, Take 20 mg by mouth at bedtime. , Disp: , Rfl:       temazepam (RESTORIL) 15 mg capsule, Take 1 capsule by mouth as needed., Disp: , Rfl:      valsartan-hydrochlorothiazide (DIOVAN-HCT) 160-25 mg per tablet, Take 1 tablet by mouth daily., Disp: , Rfl:

## 2021-06-14 NOTE — PROGRESS NOTES
1945  [phone] (home)  [phone] (mobile)    +++Important patient information for [facility]/Cath Lab staff : None+++    [facility] EP Cath Lab Procedure Order   Cardioversion:  Cardioversion    Diagnosis:  AF  Anticipated Case Duration:  Standard  Scheduling Needs/Timeframe:  schedule in 2 weeks or later as missed dose of eliquis    Current Device: None None  Device Company/Device Rep Needed for Procedure: None    Anesthesia:  General-CV Only  Research Protocol:  No    [facility] EP Cath Lab Prep   Ordering Provider: Dr Agustin  Ordering Date: 11/20/2017  Orders Status: Intial order placed and Order set placed  EP NC Contact: Sydnee Churchill RN    H&P:  Compled by OWEN on 11/20/17 or PMD to complete  PCP: Adeola Stockton MD, [phone]    Pre-op Labs: N/A for procedure    Medical Records Pertinent for Procedure:  cardioversion 11/2/17, Holter 9/11/17, Echo 9/11/17 and EKG 11/2/17    Patient Education:    Teach with Patient: Completed via phone prior to procedure    Risks Reviewed:     Cardioversion    >90% acute success rate, <10% failure to convert or   reverts shortly after cardioversion.    <1% embolic event of (CVA, pulmonary embolism, or   other site).    75% risk for superficial burn.  Risks associated with general anesthesia will be addressed by the Anesthesiology Department    Consent: Will be obtained in [facility] day of procedure    Pre-Procedure Instructions that were Reviewed with Patient:  NPO after midnight, Notified patient of time and date of procedure by CV , Transportation arrangements needed s/p procedure, Post-procedure follow up process and Sedation plan/orders    Pre-Procedure Medication Instructions:  Instructions given to pt regarding anticoagulants: Eliquis- instructed to continue anticoagulation uninterrupted through their procedure  Instructions given to pt regarding antiarrhythmic medication: stopping metoprolol and starting sotalol 3 days prior; Pt instructed to start medication sotalol 3  days prior to procedure  Instructions for medication, other than anticoagulants/antiarrhythmics listed above, given to pt: to hold metformin the morning of procedure, and to take remaining medications with small sips of water    Allergies   Allergen Reactions     Ace Inhibitors      Cortisone Acetate      Methylprednisolone Anxiety     Turns red       Current Outpatient Prescriptions:      acetaminophen (TYLENOL) 500 MG tablet, Take 1,000 mg by mouth every 6 (six) hours as needed for pain., Disp: , Rfl:      apixaban (ELIQUIS) 5 mg Tab tablet, Take 1 tablet (5 mg total) by mouth 2 (two) times a day., Disp: 60 tablet, Rfl: 11     cholecalciferol, vitamin D3, 2,000 unit Tab, Take 2,000 Units by mouth daily., Disp: , Rfl:      FLUTICASONE/SALMETEROL (ADVAIR DISKUS INHL), Inhale 2 puffs daily as needed. , Disp: , Rfl:      FLUZONE HIGH-DOSE 2017-18, PF, 180 mcg/0.5 mL Syrg injection, ADM 0.5ML IM UTD, Disp: , Rfl: 0     levalbuterol (XOPENEX HFA) 45 mcg/actuation inhaler, Inhale 1-2 puffs every 4 (four) hours as needed for wheezing., Disp: , Rfl:      metFORMIN (GLUCOPHAGE) 500 MG tablet, Take 250 mg by mouth 2 (two) times a day with meals. , Disp: , Rfl:      metoprolol succinate (TOPROL-XL) 25 MG, Take 1 tablet (25 mg total) by mouth daily., Disp: 30 tablet, Rfl: 6     PARoxetine (PAXIL) 10 MG tablet, Take 1 tablet (10 mg total) by mouth every morning., Disp: 30 tablet, Rfl: 11     simvastatin (ZOCOR) 20 MG tablet, Take 20 mg by mouth at bedtime. , Disp: , Rfl:      sotalol (BETAPACE) 120 MG tablet, Take 1 tablet (120 mg total) by mouth 2 (two) times a day., Disp: 60 tablet, Rfl: 3     temazepam (RESTORIL) 15 mg capsule, Take 1 capsule by mouth as needed., Disp: , Rfl:      valsartan-hydrochlorothiazide (DIOVAN-HCT) 160-25 mg per tablet, Take 1 tablet by mouth daily., Disp: , Rfl:

## 2021-06-14 NOTE — ANESTHESIA PREPROCEDURE EVALUATION
Anesthesia Evaluation        Airway    Pulmonary    (+) asthma  mild, a smoker (Former)                         Cardiovascular   (+) hypertension, dysrhythmias (a.fib), ,     ECG reviewed        Neuro/Psych      Endo/Other    (+) diabetes mellitus type 2, obesity (BMI 45),      GI/Hepatic/Renal       Other findings: Echo 9/11/17  Summary    1. Normal left ventricular size and systolic performance with a visually estimated ejection fraction of 65%.   2. There is mild aortic insufficiency.  3. There is mild biatrial enlargement            Dental                         Anesthesia Plan

## 2021-06-14 NOTE — PROGRESS NOTES
Patient was admitted today for an elective cardioversion.  She was placed on monitor to verify her heart rhythm and was found to be in sinus rhythm.  NP was notified, verification by ECG.  Patient's vital signs were checked and found to be stable.  Medications were reviewed and verified for consistency.  A follow up appointment has been made as ordered  Patient discharged to home accompanied by her  after reviewing medications and follow up appointments

## 2021-06-15 NOTE — PROGRESS NOTES
Date Adverse Event Occurred: 3/9/2021      Adverse Event Name: Follow up  Contact Dermatitis     Adverse Event Details:  75 year old female removed Parrot Patch per protocol on 3/9/2021 and patient noted redness outlining the patch but the lower part lateral one opened 5/8 of inch and 1/4 inch blister  Today patient states much improved and only on small area       Objective findings 12.5 cm by 9 cm perimeter of the patch erythema by 5 mm and inferior one open area 2 cm by 2 cm and another vesicular 1 cm by 1 cm on 3/9/2021 and today it was barely visible except slightly increased area of pigment  1 cm by 1 cm without scabbing    Plan   Patient will notify me in one week if that area is not completely healed.

## 2021-06-15 NOTE — ANESTHESIA CARE TRANSFER NOTE
Last vitals:   Vitals:    02/19/21 1436   BP: 105/58   Pulse: 73   Resp: 14   Temp: 36.2  C (97.2  F)   SpO2: 98%     Patient's level of consciousness is awake  Spontaneous respirations: yes  Maintains airway independently: yes  Dentition unchanged: yes  Oropharynx: oropharynx clear of all foreign objects    QCDR Measures:  ASA# 20 - Surgical Safety Checklist: WHO surgical safety checklist completed prior to induction    PQRS# 430 - Adult PONV Prevention: 4558F - Pt received => 2 anti-emetic agents (different classes) preop & intraop  ASA# 8 - Peds PONV Prevention: NA - Not pediatric patient, not GA or 2 or more risk factors NOT present  PQRS# 424 - Diane-op Temp Management: 4559F - At least one body temp DOCUMENTED => 35.5C or 95.9F within required timeframe  PQRS# 426 - PACU Transfer Protocol: - Transfer of care checklist used  ASA# 14 - Acute Post-op Pain: ASA14B - Patient did NOT experience pain >= 7 out of 10

## 2021-06-15 NOTE — ANESTHESIA PROCEDURE NOTES
Peripheral Block    Patient location during procedure: pre-op  Start time: 2/19/2021 12:15 PM  End time: 2/19/2021 12:17 PM  post-op analgesia per surgeon order as noted in medical record  Staffing:  Performing  Anesthesiologist: David Gallardo MD  Preanesthetic Checklist  Completed: patient identified, site marked, risks, benefits, and alternatives discussed, timeout performed, consent obtained, airway assessed, oxygen available, suction available, emergency drugs available and hand hygiene performed  Peripheral Block  Block type: saphenous, adductor canal block  Prep: ChloraPrep  Patient position: supine  Patient monitoring: blood pressure, heart rate, continuous pulse oximetry and cardiac monitor  Laterality: right  Injection technique: ultrasound guided    Ultrasound used to visualize needle placement in proximity to nerve being blocked: yes   US used to visualize anesthetic spread  Visualized anatomic structures normal  No Pathological Findings  Permanent ultrasound image captured for medical record  Sterile gel and probe cover used for ultrasound.  Needle  Needle type: Stimuplex   Needle gauge: 20G  Needle length: 4 in  no peripheral nerve catheter placed  Assessment  Injection assessment: no difficulty with injection, negative aspiration for heme, no paresthesia on injection and incremental injection

## 2021-06-15 NOTE — ANESTHESIA PROCEDURE NOTES
Peripheral Block    Patient location during procedure: pre-op  Start time: 2/19/2021 12:06 PM  End time: 2/19/2021 12:10 PM  post-op analgesia per surgeon order as noted in medical record  Staffing:  Performing  Anesthesiologist: David Gallardo MD  Preanesthetic Checklist  Completed: patient identified, site marked, risks, benefits, and alternatives discussed, timeout performed, consent obtained, airway assessed, oxygen available, suction available, emergency drugs available and hand hygiene performed  Peripheral Block  Block type: sciatic, popliteal  Prep: ChloraPrep  Patient position: supine  Patient monitoring: blood pressure, heart rate, continuous pulse oximetry and cardiac monitor  Laterality: right  Injection technique: ultrasound guided    Ultrasound used to visualize needle placement in proximity to nerve being blocked: yes   US used to visualize anesthetic spread  Visualized anatomic structures normal  No Pathological Findings  Permanent ultrasound image captured for medical record  Sterile gel and probe cover used for ultrasound.  Needle  Needle type: Stimuplex   Needle gauge: 20G  Needle length: 4 in  no peripheral nerve catheter placed  Assessment  Injection assessment: no difficulty with injection, negative aspiration for heme, no paresthesia on injection and incremental injection

## 2021-06-15 NOTE — ANESTHESIA PREPROCEDURE EVALUATION
Anesthesia Evaluation      Patient summary reviewed     Airway   Mallampati: II  Neck ROM: full   Pulmonary - normal exam    breath sounds clear to auscultation  (+) asthma  mild, sleep apnea on CPAP, , a smoker                         Cardiovascular - normal exam  (+) hypertension well controlled, dysrhythmias, ,     Rhythm: regular  Rate: normal,         Neuro/Psych - negative ROS     Endo/Other    (+) diabetes mellitus type 2 well controlled, arthritis, obesity,      GI/Hepatic/Renal    (+)   chronic renal disease CRI,           Dental - normal exam                        Anesthesia Plan  Planned anesthetic: general LMA and peripheral nerve block    ASA 3   Induction: intravenous   Anesthetic plan and risks discussed with: patient    Post-op plan: routine recovery

## 2021-06-15 NOTE — ANESTHESIA POSTPROCEDURE EVALUATION
Patient: Romy Hurley  Procedure(s):  RIGHT TOTAL ANKLE ARTHOPLASTY, DEBRIDE SUBTALAR JOINT (Right)  FLEXOR TENOTOMIES 3RD TOE HAMMER TOE CORRECTION (Right)  OPEN REDUCTION INTERNAL FIXATION MEDIAL MALLEOLUS FRACTURE, ANKLE RIGHT (Right)  Anesthesia type: general    Patient location: PACU  Last vitals:   Vitals Value Taken Time   /67 02/19/21 1451   Temp 36.2  C (97.2  F) 02/19/21 1436   Pulse 68 02/19/21 1452   Resp 23 02/19/21 1452   SpO2 98 % 02/19/21 1452   Vitals shown include unvalidated device data.  Post vital signs: stable  Level of consciousness: awake and responds to simple questions  Post-anesthesia pain: pain controlled  Post-anesthesia nausea and vomiting: no  Pulmonary: unassisted, return to baseline  Cardiovascular: stable and blood pressure at baseline  Hydration: adequate  Anesthetic events: no    QCDR Measures:  ASA# 11 - Diane-op Cardiac Arrest: ASA11B - Patient did NOT experience unanticipated cardiac arrest  ASA# 12 - Diane-op Mortality Rate: ASA12B - Patient did NOT die  ASA# 13 - PACU Re-Intubation Rate: ASA13B - Patient did NOT require a new airway mgmt  ASA# 10 - Composite Anes Safety: ASA10A - No serious adverse event    Additional Notes:

## 2021-06-15 NOTE — PROGRESS NOTES
Order for Durable Medical Equipment was processed and equipment ordered.   DME provider: [place]  Date Faxed: 01/11/2018  Ordering Provider: Dr. Holland  Equipment ordered: SUZETTE

## 2021-06-15 NOTE — PROGRESS NOTES
Dear Dr. Adelaida Agustin Md  [address] [place] [address]    Thank you for the opportunity to participate in the care of Ms. Romy Hurley.    She is a 72 y.o. female who comes to the clinic with a chief complaint of sleep onset insomnia that is been going on for more than 4 years.  While the patient does rarely snore, she denies any episodes of witnessed apnea or excessive daytime sleepiness.  She has been complaining of difficulty initiating sleep and would often lay awake in bed trying to fall asleep.  To complicate matters further the patient used to work as a sleep tech and she admits that she is kind of a night person.  The patient was diagnosed with persistent atrial fibrillation and would like to rule out sleep apnea.  The patient's review of system is significant for diabetes and high blood pressure.  These issues have been addressed by the providers.     Ideal Sleep-Wake Cycle(devoid of societal pressure):    Patient would try to initiate sleep at around 12:30AM with a sleep latency of 45 minutes at best. The patient would have zero or one awakening. Final wake up time is around 8AM.      Past Medical History  Past Medical History:   Diagnosis Date     Asthma      Atrial fibrillation      Diabetes mellitus      Hypertension      Insomnia      Obesity      Osteoarthritis         Past Surgical History  Past Surgical History:   Procedure Laterality Date      SECTION       REPLACEMENT TOTAL KNEE BILATERAL       SHOULDER ARTHROSCOPY W/ ROTATOR CUFF REPAIR Right         Meds  Current Outpatient Prescriptions   Medication Sig Dispense Refill     acetaminophen (TYLENOL) 500 MG tablet Take 1,000 mg by mouth every 6 (six) hours as needed for pain.       apixaban (ELIQUIS) 5 mg Tab tablet Take 1 tablet (5 mg total) by mouth 2 (two) times a day. 60 tablet 11     cholecalciferol, vitamin D3, 2,000 unit Tab Take 2,000 Units by mouth daily.       FLUTICASONE/SALMETEROL (ADVAIR DISKUS INHL)  Inhale 2 puffs daily as needed.        levalbuterol (XOPENEX HFA) 45 mcg/actuation inhaler Inhale 1-2 puffs every 4 (four) hours as needed for wheezing.       MAGNESIUM CITRATE ORAL Take 250 mg by mouth 2 (two) times a day.        metFORMIN (GLUCOPHAGE) 500 MG tablet Take 250 mg by mouth 2 (two) times a day with meals.        PARoxetine (PAXIL) 10 MG tablet Take 1 tablet (10 mg total) by mouth every morning. 30 tablet 11     simvastatin (ZOCOR) 20 MG tablet Take 20 mg by mouth at bedtime.        sotalol (BETAPACE) 120 MG tablet Take 1 tablet (120 mg total) by mouth 2 (two) times a day. 60 tablet 3     temazepam (RESTORIL) 15 mg capsule Take 1 capsule by mouth as needed.       valsartan-hydrochlorothiazide (DIOVAN-HCT) 160-25 mg per tablet Take 1 tablet by mouth daily.       No current facility-administered medications for this visit.         Allergies  Ace inhibitors; Cortisone acetate; and Methylprednisolone     Social History  Social History     Social History     Marital status:      Spouse name: N/A     Number of children: N/A     Years of education: N/A     Occupational History     retired      pediatric research     Social History Main Topics     Smoking status: Former Smoker     Smokeless tobacco: Never Used     Alcohol use No     Drug use: No     Sexual activity: Not on file     Other Topics Concern     Not on file     Social History Narrative        Family History  Family History   Problem Relation Age of Onset     Ovarian cancer Mother      Lung cancer Father      Sleep apnea Father      Snoring Father      Brain cancer Sister      No Medical Problems Brother      No Medical Problems Sister      No Medical Problems Sister         Review of Systems:  Constitutional: Negative except as noted in HPI.   Eyes: Negative except as noted in HPI.   ENT: Negative except as noted in HPI.   Cardiovascular: Negative except as noted in HPI.   Respiratory: Negative except as noted in HPI.   Gastrointestinal:  "Negative except as noted in HPI.   Genitourinary: Negative except as noted in HPI.   Musculoskeletal: Negative except as noted in HPI.   Integumentary: Negative except as noted in HPI.   Neurological: Negative except as noted in HPI.   Psychiatric: Negative except as noted in HPI.   Endocrine: Negative except as noted in HPI.   Hematologic/Lymphatic: Negative except as noted in HPI.      STOP BANG 1/10/2018   Do you snore loudly (louder than talking or loud enough to be heard through closed doors)? 0   Do you often feel tired, fatigued, or sleepy during daytime? 0   Has anyone observed you stop breathing in your sleep? 0   Do you have or are you being treated for high blood pressure? 1   BMI more than 35 kg/m2 1   Age over 50 years old? 1   Neck circumference greater than 16 inches? 0   Gender male? 0   Total Score 3   Epworths Sleepiness Scale 1/10/2018   Sitting and reading 1   Watching TV 1   Sitting, inactive in a public place (e.g. a theatre or a meeting) 0   As a passenger in a car for an hour without a break 0   Lying down to rest in the afternoon when circumstances permit 1   Sitting and talking to someone 0   Sitting quietly after a lunch without alcohol 0   In a car, while stopped for a few minutes in traffic 0   Total score 3   Rooming 1/10/2018   Usual bedtime 1am   Sleep Latency 90 minutes   Awakenings 1 time   Wake Up Time 6-8am   Weekends same   Energy Drinks 0   Coffee 0   Cola 0   Difficulty falling asleep Yes   Difficulty staying asleep No   Excessive daytime tiredness No   Excessive daytime sleepiness No   Dozing off while driving No   Shift Worker No   Sleep Walking? Yes   Sleep Talking? Yes   Kicking or punching? No   Restless legs symptoms No     Physical Exam:  /64  Pulse 62  Ht 5' 8\" (1.727 m)  Wt (!) 297 lb (134.7 kg)  SpO2 97%  BMI 45.16 kg/m2  BMI:Body mass index is 45.16 kg/(m^2).   GEN: NAD, morbidly obese  Head: Normocephalic.  EYES: PERRLA, EOMI  ENT: Oropharynx is clear, " mallampatti class 3+ airway. Uvula is intact  Nasal mucosa is moist without erythema  Neck : Thyroid is within normal limits. Neck size: 15.25 inches  CV: Regular rate and rhythm, S1 & S2 positive.  LUNGS: Bilateral breathsounds heard.   ABDOMEN: Positive bowel sounds in all quadrants, soft, no rebound or guarding  MUSCULOSKELETAL: Bilateral trace leg swelling  SKIN: warm, dry, no rashes  Neurological: Alert, oriented to time, place, and person.  Psych: normal mood, normal affect     Labs/Studies:     Lab Results   Component Value Date    WBC 7.7 06/07/2010    HGB 13.1 07/06/2011    HCT 35.8 06/07/2010    MCV 85 06/07/2010     06/07/2010         Chemistry        Component Value Date/Time     01/08/2018 1351    K 4.2 01/08/2018 1351    CL 96 (L) 01/08/2018 1351    CO2 31 01/08/2018 1351    BUN 26 01/08/2018 1351    CREATININE 0.86 01/08/2018 1351    GLU 79 01/08/2018 1351        Component Value Date/Time    CALCIUM 9.5 01/08/2018 1351            No results found for: FERRITIN  Lab Results   Component Value Date    TSH 2.8 04/25/2012         Assessment and Plan:  In summary Romy Hurley is a 72 y.o. year old female here for sleep disturbance.  1.  Snoring  I informed the patient that I do not have enough evidence to proceed directly with a sleep study at this point in time.  However I would like to order a nocturnal oximetry study to look for abnormalities.  If positive then I would recommend a sleep study.  2.  Circadian phase delay/insomnia  I recommend the patient proceed with a sleep log.  I strongly recommended patient consider if she would like to shift her circadian rhythm.  I did warn her that this would take at least 2-3 months.  She will think about it.  We also discussed proper sleep hygiene stressing the importance of stimulus control.  I will give her handout on this topic.  3.  Sleep disturbance    Patient verbalized understanding of these issues, agrees with the plan and all questions  were answered today. Patient was given an opportuntity to voice any other symptoms or concerns not listed above. Patient did not have any other symptoms or concerns.      Patient told to return in one week after the sleep study is interpreted. Patient instructed to stop at  to schedule appointment before leaving today.       Kevin Holland DO  Board Certified in Internal Medicine and Sleep Medicine  [facility].    (Note created with Dragon voice recognition and unintended spelling errors and word substitutions may occur)

## 2021-06-17 NOTE — PROGRESS NOTES
"Dear Dr. Adeola Stockton MD  [address] [facility] [address]  [place] [address],    Thank you for the opportunity to participate in the care of Romy Hurley.     She is a 72 y.o.  female patient who comes to the sleep medicine clinic for review of her sleep study. The study was completed on 03/19/18 which showed the the patient had moderate obstructive sleep apnea.      Current Outpatient Prescriptions   Medication Sig Dispense Refill     acetaminophen (TYLENOL) 500 MG tablet Take 1,000 mg by mouth every 6 (six) hours as needed for pain.       cholecalciferol, vitamin D3, 2,000 unit Tab Take 4,000 Units by mouth daily.        ELIQUIS 5 mg Tab tablet TAKE ONE TABLET BY MOUTH TWICE DAILY 180 tablet 1     FLUTICASONE/SALMETEROL (ADVAIR DISKUS INHL) Inhale 2 puffs daily as needed.        levalbuterol (XOPENEX HFA) 45 mcg/actuation inhaler Inhale 1-2 puffs every 4 (four) hours as needed for wheezing.       MAGNESIUM CITRATE ORAL Take 250 mg by mouth 2 (two) times a day.        metFORMIN (GLUCOPHAGE) 500 MG tablet Take 250 mg by mouth 2 (two) times a day with meals.        PARoxetine (PAXIL) 10 MG tablet Take 1 tablet (10 mg total) by mouth every morning. 30 tablet 11     simvastatin (ZOCOR) 20 MG tablet Take 20 mg by mouth at bedtime.        sotalol (BETAPACE) 120 MG tablet Take 1 tablet (120 mg total) by mouth 2 (two) times a day. 60 tablet 3     temazepam (RESTORIL) 15 mg capsule Take 1 capsule by mouth as needed.       valsartan-hydrochlorothiazide (DIOVAN-HCT) 160-25 mg per tablet Take 1 tablet by mouth daily.       No current facility-administered medications for this visit.        Allergies   Allergen Reactions     Ace Inhibitors      Cortisone Acetate      Methylprednisolone Anxiety     Turns red       Physical Exam:  /84  Pulse 72  Ht 5' 8\" (1.727 m)  Wt (!) 292 lb (132.5 kg)  SpO2 95%  BMI 44.4 kg/m2  BMI:Body mass index is 44.4 kg/(m^2).   GEN: NAD, obese  Psych: normal mood, normal affect   "   Labs/Studies:  - We reviewed the results of the overnight PSG as described on the HPI.     Lab Results   Component Value Date    WBC 7.7 06/07/2010    HGB 13.1 07/06/2011    HCT 35.8 06/07/2010    MCV 85 06/07/2010     06/07/2010         Chemistry        Component Value Date/Time     01/08/2018 1351    K 4.2 01/08/2018 1351    CL 96 (L) 01/08/2018 1351    CO2 31 01/08/2018 1351    BUN 26 01/08/2018 1351    CREATININE 0.86 01/08/2018 1351    GLU 79 01/08/2018 1351        Component Value Date/Time    CALCIUM 9.5 01/08/2018 1351            No results found for: FERRITIN        Assessment and Plan:  In summary Romy Hurley is a 72 y.o. year old female here for review of her sleep study.  1. Obstructive Sleep Apnea  We had an extensive conversation to review the results of her sleep study and to  her on the importance of treating sleep apnea. We discussed treatment options including oral appliance versus CPAP.  Patient would like to think about her options before making a final decision.  I will give her handout on the underlying pathophysiology of obstructive sleep apnea as well as the list of the durable medical equipment companies that we commonly work with.  The patient states that she does not wish to pursue oral appliance.  2.  Circadian phase delay  Recommend patient start low-dose melatonin to try to set her circadian rhythm.  I strongly recommend the patient avoid driving or operating machinery after intake of melatonin.  I will also give the patient a handout on this topic.  3.  Other sleep disturbance     Patient verbalized understanding of these issues, agrees with the plan and all questions were answered today. Patient was given an opportuntity to voice any other symptoms or concerns not listed above. Patient did not have any other symptoms or concerns.        Kevin Holland DO  Board Certified in Internal Medicine and Sleep Medicine  [facility].    We spent a  total of 15 minutes of face-to-face encounter and more than 50% of the encounter was used for counseling or coordination of care.    (Note created with Dragon voice recognition and unintended spelling errors and word substitutions may occur)

## 2021-06-19 NOTE — LETTER
Letter by Nano Turner RN at      Author: Nano Turner RN Service: -- Author Type: --    Filed:  Encounter Date: 12/3/2019 Status: Signed       IMPORTANT INFORMATION REGARDING YOUR      PULMONARY VEIN ISOLATION ABLATION     Pulmonary Vein Isolation Ablation : Friday 12-6-19      Please arrive at 5:30 for registration and prep.    Your procedure is scheduled for 8:00.    Have nothing to eat or drink after midnight the night prior to your ablation.    Please DO NOT take any medications EXCEPT Eliquis with a small sip of water the morning of your ablation.    If you use a CPAP, please bring this with you to the hospital.    This procedure requires you to spend the night in the hospital overnight for observation. The hospital staff have asked that you arrange for your family/health  to be present at the hospital by 9:00 AM the following day to review your discharge instructions and transport you home from the hospital. Their goal is to have you discharged from the hospital by around 10:00AM    Medication Changes :      Pepcid 20 mg Twice daily, for 3 days prior to the ablation and for 3 weeks after the ablation.  This is to prevent possible esophageal irritation after the ablation.                     Please start on :  Tonight, before meals    Please do not take any further doses of Sotalol.    You will need to start Metoprolol Tartrate 50 mg, tonight.      It is important to remain on your anticoagulation medication (Eliquis) uninterrupted before and after your ablation, PLEASE DO NOT STOP THIS MEDICATION or skip any doses.      Postoperative Information :      Please plan on taking 5-7 days after the procedure for recuperation.     We ask that you do not drive until you are seen in the clinic for your post op follow up.  This is to aide in the healing of your groin sites.  This is scheduled on 12-10-19 @ 1:30, the [facility].    Detailed information regarding discharge instructions will be given  to you when you leave the hospital.      Parking information :      Please arrive at [facility], located at: [address] [address]      Parking is available at the [address] entrance, and is open at 5:00am.    If you park in the ramp located on [address], take the elevator to Lobby/level one.  Enter the doors to the atrium/lobby area and check in at the main reception desk.    You will be assisted to Cardiac Special Care on the third floor.     Ramp parking will be free the day of your procedure and reduced to $6.00 for each visit after.      Please ask at the main reception desk in the lobby or on the third floor for parking validation.      Contact Information :      Dr. Garland's Nurse:  Nano Turner RN.  Please call with any questions or concerns regarding the procedure at :  [phone].    Scheduling questions or concerns: Marion Anderson:  [phone]                        Thank you for choosing [facility].

## 2021-06-19 NOTE — LETTER
Letter by Nano Turner RN at      Author: Nano Turner RN Service: -- Author Type: --    Filed:  Encounter Date: 10/23/2019 Status: Signed       Romy Hurley  [address] [facility] [id number]        IMPORTANT INFORMATION REGARDING YOUR      PULMONARY VEIN ISOLATION ABLATION       Preoperative Physical : Monday 12-16-19, the [facility] @1030      Your pre operative physical is scheduled with our EP Nurse Practitioner  Breanne Augustin .     Please bring these instructions with you to your appointment.    You will meet with a Nurse for education after your visit with the EP Nurse Practitioner    Pre procedure Cardiac CT/MRI :      Used to obtain images of your Pulmonary Veins prior to the procedure to assist in mapping your heart during the ablation.    A  will be calling you to set up a time for the test.    The CT or MRI should be completed at least 2 weeks prior to your ablation.      Pulmonary Vein Isolation Ablation: Friday 12-20-19 with Dr Garland      Please arrive at 5:30 am for registration and prep.    Your procedure is scheduled for approximately 8:00 am.    Have nothing to eat or drink after midnight the night prior to your ablation.    Please DO NOT take any of your medication the morning of your procedure EXCEPT your Eliquis the morning of your ablation.    You will have general anesthesia for the procedure and need to lay flat for 3-4 hours after the procedure.    You will have a gamboa catheter placed in your bladder prior to the procedure.  Please let us know if you have had difficulty with a gamboa catheter in the past.    If you use a CPAP, please bring this with you to the hospital.    You will stay over night for an Observation stay.    This procedure requires you to spend the night in the hospital overnight for observation. The hospital staff have asked that you arrange for your family/health  to be present at the hospital by 9:00 AM the following day to  review your discharge instructions and transport you home from the hospital. Their goal is to have you discharged from the hospital by around 10:00AM    Anticoagulation:    It is important to remain on your anticoagulation medication (Eliquis) uninterrupted before and after your ablation, PLEASE DO NOT STOP THIS MEDICATION or skip any doses.    If you have any questions regarding your medication prior to your procedure please contact me at the number listed below.    Postoperative Information :      Please plan on taking 5-7 days after the procedure for recuperation. We ask that you do not drive until you are seen in the clinic for your post op follow up.  This is to aide in the healing of your groin sites.  This is scheduled on Monday 12-23-19 @1030.    Detailed information regarding discharge instructions will be given to you when you leave the hospital.      Follow up:    After the ablation you will be scheduled to see:      EP Nurse Clinician for an assessment and suture removal, if appropriate.    EP Nurse Practitioner in 4-6 weeks;  A 2 week heart monitor will be ordered for you to wear about 8 weeks after the ablation.    EP Nurse Practitioner/Electrophysiologist 3 months after the ablation.      Travel Restrictions following procedure :      No travel by plane for 4 weeks.    Please refrain from extended travel outside the [place] for 3 weeks.    Contact the clinic for questions.    Parking information :      Please arrive at [facility], located at: [address] [place] [address]      Parking is available at the [address] entrance, and is open at 5:00 am.    If you park in the ramp located on [address], take the elevator to Lobby/level one.     Enter the doors to the atrium/lobby area and check in at the main reception desk.    You will be assisted to Cardiac Special Care on the third floor.     Ramp parking will be free the day of your procedure and reduced to $4.00 for each  visit after.      Please ask at the main reception desk in the lobby or on the third floor for parking validation.    Contact Information :      Please call with any questions or concerns regarding the procedure:Nano Turner [phone]  Please call with any questions or concerns regarding the procedure.     Scheduling questions or concerns: Marion Anderson [phone].                        Thank you for choosing [facility].                            Information on Atrial Fibrillation Ablations    What is Catheter Ablation?             A Catheter Ablation is a procedure that treats certain types of abnormal heart rhythms (arrhythmia). There are several components to the procedure, but the final purpose is target and destroy (ablate) small areas of your heart muscle that are causing the arrhythmia.     Why is an Ablations Done?  A catheter ablation is an effective way to treat some types of abnormal heart rhythms. An ablation is a relatively low risk procedure that may permanently cure your abnormal heart rhythm.  The ablation process damages the heart cells which results in scarring of that area. The scar is electrically inactive and can produce a permanent cure for the abnormal rhythm.  Ablation procedures can help avoid the need for rhythm medications and give patients the ability to return to their normal activity and live an active life. In patients that do not have symptoms, ablations are not typically done as there may still be an increased risk of stroke.    Why is Catheter Ablation Done?  Sometimes, the hearts electrical system does not work properly.  This can cause abnormal heart rhythms, called arrhythmias.  During an arrhythmia, the heart may beat too fast, too slowly, or irregularly.  Your doctor has recommended catheter ablation to treat a rapid (fast) heart rhythm, or tachycardia.      How Catheter Ablation Is Done  Catheter ablation uses thin, flexible wires called electrode  catheters to find and destroy (ablate) problem cells. Here is how the procedure is done:    The pulmonary veins will be treated first. There are currently two tools used to ablate around the pulmonary veins.  1) Radiofrequency catheter will heat the tissue.  2)  Cryo-balloon catheter will freeze the tissue.       Testing will be done to confirm that effective treatment has been delivered.       Further testing may be performed to see if a fib is still present or if some other rhythm problem such as atrial flutter is present. If an ongoing rhythm problem is discovered then further ablation can be done to isolate and destroy those areas responsible for the arrhythmia.       Your Experience during Catheter Ablation  In most cases, catheter ablations are done in an electrophysiology (EP) lab. Depending on your arrhythmia it often takes 4-6 hours, and sometimes longer.     The procedural area: You will be transferred back to the procedure room once you have been appropriately prepped by the nursing staff_ and you are ready for your ablation.     Sedation: You to be put completely asleep for your ablation using general anesthesia.    While you are asleep a bladder catheter will be inserted. This will be removed after your bedrest is complete.    Inserting the catheters: You will have 3-4 catheters inserted into the veins. Catheter locations can include the shoulder, neck, and groins. Catheters are guided to the heart with the help of x-ray monitors.    Finishing up: When the procedure is finished, the catheters are taken out of your body. A special closure device may be used to help seal these puncture sites. Youre then taken to your room to rest, and will be cared for by an intensive care unit (ICU) nurse.      Risks and Complications  The risks of catheter ablation are fairly low compared to the benefits you receive. Possible risks and complications include:    Common (up to 10%)  o Bleeding or bruising  o Shortness of  breath  o Heartburn    Uncommon (< 1%)  o Blood clots  o A slow heart rhythm (requiring a permanent pacemaker)  o Perforation of the heart muscle, blood vessel, or lung (may require an emergency procedure)  o Stroke  o Damage to a heart valve   o Heart attack, also known as acute myocardial infarction, or AMI   o Death (extremely rare)    Before your Catheter Ablation  Before your catheter ablation, you will meet with the electrophysiologist (specially trained heart doctor) who will do the procedure. The provider or a registered nurse will provide you with detailed instructions on how to prepare for this procedure, some of these instructions are listed below.    You will likely be told to stop or change your heart rhythm medications for a period of time before your ablation.     You may have testing done several days prior to your ablation or the morning of your ablation, such as an ECG, x-ray, blood tests, or echocardiogram.     You will not be allowed to eat or drink 8 hours before your ablation. You will be given further instructions by your physician or a registered nurse regarding the medication you will take the morning of your procedure.    You will need to arrange to have a  home from the hospital; you will not be permitted to drive after your procedure due to the sedation that you receive.     You are allowed to bring personal items and clothing to the hospital, but please refrain from bringing any valuables as the hospital is not responsible for any lost or stolen items.    You will need to bring a list of the names and dosages of the medication you are taking to the hospital.    It is important to mention to your doctor or registered nurse if you have any allergies, reactions to anesthesia, or have had history of bleeding problems.     Arriving at the Hospital the morning of your Catheter Ablation  You will need to check in at the 1st floor entrance at the [facility] the  morning of your ablation, you will then be escorted to the 3rd floor where they will prepare you for your ablation and where your ablation will be performed.    When you arrive on the floor the doctor or registered nurse will meet with you prior to your ablation, this is a good time to ask questions and address any concerns you may have. You will then be asked to sign the consent form for your ablation, if this has not already been done.    The nursing staff will begin to prepare you for your procedure:    A nurse will shave and cleanse the area where the ablation catheters will be placed. These areas are most commonly the left and right groin sites (the fold between your thigh and abdomen), and in some cases the chest, arm, and neck. This is done to reduce the risk of infection.      The nursing staff will start an intravenous (IV) line into a vein in your arm, which allows the staff to give you medication and sedatives to help you relax prior and during your ablation.    In some cases, the nursing staff will need to place a catheter that will drain urine from your bladder ([place] Catheter), which is required due to the length and complexity of the ablation you are having.    After Catheter Ablation  Recovery immediately after your ablation in the hospital  After your catheter ablation procedure, you will be taken to a recovery room. You may need to lie flat for 2-6 hours while the insertion sites close up. During this time, a nurse will monitor you, and you will be given medication to make you comfortable. This ablation requires you to stay in the hospital overnight.    Going Home  When it is time to go home, your will need to have an adult family member or friend drive you. Most people can walk, climb stairs, and perform light activity soon after catheter ablation. You can most likely return to your full routine within a few days. However, you may be told to avoid running, heavy lifting, and other strenuous  activities for a short time. Please make sure to follow any specific activity restrictions provided by the medical staff at the time of your discharge from the hospital.    Doctor's typically advise that you not drive until your post procedure assessment visit in the clinic.     Avoid heavy physical activity and heavy lifting for several days after the procedure to allow your body to heal.    Ask your doctor when you can expect to return to work.    Take your temperature and check your incision for signs of infection (redness, swelling, drainage, or warmth) every day for a week. It is normal to have a small bruise or lump where the catheter was inserted.    Take your medications exactly as directed. Do not skip doses or stop medication without consulting your physician prior.    Learn to take your own pulse and keep a record of your results.    Follow-Up  After your ablations you will have a follow up visit to see how you are doing, to assess your rhythm after your ablation, and to address any medication changes if necessary. In many cases, one ablation is enough to treat an arrhythmia. However, sometimes the problem returns or another is found. If this happens, you may need a second catheter ablation. Tell your healthcare provider if you have any new or returning symptoms.    Common Symptoms after Catheter Ablation  In the first few weeks after catheter ablation, you may feel mild chest fullness or aching. You may also feel as if your heart is skipping beats or your heartbeat may feel faster than normal. You may think that your heart rhythm problem is about to return. These sensations are normal and usually go away with time. Talk to your healthcare provider if you are concerned.    When to Call Your Doctor    Increased bleeding, bruising, or pain at the insertion site    Episodes of atrial fibrillation are common post procedure, call the clinic if episodes are lasting longer than 4-6 hours    Difficulty with your  speech or walking, or any visual disturbance    Lightheaded, dizziness, or feeling faint    Shortness of breath or chest pain    Coldness, swelling, or numbness of the arm or leg near the insertion site    A bruise or lump at the insertion site that is larger than a walnut    A fever over 100 F

## 2021-06-23 ENCOUNTER — HOSPITAL ENCOUNTER (EMERGENCY)
Facility: CLINIC | Age: 76
Discharge: HOME | End: 2021-06-23
Attending: PHYSICIAN ASSISTANT | Admitting: PHYSICIAN ASSISTANT
Payer: COMMERCIAL

## 2021-06-23 VITALS
HEART RATE: 64 BPM | OXYGEN SATURATION: 100 % | BODY MASS INDEX: 40.92 KG/M2 | DIASTOLIC BLOOD PRESSURE: 94 MMHG | SYSTOLIC BLOOD PRESSURE: 156 MMHG | RESPIRATION RATE: 18 BRPM | WEIGHT: 270 LBS | HEIGHT: 68 IN | TEMPERATURE: 97.9 F

## 2021-06-23 DIAGNOSIS — L03.90 CELLULITIS: ICD-10-CM

## 2021-06-23 PROCEDURE — G0463 HOSPITAL OUTPT CLINIC VISIT: HCPCS | Performed by: PHYSICIAN ASSISTANT

## 2021-06-23 PROCEDURE — 99214 OFFICE O/P EST MOD 30 MIN: CPT | Performed by: PHYSICIAN ASSISTANT

## 2021-06-23 RX ORDER — CEPHALEXIN 500 MG/1
500 CAPSULE ORAL 4 TIMES DAILY
Qty: 28 CAPSULE | Refills: 0 | Status: SHIPPED | OUTPATIENT
Start: 2021-06-23 | End: 2021-06-30

## 2021-06-23 ASSESSMENT — MIFFLIN-ST. JEOR: SCORE: 1773.21

## 2021-06-23 NOTE — ED NOTES
Pt here with left lower leg redness and swelling. 12 days ago, pt tripped getting into her pontoon boat and hit her shin on metal. Immediately pt had a lump, then it became warm and red the next day. Pt is on blood thinners.

## 2021-06-25 NOTE — PROGRESS NOTES
Progress Notes by Adelaida Agustin MD at 11/20/2017  9:50 AM     Author: Adelaida Agustin MD Service: -- Author Type: Physician    Filed: 11/20/2017 11:30 AM Encounter Date: 11/20/2017 Status: Signed    : Adelaida Agustin MD (Physician)                 Arrhythmia Clinic    Thank you, Dr. Adeola Stockton MD, for asking the [facility] Arrhythmia team to evaluate Romy Hurley in consultation for atrial fibrillation      Assessment:     72 year old female with history of HTN, DM2, morbid obesity presents for evaluation of persistent atrial fibrillation     Plan:     Atrial fibrillation - persistent and highly symptomatic with shortness of breath.  She has a chads2-vasc of 4 and is appropriately anticoagulated with eliquis.  I reviewed the pathophysiology of atrial fibrillation with Romy in detail.  I specifically reviewed the non-life threatening nature of AF.  Treatment options were reviewed and include watchful waiting, AAD therapy (flecainide not effective, switch to something else) or ablation procedure.  Ablation was reviewed carefully.  I specifically discussed the need for both PVI and substrate modification given her persistent state and morbid obesity.  Additionally, given her obesity, I did also review success rates in relation to BMI.  Given all issues, I quoted no better than a 60-65% chance of success with a single procedure.  Risks, including but not limited to: bleeding, bruising, pain, infection, cardiac perforation, CVA, heart attack, PV stenosis, esophageal injury or death were reviewed.    After discussion, Romy would like a trial of sotalol and cardioversion prior to attempt at ablation.  She did miss a dose of eliquis last week.  Therefore, we will plan cardioversion in about 2 weeks and for her to start sotalol 120 mg po bid 3 days prior.  She can stop her flecainide now and stop her metoprolol when she starts her sotalol.    Referral has  been made to sleep clinic for evaluation for sleep apnea.  She has a stop-bang score of 4/8 with HTN, age, neck circumference, and BMI risk factors.  This puts her in the high risk category for sleep apnea.    Follow up 2 weeks post cardioversion to review treatment options.     Current History:   Romy Hurley is a 72 y.o. female with a history of HTN, DM2, morbid obesity and persistent atrial fibrillation. She underwent cardioversion last year on low dose flecainide.  This was success for about 1 year, but her AF recurred and was cardioverted unsuccessfully on low dose flecainide.  Her flecainide was increased to moderate dose and she again had ERAF post cardioversion within 3-4 days.  She is now referred for treatment options.  She states she is highly symptomatic with AF causing severe shortness of breath and limits her activities.  She has a  with scleroderma who she cares for and is limited in this regard.  She denies chest pains, LE edema, orthopnea, pnd.      Past Medical History:     Past Medical History:   Diagnosis Date   ? Asthma    ? Atrial fibrillation    ? Diabetes mellitus    ? Hypertension    ? Insomnia    ? Obesity    ? Osteoarthritis        Past Surgical History:     Past Surgical History:   Procedure Laterality Date   ?  SECTION     ? REPLACEMENT TOTAL KNEE BILATERAL     ? SHOULDER ARTHROSCOPY W/ ROTATOR CUFF REPAIR Right        Family History:     Family History   Problem Relation Age of Onset   ? Ovarian cancer Mother    ? Lung cancer Father    ? Brain cancer Sister    ? No Medical Problems Brother    ? No Medical Problems Sister    ? No Medical Problems Sister        Social History:    reports that she has quit smoking. She has never used smokeless tobacco. She reports that she does not drink alcohol or use illicit drugs.    Meds:     Current Outpatient Prescriptions:   ?  acetaminophen (TYLENOL) 500 MG tablet, Take 1,000 mg by mouth every 6 (six) hours as needed for pain.,  "Disp: , Rfl:   ?  apixaban (ELIQUIS) 5 mg Tab tablet, Take 1 tablet (5 mg total) by mouth 2 (two) times a day., Disp: 60 tablet, Rfl: 11  ?  cholecalciferol, vitamin D3, 2,000 unit Tab, Take 2,000 Units by mouth daily., Disp: , Rfl:   ?  FLUTICASONE/SALMETEROL (ADVAIR DISKUS INHL), Inhale 2 puffs daily as needed. , Disp: , Rfl:   ?  levalbuterol (XOPENEX HFA) 45 mcg/actuation inhaler, Inhale 1-2 puffs every 4 (four) hours as needed for wheezing., Disp: , Rfl:   ?  metFORMIN (GLUCOPHAGE) 500 MG tablet, Take 250 mg by mouth 2 (two) times a day with meals. , Disp: , Rfl:   ?  metoprolol succinate (TOPROL-XL) 25 MG, Take 1 tablet (25 mg total) by mouth daily., Disp: 30 tablet, Rfl: 6  ?  PARoxetine (PAXIL) 10 MG tablet, Take 1 tablet (10 mg total) by mouth every morning., Disp: 30 tablet, Rfl: 11  ?  simvastatin (ZOCOR) 20 MG tablet, Take 20 mg by mouth at bedtime. , Disp: , Rfl:   ?  temazepam (RESTORIL) 15 mg capsule, Take 1 capsule by mouth as needed., Disp: , Rfl:   ?  valsartan-hydrochlorothiazide (DIOVAN-HCT) 160-25 mg per tablet, Take 1 tablet by mouth daily., Disp: , Rfl:   ?  FLUZONE HIGH-DOSE 2017-18, PF, 180 mcg/0.5 mL Syrg injection, ADM 0.5ML IM UTD, Disp: , Rfl: 0  ?  sotalol (BETAPACE) 120 MG tablet, Take 1 tablet (120 mg total) by mouth 2 (two) times a day., Disp: 60 tablet, Rfl: 3    Allergies:   Ace inhibitors; Cortisone acetate; and Methylprednisolone    Review of Systems:   A full 12 point review of systems without pertinent positives except as per HPI or below.        Objective:      Physical Exam  Weight: Weight: (!) 298 lb (135.2 kg)  Body mass index is 45.31 kg/(m^2).  /78 (Patient Site: Left Arm, Patient Position: Sitting, Cuff Size: Adult Large)  Pulse 97  Resp 16  Ht 5' 8\" (1.727 m)  Wt (!) 298 lb (135.2 kg)  BMI 45.31 kg/m2    General Appearance:   Nad, alert and oriented x 3   HEENT:  Mmm, perrl   Neck: No goiter noted   Chest: No deformities noted   Lungs:   Clear bilaterally "   Cardiovascular:   Irregularly irregular   Abdomen:  Soft and non-tender   Extremities: No clubbing or edema   Skin: No rashes                 Cardiographics  Cardioversion report 11/10/17 personally reviewed  Note from Dr. Rooney 10/17 personally reviewed  Echo 9/17 personally reviewed - normal EF, no significant valvular abnormalities, mild-moderately enlarged LA  Holter 9/17 personally reviewed - persistent atrial fibrillation with controlled ventricular response    Imaging  None    Lab Review   Lab Results   Component Value Date     (L) 02/02/2010    K 4.2 11/02/2017     02/02/2010    CO2 28 02/02/2010    BUN 23 (H) 02/02/2010    CREATININE 1.00 02/02/2010    CALCIUM 8.3 (L) 02/02/2010     Lab Results   Component Value Date    WBC 7.7 06/07/2010    HGB 13.1 07/06/2011    HCT 35.8 06/07/2010    MCV 85 06/07/2010     06/07/2010     Lab Results   Component Value Date    TRIG 114 03/27/2012    HDL 37 (L) 03/27/2012         Adelaida Agustin MD  [facility] Cardiology, Electrophysiology

## 2021-06-25 NOTE — PATIENT INSTRUCTIONS - HE
Romy Hurley,    It was a pleasure to see you today at the [facility].     My recommendations after this visit include:    I recommend to continue to work on weight loss to minimize obstructive sleep apnea and sleep with head of bed elevated.    Please call if atrial fibrillation episodes more frequent or in atrial fibrillation for 3 days or more.      To followup with Dr. Garland in 3-4 months for 40 minute appointment to discuss PVI.      My contact information:  Lucien Beach, MELANIE  After Hours or Scheduling  [phone]  My Nurse---Miri Vazquez [phone]  Fax [phone]

## 2021-06-26 NOTE — PROGRESS NOTES
Progress Notes by Lida Beach CNP at 6/25/2018  1:30 PM     Author: Lida Beach CNP Service: -- Author Type: Nurse Practitioner    Filed: 6/25/2018  2:36 PM Encounter Date: 6/25/2018 Status: Signed    : iLda Beach CNP (Nurse Practitioner)          Click to link to [facility]     [facility] HEART CARE ELECTROPHYSIOLOGY NOTE      Assessment/Recommendations   Assessment/Plan:    Diagnoses and all orders for this visit:    Persistent atrial fibrillation (H) and A. fib controlled with regard to number of episodes but having longer episodes when she does have A. fib.  I discussed with Pham that within a year she is gone from being on flecainide to moderate dose sotalol, so I am concerned that sotalol may not consistently keep her in rhythm.  She does not have a good antiarrhythmic option that is affordable and something that she is stable to stay on long-term.  I therefore recommended that she consider PVI in the next year.  She would do this with Dr. Garland.  Dr. Agustin previously reviewed entire procedure with her.  No questions regarding procedure.    Essential hypertension and well-controlled.    SHARA (obstructive sleep apnea) and reports that she slept an hour and a half at most during the sleep study so has a hard time believing moderate obstructive sleep apnea.  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  She has terrible insomnia and has since childhood.  Melatonin made insomnia worse.  She is working on weight loss as a way to treat her SHARA.  She will not do in clinic sleep study but may consider home sleep study and had wanted that instead of in center sleep study.    [id number] score of 4 and on Eliquis.  Follow up in clinic with me in 6 months and to call if more frequent or longer episodes of A. fib.     History of Present Illness    Ms. Romy Hurley is a very pleasant 73 y.o. female who comes in today for EP follow-up  regarding persistent atrial fibrillation.  Romy Hurley has a known history of persistent atrial fibrillation with first episode and then paroxysmal thereafter.  She failed flecainide in 2017 and been on sotalol since 2018.  She is on moderate dose and when I went down on the dose she had recurrence of A. Fib, so back on sotalol 120 mg p.o. every 12 hours.    She reports one episode of A. fib every 2-3 weeks and can last anywhere from 24-30 hours long.  She is doing well on diet and lost weight.  Outside of A. fib episode, she denies any cardiac symptoms.  She tells me she is less symptomatic over time but can tell the difference in how she is feeling and the heart rate watch.  Reports heart rates in the high 70s with A. fib.    Cardiographics (personally reviewed):  Results for orders placed during the hospital encounter of 09/11/17   Echo Complete [ECH10] 09/11/2017    Narrative 1. Normal left ventricular size and systolic performance with a visually   estimated ejection fraction of 65%.   2. There is mild aortic insufficiency.  3. There is mild biatrial enlargement.    When compared to the prior real-time echocardiogram dated 29 March 2012,   mild aortic insufficiency is now noted.  Otherwise, there has been little   appreciable interval change.        Results for orders placed or performed in visit on 03/27/18   ECG Clinic - Today   Result Value Ref Range    SYSTOLIC BLOOD PRESSURE  mmHg    DIASTOLIC BLOOD PRESSURE  mmHg    VENTRICULAR RATE 94 BPM    ATRIAL RATE 77 BPM    P-R INTERVAL  ms    QRS DURATION 80 ms    Q-T INTERVAL 374 ms    QTC CALCULATION (BEZET) 467 ms    P Axis  degrees    R AXIS -11 degrees    T AXIS 10 degrees    MUSE DIAGNOSIS       Atrial fibrillation  Moderate voltage criteria for LVH, may be normal variant  Anteroseptal infarct , age undetermined  Abnormal ECG  When compared with ECG of 08-JAN-2018 13:14,  Atrial fibrillation has replaced Sinus rhythm  Vent. rate has increased BY  33  BPM    Confirmed by ESSIE MOCK MD LOC:[facility] ([id number]) on 3/28/2018 2:06:46 PM            Problem List:  Patient Active Problem List   Diagnosis   ? Obesity   ? Essential hypertension   ? Persistent atrial fibrillation (H)   ? Osteoarthritis   ? Insomnia   ? Mild intermittent asthma   ? Type 2 diabetes mellitus without complication, without long-term current use of insulin (H)   ? Persistent insomnia   ? Total knee replacement status   ? Stage III chronic kidney disease   ? SHARA (obstructive sleep apnea)       Physical Examination Review of Systems   Vitals:    06/25/18 1355   BP: 118/70   Pulse: (!) 57   Resp: 10     Body mass index is 42.57 kg/(m^2).  Wt Readings from Last 3 Encounters:   06/25/18 (!) 280 lb (127 kg)   04/10/18 (!) 292 lb (132.5 kg)   03/27/18 (!) 300 lb (136.1 kg)     General Appearance:   Alert, well-appearing and in no acute distress.   HEENT: Atraumatic, normocephalic.  No scleral icterus, normal conjunctivae; mucous membranes pink and moist.     Chest: Chest symmetric, spine straight.   Lungs:   Respirations unlabored: Lungs are clear to auscultation.   Cardiovascular:   Normal first and second heart sounds with no murmurs, rubs, or gallops.  Regular, regular.  Radial and posterior tibial pulses are intact.  Normal JVD, 1+ bilat ankle edema.       Extremities: No cyanosis or clubbing   Musculoskeletal: Moves all extremities   Skin: Warm, dry, intact.    Neurologic: Mood and affect are appropriate, alert and oriented to person, place, time, and situation    General: Weight Loss  Eyes: WNL  Ears/Nose/Throat: WNL  Lungs: WNL  Heart: Irregular Heartbeat  Stomach: WNL  Bladder: WNL  Muscle/Joints: WNL  Skin: WNL  Nervous System: WNL  Mental Health: WNL     Blood: WNL       Medical History  Surgical History Family History Social History   Past Medical History:   Diagnosis Date   ? Asthma    ? Atrial fibrillation (H)    ? Diabetes mellitus (H)    ? Hypertension    ? Insomnia    ? Obesity    ?  Osteoarthritis     Past Surgical History:   Procedure Laterality Date   ?  SECTION     ? REPLACEMENT TOTAL KNEE BILATERAL     ? SHOULDER ARTHROSCOPY W/ ROTATOR CUFF REPAIR Right     Family History   Problem Relation Age of Onset   ? Ovarian cancer Mother    ? Lung cancer Father    ? Sleep apnea Father    ? Snoring Father    ? Brain cancer Sister    ? No Medical Problems Brother    ? No Medical Problems Sister    ? No Medical Problems Sister     Social History     Social History   ? Marital status:      Spouse name: N/A   ? Number of children: N/A   ? Years of education: N/A     Occupational History   ? retired      pediatric research     Social History Main Topics   ? Smoking status: Former Smoker   ? Smokeless tobacco: Never Used   ? Alcohol use No   ? Drug use: No   ? Sexual activity: Not on file     Other Topics Concern   ? Not on file     Social History Narrative          Medications  Allergies   Current Outpatient Prescriptions   Medication Sig Dispense Refill   ? acetaminophen (TYLENOL) 500 MG tablet Take 1,000 mg by mouth every 6 (six) hours as needed for pain.     ? cholecalciferol, vitamin D3, 2,000 unit Tab Take 4,000 Units by mouth daily.      ? ELIQUIS 5 mg Tab tablet TAKE ONE TABLET BY MOUTH TWICE DAILY 180 tablet 1   ? FLUTICASONE/SALMETEROL (ADVAIR DISKUS INHL) Inhale 2 puffs daily as needed.      ? levalbuterol (XOPENEX HFA) 45 mcg/actuation inhaler Inhale 1-2 puffs every 4 (four) hours as needed for wheezing.     ? MAGNESIUM CITRATE ORAL Take 250 mg by mouth 2 (two) times a day.      ? metFORMIN (GLUCOPHAGE) 500 MG tablet Take 250 mg by mouth 2 (two) times a day with meals.      ? PARoxetine (PAXIL) 10 MG tablet Take 1 tablet (10 mg total) by mouth every morning. 30 tablet 11   ? sotalol (BETAPACE) 120 MG tablet Take 1 tablet (120 mg total) by mouth 2 (two) times a day. 60 tablet 3   ? temazepam (RESTORIL) 15 mg capsule Take 1 capsule by mouth as needed.     ?  valsartan-hydrochlorothiazide (DIOVAN-HCT) 160-25 mg per tablet Take 1 tablet by mouth daily.       No current facility-administered medications for this visit.       Allergies   Allergen Reactions   ? Ace Inhibitors    ? Cortisone Acetate    ? Methylprednisolone Anxiety     Turns red      Medical, surgical, family, social history, and medications were all reviewed and updated as necessary.   Lab Results    Chemistry CBC/INR CHOLESTROL   Lab Results   Component Value Date    CREATININE 0.86 01/08/2018    BUN 26 01/08/2018     01/08/2018    K 4.2 01/08/2018    CL 96 (L) 01/08/2018    CO2 31 01/08/2018     Creatinine (mg/dL)   Date Value   01/08/2018 0.86   02/02/2010 1.00     No results found for: BNP Lab Results   Component Value Date    WBC 7.7 06/07/2010    HGB 13.1 07/06/2011    HCT 35.8 06/07/2010    MCV 85 06/07/2010     06/07/2010     Lab Results   Component Value Date    INR 1.16 (H) 05/31/2012      Lab Results   Component Value Date    HDL 37 (L) 03/27/2012    LDLCALC 99 03/27/2012    TRIG 114 03/27/2012          Greater than than 25 minutes were spent face to face in this visit discussing diagnoses as listed above, counseling, and coordination of care.    This note has been dictated using voice recognition software. Any grammatical, typographical, or context distortions are unintentional and inherent to the software.    Lida Beach RN,  [facility]   Electrophysiology  [phone]

## 2021-06-26 NOTE — PROGRESS NOTES
Progress Notes by Adelaida Agustin MD at 3/27/2018 10:10 AM     Author: Adelaida Agustin MD Service: -- Author Type: Physician    Filed: 3/27/2018 10:41 AM Encounter Date: 3/27/2018 Status: Signed    : Adelaida Agustin MD (Physician)                 Arrhythmia Clinic    Thank you, Dr. Adeola Stockton MD, for asking the [facility] Arrhythmia team to evaluate Romy Hurley in follow up for atrial fibrillation      Assessment:     72 year old female with history of persistent atrial fibrillation     Plan:     Atrial fibrillation - persistent and mildly symptomatic.  Chads2-vasc of 3 and appropriately on eliquis.  Despite sotalol she continues to have breakthroughs of atrial fibrillation.  I reviewed treatment options which would include a change in AAD therapy or proceeding to ablation.  I again cautioned that given her weight (she has increased 3 pounds since last visit) her chance of success is in the 65% range.  After discussion, Andreas indicates she is going to be starting a new diet this week and would like to see if she has any weight loss in the next 3 months before making a decision.    Follow up in 3 months with no change in medications at this time.     Current History:   Romy Hurley is a 72 y.o. female with a history of DM2, HTN, morbid obesity and persistent atrial fibrillation.  At my last visit I started her on sotalol 120 mg po bid and she had spontaneous conversion to normal rhythm.  She had her dose decreased to 80 mg po bid by EP NP and this was subsequently re-increased.  She continues to have paroxysms of atrial fibrillation which are rate controlled and only mildly symptomatic.  She presents today in follow up.    Past Medical History:     Past Medical History:   Diagnosis Date   ? Asthma    ? Atrial fibrillation    ? Diabetes mellitus    ? Hypertension    ? Insomnia    ? Obesity    ? Osteoarthritis        Past Surgical History:     Past  Surgical History:   Procedure Laterality Date   ?  SECTION     ? REPLACEMENT TOTAL KNEE BILATERAL     ? SHOULDER ARTHROSCOPY W/ ROTATOR CUFF REPAIR Right        Family History:     Family History   Problem Relation Age of Onset   ? Ovarian cancer Mother    ? Lung cancer Father    ? Sleep apnea Father    ? Snoring Father    ? Brain cancer Sister    ? No Medical Problems Brother    ? No Medical Problems Sister    ? No Medical Problems Sister        Social History:    reports that she has quit smoking. She has never used smokeless tobacco. She reports that she does not drink alcohol or use illicit drugs.    Meds:     Current Outpatient Prescriptions:   ?  acetaminophen (TYLENOL) 500 MG tablet, Take 1,000 mg by mouth every 6 (six) hours as needed for pain., Disp: , Rfl:   ?  cholecalciferol, vitamin D3, 2,000 unit Tab, Take 4,000 Units by mouth daily. , Disp: , Rfl:   ?  ELIQUIS 5 mg Tab tablet, TAKE ONE TABLET BY MOUTH TWICE DAILY, Disp: 180 tablet, Rfl: 1  ?  FLUTICASONE/SALMETEROL (ADVAIR DISKUS INHL), Inhale 2 puffs daily as needed. , Disp: , Rfl:   ?  levalbuterol (XOPENEX HFA) 45 mcg/actuation inhaler, Inhale 1-2 puffs every 4 (four) hours as needed for wheezing., Disp: , Rfl:   ?  MAGNESIUM CITRATE ORAL, Take 250 mg by mouth 2 (two) times a day. , Disp: , Rfl:   ?  metFORMIN (GLUCOPHAGE) 500 MG tablet, Take 250 mg by mouth 2 (two) times a day with meals. , Disp: , Rfl:   ?  PARoxetine (PAXIL) 10 MG tablet, Take 1 tablet (10 mg total) by mouth every morning., Disp: 30 tablet, Rfl: 11  ?  simvastatin (ZOCOR) 20 MG tablet, Take 20 mg by mouth at bedtime. , Disp: , Rfl:   ?  sotalol (BETAPACE) 120 MG tablet, Take 1 tablet (120 mg total) by mouth 2 (two) times a day., Disp: 60 tablet, Rfl: 3  ?  temazepam (RESTORIL) 15 mg capsule, Take 1 capsule by mouth as needed., Disp: , Rfl:   ?  valsartan-hydrochlorothiazide (DIOVAN-HCT) 160-25 mg per tablet, Take 1 tablet by mouth daily., Disp: , Rfl:     Allergies:  "  Ace inhibitors; Cortisone acetate; and Methylprednisolone    Review of Systems:   A full 12 point review of systems without pertinent positives except as per HPI or below.        Objective:      Physical Exam  Weight: Weight: (!) 300 lb (136.1 kg)  Body mass index is 45.61 kg/(m^2).  /76 (Patient Site: Left Arm, Patient Position: Sitting, Cuff Size: Adult Large)  Pulse 92  Resp 16  Ht 5' 8\" (1.727 m)  Wt (!) 300 lb (136.1 kg)  BMI 45.61 kg/m2    General Appearance:   Nad, alert and oriented x 3               Lungs:   ctab   Cardiovascular:   Irregularly irregular   Abdomen:  Obese, soft and non-tender   Extremities: No clubbing or edema   Skin: No rashes                 Cardiographics  ECG performed today personally reviewed - atrial fibrillation    Imaging  None    Lab Review   Lab Results   Component Value Date     01/08/2018    K 4.2 01/08/2018    CL 96 (L) 01/08/2018    CO2 31 01/08/2018    BUN 26 01/08/2018    CREATININE 0.86 01/08/2018    CALCIUM 9.5 01/08/2018     Lab Results   Component Value Date    WBC 7.7 06/07/2010    HGB 13.1 07/06/2011    HCT 35.8 06/07/2010    MCV 85 06/07/2010     06/07/2010     Lab Results   Component Value Date    TRIG 114 03/27/2012    HDL 37 (L) 03/27/2012         Adelaida Agustin MD  [facility] Cardiology, Electrophysiology         "

## 2021-06-26 NOTE — PATIENT INSTRUCTIONS - HE
Romy Hurley,    It was a pleasure to see you today at the [facility].     My recommendations after this visit include:    Please call if atrial fibrillation episodes more frequent or stuck in atrial fibrillation for 3 to 4 days.      To followup with clinic with Dr. Garland or myself in 1 year.      My contact information:  Lucien Beach CNP  After Hours or Scheduling  [phone]  My Nurse---Miri Vazquez [phone]

## 2021-06-27 NOTE — PROGRESS NOTES
Progress Notes by Lida Beach CNP at 3/20/2019 12:50 PM     Author: Lida Beach CNP Service: -- Author Type: Nurse Practitioner    Filed: 3/20/2019  5:11 PM Encounter Date: 3/20/2019 Status: Signed    : Lida Beach CNP (Nurse Practitioner)          Click to link to [facility]     [facility] HEART CARE ELECTROPHYSIOLOGY NOTE      Assessment/Recommendations   Assessment/Plan:    Diagnoses and all orders for this visit:    Persistent atrial fibrillation (H) and having infrequent episodes of A. fib but when she does have A. fib it usually last 1-2 days.  This is essentially unchanged from last year.  She denies any side effects on sotalol.  She is having a physical at her primary clinic within the months and to get lab work there.  I did not give her a refill for sotalol as no creatinine in her chart since last year.  Twelve-lead EKG done and QTC okay.  I discussed pulmonary vein isolation ablation again this year and she is considering.  See below for more details regarding SHARA.  -     ECG 12 lead with MUSE    Essential hypertension and well-controlled.    SHARA (obstructive sleep apnea) which was read as moderate on sleep study but slept very poorly that night.  She cannot do side sleeping.  She elevates the head of her bed with pillows.  She is going to check if her insurance would cover head of the bed elevation bed.  If she were to be able to officially elevate head of the bed it may be worthwhile to reevaluate SHARA with home sleep study.  Because SHARA is not treated this is  both her and my primary reluctance in proceeding with PVI.  Encouraged further weight loss.    Other orders  -     simvastatin (ZOCOR) 10 MG tablet; Take 10 mg by mouth at bedtime.    [id number] score of 4 and on Eliquis.  Follow up in clinic with Dr. Garland in 3-4 months to discuss PVI.     History of Present Illness    Ms. Romy Hurley is a very pleasant 73 y.o. female who comes in today for  10-month EP follow-up for persistent atrial fib.  Romy Hurley has a known history of persistent atrial fib with first episode and then paroxysmal thereafter.  She failed flecainide in 2017 and has been on sotalol since 2018.  She is on moderate dose sotalol and did not tolerate decrease in sotalol dose due to increased A. fib burden.  She lost about 20 pounds and has kept it off over the last year.  She denies any cardiac symptoms outside of A. fib episodes.  She remains symptomatic with A. fib and tells me her A. fib is essentially the same as it was last year.  See above for more details.    Cardiographics (personally reviewed):  Results for orders placed during the hospital encounter of 09/11/17   Echo Complete [ECH10] 09/11/2017    Narrative 1. Normal left ventricular size and systolic performance with a visually   estimated ejection fraction of 65%.   2. There is mild aortic insufficiency.  3. There is mild biatrial enlargement.    When compared to the prior real-time echocardiogram dated 29 March 2012,   mild aortic insufficiency is now noted.  Otherwise, there has been little   appreciable interval change.          Results for orders placed or performed in visit on 03/20/19   ECG 12 lead with MUSE   Result Value Ref Range    SYSTOLIC BLOOD PRESSURE  mmHg    DIASTOLIC BLOOD PRESSURE  mmHg    VENTRICULAR RATE 56 BPM    ATRIAL RATE 56 BPM    P-R INTERVAL 216 ms    QRS DURATION 84 ms    Q-T INTERVAL 462 ms    QTC CALCULATION (BEZET) 445 ms    P Axis 0 degrees    R AXIS -19 degrees    T AXIS -12 degrees    MUSE DIAGNOSIS       Sinus bradycardia with 1st degree A-V block  Voltage criteria for left ventricular hypertrophy  Inferior infarct , age undetermined  Lateral injury pattern  ** ** ** ** * ACUTE MI  ** ** ** **  Abnormal ECG  When compared with ECG of 27-MAR-2018 10:15,  Significant changes have occurred            Problem List:  Patient Active Problem List   Diagnosis   ? Obesity   ? Essential  hypertension   ? Persistent atrial fibrillation (H)   ? Osteoarthritis   ? Insomnia   ? Mild intermittent asthma   ? Type 2 diabetes mellitus without complication, without long-term current use of insulin (H)   ? Persistent insomnia   ? Total knee replacement status   ? Stage III chronic kidney disease (H)   ? SHARA (obstructive sleep apnea)       Physical Examination Review of Systems   Vitals:    19 1253   BP: 116/70   Pulse: 61   Resp: 14     Body mass index is 42.57 kg/m .  Wt Readings from Last 3 Encounters:   19 (!) 280 lb (127 kg)   18 (!) 280 lb (127 kg)   04/10/18 (!) 292 lb (132.5 kg)     General Appearance:   Alert, well-appearing and in no acute distress.   HEENT: Atraumatic, normocephalic.  No scleral icterus, normal conjunctivae; mucous membranes pink and moist.     Chest: Chest symmetric, spine straight.   Lungs:   Respirations unlabored: Lungs are clear to auscultation.   Cardiovascular:   Normal first and second heart sounds with no murmurs, rubs, or gallops.  Regular, regular.  Radial and posterior tibial pulses are intact.  Normal JVD, no edema.       Extremities: No cyanosis or clubbing   Musculoskeletal: Moves all extremities   Skin: Warm, dry, intact.    Neurologic: Mood and affect are appropriate, alert and oriented to person, place, time, and situation    General: WNL  Eyes: WNL  Ears/Nose/Throat: WNL  Lungs: WNL, Shortness of Breath  Heart: Irregular Heartbeat  Stomach: WNL  Bladder: WNL  Muscle/Joints: WNL  Skin: WNL  Nervous System: WNL  Mental Health: WNL     Blood: WNL       Medical History  Surgical History Family History Social History   Past Medical History:   Diagnosis Date   ? Asthma    ? Atrial fibrillation (H)    ? Diabetes mellitus (H)    ? Hypertension    ? Insomnia    ? Obesity    ? Osteoarthritis     Past Surgical History:   Procedure Laterality Date   ?  SECTION     ? REPLACEMENT TOTAL KNEE BILATERAL     ? SHOULDER ARTHROSCOPY W/ ROTATOR CUFF REPAIR  Right     Family History   Problem Relation Age of Onset   ? Ovarian cancer Mother    ? Lung cancer Father    ? Sleep apnea Father    ? Snoring Father    ? Brain cancer Sister    ? No Medical Problems Brother    ? No Medical Problems Sister    ? No Medical Problems Sister     Social History     Socioeconomic History   ? Marital status:      Spouse name: Not on file   ? Number of children: Not on file   ? Years of education: Not on file   ? Highest education level: Not on file   Occupational History   ? Occupation: retired     Comment: pediatric research   Social Needs   ? Financial resource strain: Not on file   ? Food insecurity:     Worry: Not on file     Inability: Not on file   ? Transportation needs:     Medical: Not on file     Non-medical: Not on file   Tobacco Use   ? Smoking status: Former Smoker   ? Smokeless tobacco: Never Used   Substance and Sexual Activity   ? Alcohol use: No   ? Drug use: No   ? Sexual activity: Not on file   Lifestyle   ? Physical activity:     Days per week: Not on file     Minutes per session: Not on file   ? Stress: Not on file   Relationships   ? Social connections:     Talks on phone: Not on file     Gets together: Not on file     Attends Gnosticist service: Not on file     Active member of club or organization: Not on file     Attends meetings of clubs or organizations: Not on file     Relationship status: Not on file   ? Intimate partner violence:     Fear of current or ex partner: Not on file     Emotionally abused: Not on file     Physically abused: Not on file     Forced sexual activity: Not on file   Other Topics Concern   ? Not on file   Social History Narrative   ? Not on file          Medications  Allergies   Current Outpatient Medications   Medication Sig Dispense Refill   ? acetaminophen (TYLENOL) 500 MG tablet Take 1,000 mg by mouth every 6 (six) hours as needed for pain.     ? apixaban (ELIQUIS) 5 mg Tab tablet Take 1 tablet (5 mg total) by mouth 2 (two) times  a day. 180 tablet 0   ? cholecalciferol, vitamin D3, 2,000 unit Tab Take 4,000 Units by mouth daily.      ? FLUTICASONE/SALMETEROL (ADVAIR DISKUS INHL) Inhale 2 puffs daily as needed.      ? levalbuterol (XOPENEX HFA) 45 mcg/actuation inhaler Inhale 1-2 puffs every 4 (four) hours as needed for wheezing.     ? MAGNESIUM CITRATE ORAL Take 250 mg by mouth 2 (two) times a day.      ? metFORMIN (GLUCOPHAGE) 500 MG tablet Take 250 mg by mouth 2 (two) times a day with meals.      ? simvastatin (ZOCOR) 10 MG tablet Take 10 mg by mouth at bedtime.     ? sotalol (BETAPACE) 120 MG tablet TAKE 1 TABLET(120 MG) BY MOUTH TWICE DAILY 180 tablet 1   ? temazepam (RESTORIL) 15 mg capsule Take 1 capsule by mouth as needed.     ? valsartan-hydrochlorothiazide (DIOVAN-HCT) 160-25 mg per tablet Take 1 tablet by mouth daily.       No current facility-administered medications for this visit.       Allergies   Allergen Reactions   ? Ace Inhibitors    ? Cortisone Acetate    ? Methylprednisolone Anxiety     Turns red      Medical, surgical, family, social history, and medications were all reviewed and updated as necessary.   Lab Results    Chemistry CBC/INR CHOLESTROL   Lab Results   Component Value Date    CREATININE 0.86 01/08/2018    BUN 26 01/08/2018     01/08/2018    K 4.2 01/08/2018    CL 96 (L) 01/08/2018    CO2 31 01/08/2018     Creatinine (mg/dL)   Date Value   01/08/2018 0.86   02/02/2010 1.00     No results found for: BNP Lab Results   Component Value Date    WBC 7.7 06/07/2010    HGB 13.1 07/06/2011    HCT 35.8 06/07/2010    MCV 85 06/07/2010     06/07/2010     Lab Results   Component Value Date    INR 1.16 (H) 05/31/2012      Lab Results   Component Value Date    HDL 37 (L) 03/27/2012    LDLCALC 99 03/27/2012    TRIG 114 03/27/2012          Total Time- 25 minutes with greater than 50% spent talking to patient and family regarding patient's relevant diagnoses.  This note has been dictated using voice recognition  software. Any grammatical, typographical, or context distortions are unintentional and inherent to the software.    Lida Beach RN,  [facility]   Electrophysiology  [phone]

## 2021-06-28 NOTE — PROGRESS NOTES
Progress Notes by Yasmeen Churchill RN at 12/27/2019 11:30 AM     Author: Yasmeen Churchill RN Service: -- Author Type: Registered Nurse    Filed: 12/27/2019  3:11 PM Encounter Date: 12/27/2019 Status: Signed    : Yasmeen Churchill RN (Registered Nurse)       Khloe Deshpande, Yasmeen Ramos RN             Last dose of metoprolol tonight, start sotalol 80 mg twice daily in the morning, follow-up EKG on Monday.  If she remains in A. fib, will plan for cardioversion.  I would like to give her a chance to convert with the medication not requiring electrical cardioversion

## 2021-06-28 NOTE — PROGRESS NOTES
Progress Notes by Breanne Augustin CNP at 12/3/2019  2:50 PM     Author: Breanne Augustin CNP Service: -- Author Type: Nurse Practitioner    Filed: 12/3/2019  3:55 PM Encounter Date: 12/3/2019 Status: Signed    : Breanne Augustin CNP (Nurse Practitioner)             Assessment/Recommendations   Assessment/Plan:    1.  Persistent atrial Fibrillation:  Initially diagnosed 2012.  Symptoms consist of palpitations, fatigue, diaphoresis.  Failed antiarrhythmic therapy with flecainide and sotalol.  She has a [id number] score of 4 for age 65-74, female gender, hypertension, and diabetes and is on Eliquis 5 mg twice daily for stroke prophylaxis.  She reports no missed doses or bleeding issues.  HAS-BLED score of 1 for age >65.  She is scheduled for pulmonary vein isolation ablation with Dr. Garland on 12/6/2019.  We discussed ablation, <1% risk for complication, expected recovery, and follow-up.  She was instructed to discontinue sotalol and start metoprolol tartrate 50 mg twice daily.  Start famotidine 20 mg twice daily, to continue for 3 weeks post ablation.  Her questions were answered to her satisfaction and she is ready to proceed.     2.  Hypertension: Continue hydrochlorothiazide and losartan.  Reevaluate blood pressures with the change in beta-blocker as above.    3.  Obstructive sleep apnea: She is now using CPAP nightly.  She was instructed to bring her CPAP to the hospital to use following ablation.    Post procedure check on 12/10/2019  Follow-up 6 weeks post PVI  Follow-up with Dr. Garland 3 months post PVI     History of Present Illness/Subjective    Ms. Romy Hurley is a 74 y.o. female who comes in today for follow-up of atrial fibrillation and history and physical prior to pulmonary vein isolation ablation.  She was diagnosed with atrial fibrillation in 2012.  Symptoms consist of palpitations, fatigue, and diaphoresis.  She has failed antiarrhythmic therapy with flecainide due to side effects and sotalol  due to breakthrough.  She reports early episodes of A. fib lasting between 2 to 3 days.  She also has a history of hypertension, stage III chronic kidney disease, type 2 diabetes, and obstructive sleep apnea for which she now wears CPAP nightly.  She is on Eliquis 5 mg twice daily for stroke prophylaxis.  She denies chest discomfort, abdominal fullness/bloating or peripheral edema, shortness of breath, paroxysmal nocturnal dyspnea, orthopnea, lightheadedness, dizziness, pre-syncope, or syncope.    Cardiographics (EKGs personally reviewed):  ECG done 12/3/2019, shows sinus bradycardia 54 bpm with first-degree AV block, QT/QTc interval measures 464/440 ms.    24-hour Holter monitor worn 9/18/2017 shows persistent atrial fibrillation with controlled ventricular response, average ventricular rate of 82 bpm with a range of 52 to 136 bpm.    ECHO done 9/11/2017:  1. Normal left ventricular size and systolic performance with a visually estimated ejection fraction of 65%.   2. There is mild aortic insufficiency.  3. There is mild biatrial enlargement.     When compared to the prior real-time echocardiogram dated 29 March 2012, mild aortic insufficiency is now noted.  Otherwise, there has been little appreciable interval change.     CT pulmonary veins done 11/4/2019:  1. Four pulmonary veins with normal configuration.   2. Esophagus is adjacent to the posterior left atrial body and left inferior pulmonary vein.  3. No calcified atherosclerotic plaque.  Left superior pulmonary vein: 15 x 16.2mm, area 1.94sq cm.  Left inferior pulmonary vein: 12.7 x 19.1mm, area 1.97sq cm.  Right superior pulmonary vein: 18.4 x 22.8mm, area 3.32sq cm.  Right inferior pulmonary vein: 14.7 x 17mm, area 2.07sq cm.   Esophagus: The esophagus is adjacent to the posterior side of the left atrial body and posterior side of the left inferior pulmonary vein.       Physical Examination Review of Systems   Vitals:    12/03/19 1436   BP: 118/72   Pulse:  (!) 55   Resp: 16   SpO2: 96%     Body mass index is 43.64 kg/m .  Wt Readings from Last 3 Encounters:   19 (!) 287 lb (130.2 kg)   19 (!) 287 lb (130.2 kg)   10/25/19 (!) 287 lb (130.2 kg)       General Appearance:   Alert, well-appearing and in no acute distress.   HEENT: Atraumatic, normocephalic.  No scleral icterus, normal conjunctivae, EOMs intact, PERRL.  Mucous membranes pink and moist.     Chest/Lungs:   Chest symmetric, spine straight.  Respirations unlabored.  Lungs are clear to auscultation.   Cardiovascular:   Regular rate and rhythm.  Normal first and second heart sounds with no murmurs, rubs, or gallops; radial and posterior tibial pulses are intact, No JVD, right ankle brace with 1+ right lower extremity edema, none on left.   Abdomen:  Soft, nontender, nondistended, bowel sounds present.   Extremities: No cyanosis or clubbing.   Musculoskeletal: Moves all extremities.     Skin: Warm, dry, intact.    Neurologic: Mood and affect are appropriate.  Alert and oriented to person, place, time, and situation.    General: WNL  Eyes: WNL  Ears/Nose/Throat: WNL  Lungs: Shortness of Breath  Heart: Shortness of Breath with activity  Stomach: WNL  Bladder: WNL  Muscle/Joints: Joint Pain  Skin: WNL  Nervous System: WNL  Mental Health: WNL     Blood: WNL       Medical History  Surgical History Family History Social History   Past Medical History:   Diagnosis Date   ? Asthma    ? Atrial fibrillation (H)    ? Diabetes mellitus (H)    ? Hypertension    ? Insomnia    ? Obesity    ? Osteoarthritis     Past Surgical History:   Procedure Laterality Date   ?  SECTION     ? REPLACEMENT TOTAL KNEE BILATERAL     ? SHOULDER ARTHROSCOPY W/ ROTATOR CUFF REPAIR Right     Family History   Problem Relation Age of Onset   ? Ovarian cancer Mother 82   ? Lung cancer Father 75   ? Sleep apnea Father    ? Snoring Father    ? Brain cancer Sister 57        Brain Ca x 1 year   ? No Medical Problems Brother    ? No  Medical Problems Sister    ? No Medical Problems Sister     Social History     Tobacco Use   ? Smoking status: Former Smoker     Packs/day: 0.00     Types: Cigarettes     Last attempt to quit: 1981     Years since quittin.5   ? Smokeless tobacco: Never Used   Substance Use Topics   ? Alcohol use: Yes     Comment: Rare (2-3 month)   ? Drug use: No          Medications  Allergies   Current Outpatient Medications   Medication Sig Dispense Refill   ? acetaminophen (TYLENOL) 500 MG tablet Take 1,000 mg by mouth every 6 (six) hours as needed for pain.     ? apixaban (ELIQUIS) 5 mg Tab tablet Take 1 tablet (5 mg total) by mouth 2 (two) times a day. 180 tablet 3   ? cholecalciferol, vitamin D3, 2,000 unit Tab Take 4,000 Units by mouth daily.      ? FLUTICASONE/SALMETEROL (ADVAIR DISKUS INHL) Inhale 2 puffs daily as needed.      ? hydroCHLOROthiazide (HYDRODIURIL) 25 MG tablet Take 25 mg by mouth daily.     ? levalbuterol (XOPENEX HFA) 45 mcg/actuation inhaler Inhale 1-2 puffs every 4 (four) hours as needed for wheezing.     ? losartan (COZAAR) 100 MG tablet Take 100 mg by mouth daily.     ? metFORMIN (GLUCOPHAGE) 500 MG tablet Take 250 mg by mouth 2 (two) times a day with meals.      ? PARoxetine (PAXIL) 10 MG tablet Take 10 mg by mouth daily.  3   ? simvastatin (ZOCOR) 20 MG tablet Take 20 mg by mouth at bedtime.         3   ? temazepam (RESTORIL) 15 mg capsule Take 1 capsule by mouth at bedtime as needed for sleep or anxiety.      ? famotidine (PEPCID) 20 MG tablet Take 1 tablet (20 mg total) by mouth 2 (two) times a day. 48 tablet 0   ? metoprolol tartrate (LOPRESSOR) 50 MG tablet Take 1 tablet (50 mg total) by mouth 2 (two) times a day. 180 tablet 3     No current facility-administered medications for this visit.     Allergies   Allergen Reactions   ? Ace Inhibitors    ? Cortisone Acetate    ? Methylprednisolone Anxiety     Turns red       Denies previous adverse reaction to anesthesia.      Lab Results     Chemistry/lipid CBC Other   Lab Results   Component Value Date    CREATININE 1.05 12/03/2019    BUN 23 12/03/2019     12/03/2019    K 4.3 12/03/2019    CL 99 12/03/2019    CO2 30 12/03/2019     Creatinine (mg/dL)   Date Value   12/03/2019 1.05   01/08/2018 0.86   02/02/2010 1.00    Lab Results   Component Value Date    WBC 8.2 12/03/2019    HGB 13.4 12/03/2019    HCT 41.4 12/03/2019    MCV 89 12/03/2019     12/03/2019    Lab Results   Component Value Date    TSH 2.8 04/25/2012    INR 1.16 (H) 05/31/2012        This note has been dictated using voice recognition software. Any grammatical, typographical, or context distortions are unintentional and inherent to the software.

## 2021-06-28 NOTE — PROGRESS NOTES
Progress Notes by Lida Beach CNP at 1/14/2020 10:30 AM     Author: Lida Beach CNP Service: -- Author Type: Nurse Practitioner    Filed: 1/14/2020 11:32 AM Encounter Date: 1/14/2020 Status: Signed    : Lida Beach CNP (Nurse Practitioner)            Thank you, Dr. Stockton, for asking the [facility] team to see Ms. Romy Hurley to evaluate post pulmonary vein isolation ablation.    Assessment/Recommendations     Assessment/Plan:    Diagnoses and all orders for this visit:    Persistent atrial fibrillation and to call if she has any significant A. fib while she is in [place].  She denies any questions regarding ablation.  -     MATEUSZ Monitor Hook-Up; Future; Expected date: 01/14/2020    Essential hypertension and well-controlled.  Status post ablation operation for arrhythmia and no complications post ablation.  Had one episode of about 2 to 3 days of A. fib within about 2 weeks post ablation.  Symptoms were the same.  None since.  -     MATEUSZ Monitor Hook-Up; Future; Expected date: 01/14/2020    SHARA on CPAP and using CPAP nightly.    [id number] score of 4 and on Eliquis.  Okay to stop aspirin.  Follow up in clinic with Dr. Garland in May and to call when she is back in [place] in April and have 2 weeks symptom monitor placed.  I explained details of the 2 weeks symptom monitor.     History of Present Illness/Subjective     Romy Hurley is a very pleasant 74 y.o. female who comes in today for EP follow-up post pulmonary vein isolation ablation.  Romy Hurley was diagnosed with atrial fibrillation in 2012.  Symptoms consist of palpitations, fatigue, and diaphoresis.  She has failed antiarrhythmic therapy with flecainide due to side effects and sotalol due to breakthrough.  She reports early episodes of A. fib lasting between 2 to 3 days.  She also has a history of hypertension, stage III chronic kidney disease, type 2 diabetes, and obstructive sleep  apnea for which she now wears CPAP nightly.    On December 6, 2019 Pat had pulmonary vein isolation ablation with cryoablation and RF to 4 pulmonary veins and roofline done.  Normal voltage was seen except at roofline area.  She denies any complications post ablation.  She is back to almost all of her normal activities.  She has had one episode of about 2 to 3 days of A. fib in the early weeks post ablation.  This is around [place] time and also under a lot of stress at the time.  She called in and they had wanted her to start on sotalol but she misunderstood and increased her metoprolol dose to 75 mg p.o. twice daily.  She had some dyspnea on exertion when she reverted back to normal rhythm 2 days later so she decreased her metoprolol dose back to 50 mg twice daily.  She thinks she still has some dyspnea on exertion on 50 mg of metoprolol twice daily.  That is her only cardiac complaint.  She has had no further episodes of atrial fib.  They are leaving for [place] in about 2 weeks.  She will be coming back to [place] in April.  She cares for her , who has some significant health issues..      Cardiographics (reviewed):  Results for orders placed during the hospital encounter of 09/11/17   Echo Complete [ECH10] 09/11/2017    Narrative 1. Normal left ventricular size and systolic performance with a visually   estimated ejection fraction of 65%.   2. There is mild aortic insufficiency.  3. There is mild biatrial enlargement.    When compared to the prior real-time echocardiogram dated 29 March 2012,   mild aortic insufficiency is now noted.  Otherwise, there has been little   appreciable interval change.    24-hour Holter monitor worn 9/18/2017 shows persistent atrial fibrillation with controlled ventricular response, average ventricular rate of 82 bpm with a range of 52 to 136 bpm.     CT pulmonary veins done 11/4/2019:  1. Four pulmonary veins with normal configuration.   2. Esophagus is adjacent to the  posterior left atrial body and left inferior pulmonary vein.  3. No calcified atherosclerotic plaque.  Left superior pulmonary vein: 15 x 16.2mm, area 1.94sq cm.  Left inferior pulmonary vein: 12.7 x 19.1mm, area 1.97sq cm.  Right superior pulmonary vein: 18.4 x 22.8mm, area 3.32sq cm.  Right inferior pulmonary vein: 14.7 x 17mm, area 2.07sq cm.   Esophagus: The esophagus is adjacent to the posterior side of the left atrial body and posterior side of the left inferior pulmonary vein.          Cardiac testing personally reviewed:      Results for orders placed or performed in visit on 12/27/19   ECG 12 lead with MUSE   Result Value Ref Range    SYSTOLIC BLOOD PRESSURE      DIASTOLIC BLOOD PRESSURE      VENTRICULAR RATE 86 BPM    ATRIAL RATE 375 BPM    P-R INTERVAL      QRS DURATION 70 ms    Q-T INTERVAL 340 ms    QTC CALCULATION (BEZET) 406 ms    P Axis      R AXIS -11 degrees    T AXIS -3 degrees    MUSE DIAGNOSIS       Atrial fibrillation  Moderate voltage criteria for LVH, may be normal variant  Inferior infarct , age undetermined  Anteroseptal infarct , age undetermined  Abnormal ECG  When compared with ECG of 03-DEC-2019 14:38,  Atrial fibrillation has replaced Sinus rhythm  Vent. rate has increased BY  32 BPM  Anteroseptal infarct is now Present  Confirmed by VIOLET VARGAS, ZAID LOC:SJ ([id number]) on 12/27/2019 4:32:31 PM            Problem List:  Patient Active Problem List   Diagnosis   ? Essential hypertension   ? Persistent atrial fibrillation   ? Osteoarthritis   ? Mild intermittent asthma   ? Type 2 diabetes mellitus without complication, without long-term current use of insulin (H)   ? Persistent insomnia   ? Total knee replacement status   ? Stage III chronic kidney disease (H)   ? SHARA on CPAP   ? Morbid obesity (H)   ? Status post ablation operation for arrhythmia     Revi  e  Physical Examination Review of Systems   w [facility]  Vitals:    01/14/20 1038   BP: 124/72   Pulse: 76   Resp: 16     Body mass  index is 43.18 kg/m .  Wt Readings from Last 3 Encounters:   20 (!) 284 lb (128.8 kg)   20 (!) 286 lb (129.7 kg)   19 (!) 286 lb (129.7 kg)     General Appearance:   Alert, well-appearing and in no acute distress.   HEENT: Atraumatic, normocephalic.  No scleral icterus, normal conjunctivae; mucous membranes pink and moist.     Chest: Chest symmetric, spine straight.   Lungs:   Respirations unlabored: Lungs are clear to auscultation.   Cardiovascular:   Normal first and second heart sounds with no murmurs, rubs, or gallops.  Regular, regular.  Radial and posterior tibial pulses are intact.  Normal JVD, 2+ edema in R ankle and wearing brace and no edema on left.       Extremities: No cyanosis or clubbing   Musculoskeletal: Moves all extremities   Skin: Warm, dry, intact.    Neurologic: Mood and affect are appropriate, alert and oriented to person, place, time, and situation    General: WNL  Eyes: WNL  Ears/Nose/Throat: WNL  Lungs: Wheezing  Heart: WNL  Stomach: WNL  Bladder: WNL  Muscle/Joints: Joint Pain  Skin: WNL  Nervous System: WNL  Mental Health: WNL     Blood: WNL       Medical History  Surgical History Family History Social History     Past Medical History:   Diagnosis Date   ? Asthma    ? Atrial fibrillation (H)    ? Diabetes mellitus (H)    ? Hypertension    ? Insomnia    ? Obesity    ? SHARA on CPAP    ? Osteoarthritis     Past Surgical History:   Procedure Laterality Date   ?  SECTION     ? EP ABLATION AFLUTTER  2019    Procedure: EP Ablation Atrial Flutter;  Surgeon: Dorian Garland MD;  Location: [facility];  Service: Cardiology   ? EP ABLATION PVI Left 2019    Procedure: EP Ablation PVI;  Surgeon: Dorian Garland MD;  Location: [facility];  Service: Cardiology   ? REPLACEMENT TOTAL KNEE BILATERAL     ? SHOULDER ARTHROSCOPY W/ ROTATOR CUFF REPAIR Right     Family History   Problem Relation Age of Onset   ? Ovarian cancer Mother 82   ? Lung  cancer Father 75   ? Sleep apnea Father    ? Snoring Father    ? Brain cancer Sister 57        Brain Ca x 1 year   ? No Medical Problems Brother    ? No Medical Problems Sister    ? No Medical Problems Sister     Social History     Tobacco Use   Smoking Status Former Smoker   ? Packs/day: 0.00   ? Types: Cigarettes   ? Last attempt to quit: 1981   ? Years since quittin.6   Smokeless Tobacco Never Used     Social History     Substance and Sexual Activity   Alcohol Use Yes    Comment: Rare (2-3 month)        Medications  Allergies     Current Outpatient Medications   Medication Sig Dispense Refill   ? acetaminophen (TYLENOL) 500 MG tablet Take 1,000 mg by mouth every 6 (six) hours as needed for pain.     ? apixaban (ELIQUIS) 5 mg Tab tablet Take 1 tablet (5 mg total) by mouth 2 (two) times a day. 180 tablet 3   ? cholecalciferol, vitamin D3, 2,000 unit Tab Take 4,000 Units by mouth daily.      ? FLUTICASONE/SALMETEROL (ADVAIR DISKUS INHL) Inhale 2 puffs daily as needed.      ? hydroCHLOROthiazide (HYDRODIURIL) 25 MG tablet Take 25 mg by mouth daily.     ? levalbuterol (XOPENEX HFA) 45 mcg/actuation inhaler Inhale 1-2 puffs every 4 (four) hours as needed for wheezing.     ? losartan (COZAAR) 100 MG tablet Take 100 mg by mouth daily.     ? metFORMIN (GLUCOPHAGE) 500 MG tablet Take 250 mg by mouth 2 (two) times a day with meals.      ? metoprolol tartrate (LOPRESSOR) 50 MG tablet Take 1 tablet (50 mg total) by mouth 2 (two) times a day. 60 tablet 0   ? simvastatin (ZOCOR) 20 MG tablet Take 20 mg by mouth at bedtime.         3   ? temazepam (RESTORIL) 15 mg capsule Take 1 capsule by mouth at bedtime as needed for sleep or anxiety.      ? traZODone (DESYREL) 50 MG tablet TK 1 T PO AT BEDTIME     ? famotidine (PEPCID) 20 MG tablet Take 1 tablet (20 mg total) by mouth 2 (two) times a day. 48 tablet 0     No current facility-administered medications for this visit.       Allergies   Allergen Reactions   ? Ace  Inhibitors    ? Cortisone Acetate    ? Methylprednisolone Anxiety     Turns red      Medical, surgical, family, social history, and medications were all reviewed and updated as necessary.   Lab Results    Chemistry/lipid CBC Cardiac Enzymes/BNP/TSH/INR     Lab Results   Component Value Date    CREATININE 1.05 12/03/2019    BUN 23 12/03/2019     12/03/2019    K 4.3 12/03/2019    CL 99 12/03/2019    CO2 30 12/03/2019     Creatinine (mg/dL)   Date Value   12/03/2019 1.05   01/08/2018 0.86   02/02/2010 1.00     No results found for: BNP Lab Results   Component Value Date    WBC 8.2 12/03/2019    HGB 13.4 12/03/2019    HCT 41.4 12/03/2019    MCV 89 12/03/2019     12/03/2019     Lab Results   Component Value Date    INR 1.16 (H) 05/31/2012      Lab Results   Component Value Date    HDL 37 (L) 03/27/2012    LDLCALC 99 03/27/2012    TRIG 114 03/27/2012

## 2021-06-30 NOTE — PROGRESS NOTES
Progress Notes by Jareth Sanches at 3/9/2021 11:00 AM     Author: Jareth Sanches Service: -- Author Type: Patient Access    Filed: 3/9/2021 12:09 PM Encounter Date: 3/9/2021 Status: Signed    : Jareth Sanches (Patient Access)       Parrot Study - Day 5 Call      Removing Patch 1    Study Purpose:   Atrial Fibrillation Algorithm Clinical Validation Study, prospective, multi-center, non significant risk     Subject was called on 09-Mar-2021 for their Day 5 phone call to instructed subjects on removing device patch 1 and ship it back to sponsor and instruction on putting on device 2 patch.     Did the subject have a new reportable adverse events since last visit: Yes  If yes, please generate adverse event report for PI to cosign    Plan: Subject was discontinued from study due to adverse event.     Jareth Sanches

## 2021-06-30 NOTE — PROGRESS NOTES
Progress Notes by Adelaide Romo at 3/4/2021  2:00 PM     Author: Adelaide Romo Service: -- Author Type: Patient Access    Filed: 3/4/2021  3:11 PM Encounter Date: 3/4/2021 Status: Signed    : Adelaide Romo (Patient Access)       Parrot Study - Day 1 Call     Putting Devices On    Study Purpose:   Atrial Fibrillation Algorithm Clinical Validation Study, prospective, multi-center, non significant risk     Subject was called on 03/04/21 for their Day 1 phone call to instructed subjects on putting on device.     Did the subject have a new reportable adverse events since last visit: No  If yes, please generate adverse event report for PI to cosign    Plan: Subject is schedule for a Day 5 phone call on 3/9/21 at 1100.     Adelaide Romo

## 2021-06-30 NOTE — PROGRESS NOTES
Progress Notes by Morenita Cid CNP at 3/9/2021 11:00 AM     Author: Morenita Cid CNP Service: -- Author Type: Nurse Practitioner    Filed: 3/9/2021 12:09 PM Encounter Date: 3/9/2021 Status: Signed    : Morenita Cid CNP (Nurse Practitioner)       Parrot Study Adverse Event Note  Study Purpose: Atrial Fibrillation Algorithm Clinical Validation Study, prospective, multi-center, non-significant risk     Date Adverse Event Occurred: 3/9/2021     Adverse Event Name: Contact Dermatitis    Adverse Event Details:  75 year old female removed Parrot Patch per protocol and patient noted redness outlining the patch but the lower part lateral one opened 5/8 of inch and 1/4 inch blister with tenderness.    Objective findings 12.5 cm by 9 cm perimeter of the patch erythema by 5 mm and inferior one open area 2 cm by 2 cm and another vesicular 1 cm by 1 cm     Relationship:  Morenita Cid Please provide your assessment of relationship     Was the adverse event related to the device? [x] Yes   [] No   [] Unknown       AE Severity: [] Mild  [x] Moderate  [] Severe       Relationship to study procedures: [x] Definitely related  [] Possibly related  [] Unlikely related  [] Not related       Action taken with study procedures: [] None  [] Study procedures interrupted  [x] Study procedures stopped       Serious Event? YES or NO   If YES No     Seriousness:  Death No   Life threatening  No   Initial or prolonged hospitalization No   Persistent or significant disability/incapacity No   Congenital anomaly  / birth defect No   Other medically important event No     Medication given? No prescription given        Treatment:   Wash left upper chest with mild soap with warm water such as CeraVe or Ivory and cleanse area gentle.  Then pat dry and apply Hydrocortisone 1 % over the counter three times a day  Monitor for signs and symptoms of infection such as increased redness, of if drainage, increased warm, increased pain or  fever, chills, or bodyaches and if these symptoms occur to notify research office.  Follow up with me on March 16, 2021 at 11 am       Adverse Event Status:Stablized and ongoing     Resolved / Ongoing or stabilized and followed by private MD / Subject Lost to follow up / Unresolvable

## 2021-06-30 NOTE — PROGRESS NOTES
Progress Notes by Renetta Medina at 2/25/2021 12:30 PM     Author: Renetta Medina Service: -- Author Type: Patient Access    Filed: 2/25/2021  1:27 PM Encounter Date: 2/25/2021 Status: Signed    : Renetta Medina (Patient Access)       Parrot Study Consent Note    Study Purpose: Atrial Fibrillation Algorithm Clinical Validation Study, prospective, multi-center, non-significant risk     Romy NOBLE Xavi was called today, 02/25/21, to discuss participation in the Parrot Study. The consent form version 3.0 was reviewed with subject. Subject was provided with a virtual copy of the consent form via [employer] e-consenting software.    The consent discuss included:    Study description and purpose     Qualifications for participation    Screening visit    Study procedures and follow up visits    Participant responsibilities     Study restrictions    Length of study    Study data    Potential risks and discomforts    New information    Potential benefits of participation    Incidental findings    Alternate therapies    Compensation for participation    Cost/Compensation for research related injury    Study Funding    Withdrawal participation    Confidentiality     Study contacts    Legal right    The subject was provided time to review the consent form and consider participation her questions were answered to her satisfaction. The patient has voluntarily agreed to participate in the above noted study.     The consent form version 3.0 and HIPPA form version 3.0 was signed 02/25/21 via [employer] e-consenting software. A copy of the signed consent form is delivered to patient via email from [employer] e-consent software. A copy will be placed in subject's medical record.     Past Medical History:   Diagnosis Date   ? Asthma    ? Atrial fibrillation (H) 2012   ? Diabetes mellitus (H)    ? Hypertension    ? Insomnia    ? Obesity    ? SHARA on CPAP    ? Osteoarthritis        [unfilled]      Current  Outpatient Medications:   ?  apixaban ANTICOAGULANT (ELIQUIS) 5 mg Tab tablet, Take 1 tablet (5 mg total) by mouth 2 (two) times a day., Disp: 180 tablet, Rfl: 1  ?  hydroCHLOROthiazide (HYDRODIURIL) 25 MG tablet, Take 25 mg by mouth daily., Disp: , Rfl:   ?  losartan (COZAAR) 100 MG tablet, Take 100 mg by mouth daily., Disp: , Rfl:   ?  metFORMIN (GLUCOPHAGE) 500 MG tablet, Take 500 mg by mouth daily with breakfast. , Disp: , Rfl:   ?  PARoxetine (PAXIL) 10 MG tablet, Take 10 mg by mouth at bedtime., Disp: , Rfl:   ?  simvastatin (ZOCOR) 20 MG tablet, Take 20 mg by mouth at bedtime.    , Disp: , Rfl: 3  ?  sotaloL (BETAPACE) 80 MG tablet, TAKE 1 TABLET BY MOUTH TWICE DAILY, Disp: 180 tablet, Rfl: 1  ?  traZODone (DESYREL) 50 MG tablet, TK 1 T PO AT BEDTIME, Disp: , Rfl:     Allergies   Allergen Reactions   ? Ace Inhibitors Cough   ? Cortisone Acetate Other (See Comments)     flushed   ? Methylprednisolone Anxiety     Turns red       Renetta Medina

## 2021-06-30 NOTE — PROGRESS NOTES
Progress Notes by Morenita Cid CNP at 2/25/2021 12:30 PM     Author: Moreniat Cid CNP Service: -- Author Type: Nurse Practitioner    Filed: 2/25/2021  3:02 PM Encounter Date: 2/25/2021 Status: Signed    : Morenita Cid CNP (Nurse Practitioner)           Study Purpose: Atrial Fibrillation Algorithm Clinical Validation Study, prospective, multi-center, non significant risk       Physical Examination performed via live video encounter   General Appearance:   Alert, well appearing,no distress, normal body habitus, upright.   HENT/Mouth: Atraumatic normocephalic membranes moist, mucous membranes pink and moist  No nasal discharge or   bleeding gums.    EYES:  no scleral icterus, normal conjunctivae   Neck: no evidence of thyromegaly.  Supple.    Chest/Lungs:   No audible wheezing equal chest wall expansion. Non labored breathing.  No cough.   Cardiovascular:   No evidence of elevated jugular venous pressure.      Abdomen:  no evidence of abdominal distention. No observe juandice.     Extremities: no cyanosis or clubbing noted. right ankle in a cast    Skin:   skin dry, no visible ecchymosis  No markings of the bilateral wrists.   Neurologic: Mood affect appropriate to place time and person   Normal arm motion bilateral, no tremors.  No evidence of focal defect.              COVID: No symptoms, chills, shortness of breath, or difficulty breathing, muscle or body aches, headache, loss of taste or smell, sore throat, runny nose, congestion, nausea, vomiting or diarrhea.according to the [place] Department of Health and Human Services based on the CARES Act.     Patient had surgery on 2/19/2021       Morenita Cid CNP

## 2021-06-30 NOTE — PROGRESS NOTES
Progress Notes by Renetta Medina at 2/25/2021 12:30 PM     Author: Renetta Medina Service: -- Author Type: Patient Access    Filed: 2/26/2021  7:14 AM Encounter Date: 2/25/2021 Status: Signed    : Dorian Garland MD (Physician)    Related Notes: Original Note by Renetta Medina (Patient Access) filed at 2/25/2021  3:02 PM         Parrot Study Inclusion / Exclusion Criteria    Study Purpose: Atrial Fibrillation Algorithm Clinical Validation Study, prospective, multi-center, non significant risk     Protocol Version V 3.0 - 10 December 2020    Subject Cohort: 3    Inclusion Criteria-all must be Yes  Yes/No Cohort Subject must meet all inclusion criteria:    Yes   All 1. Able to read, understand, and provide written informed consent.   Yes All 2. Willing and able to participate in the study procedures as described in the consent form.    Yes All 3. Be 22 years of age and older   Yes    All 4. Able to communicate effectively with and follow instructions from study staff   Yes All 5. Able to wear the wrist device for duration of study participation     NA Cohort 1 6. Have no known medical history of AF    N/A Cohort 2 7. have no known medical history of AF and active diagnosis of at least one of the following arrhythmias within the past 2 years:   A. Frequent PACs, defined as at least 1% of total beats of atrial ectopic beats by 24-48 hour Holter, ambulatory ECG monitor, or implantable loop recorder)   B. Frequent PVCs, defined as at least 1% of total beats of ventricular ectopic beats by 24-48 hour Holter, ambulatory ECG monitor, or implantable loop recorder   C. SVT, which will include atrial tachycardia, atrioventricular chapito re-entrant tachycardia, atrioventricular re-entrant tachycardia by 12-lead ECG or 24-48 hour Holter, ambulatory ECG monitor, or implantable loop recorder   D. NSVT, defined as three or more consecutive ventricular beats at a rate of at least 100 beats per minute and lasting no  more than 30 seconds, by 12-lead ECG or 24-48 hour Holter, ambulatory ECG monitor, or implantable loop recorder     Yes Cohort 3 and 4 8. have a known diagnosis of AF at the time of screening (confirmed by electronic medical record (EMR) or self-report) and have had a recent episode of AF, or confirmed AF on ECG, in the past 12 months     NA Cohort 4 9. have a known diagnosis of permanent AF at the time of screening (confirmed by EMR or self-report) and have had a recent episode of AF, or confirmed AF on ECG, in the past 12 months    Yes NA 10. Meet additional binning based on demographics             Exclusion Criteria-all must be no  Yes/No Criteria # Subject must not meet any exclusion criteria:    No All 1. Physical disability that prevents safe and adequate testing.   No All 2. Mental impairment resulting in limited ability to cooperate.   No All 3. Known uncontrolled medical conditions, Such as (but not limited to) significant anemia, important electrolyte imbalance and untreated or uncontrolled thyroid disease   No All 4. Open wound(s) on the wrist and / or forearm   No All 5. Tattoos, large moles, or scars on the wrist at the wrist device location    No All 6. Skin conditions on either wrist that would preclude subject from wearing a wristband on either wrist    No All 7. Known allergy or sensitivity to medical adhesives, isopropyl alcohol, wristbands, or ECG patch.   No All 8. Medical history or physical assessment finding that makes the subjects inappropriate for participation according to investigator(s)   No All 9. Participation in a previous study that used a wrist-worn sensor device with a simultaneous ECG reference patch    No All 10. Implantable cardiac devices such as a Pacemaker or Implantable Cardioverter defibrillator    No All 11. Clinically significant hand tremors, as judged by the investigator    No All 12. Acute illness including COVID and other respiratory illnesses    No Cohort 3 and 4 13.  Subject with known history of AF on a rhythm control medications with history of complete AF rhythm control (I.e history of zero AF burden) will be excluded from Cohort 3 and 4.  (Subjects enrolled into Cohort 2 can be on rate control medications)      Subject has met all inclusion criteria and no exclusion criteria have been met.     Subject is ready to fully enrolled in the Parrot study.    Renetta Medina

## 2021-07-04 NOTE — PROGRESS NOTES
Progress Notes by Lida Beach CNP at 6/8/2021  3:30 PM     Author: Lida Beach CNP Service: -- Author Type: Nurse Practitioner    Filed: 6/8/2021  4:09 PM Encounter Date: 6/8/2021 Status: Signed    : Lida Beach CNP (Nurse Practitioner)            Thank you, Dr. Stockton, for asking the [facility] team to see Ms. Romy Hurley to evaluate persistent atrial fibrillation.    Assessment/Recommendations     Assessment/Plan:    Diagnoses and all orders for this visit:    Persistent atrial fibrillation and has had recurrence of atrial fib on several occasions post ablation.  Back on sotalol for 1-1/2 years without problems.  Having an episode of A. fib lasting several hours about every 3 to 4 months.  Discussed she would need repeat ablation at some point if she wants to get off of sotalol.  She satisfied to stay on medication now.  Twelve-lead EKG done today and   milliseconds.  -     ECG Clinic - Today    Essential hypertension and blood pressure at goal    SHARA on CPAPAnd using CPAP routinely    [id number] score of 4 and on Eliquis.  Follow up in clinic with Dr. Garland or myself in 1 year.     History of Present Illness/Subjective     Romy Hurley is a very pleasant 76 y.o. female who comes in today for EP routine follow-up for persistent atrial fibrillation.  Romy Hurley was diagnosed with atrial fibrillation in 2012.  Symptoms consist of palpitations, fatigue, and diaphoresis.  She has failed antiarrhythmic therapy with flecainide due to side effects and sotalol due to breakthrough.  She reports early episodes of A. fib lasting between 2 to 3 days.  She also has a history of hypertension, stage III chronic kidney disease, type 2 diabetes, and obstructive sleep apnea for which she now wears CPAP nightly.    On December 6, 2019 Pat had pulmonary vein isolation ablation with cryoablation and RF to 4 pulmonary veins and roofline done.  Normal  voltage was seen except at roofline area.  Pat is had recurrence of atrial fibrillation since her ablation.  She typically has several days of atrial fib and her symptoms remain the same as preablation.  Then in January 2020 she went into a persistent atrial fibrillation episode.  I restarted her on sotalol and she converted with start of drug and did not need cardioversion.  It has been a year and a half since I have seen her in clinic.  She typically jacome in [place].  She had a right ankle replacement in February of this year.  She is now able to walk about a quarter of a mile.  This is a huge improvement from prior to ankle replacement.  She tells me she is trying to get back to all of her normal activities.  She just finished physical therapy.  Its been a huge recovery process after ankle replacement.  She is glad she did it.  She denies any cardiac symptoms outside of atrial fib episodes.  She reports that she has occasional A. fib episode about every 3 or 4 months.  She has not always identified the trigger.  Occasionally its been an alcoholic drink.  They happen during the daytime or at night and can last a few hours.  She is aware of palpitations and feeling of irregularity in her chest when she is in atrial fib.      Cardiographics (reviewed):  Results for orders placed during the hospital encounter of 09/11/17   Echo Complete [ECH10] 09/11/2017    Narrative 1. Normal left ventricular size and systolic performance with a visually   estimated ejection fraction of 65%.   2. There is mild aortic insufficiency.  3. There is mild biatrial enlargement.    When compared to the prior real-time echocardiogram dated 29 March 2012,   mild aortic insufficiency is now noted.  Otherwise, there has been little   appreciable interval change.    24-hour Holter monitor worn 9/18/2017 shows persistent atrial fibrillation with controlled ventricular response, average ventricular rate of 82 bpm with a range of 52 to 136  bpm.     CT pulmonary veins done 11/4/2019:  1. Four pulmonary veins with normal configuration.   2. Esophagus is adjacent to the posterior left atrial body and left inferior pulmonary vein.  3. No calcified atherosclerotic plaque.  Left superior pulmonary vein: 15 x 16.2mm, area 1.94sq cm.  Left inferior pulmonary vein: 12.7 x 19.1mm, area 1.97sq cm.  Right superior pulmonary vein: 18.4 x 22.8mm, area 3.32sq cm.  Right inferior pulmonary vein: 14.7 x 17mm, area 2.07sq cm.   Esophagus: The esophagus is adjacent to the posterior side of the left atrial body and posterior side of the left inferior pulmonary vein.    Cardiac testing personally reviewed:  Twelve-lead EKG done today and reviewed.  .  Normal sinus rhythm with a rate of 60.  Results for orders placed or performed during the hospital encounter of 01/24/20   ECG 12 lead MUSE   Result Value Ref Range    SYSTOLIC BLOOD PRESSURE      DIASTOLIC BLOOD PRESSURE      VENTRICULAR RATE 63 BPM    ATRIAL RATE 63 BPM    P-R INTERVAL 206 ms    QRS DURATION 86 ms    Q-T INTERVAL 444 ms    QTC CALCULATION (BEZET) 454 ms    P Axis 47 degrees    R AXIS -12 degrees    T AXIS -12 degrees    MUSE DIAGNOSIS       Sinus rhythm  Voltage criteria for left ventricular hypertrophy  ST elevation, consider early repolarization, pericarditis, or injury  Abnormal ECG  When compared with ECG of 20-JAN-2020 13:18,  Sinus rhythm has replaced Atrial fibrillation  Criteria for Anteroseptal infarct are no longer Present  T wave inversion more evident in Inferior leads  Confirmed by CLAUDIA BLACKWOOD MD LOC:[facility] ([id number]) on 1/24/2020 4:11:00 PM            Problem List:  Patient Active Problem List   Diagnosis   ? Essential hypertension   ? Persistent atrial fibrillation   ? Osteoarthritis   ? Mild intermittent asthma   ? Type 2 diabetes mellitus without complication, without long-term current use of insulin (H)   ? Persistent insomnia   ? Total knee replacement status   ? Stage III chronic  kidney disease   ? SHARA on CPAP   ? Morbid obesity (H)   ? Status post ablation operation for arrhythmia   ? Paroxysmal atrial fibrillation (H)   ? Ankle arthritis   ? Adjustment disorder with mixed anxiety and depressed mood   ? Advanced directives, counseling/discussion     Revi  e  Physical Examination Review of Systems   w fystems  There were no vitals filed for this visit.  There is no height or weight on file to calculate BMI.  Wt Readings from Last 3 Encounters:   21 (!) 272 lb 4 oz (123.5 kg)   20 (!) 286 lb 14.4 oz (130.1 kg)   20 (!) 284 lb (128.8 kg)     General Appearance:   Alert, well-appearing and in no acute distress.   HEENT: Atraumatic, normocephalic.  No scleral icterus, normal conjunctivae; mucous membranes pink and moist.     Chest: Chest symmetric, spine straight.   Lungs:   Respirations unlabored: Lungs are clear to auscultation.   Cardiovascular:   Normal first and second heart sounds with no murmurs, rubs, or gallops.  Regular, regular.  Radial and posterior tibial pulses are intact.  Normal JVD, no edema on the left and 1+ swelling in the right ankle.       Extremities: No cyanosis or clubbing   Musculoskeletal: Moves all extremities   Skin: Warm, dry, intact.    Neurologic: Mood and affect are appropriate, alert and oriented to person, place, time, and situation                                                Medical History  Surgical History Family History Social History     Past Medical History:   Diagnosis Date   ? Asthma    ? Atrial fibrillation (H)    ? Diabetes mellitus (H)    ? Hypertension    ? Insomnia    ? Obesity    ? SHARA on CPAP    ? Osteoarthritis     Past Surgical History:   Procedure Laterality Date   ?  SECTION     ? CORRECTION HAMMER TOE Right 2021    Procedure: FLEXOR TENOTOMIES 3RD TOE HAMMER TOE CORRECTION;  Surgeon: Vidal Cohn MD;  Location: [facility];  Service: Orthopedics   ? EP ABLATION AFLUTTER  2019     Procedure: EP Ablation Atrial Flutter;  Surgeon: Dorian Garland MD;  Location: [facility];  Service: Cardiology   ? EP ABLATION PVI Left 2019    Procedure: EP Ablation PVI;  Surgeon: Dorian Garland MD;  Location: [facility];  Service: Cardiology   ? ORIF ANKLE FRACTURE Right 2021    Procedure: OPEN REDUCTION INTERNAL FIXATION MEDIAL MALLEOLUS FRACTURE, ANKLE RIGHT;  Surgeon: Vidal Cohn MD;  Location: [facility];  Service: Orthopedics   ? MS TOTAL ANKLE REPLACEMENT Right 2021    Procedure: RIGHT TOTAL ANKLE ARTHOPLASTY, DEBRIDE SUBTALAR JOINT;  Surgeon: Vidal Cohn MD;  Location: [facility] OR;  Service: Orthopedics   ? REPLACEMENT TOTAL KNEE BILATERAL     ? SHOULDER ARTHROSCOPY W/ ROTATOR CUFF REPAIR Right     Family History   Problem Relation Age of Onset   ? Ovarian cancer Mother 82   ? Lung cancer Father 75   ? Sleep apnea Father    ? Snoring Father    ? Brain cancer Sister 57        Brain Ca x 1 year   ? No Medical Problems Brother    ? No Medical Problems Sister    ? No Medical Problems Sister     Social History     Tobacco Use   Smoking Status Former Smoker   ? Packs/day: 0.00   ? Types: Cigarettes   ? Quit date: 1981   ? Years since quittin.0   Smokeless Tobacco Never Used     Social History     Substance and Sexual Activity   Alcohol Use Yes    Comment: Rare (2-3 month)        Medications  Allergies     Current Outpatient Medications   Medication Sig Dispense Refill   ? acetaminophen (TYLENOL) 500 MG tablet Take 1-2 tablets (500-1,000 mg total) by mouth every 4 (four) hours as needed.  0   ? cholecalciferol, vitamin D3, 5,000 unit Tab Take 5,000 Units by mouth daily.     ? cranberry 500 mg cap Take 1 capsule by mouth daily.     ? ELIQUIS 5 mg Tab tablet TAKE 1 TABLET(5 MG) BY MOUTH TWICE DAILY 180 tablet 0   ? fluticasone propion-salmeteroL (ADVAIR) 250-50 mcg/dose DISKUS Inhale 1 puff daily as needed (when sick).     ?  hydroCHLOROthiazide (HYDRODIURIL) 25 MG tablet Take 25 mg by mouth daily.     ? hydrOXYzine pamoate (VISTARIL) 25 MG capsule Take 1 capsule (25 mg total) by mouth every 6 (six) hours as needed (Itching). 20 capsule 0   ? levalbuterol (XOPENEX HFA) 45 mcg/actuation inhaler Inhale 1-2 puffs every 4 (four) hours as needed for wheezing.     ? losartan (COZAAR) 100 MG tablet Take 100 mg by mouth daily.     ? metFORMIN (GLUCOPHAGE) 500 MG tablet Take 500 mg by mouth daily with breakfast.      ? oxyCODONE (ROXICODONE) 5 MG immediate release tablet Take 1-2 tablets (5-10 mg total) by mouth every 4 (four) hours as needed. 30 tablet 0   ? PARoxetine (PAXIL) 10 MG tablet Take 10 mg by mouth at bedtime.     ? simvastatin (ZOCOR) 20 MG tablet Take 20 mg by mouth at bedtime.         3   ? sotaloL (BETAPACE) 80 MG tablet TAKE 1 TABLET BY MOUTH TWICE DAILY 180 tablet 1   ? traZODone (DESYREL) 50 MG tablet TK 1 T PO AT BEDTIME       No current facility-administered medications for this visit.       Allergies   Allergen Reactions   ? Ace Inhibitors Cough   ? Cortisone Acetate Other (See Comments)     flushed   ? Methylprednisolone Anxiety     Turns red      Medical, surgical, family, social history, and medications were all reviewed and updated as necessary.   Lab Results    Chemistry/lipid CBC Cardiac Enzymes/BNP/TSH/INR     Lab Results   Component Value Date    CREATININE 0.97 02/20/2021    BUN 18 02/20/2021     02/20/2021    K 4.6 02/20/2021     02/20/2021    CO2 32 (H) 02/20/2021     Creatinine (mg/dL)   Date Value   02/20/2021 0.97   12/03/2019 1.05   01/08/2018 0.86   02/02/2010 1.00     No results found for: BNP Lab Results   Component Value Date    WBC 10.9 02/20/2021    HGB 11.0 (L) 02/20/2021    HCT 35.1 02/20/2021    MCV 90 02/20/2021     02/20/2021     Lab Results   Component Value Date    INR 1.16 (H) 05/31/2012      Lab Results   Component Value Date    HDL 37 (L) 03/27/2012    LDLCALC 99 03/27/2012     TRIG 114 03/27/2012          Total Time- 30 minutes spent on date of encounter doing chart review, history and exam, documentation and further activities as noted above.  This note has been dictated using voice recognition software. Any grammatical, typographical, or context distortions are unintentional and inherent to the software.

## 2021-07-06 VITALS
HEART RATE: 60 BPM | SYSTOLIC BLOOD PRESSURE: 106 MMHG | DIASTOLIC BLOOD PRESSURE: 60 MMHG | WEIGHT: 273 LBS | HEIGHT: 68 IN | RESPIRATION RATE: 18 BRPM | BODY MASS INDEX: 41.37 KG/M2

## 2021-07-23 ENCOUNTER — OFFICE VISIT (OUTPATIENT)
Dept: FAMILY MEDICINE | Facility: CLINIC | Age: 76
End: 2021-07-23
Payer: COMMERCIAL

## 2021-07-23 VITALS
OXYGEN SATURATION: 98 % | BODY MASS INDEX: 42.19 KG/M2 | HEART RATE: 61 BPM | TEMPERATURE: 98 F | SYSTOLIC BLOOD PRESSURE: 112 MMHG | WEIGHT: 278.4 LBS | HEIGHT: 68 IN | DIASTOLIC BLOOD PRESSURE: 70 MMHG | RESPIRATION RATE: 16 BRPM

## 2021-07-23 DIAGNOSIS — E66.01 MORBID OBESITY (H): ICD-10-CM

## 2021-07-23 DIAGNOSIS — E11.9 TYPE 2 DIABETES MELLITUS WITHOUT COMPLICATION, WITHOUT LONG-TERM CURRENT USE OF INSULIN (H): ICD-10-CM

## 2021-07-23 DIAGNOSIS — I10 HTN, GOAL BELOW 140/90: ICD-10-CM

## 2021-07-23 DIAGNOSIS — N18.31 STAGE 3A CHRONIC KIDNEY DISEASE (H): ICD-10-CM

## 2021-07-23 DIAGNOSIS — J45.20 MILD INTERMITTENT ASTHMA WITHOUT COMPLICATION: ICD-10-CM

## 2021-07-23 DIAGNOSIS — F43.23 ADJUSTMENT DISORDER WITH MIXED ANXIETY AND DEPRESSED MOOD: ICD-10-CM

## 2021-07-23 DIAGNOSIS — Z00.00 ENCOUNTER FOR MEDICARE ANNUAL WELLNESS EXAM: Primary | ICD-10-CM

## 2021-07-23 LAB
ALBUMIN SERPL-MCNC: 3.2 G/DL (ref 3.4–5)
ALP SERPL-CCNC: 60 U/L (ref 40–150)
ALT SERPL W P-5'-P-CCNC: 21 U/L (ref 0–50)
ANION GAP SERPL CALCULATED.3IONS-SCNC: 5 MMOL/L (ref 3–14)
AST SERPL W P-5'-P-CCNC: 16 U/L (ref 0–45)
BILIRUB SERPL-MCNC: 0.5 MG/DL (ref 0.2–1.3)
BUN SERPL-MCNC: 23 MG/DL (ref 7–30)
CALCIUM SERPL-MCNC: 9.1 MG/DL (ref 8.5–10.1)
CHLORIDE BLD-SCNC: 104 MMOL/L (ref 94–109)
CHOLEST SERPL-MCNC: 129 MG/DL
CO2 SERPL-SCNC: 30 MMOL/L (ref 20–32)
CREAT SERPL-MCNC: 1.08 MG/DL (ref 0.52–1.04)
CREAT UR-MCNC: 149 MG/DL
FASTING STATUS PATIENT QL REPORTED: NORMAL
GFR SERPL CREATININE-BSD FRML MDRD: 51 ML/MIN/1.73M2
GLUCOSE BLD-MCNC: 118 MG/DL (ref 70–99)
HBA1C MFR BLD: 6.2 % (ref 0–5.6)
HDLC SERPL-MCNC: 52 MG/DL
LDLC SERPL CALC-MCNC: 58 MG/DL
MICROALBUMIN UR-MCNC: 14 MG/DL
MICROALBUMIN/CREAT UR: 9.4 MG/G CR (ref 0–25)
NONHDLC SERPL-MCNC: 77 MG/DL
POTASSIUM BLD-SCNC: 4.2 MMOL/L (ref 3.4–5.3)
PROT SERPL-MCNC: 7.6 G/DL (ref 6.8–8.8)
SODIUM SERPL-SCNC: 139 MMOL/L (ref 133–144)
TRIGL SERPL-MCNC: 95 MG/DL

## 2021-07-23 PROCEDURE — 99214 OFFICE O/P EST MOD 30 MIN: CPT | Mod: 25 | Performed by: FAMILY MEDICINE

## 2021-07-23 PROCEDURE — 80053 COMPREHEN METABOLIC PANEL: CPT | Performed by: FAMILY MEDICINE

## 2021-07-23 PROCEDURE — 83036 HEMOGLOBIN GLYCOSYLATED A1C: CPT | Performed by: FAMILY MEDICINE

## 2021-07-23 PROCEDURE — 99397 PER PM REEVAL EST PAT 65+ YR: CPT | Performed by: FAMILY MEDICINE

## 2021-07-23 PROCEDURE — 36415 COLL VENOUS BLD VENIPUNCTURE: CPT | Performed by: FAMILY MEDICINE

## 2021-07-23 PROCEDURE — 82043 UR ALBUMIN QUANTITATIVE: CPT | Performed by: FAMILY MEDICINE

## 2021-07-23 PROCEDURE — 80061 LIPID PANEL: CPT | Performed by: FAMILY MEDICINE

## 2021-07-23 PROCEDURE — 82607 VITAMIN B-12: CPT | Performed by: FAMILY MEDICINE

## 2021-07-23 RX ORDER — SIMVASTATIN 20 MG
20 TABLET ORAL AT BEDTIME
Qty: 90 TABLET | Refills: 3 | Status: SHIPPED | OUTPATIENT
Start: 2021-07-23 | End: 2022-06-20

## 2021-07-23 RX ORDER — HYDROCHLOROTHIAZIDE 25 MG/1
25 TABLET ORAL DAILY
Qty: 90 TABLET | Refills: 3 | Status: SHIPPED | OUTPATIENT
Start: 2021-07-23 | End: 2021-10-12

## 2021-07-23 RX ORDER — LOSARTAN POTASSIUM 100 MG/1
100 TABLET ORAL DAILY
Qty: 90 TABLET | Refills: 3 | Status: SHIPPED | OUTPATIENT
Start: 2021-07-23 | End: 2021-10-12

## 2021-07-23 RX ORDER — PAROXETINE 10 MG/1
TABLET, FILM COATED ORAL
Qty: 90 TABLET | Refills: 3 | Status: SHIPPED | OUTPATIENT
Start: 2021-07-23 | End: 2022-09-15

## 2021-07-23 RX ORDER — LEVALBUTEROL TARTRATE 45 UG/1
2 AEROSOL, METERED ORAL EVERY 8 HOURS PRN
Qty: 15 G | Refills: 3 | Status: SHIPPED | OUTPATIENT
Start: 2021-07-23 | End: 2022-09-15

## 2021-07-23 ASSESSMENT — MIFFLIN-ST. JEOR: SCORE: 1811.31

## 2021-07-23 NOTE — PROGRESS NOTES
"    Assessment & Plan     Encounter for Medicare annual wellness exam       Type 2 diabetes mellitus without complication, without long-term current use of insulin (H)  Well controlled    - Comprehensive metabolic panel; Future  - Lipid panel reflex to direct LDL Fasting; Future  - Vitamin B12; Future  - Hemoglobin A1c; Future  - Albumin Random Urine Quantitative with Creat Ratio  - Comprehensive metabolic panel  - Lipid panel reflex to direct LDL Fasting  - Vitamin B12  - Hemoglobin A1c  - metFORMIN (GLUCOPHAGE) 500 MG tablet; TAKE 1 TABLET(500 MG) BY MOUTH DAILY WITH DINNER  - simvastatin (ZOCOR) 20 MG tablet; Take 1 tablet (20 mg) by mouth At Bedtime  - OFFICE/OUTPT VISIT,EST,LEVL IV    Morbid obesity (H)   contributes to overall heal and comorbidities  - OFFICE/OUTPT VISIT,ESTLEVL IV    Mild intermittent asthma without complication     - levalbuterol (XOPENEX HFA) 45 MCG/ACT inhaler; Inhale 2 puffs into the lungs every 8 hours as needed for shortness of breath / dyspnea  - fluticasone-salmeterol (ADVAIR DISKUS) 250-50 MCG/DOSE inhaler; Inhale 1 puff into the lungs 2 times daily  - OFFICE/OUTPT VISIT,ESTLEVL IV    HTN, goal below 140/90     - losartan (COZAAR) 100 MG tablet; Take 1 tablet (100 mg) by mouth daily  - hydrochlorothiazide (HYDRODIURIL) 25 MG tablet; Take 1 tablet (25 mg) by mouth daily  - OFFICE/OUTPT VISIT,EST,LEVL IV    Stage 3a chronic kidney disease     - OFFICE/OUTPT VISIT,EST,LEVL IV    Adjustment disorder with mixed anxiety and depressed mood     - PARoxetine (PAXIL) 10 MG tablet; TAKE 1 TABLET(10 MG) BY MOUTH AT BEDTIME  - OFFICE/OUTPT VISIT,ESTDAHIANAL IV             BMI:   Estimated body mass index is 42.33 kg/m  as calculated from the following:    Height as of this encounter: 1.727 m (5' 8\").    Weight as of this encounter: 126.3 kg (278 lb 6.4 oz).   Weight management plan: Discussed healthy diet and exercise guidelines        No follow-ups on file.    Adeola Stockton MD  [facility] " [facility]    Amber VELASQUEZ is a 76 year old who presents for the following health issues  HPI     Diabetes Follow-up      How often are you checking your blood sugar? Once a week    What concerns do you have today about your diabetes? None     Do you have any of these symptoms? (Select all that apply)  No numbness or tingling in feet.  No redness, sores or blisters on feet.  No complaints of excessive thirst.  No reports of blurry vision.  No significant changes to weight.      Had right total ankle replacement in February and has had great success with that . Being on the knee scooter has resulted in some back pain and shoulder pain.       Hyperlipidemia Follow-Up      Are you regularly taking any medication or supplement to lower your cholesterol?   Yes- simvastatin    Are you having muscle aches or other side effects that you think could be caused by your cholesterol lowering medication?  No    Hypertension Follow-up      Do you check your blood pressure regularly outside of the clinic? Yes     Are you following a low salt diet? Yes    Are your blood pressures ever more than 140 on the top number (systolic) OR more   than 90 on the bottom number (diastolic), for example 140/90? No    BP Readings from Last 2 Encounters:   07/23/21 112/70   06/23/21 (!) 156/94     Hemoglobin A1C (%)   Date Value   02/20/2021 6.2 (H)   10/01/2020 6.3 (H)   12/16/2019 6.0 (H)     LDL Cholesterol Calculated (mg/dL)   Date Value   10/01/2020 48   03/21/2019 53         How many servings of fruits and vegetables do you eat daily?  2-3    On average, how many sweetened beverages do you drink each day (Examples: soda, juice, sweet tea, etc.  Do NOT count diet or artificially sweetened beverages)?   0    How many days per week do you exercise enough to make your heart beat faster? 3 or less    How many minutes a day do you exercise enough to make your heart beat faster? 9 or less  How many days per week do you miss  "taking your medication? Occasionally     What makes it hard for you to take your medications?  remembering to take    Annual Wellness Visit    Patient has been advised of split billing requirements and indicates understanding: Yes     Are you in the first 12 months of your Medicare Part B coverage?  No    Physical Health:    In general, how would you rate your overall physical health? fair    Outside of work, how many days during the week do you exercise?none    Outside of work, approximately how many minutes a day do you exercise?not applicable    If you drink alcohol do you typically have >3 drinks per day or >7 drinks per week? No    Do you usually eat at least 4 servings of fruit and vegetables a day, include whole grains & fiber and avoid regularly eating high fat or \"junk\" foods? NO    Do you have any problems taking medications regularly? No    Do you have any side effects from medications? none    Needs assistance for the following daily activities: no assistance needed    Which of the following safety concerns are present in your home?  none identified     Hearing impairment: Yes, Difficulty following a conversation in a noisy restaurant or crowded room.    Feel that people are mumbling or not speaking clearly.    Need to ask people to speak up or repeat themselves.    Difficulty understanding soft or whispered speech.    In the past 6 months, have you been bothered by leaking of urine? yes    Mental Health:    In general, how would you rate your overall mental or emotional health? good  PHQ-2 Score:      Do you feel safe in your environment? Yes    Have you ever done Advance Care Planning? (For example, a Health Directive, POLST, or a discussion with a medical provider or your loved ones about your wishes)? Yes, advance care planning is on file.    Fall risk:  Fallen 2 or more times in the past year?: No  Any fall with injury in the past year?: No    Cognitive Screenin) Repeat 3 items (Leader, " "Season, Table)    2) Clock draw: NORMAL  3) 3 item recall: Recalls 3 objects  Results: 3 items recalled: COGNITIVE IMPAIRMENT LESS LIKELY    Mini-CogTM Copyright S Theron. Licensed by the author for use in [facility] [employer]; reprinted with permission (heidi@[email or web address]). All rights reserved.      Do you have sleep apnea, excessive snoring or daytime drowsiness?: no    Current providers sharing in care for this patient include:   Patient Care Team:  Adeola Stockton MD as PCP - General  Adeola Stockton MD as Assigned PCP  Lida Beach APRN CNP as Assigned Heart and Vascular Provider    Patient has been advised of split billing requirements and indicates understanding: Yes      Review of Systems   Constitutional, HEENT, cardiovascular, pulmonary, gi and gu systems are negative, except as otherwise noted.      Objective    /70   Pulse 61   Temp 98  F (36.7  C) (Tympanic)   Resp 16   Ht 1.727 m (5' 8\")   Wt 126.3 kg (278 lb 6.4 oz)   SpO2 98%   BMI 42.33 kg/m    Body mass index is 42.33 kg/m .  Physical Exam   GENERAL: healthy, alert and no distress  NECK: no adenopathy, no asymmetry, masses, or scars and thyroid normal to palpation  RESP: lungs clear to auscultation - no rales, rhonchi or wheezes  CV: regular rate and rhythm, normal S1 S2, no S3 or S4, no murmur, click or rub, no peripheral edema and peripheral pulses strong  ABDOMEN: soft, nontender, no hepatosplenomegaly, no masses and bowel sounds normal  MS: no gross musculoskeletal defects noted, no edema  Some swelling and hyperpigmentation over left mid tibia (recent injury and cellulitis)    Results for orders placed or performed in visit on 07/23/21 (from the past 24 hour(s))   Hemoglobin A1c   Result Value Ref Range    Hemoglobin A1C 6.2 (H) 0.0 - 5.6 %               "

## 2021-07-24 LAB — VIT B12 SERPL-MCNC: 513 PG/ML (ref 193–986)

## 2021-09-25 ENCOUNTER — HEALTH MAINTENANCE LETTER (OUTPATIENT)
Age: 76
End: 2021-09-25

## 2021-10-07 DIAGNOSIS — I10 HTN, GOAL BELOW 140/90: ICD-10-CM

## 2021-10-07 DIAGNOSIS — I48.19 PERSISTENT ATRIAL FIBRILLATION (H): Primary | ICD-10-CM

## 2021-10-07 RX ORDER — APIXABAN 5 MG/1
TABLET, FILM COATED ORAL
Qty: 180 TABLET | Refills: 3 | Status: SHIPPED | OUTPATIENT
Start: 2021-10-07 | End: 2022-10-13

## 2021-10-12 RX ORDER — HYDROCHLOROTHIAZIDE 25 MG/1
TABLET ORAL
Qty: 90 TABLET | Refills: 1 | Status: SHIPPED | OUTPATIENT
Start: 2021-10-12 | End: 2022-09-19

## 2021-10-12 RX ORDER — LOSARTAN POTASSIUM 100 MG/1
TABLET ORAL
Qty: 90 TABLET | Refills: 1 | Status: SHIPPED | OUTPATIENT
Start: 2021-10-12 | End: 2022-09-19

## 2021-10-12 NOTE — TELEPHONE ENCOUNTER
Check with pharmacy, these refills were sent for one year on 7/23/2021 at her wellness visit.    If they don't have the refills on file, can refill x 90 days with 2 refills.    Adeola Stockton M.D.

## 2021-12-02 DIAGNOSIS — G47.00 PERSISTENT INSOMNIA: ICD-10-CM

## 2021-12-06 RX ORDER — TRAZODONE HYDROCHLORIDE 50 MG/1
TABLET, FILM COATED ORAL
Qty: 90 TABLET | Refills: 1 | Status: SHIPPED | OUTPATIENT
Start: 2021-12-06 | End: 2022-04-27

## 2021-12-06 NOTE — TELEPHONE ENCOUNTER
"Routing refill request to provider for review/approval because:  Drug interaction warning    Requested Prescriptions   Pending Prescriptions Disp Refills     traZODone (DESYREL) 50 MG tablet [Pharmacy Med Name: TRAZODONE 50MG TABLETS] 90 tablet 1     Sig: TAKE 1 TABLET(50 MG) BY MOUTH AT BEDTIME       Serotonin Modulators Passed - 12/2/2021  6:46 PM        Passed - Recent (12 mo) or future (30 days) visit within the authorizing provider's specialty     Patient has had an office visit with the authorizing provider or a provider within the authorizing providers department within the previous 12 mos or has a future within next 30 days. See \"Patient Info\" tab in inbasket, or \"Choose Columns\" in Meds & Orders section of the refill encounter.              Passed - Medication is active on med list        Passed - Patient is age 18 or older        Passed - No active pregnancy on record        Passed - No positive pregnancy test in past 12 months                   "

## 2021-12-21 DIAGNOSIS — E11.9 TYPE 2 DIABETES MELLITUS WITHOUT COMPLICATION, WITHOUT LONG-TERM CURRENT USE OF INSULIN (H): ICD-10-CM

## 2022-03-12 ENCOUNTER — HEALTH MAINTENANCE LETTER (OUTPATIENT)
Age: 77
End: 2022-03-12

## 2022-04-25 ENCOUNTER — TRANSFERRED RECORDS (OUTPATIENT)
Dept: HEALTH INFORMATION MANAGEMENT | Facility: CLINIC | Age: 77
End: 2022-04-25
Payer: COMMERCIAL

## 2022-05-25 ENCOUNTER — OFFICE VISIT (OUTPATIENT)
Dept: ORTHOPEDICS | Facility: CLINIC | Age: 77
End: 2022-05-25
Payer: COMMERCIAL

## 2022-05-25 VITALS
HEIGHT: 68 IN | BODY MASS INDEX: 39.4 KG/M2 | DIASTOLIC BLOOD PRESSURE: 78 MMHG | WEIGHT: 260 LBS | SYSTOLIC BLOOD PRESSURE: 114 MMHG

## 2022-05-25 DIAGNOSIS — M54.2 CERVICALGIA: Primary | ICD-10-CM

## 2022-05-25 DIAGNOSIS — M19.019 SHOULDER ARTHRITIS: ICD-10-CM

## 2022-05-25 PROCEDURE — 99204 OFFICE O/P NEW MOD 45 MIN: CPT | Performed by: PEDIATRICS

## 2022-05-25 RX ORDER — OXYCODONE HYDROCHLORIDE 5 MG/1
5 TABLET ORAL EVERY 8 HOURS PRN
Qty: 6 TABLET | Refills: 0 | Status: SHIPPED | OUTPATIENT
Start: 2022-05-25 | End: 2022-05-28

## 2022-05-25 NOTE — LETTER
5/25/2022         RE: Romy Hurley  [address] [facility] [place] [address]        Dear Colleague,    Thank you for referring your patient, Romy Hurley, to the [facility] [place]. Please see a copy of my visit note below.    ASSESSMENT & PLAN    Romy was seen today for pain.    Diagnoses and all orders for this visit:    Cervicalgia  -     Physical Therapy Referral; Future  -     Orthopedic  Referral; Future  -     oxyCODONE (ROXICODONE) 5 MG tablet; Take 1 tablet (5 mg) by mouth every 8 hours as needed for severe pain Do not take with other sedating medications.    Shoulder arthritis  -     Physical Therapy Referral; Future  -     Orthopedic  Referral; Future  -     oxyCODONE (ROXICODONE) 5 MG tablet; Take 1 tablet (5 mg) by mouth every 8 hours as needed for severe pain Do not take with other sedating medications.      This issue is chronic and Unchanged.    We discussed these other possible diagnosis: Symptoms most likely shoulder arthritis, some pain into hand could be cervical radiculopathy, monitor symptoms after injection and physical therapy.    Plan:  - Today's Plan of Care:  Rehab: Physical Therapy: [facility] - [phone]  Referral for US guided injection  Discussed activity considerations and other supportive care including Ice/Heat, OTC and other topical medications as needed.    Oxycodone given for breakthrough severe pain only, PDMP reviewed without concerns. Do not take with other sedating medications.    -We also discussed other future treatment options:  Cervical work up - pending symptoms after injection    Follow Up: 6 - 8 weeks    Concerning signs and symptoms were reviewed.  The patient expressed understanding of this management plan and all questions were answered at this time.    Niurka Esquivel MD [facility]  Sports Medicine Physician  [facility]      -----  Chief Complaint   Patient presents  "with     Right Shoulder - Pain       SUBJECTIVE  Romy NOBLE Hurley is a/an 77 year old female who is seen as a self referral for evaluation of acute radiating right shoulder.     The patient is seen by themselves.  The patient is right handed     Onset: 2 - 3 month(s) ago. Reports insidious onset without acute precipitating event.  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    REVIEW OF SYSTEMS:  Review of Systems  Skin: no bruising, no swelling  Musculoskeletal: as above  Neurologic: no numbness, paresthesias  Remainder of review of systems is negative including constitutional, CV, pulmonary, GI, except as noted in HPI or medical history.    OBJECTIVE:  /78   Ht 1.727 m (5' 8\")   Wt 117.9 kg (260 lb)   BMI 39.53 kg/m     General: healthy, alert and in no distress  HEENT: no scleral icterus or conjunctival erythema  Skin: no suspicious lesions or rash. No jaundice.  CV: distal perfusion intact  Resp: normal respiratory effort without conversational dyspnea   Psych: normal mood and affect  Gait: normal steady gait with appropriate coordination and balance  Neuro: Normal light sensory exam of upper extremity    Cervical Spine Exam  Inspection:       No visible deformity    Posture:      rounded shoulders and upper back    Tender:      paraspinal muscles    Non-Tender:      remainder of cervical spine " area    Range of Motion:       Full active and passive ROM forward flexion, extension, lateral rotation, lateral flexion.    Painful Motions:      none    Strength:     C4 (shoulder shrug)  symmetric 5/5       C5 (shoulder abduction) symmetric 5/5       C6 (elbow flexion) symmetric 5/5       C7 (elbow extension) symmetric 5/5       C8 (finger abduction, thumb flexion) symmetric 5/5    Sensation:     grossly intact througout bilateral upper extremities    Special Tests:      neg (-) Spurling    Bilateral Shoulder exam  Inspection and Posture:       rounded shoulders and upper back    Skin:        no visible deformities    Tender:        subacromial space right    Non Tender:       remainder of shoulder bilateral    ROM:        forward flexion 160  right       abduction 160 right       internal rotation gluteal right       external rotation 45 right    Painful motions:       flexion right       elevation right    Strength:        abduction 4/5 right       flexion 4/5 right       internal rotation 5/5 bilateral       external rotation 5/5 bilateral    Impingement testing:       positive (+) Neer right       positive (+) Rivera right    Sensation:        normal sensation over shoulder and upper extremity     RADIOLOGY:  I independently visualized and reviewed these images with the patient  4 XR views of right shoulder reviewed from [facility] 4/25/2022: no acute bony abnormality, moderate degenerative changes with high riding humeral head  - will follow official read    Reviewed [facility] notes  Review of the result(s) of each unique test - XR             Again, thank you for allowing me to participate in the care of your patient.        Sincerely,        Niurka Esquivel MD

## 2022-05-25 NOTE — PROGRESS NOTES
ASSESSMENT & PLAN    Romy was seen today for pain.    Diagnoses and all orders for this visit:    Cervicalgia  -     Physical Therapy Referral; Future  -     Orthopedic  Referral; Future  -     oxyCODONE (ROXICODONE) 5 MG tablet; Take 1 tablet (5 mg) by mouth every 8 hours as needed for severe pain Do not take with other sedating medications.    Shoulder arthritis  -     Physical Therapy Referral; Future  -     Orthopedic  Referral; Future  -     oxyCODONE (ROXICODONE) 5 MG tablet; Take 1 tablet (5 mg) by mouth every 8 hours as needed for severe pain Do not take with other sedating medications.      This issue is chronic and Unchanged.    We discussed these other possible diagnosis: Symptoms most likely shoulder arthritis, some pain into hand could be cervical radiculopathy, monitor symptoms after injection and physical therapy.    Plan:  - Today's Plan of Care:  Rehab: Physical Therapy: [facility] - [phone]  Referral for US guided injection  Discussed activity considerations and other supportive care including Ice/Heat, OTC and other topical medications as needed.    Oxycodone given for breakthrough severe pain only, PDMP reviewed without concerns. Do not take with other sedating medications.    -We also discussed other future treatment options:  Cervical work up - pending symptoms after injection    Follow Up: 6 - 8 weeks    Concerning signs and symptoms were reviewed.  The patient expressed understanding of this management plan and all questions were answered at this time.    Niurka Esquivel MD [facility]  Sports Medicine Physician  [facility]      -----  Chief Complaint   Patient presents with     Right Shoulder - Pain       SUBJECTIVE  Roymnevaeh Hurley is a/an 77 year old female who is seen as a self referral for evaluation of acute radiating right shoulder.     The patient is seen by themselves.  The patient is right handed     Onset: 2 - 3 month(s) ago. Reports  "insidious onset without acute precipitating event.  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    REVIEW OF SYSTEMS:  Review of Systems  Skin: no bruising, no swelling  Musculoskeletal: as above  Neurologic: no numbness, paresthesias  Remainder of review of systems is negative including constitutional, CV, pulmonary, GI, except as noted in HPI or medical history.    OBJECTIVE:  /78   Ht 1.727 m (5' 8\")   Wt 117.9 kg (260 lb)   BMI 39.53 kg/m     General: healthy, alert and in no distress  HEENT: no scleral icterus or conjunctival erythema  Skin: no suspicious lesions or rash. No jaundice.  CV: distal perfusion intact  Resp: normal respiratory effort without conversational dyspnea   Psych: normal mood and affect  Gait: normal steady gait with appropriate coordination and balance  Neuro: Normal light sensory exam of upper extremity    Cervical Spine Exam  Inspection:       No visible deformity    Posture:      rounded shoulders and upper back    Tender:      paraspinal muscles    Non-Tender:      remainder of cervical spine area    Range of Motion:       Full active and passive ROM forward flexion, extension, lateral rotation, lateral flexion.    Painful Motions:      none    Strength:     C4 (shoulder shrug)  symmetric 5/5       C5 (shoulder abduction) symmetric 5/5       C6 (elbow flexion) symmetric " 5/5       C7 (elbow extension) symmetric 5/5       C8 (finger abduction, thumb flexion) symmetric 5/5    Sensation:     grossly intact througout bilateral upper extremities    Special Tests:      neg (-) Spurling    Bilateral Shoulder exam  Inspection and Posture:       rounded shoulders and upper back    Skin:        no visible deformities    Tender:        subacromial space right    Non Tender:       remainder of shoulder bilateral    ROM:        forward flexion 160  right       abduction 160 right       internal rotation gluteal right       external rotation 45 right    Painful motions:       flexion right       elevation right    Strength:        abduction 4/5 right       flexion 4/5 right       internal rotation 5/5 bilateral       external rotation 5/5 bilateral    Impingement testing:       positive (+) Neer right       positive (+) Rivera right    Sensation:        normal sensation over shoulder and upper extremity     RADIOLOGY:  I independently visualized and reviewed these images with the patient  4 XR views of right shoulder reviewed from [facility] 4/25/2022: no acute bony abnormality, moderate degenerative changes with high riding humeral head  - will follow official read    Reviewed [facility] notes  Review of the result(s) of each unique test - XR

## 2022-05-25 NOTE — PATIENT INSTRUCTIONS
We discussed these other possible diagnosis: Symptoms most likely shoulder arthritis, some pain into hand could be cervical radiculopathy, monitor symptoms after injection and physical therapy.    Plan:  - Today's Plan of Care:  [phone]  Referral for US guided injection  Discussed activity considerations and other supportive care including Ice/Heat, OTC and other topical medications as needed.    Oxycodone given for breakthrough severe pain only, PDMP reviewed    -We also discussed other future treatment options:  Cervical work up - pending symptoms after injection    Follow Up: 6 - 8 weeks    If you have any further questions for your physician or physician s care team you can call [phone] and use option 3 to leave a voice message. Calls received during business hours will be returned same day.

## 2022-05-26 ENCOUNTER — OFFICE VISIT (OUTPATIENT)
Dept: ORTHOPEDICS | Facility: CLINIC | Age: 77
End: 2022-05-26
Payer: COMMERCIAL

## 2022-05-26 VITALS — BODY MASS INDEX: 39.4 KG/M2 | HEIGHT: 68 IN | WEIGHT: 260 LBS

## 2022-05-26 DIAGNOSIS — M19.019 SHOULDER ARTHRITIS: Primary | ICD-10-CM

## 2022-05-26 PROCEDURE — 20611 DRAIN/INJ JOINT/BURSA W/US: CPT | Mod: RT | Performed by: FAMILY MEDICINE

## 2022-05-26 RX ADMIN — TRIAMCINOLONE ACETONIDE 40 MG: 40 INJECTION, SUSPENSION INTRA-ARTICULAR; INTRAMUSCULAR at 14:40

## 2022-05-26 RX ADMIN — ROPIVACAINE HYDROCHLORIDE 3 ML: 5 INJECTION, SOLUTION EPIDURAL; INFILTRATION; PERINEURAL at 14:40

## 2022-05-26 NOTE — PROGRESS NOTES
Romy Hurley  :  1945  DOS: 2022  MRN: [id number]    Sports Medicine Clinic Procedure    Ultrasound Guided Right Shoulder Intra-articular Glenohumeral Joint Injection    Clinical History: Gradual onset right posterior shoulder joint pain over the past 2 - 3 months    Diagnosis:   1. Shoulder arthritis      Referring Physician: Niurka Esquivel MD  Large Joint Injection/Arthocentesis: R glenohumeral joint    Date/Time: 2022 2:40 PM  Performed by: Nathen Delacruz DO  Authorized by: Nathen Delacruz DO     Indications:  Pain and osteoarthritis  Needle Size:  21 G  Guidance: ultrasound    Approach:  Posterolateral  Location:  Shoulder      Site:  R glenohumeral joint  Medications:  3 mL ropivacaine 5 MG/ML; 40 mg triamcinolone 40 MG/ML  Outcome:  Tolerated well, no immediate complications  Procedure discussed: discussed risks, benefits, and alternatives    Consent Given by:  Patient  Timeout: timeout called immediately prior to procedure    Prep: patient was prepped and draped in usual sterile fashion     Ultrasound images of procedure were permanently stored.         Impression:  Successful Right intra-articular shoulder injection.    Plan:  Follow up with Dr Esquivel as directed   Expectations and goals of CSI reviewed  Often 2-3 days for steroid effect, and can take up to two weeks for maximum effect  We discussed modified progressive pain-free activity as tolerated  Do not overuse in first two weeks if feeling better due to concern for vulnerability while steroid is working  Supportive care reviewed  All questions were answered today  Contact us with additional questions or concerns  Signs and sx of concern reviewed        Nathen Delacruz DO, CAQ  Primary Care Sports Medicine  [facility]

## 2022-05-26 NOTE — LETTER
2022         RE: Romy Hurley  [address] [facility] [place] [address]        Dear Colleague,    Thank you for referring your patient, Romy Hurley, to the [facility] [place]. Please see a copy of my visit note below.    Romy Hurley  :  1945  DOS: 2022  MRN: [id number]    Sports Medicine Clinic Procedure    Ultrasound Guided Right Shoulder Intra-articular Glenohumeral Joint Injection    Clinical History: Gradual onset right posterior shoulder joint pain over the past 2 - 3 months    Diagnosis:   1. Shoulder arthritis      Referring Physician: Niurka Esquivel MD  Large Joint Injection/Arthocentesis: R glenohumeral joint    Date/Time: 2022 2:40 PM  Performed by: Nathen Delacruz DO  Authorized by: Nathen Delacruz DO     Indications:  Pain and osteoarthritis  Needle Size:  21 G  Guidance: ultrasound    Approach:  Posterolateral  Location:  Shoulder      Site:  R glenohumeral joint  Medications:  3 mL ropivacaine 5 MG/ML; 40 mg triamcinolone 40 MG/ML  Outcome:  Tolerated well, no immediate complications  Procedure discussed: discussed risks, benefits, and alternatives    Consent Given by:  Patient  Timeout: timeout called immediately prior to procedure    Prep: patient was prepped and draped in usual sterile fashion     Ultrasound images of procedure were permanently stored.         Impression:  Successful Right intra-articular shoulder injection.    Plan:  Follow up with Dr Esquivel as directed   Expectations and goals of CSI reviewed  Often 2-3 days for steroid effect, and can take up to two weeks for maximum effect  We discussed modified progressive pain-free activity as tolerated  Do not overuse in first two weeks if feeling better due to concern for vulnerability while steroid is working  Supportive care reviewed  All questions were answered today  Contact us with additional questions or concerns  Signs and sx of  concern reviewed        Nathen Delacruz DO, ZUNILDA  Primary Care Sports Medicine  [facility]         Again, thank you for allowing me to participate in the care of your patient.        Sincerely,        Nathen Delacruz DO

## 2022-06-06 RX ORDER — ROPIVACAINE HYDROCHLORIDE 5 MG/ML
3 INJECTION, SOLUTION EPIDURAL; INFILTRATION; PERINEURAL
Status: DISCONTINUED | OUTPATIENT
Start: 2022-05-26 | End: 2023-07-27

## 2022-06-06 RX ORDER — TRIAMCINOLONE ACETONIDE 40 MG/ML
40 INJECTION, SUSPENSION INTRA-ARTICULAR; INTRAMUSCULAR
Status: DISCONTINUED | OUTPATIENT
Start: 2022-05-26 | End: 2023-07-27

## 2022-06-14 ENCOUNTER — HOSPITAL ENCOUNTER (OUTPATIENT)
Dept: PHYSICAL THERAPY | Facility: CLINIC | Age: 77
Setting detail: THERAPIES SERIES
Discharge: HOME | End: 2022-06-14
Attending: PEDIATRICS
Payer: COMMERCIAL

## 2022-06-14 DIAGNOSIS — M19.019 SHOULDER ARTHRITIS: ICD-10-CM

## 2022-06-14 DIAGNOSIS — M54.2 CERVICALGIA: ICD-10-CM

## 2022-06-14 PROCEDURE — 97110 THERAPEUTIC EXERCISES: CPT | Mod: GP | Performed by: PHYSICAL THERAPIST

## 2022-06-14 PROCEDURE — 97161 PT EVAL LOW COMPLEX 20 MIN: CPT | Mod: GP | Performed by: PHYSICAL THERAPIST

## 2022-06-14 PROCEDURE — 97140 MANUAL THERAPY 1/> REGIONS: CPT | Mod: GP | Performed by: PHYSICAL THERAPIST

## 2022-06-14 NOTE — PROGRESS NOTES
[facility]    OUTPATIENT PHYSICAL THERAPY ORTHOPEDIC EVALUATION  PLAN OF TREATMENT FOR OUTPATIENT REHABILITATION  (COMPLETE FOR INITIAL CLAIMS ONLY)  Patient's Last Name, First Name, M.I.  YOB: 1945  Romy Hurley    Provider s Name:  [facility]   Medical Record No.  [id number]   Start of Care Date:  06/14/22   Onset Date:  05/25/22   Type:     _X__PT   ___OT   ___SLP Medical Diagnosis:  Cervicalgia (M54.2)     Shoulder arthritis (M19.019)     PT Diagnosis:  R shoulder pain   Visits from SOC:  1      _________________________________________________________________________________  Plan of Treatment/Functional Goals:  joint mobilization, manual therapy, neuromuscular re-education, ROM, strengthening, stretching     Electrical stimulation, Cryotherapy, Hot packs, TENS, Traction, Ultrasound     Goals  Goal Identifier: ST HEP goal  Goal Description: Pt will be independent wtih her HEP in 2 weeks to be able to strengthen at home.  Target Date: 06/28/22    Goal Identifier: LT Strength goal  Goal Description: Pt will have 4/5 ER strength in 8 weeks to stabilize and support the area.  Target Date: 08/09/22    Goal Identifier: LT strength goal  Goal Description: Pt will have 5/5 shoulder strength with flex, abd, and IR in 8 weeks to improve her mobility and decrease pain.  Target Date: 08/09/22    Goal Identifier: ST ROM goal  Goal Description: Pt will actively be able to achieve 60 degrees of ER in 4 weeks to strengthen the area and allow full mobility.  Target Date: 07/12/22                                                Therapy Frequency:  2 times/Week  Predicted Duration of Therapy Intervention:  8    Aury Morley PT                 I CERTIFY THE NEED FOR THESE SERVICES FURNISHED UNDER        THIS PLAN OF TREATMENT AND WHILE UNDER MY CARE     (Physician  co-signature of this document indicates review and certification of the therapy plan).                       Certification Date From:  06/14/22   Certification Date To:  09/12/22    Referring Provider:  Dr. Esquivel    Initial Assessment        See Epic Evaluation Start of Care Date: 06/14/22

## 2022-06-14 NOTE — PROGRESS NOTES
06/14/22 0700   General Information   Type of Visit Initial OP Ortho PT Evaluation   Start of Care Date 06/14/22   Referring Physician Dr. Esquivel   Patient/Family Goals Statement decrease shoulder pain   Orders Evaluate and Treat   Date of Order 05/25/22   Certification Required? Yes   Medical Diagnosis Cervicalgia (M54.2)     Shoulder arthritis (M19.019)   Body Part(s)   Body Part(s) Shoulder   Presentation and Etiology   Pertinent history of current problem (include personal factors and/or comorbidities that impact the POC) Pt notes any type of lifting increases the pain in her R shoulder. She notes raising overhead or placing a pillow under her armpit decreases her Sx. R-hand dominant. Neck pain possibly related to BANKS R>L. The pt has difficulty lying on her back with her shoulder resting down. She has noted in the past some radiating Sx into the UE but notes they have been infrequent and not recent. She notes anterior, superior, and posterior shoulder pain to the area. The pt had a RC repair in 2011.   Impairments A. Pain;F. Decreased strength and endurance;L. Tingling;N. Headaches   Functional Limitations perform activities of daily living;perform desired leisure / sports activities   Symptom Location neck pain R>L; R shoulder pain   How/Where did it occur With repetition/overuse   Onset date of current episode/exacerbation 05/25/22   Chronicity Chronic   Pain rating (0-10 point scale) Best (/10);Worst (/10)   Best (/10) 3   Worst (/10) 10   Pain quality A. Sharp;C. Aching;F. Stabbing   Frequency of pain/symptoms A. Constant   Pain/symptoms are: Worse during the day   Pain/symptoms exacerbated by C. Lifting;B. Walking;D. Carrying;J. ADL;K. Home tasks   Pain/symptoms eased by E. Changing positions   Progression of symptoms since onset: Worsened   Current Level of Function   Patient role/employment history F. Retired   [facility]   Fall Risk Screen   Fall screen completed by PT   Have  you fallen 2 or more times in the past year? No   Have you fallen and had an injury in the past year? No   Is patient a fall risk? No   Abuse Screen (yes response referral indicated)   Feels Unsafe at Home or Work/School no   Feels Threatened by Someone no   Does Anyone Try to Keep You From Having Contact with Others or Doing Things Outside Your Home? no   Physical Signs of Abuse Present no   Shoulder Objective Findings   Side (if bilateral, select both right and left) Right   Cervical Screen (ROM, quadrant) 100% of motion with pinching in the neck with extension   Thoracic Mobility Screen equal full rotation   Scapulothoracic Rhythm shoulder elevation   Pec Minor (supine) Flexibility tight   Neer's Test +   Relocation Test -   Sulcus Test -   Crossover Test +   Palpation TrP to UT, neck extensors, supraspinatus;  - to bicep and deltoid   Accessory Motion/Joint Mobility good mobility   Posture R-side bend in the neck; elevated shoulder on the R   Right Shoulder Flexion AROM 170 degrees   Right Shoulder Abduction AROM 170 degrees with pain   Right Shoulder ER AROM 35 degrees of active in neutral   Right Shoulder ER PROM 80 degrees in 90 abd and in neutral   Right Shoulder IR AROM 80; able to reach T8 behind back   Right Shoulder IR PROM 80 degrees in 90 abd   Right Shoulder Flexion Strength 4+   Right Shoulder Abduction Strength 4- with pain to area   Right Shoulder ER Strength 3-   Right Shoulder IR Strength 4+   Right Shoulder Extension Strength 4-   Right Mid Trapezius Strength 4   Right Lower Trapezius Strength 2   Planned Therapy Interventions   Planned Therapy Interventions joint mobilization;manual therapy;neuromuscular re-education;ROM;strengthening;stretching   Planned Modality Interventions   Planned Modality Interventions Electrical stimulation;Cryotherapy;Hot packs;TENS;Traction;Ultrasound   Clinical Impression   Criteria for Skilled Therapeutic Interventions Met yes, treatment indicated   PT Diagnosis R  shoulder pain   Influenced by the following impairments decreased ROM, weakness   Functional limitations due to impairments lifting her coffee cup and reaching out to the side   Clinical Presentation Stable/Uncomplicated   Clinical Presentation Rationale current condition; chronic Sx with increase pain as time has gone on without relief   Clinical Decision Making (Complexity) Low complexity   Therapy Frequency 2 times/Week   Predicted Duration of Therapy Intervention (days/wks) 8   Risk & Benefits of therapy have been explained Yes   Patient, Family & other staff in agreement with plan of care Yes   Clinical Impression Comments Sx present similar to RC injury either tendon swelling or tear; not fully conclusive with most ROM and testing negative with some of the tests   ORTHO GOALS   PT Ortho Eval Goals 1;2;3;4   Ortho Goal 1   Goal Identifier ST HEP goal   Goal Description Pt will be independent wtih her HEP in 2 weeks to be able to strengthen at home.   Target Date 06/28/22   Ortho Goal 2   Goal Identifier LT Strength goal   Goal Description Pt will have 4/5 ER strength in 8 weeks to stabilize and support the area.   Target Date 08/09/22   Ortho Goal 3   Goal Identifier LT strength goal   Goal Description Pt will have 5/5 shoulder strength with flex, abd, and IR in 8 weeks to improve her mobility and decrease pain.   Target Date 08/09/22   Ortho Goal 4   Goal Identifier ST ROM goal   Goal Description Pt will actively be able to achieve 60 degrees of ER in 4 weeks to strengthen the area and allow full mobility.   Target Date 07/12/22   Total Evaluation Time   PT Eval, Low Complexity Minutes (86747) 20   Therapy Certification   Certification date from 06/14/22   Certification date to 09/12/22   Medical Diagnosis Cervicalgia (M54.2)     Shoulder arthritis (M19.019)       Aury Morley, PT, DPT

## 2022-06-21 ENCOUNTER — HOSPITAL ENCOUNTER (OUTPATIENT)
Dept: PHYSICAL THERAPY | Facility: CLINIC | Age: 77
Setting detail: THERAPIES SERIES
Discharge: HOME | End: 2022-06-21
Attending: PEDIATRICS
Payer: COMMERCIAL

## 2022-06-21 PROCEDURE — 97110 THERAPEUTIC EXERCISES: CPT | Mod: GP | Performed by: PHYSICAL THERAPIST

## 2022-06-21 PROCEDURE — 97140 MANUAL THERAPY 1/> REGIONS: CPT | Mod: GP | Performed by: PHYSICAL THERAPIST

## 2022-06-27 ENCOUNTER — MYC MEDICAL ADVICE (OUTPATIENT)
Dept: ORTHOPEDICS | Facility: CLINIC | Age: 77
End: 2022-06-27

## 2022-06-27 NOTE — TELEPHONE ENCOUNTER
Would recommend clinic follow up for repeat exam.  Can likely start with x-rays in clinic.    Niurka Esquivel MD

## 2022-06-29 ENCOUNTER — ANCILLARY PROCEDURE (OUTPATIENT)
Dept: GENERAL RADIOLOGY | Facility: CLINIC | Age: 77
End: 2022-06-29
Attending: PEDIATRICS
Payer: COMMERCIAL

## 2022-06-29 ENCOUNTER — OFFICE VISIT (OUTPATIENT)
Dept: ORTHOPEDICS | Facility: CLINIC | Age: 77
End: 2022-06-29

## 2022-06-29 VITALS
WEIGHT: 260 LBS | BODY MASS INDEX: 39.4 KG/M2 | HEIGHT: 68 IN | DIASTOLIC BLOOD PRESSURE: 74 MMHG | HEART RATE: 61 BPM | SYSTOLIC BLOOD PRESSURE: 115 MMHG

## 2022-06-29 DIAGNOSIS — M54.2 CERVICALGIA: Primary | ICD-10-CM

## 2022-06-29 DIAGNOSIS — M54.2 CERVICALGIA: ICD-10-CM

## 2022-06-29 DIAGNOSIS — M19.019 SHOULDER ARTHRITIS: ICD-10-CM

## 2022-06-29 PROCEDURE — 99214 OFFICE O/P EST MOD 30 MIN: CPT | Performed by: PEDIATRICS

## 2022-06-29 PROCEDURE — 72040 X-RAY EXAM NECK SPINE 2-3 VW: CPT | Mod: TC | Performed by: RADIOLOGY

## 2022-06-29 RX ORDER — METHOCARBAMOL 750 MG/1
750 TABLET, FILM COATED ORAL 3 TIMES DAILY PRN
Qty: 30 TABLET | Refills: 0 | Status: SHIPPED | OUTPATIENT
Start: 2022-06-29 | End: 2022-07-07

## 2022-06-29 NOTE — PATIENT INSTRUCTIONS
We discussed these other possible diagnosis: Some symptoms likely related to cervical radicular pain, given persistent symptoms despite physical therapy will obtain MRI.    Plan:  - Today's Plan of Care:  MRI of the Cervical Spine - Call [phone] to schedule MRI   Prescription Medication as directed: Methocarbamol (I reviewed the mechanism of action as well as risk/benefit profile of medications. Questions answered.)    Discussed activity considerations and other supportive care including Ice/Heat, OTC and other topical medications as needed.    -We also discussed other future treatment options:  Consideration of injection  Referral to Spine    Follow Up: In clinic with Dr. Esquivel after MRI (wait at least 1-2 days)    If you have any further questions for your physician or physician s care team you can call [phone] and use option 3 to leave a voice message. Calls received during business hours will be returned same day.

## 2022-06-29 NOTE — LETTER
6/29/2022         RE: Romy [place]  [address] [place] [id number]        Dear Colleague,    Thank you for referring your patient, Romy Hurley, to the [facility] [place]. Please see a copy of my visit note below.    ASSESSMENT & PLAN    Romy was seen today for follow up and pain.    Diagnoses and all orders for this visit:    Cervicalgia  -     XR Cervical Spine 2/3 vws; Future  -     MRI Cervical spine w/o contrast; Future  -     methocarbamol (ROBAXIN) 750 MG tablet; Take 1 tablet (750 mg) by mouth 3 times daily as needed for muscle spasms    Shoulder arthritis      This issue is chronic and Worsening.    We discussed these other possible diagnosis: Some symptoms likely related to cervical radicular pain, given persistent symptoms despite physical therapy will obtain MRI.    Plan:  - Today's Plan of Care:  MRI of the Cervical Spine - Call [phone] to schedule MRI   Prescription Medication as directed: Methocarbamol (I reviewed the mechanism of action as well as risk/benefit profile of medications. Questions answered.)    Discussed activity considerations and other supportive care including Ice/Heat, OTC and other topical medications as needed.    -We also discussed other future treatment options:  Consideration of injection  Referral to Spine    Follow Up: In clinic with Dr. Esquivel after MRI (wait at least 1-2 days)    Concerning signs and symptoms were reviewed.  The patient expressed understanding of this management plan and all questions were answered at this time.    Niurka Esquivel MD [facility]  Sports Medicine Physician  [facility]      SUBJECTIVE- Interim History June 29, 2022    Chief Complaint   Patient presents with     Neck - Follow Up, Pain       Romy Hurley is a 77 year old female who is seen in f/u up for    Cervicalgia  Shoulder arthritis. Since last visit on 5/5/22 patient reports continued pain. She still  "has neck and right sided pain. She is having a lot of trouble sleeping and wakes frequently. By the end of the day it is really bad. She is unable to do ADLs. She is having radiating pain down her right arm.   - Injection with Dr. Delacruz 2022 didn't really help.  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    REVIEW OF SYSTEMS:  Review of Systems  Skin: no bruising, no swelling  Musculoskeletal: as above  Neurologic: no numbness, paresthesias  Remainder of review of systems is negative including constitutional, CV, pulmonary, GI, except as noted in HPI or medical history.    OBJECTIVE:  /74   Pulse 61   Ht 1.727 m (5' 8\")   Wt 117.9 kg (260 lb)   BMI 39.53 kg/m       General: healthy, alert and in no distress  HEENT: no scleral icterus or conjunctival erythema  Skin: no suspicious lesions or rash. No jaundice.  CV: distal perfusion intact  Resp: normal respiratory effort without conversational dyspnea   Psych: normal mood and affect  Gait: normal steady gait with appropriate coordination and balance  Neuro: Normal light sensory exam of upper extremity     Cervical Spine Exam  Inspection:       No visible deformity     Posture:      rounded shoulders and upper back     Tender:      paraspinal muscles     Non-Tender:      remainder of cervical spine area     Range of Motion:       Full active and passive ROM forward " flexion, extension, lateral rotation, lateral flexion.     Painful Motions:      extension and lateral rotation     Strength:     C4 (shoulder shrug)  symmetric 5/5       C5 (shoulder abduction) symmetric 5/5       C6 (elbow flexion) symmetric 5/5       C7 (elbow extension) symmetric 5/5       C8 (finger abduction, thumb flexion) symmetric 5/5     Sensation:     grossly intact througout bilateral upper extremities     Special Tests:      neg (-) Spurling     Bilateral Shoulder exam  Inspection and Posture:       rounded shoulders and upper back     Skin:        no visible deformities     Tender:        subacromial space right     Non Tender:       remainder of shoulder bilateral     ROM:        forward flexion 160  right       abduction 160 right       internal rotation gluteal right       external rotation 45 right     Painful motions:       flexion right       elevation right     Strength:        abduction 4/5 right       flexion 4/5 right       internal rotation 5/5 bilateral       external rotation 5/5 bilateral     Sensation:        normal sensation over shoulder and upper extremity     RADIOLOGY:  Final results and radiologist's interpretation, available in the [facility] health record.  Images were reviewed with the patient in the office today.  My personal interpretation of the performed imagin XR views of cervical spine reviewed: no acute bony abnormality, degenerative change  - will follow official read      4 XR views of right shoulder reviewed from [facility] 2022: no acute bony abnormality, moderate degenerative changes with high riding humeral head    Review of the result(s) of each unique test - XRs             Again, thank you for allowing me to participate in the care of your patient.        Sincerely,        Niurka Esquivel MD

## 2022-06-29 NOTE — PROGRESS NOTES
ASSESSMENT & PLAN    Romy was seen today for follow up and pain.    Diagnoses and all orders for this visit:    Cervicalgia  -     XR Cervical Spine 2/3 vws; Future  -     MRI Cervical spine w/o contrast; Future  -     methocarbamol (ROBAXIN) 750 MG tablet; Take 1 tablet (750 mg) by mouth 3 times daily as needed for muscle spasms    Shoulder arthritis      This issue is chronic and Worsening.    We discussed these other possible diagnosis: Some symptoms likely related to cervical radicular pain, given persistent symptoms despite physical therapy will obtain MRI.    Plan:  - Today's Plan of Care:  MRI of the Cervical Spine - Call [phone] to schedule MRI   Prescription Medication as directed: Methocarbamol (I reviewed the mechanism of action as well as risk/benefit profile of medications. Questions answered.)    Discussed activity considerations and other supportive care including Ice/Heat, OTC and other topical medications as needed.    -We also discussed other future treatment options:  Consideration of injection  Referral to Spine    Follow Up: In clinic with Dr. Esquivel after MRI (wait at least 1-2 days)    Concerning signs and symptoms were reviewed.  The patient expressed understanding of this management plan and all questions were answered at this time.    Niurka Esquivel MD [facility]  Sports Medicine Physician  [facility]      SUBJECTIVE- Interim History June 29, 2022    Chief Complaint   Patient presents with     Neck - Follow Up, Pain       Romy Hurley is a 77 year old female who is seen in f/u up for    Cervicalgia  Shoulder arthritis. Since last visit on 5/5/22 patient reports continued pain. She still has neck and right sided pain. She is having a lot of trouble sleeping and wakes frequently. By the end of the day it is really bad. She is unable to do ADLs. She is having radiating pain down her right arm.   - Injection with Dr. Delacruz 5/26/2022 didn't really help.  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    REVIEW OF SYSTEMS:  Review of Systems  Skin: no bruising, no swelling  Musculoskeletal: as above  Neurologic: no numbness, paresthesias  Remainder of review of systems is negative including constitutional, CV, pulmonary, GI, except as noted in HPI or medical history.    OBJECTIVE:  /74   Pulse 61   Ht 1.727 m (5' 8\")   Wt 117.9 kg (260 lb)   BMI 39.53 kg/m       General: healthy, alert and in no distress  HEENT: no scleral icterus or conjunctival erythema  Skin: no suspicious lesions or rash. No jaundice.  CV: distal perfusion intact  Resp: normal respiratory effort without conversational dyspnea   Psych: normal mood and affect  Gait: normal steady gait with appropriate coordination and balance  Neuro: Normal light sensory exam of upper extremity     Cervical Spine Exam  Inspection:       No visible deformity     Posture:      rounded shoulders and upper back     Tender:      paraspinal muscles     Non-Tender:      remainder of cervical spine area     Range of Motion:       Full active and passive ROM forward flexion, extension, lateral rotation, lateral flexion.     Painful Motions:      extension and lateral rotation     Strength:     C4 (shoulder shrug)  symmetric 5/5       C5 (shoulder abduction) symmetric 5/5       C6 (elbow flexion) symmetric 5/5       C7 (elbow extension) " symmetric 5/5       C8 (finger abduction, thumb flexion) symmetric 5/5     Sensation:     grossly intact througout bilateral upper extremities     Special Tests:      neg (-) Spurling     Bilateral Shoulder exam  Inspection and Posture:       rounded shoulders and upper back     Skin:        no visible deformities     Tender:        subacromial space right     Non Tender:       remainder of shoulder bilateral     ROM:        forward flexion 160  right       abduction 160 right       internal rotation gluteal right       external rotation 45 right     Painful motions:       flexion right       elevation right     Strength:        abduction 4/5 right       flexion 4/5 right       internal rotation 5/5 bilateral       external rotation 5/5 bilateral     Sensation:        normal sensation over shoulder and upper extremity     RADIOLOGY:  Final results and radiologist's interpretation, available in the [facility] health record.  Images were reviewed with the patient in the office today.  My personal interpretation of the performed imagin XR views of cervical spine reviewed: no acute bony abnormality, degenerative change  - will follow official read      4 XR views of right shoulder reviewed from [facility] 2022: no acute bony abnormality, moderate degenerative changes with high riding humeral head    Review of the result(s) of each unique test - XRs

## 2022-07-05 ENCOUNTER — OFFICE VISIT (OUTPATIENT)
Dept: CARDIOLOGY | Facility: CLINIC | Age: 77
End: 2022-07-05
Payer: COMMERCIAL

## 2022-07-05 VITALS
SYSTOLIC BLOOD PRESSURE: 138 MMHG | WEIGHT: 277 LBS | BODY MASS INDEX: 41.98 KG/M2 | DIASTOLIC BLOOD PRESSURE: 82 MMHG | HEART RATE: 60 BPM | RESPIRATION RATE: 12 BRPM | HEIGHT: 68 IN

## 2022-07-05 DIAGNOSIS — N18.31 STAGE 3A CHRONIC KIDNEY DISEASE (H): ICD-10-CM

## 2022-07-05 DIAGNOSIS — I48.0 PAROXYSMAL ATRIAL FIBRILLATION (H): Primary | ICD-10-CM

## 2022-07-05 DIAGNOSIS — I10 ESSENTIAL HYPERTENSION: ICD-10-CM

## 2022-07-05 PROCEDURE — 93000 ELECTROCARDIOGRAM COMPLETE: CPT | Performed by: INTERNAL MEDICINE

## 2022-07-05 PROCEDURE — 99214 OFFICE O/P EST MOD 30 MIN: CPT | Performed by: NURSE PRACTITIONER

## 2022-07-05 NOTE — LETTER
7/5/2022    Adeola Stockton MD  [address]  [facility] [address]    RE: Romy A Xavi       Dear Colleague,     I had the pleasure of seeing Romy Hurley in the [facility].    Thank you, Dr. Stockton, for asking the [facility] team to see Ms. Romy Hurley to evaluate paroxysmal atrial fibrillation.    Assessment/Recommendations     Assessment/Plan:    Diagnoses and all orders for this visit:  Paroxysmal atrial fibrillation (H) with good control on low-dose sotalol.  However due to creatinine of 1.08 and estimated GFR of 51 in July 2021 she needs to be on renal dose sotalol which is 80 mg 1 tab daily.  QTC okay today so I can wait a month before putting her on renal dose sotalol if needed.  She needs basic metabolic profile at next lab check.  To call me after plan is determined with Ortho.  If she is going to have surgery I will wait to decrease her sotalol to renal dose until after surgery.  I discussed risk when I decrease sotalol dose of increasing frequency of atrial fibrillation I would not want to do this when she will likely be off of anticoagulation for at least some period of time.  If she has increasing A. fib burden on renal dose sotalol I discussed that I would likely switch her to amiodarone.  I discussed sun sensitivity, risk of hypo or hyperthyroidism, neuro changes and small risk of pulmonary fibrosis with long-term use.  I gave her handout on amiodarone.  No med changes for now.  -     ECG 12-LEAD WITH MUSE (E)  Stage 3a chronic kidney disease (H) and see above for details.  Essential hypertension and blood pressure at goal.  SHARA and on CPAP    [id number] score of 5 and on Eliquis.  Follow up in clinic with me in 2 months for reassessment of plan.     History of Present Illness/Subjective     Romy Hurley is a very pleasant 77 year old female who comes in today for EP follow-up for paroxysmal atrial fibrillation.  Romy Hurley  has a known history of was diagnosed with atrial fibrillation in .  Symptoms consist of palpitations, fatigue, and diaphoresis.  She has failed antiarrhythmic therapy with flecainide due to side effects and sotalol due to breakthrough.  She reports early episodes of A. fib lasting between 2 to 3 days.  She also has a history of hypertension, stage III chronic kidney disease, type 2 diabetes, and obstructive sleep apnea for which she now wears CPAP nightly.    On 2019 Pham had pulmonary vein isolation ablation with cryoablation and RF to 4 pulmonary veins and roofline done.  Normal voltage was seen except at roofline area.  Pham is had recurrence of atrial fibrillation since her ablation.  She typically has several days of atrial fib and her symptoms remain the same as preablation.  Then in 2020 she went into a persistent atrial fibrillation episode.  I restarted her on sotalol and she converted with start of drug and did not need cardioversion.  It has been a year since I have seen her in clinic.  She typically jacome in [place].  Pham comes to clinic today and is having a lot of pain in her neck and right shoulder.  She is having this evaluated in Ortho clinic.  She is unsure if she is going to need surgery or an injection.  Her  had a chronic illness and  suddenly in mid May.  She was having to do a lot more lifting and taking care of him prior to his death.  She was tearful in discussing this.  She is working through a lot of financial issues as well.  This has been stressful.  She tells me that she only goes into atrial fibrillation when she has an alcoholic drink.  She denies any side effects on sotalol.      Cardiographics (reviewed):  Results for orders placed during the hospital encounter of 17   Echo Complete [ECH10] 2017     Narrative 1. Normal left ventricular size and systolic performance with a visually   estimated ejection fraction of 65%.   2. There is mild  aortic insufficiency.  3. There is mild biatrial enlargement.     When compared to the prior real-time echocardiogram dated 29 March 2012,   mild aortic insufficiency is now noted.  Otherwise, there has been little   appreciable interval change.    24-hour Holter monitor worn 9/18/2017 shows persistent atrial fibrillation with controlled ventricular response, average ventricular rate of 82 bpm with a range of 52 to 136 bpm.     CT pulmonary veins done 11/4/2019:  1. Four pulmonary veins with normal configuration.   2. Esophagus is adjacent to the posterior left atrial body and left inferior pulmonary vein.  3. No calcified atherosclerotic plaque.  Left superior pulmonary vein: 15 x 16.2mm, area 1.94sq cm.  Left inferior pulmonary vein: 12.7 x 19.1mm, area 1.97sq cm.  Right superior pulmonary vein: 18.4 x 22.8mm, area 3.32sq cm.  Right inferior pulmonary vein: 14.7 x 17mm, area 2.07sq cm.   Esophagus: The esophagus is adjacent to the posterior side of the left atrial body and posterior side of the left inferior pulmonary vein.    Cardiac testing personally reviewed:  Twelve-lead ECG today shows normal sinus rhythm of 60 and QTC of 434 ms.         Problem List:  Patient Active Problem List   Diagnosis     Osteoarthritis     Total knee replacement status     Insomnia     Essential hypertension     CARDIOVASCULAR SCREENING; LDL GOAL LESS THAN 130     Paroxysmal atrial fibrillation (H)     Advanced directives, counseling/discussion     Health Care Home     Mild intermittent asthma without complication     Persistent insomnia     Type 2 diabetes mellitus without complication, without long-term current use of insulin (H)     Adjustment disorder with mixed anxiety and depressed mood     Morbid obesity (H)     CKD (chronic kidney disease) stage 3, GFR 30-59 ml/min (H)     Cervicalgia     Revi  e  Physical Examination Review of Systems   w [facility]  There were no vitals taken for this visit.  There is no height or weight on  file to calculate BMI.  Wt Readings from Last 3 Encounters:   22 117.9 kg (260 lb)   22 117.9 kg (260 lb)   22 117.9 kg (260 lb)     General Appearance:   Alert, well-appearing and in no acute distress.   HEENT: Atraumatic, normocephalic.  No scleral icterus, normal conjunctivae; mucous membranes pink and moist.     Chest: Chest symmetric, spine straight.   Lungs:   Respirations unlabored.   Cardiovascular:   Regular, regular.          Extremities: No cyanosis or clubbing   Musculoskeletal: Moves all extremities   Skin: Warm, dry, intact.    Neurologic: Mood and affect are appropriate, alert and oriented to person, place, time, and situation     ROS: 10 point ROS neg other than the symptoms noted above in the HPI.     Medical History  Surgical History Family History Social History     Past Medical History:   Diagnosis Date     Asthma      Atrial fibrillation (H) 2012     Diabetes mellitus (H)      Hypertension      Insomnia      Mild intermittent asthma      Obesity      SHARA on CPAP      Osteoarthritis     Past Surgical History:   Procedure Laterality Date     ARTHROPLASTY KNEE BILATERAL       ARTHROSCOPY SHOULDER RT/LT  11    right with subacromial decompression and rotator cuff repair, massive      SECTION       EP ABLATION AFLUTTER  2019    Procedure: EP Ablation Atrial Flutter;  Surgeon: Dorian Garland MD;  Location: [facility];  Service: Cardiology     EP ABLATION PVI Left 2019    Procedure: EP Ablation PVI;  Surgeon: Dorian Garland MD;  Location: [facility];  Service: Cardiology     HC CARDIOVERSION  2012    [facility] - done for a fib     JOINT REPLACEMTN, KNEE RT/LT  2010    LEFT     JOINT REPLACEMTN, KNEE RT/LT  2010    right     OPEN REDUCTION INTERNAL FIXATION ANKLE Right 2021    Procedure: OPEN REDUCTION INTERNAL FIXATION MEDIAL MALLEOLUS FRACTURE, ANKLE RIGHT;  Surgeon: Vidal Cohn MD;  Location: [facility]  Main OR;  Service: Orthopedics     REPAIR HAMMER TOE Right 2/19/2021    Procedure: FLEXOR TENOTOMIES 3RD TOE HAMMER TOE CORRECTION;  Surgeon: Vidal Cohn MD;  Location: [facility] Main OR;  Service: Orthopedics     SHOULDER ARTHROSCOPY W/ ROTATOR CUFF REPAIR Right      ZZC TOTAL ANKLE REPLACEMENT Right 2/19/2021    Procedure: RIGHT TOTAL ANKLE ARTHOPLASTY, DEBRIDE SUBTALAR JOINT;  Surgeon: Vidal Cohn MD;  Location: [facility] Main OR;  Service: Orthopedics    Family History   Problem Relation Age of Onset     Genitourinary Problems Mother         Ovarian Cancer     Gastrointestinal Disease Mother         Had gallbladder romoved     Cancer Father         Lung     Gastrointestinal Disease Father         Had gallbladder romoved     C.A.D. Paternal Grandfather         Coronary     Gastrointestinal Disease Brother         Had gallbladder romoved     Depression Sister         DDD     Ovarian Cancer Mother 82.00     Lung Cancer Father 75.00     Sleep Apnea Father      Snoring Father      Brain Cancer Sister 57.00        Brain Ca x 1 year     No Known Problems Brother      No Known Problems Sister      No Known Problems Sister     History   Smoking Status     Former Smoker     Packs/day: 0.00     Types: Cigarettes, Cigarettes     Quit date: 6/1/1981   Smokeless Tobacco     Never Used     Social History    Substance and Sexual Activity      Alcohol use: Yes        Comment: Alcoholic Drinks/day: Rare (2-3 month)       Medications  Allergies     Current Outpatient Medications   Medication Sig Dispense Refill     blood glucose (ONETOUCH VERIO IQ) test strip Use to test blood sugars 1 times daily or as directed. 100 each 11     blood glucose monitoring (ONE TOUCH DELICA) lancets Use to test blood sugars 1 times daily or as directed. 100 each 3     Cholecalciferol (VITAMIN D) 2000 UNITS tablet Take 4,000 Units by mouth       CRANBERRY PO        ELIQUIS ANTICOAGULANT 5 MG tablet TAKE 1 TABLET(5 MG) BY MOUTH TWICE  DAILY 180 tablet 3     fluticasone-salmeterol (ADVAIR DISKUS) 250-50 MCG/DOSE inhaler Inhale 1 puff into the lungs 2 times daily 60 each 11     hydrochlorothiazide (HYDRODIURIL) 25 MG tablet TAKE 1 TABLET(25 MG) BY MOUTH DAILY 90 tablet 1     levalbuterol (XOPENEX HFA) 45 MCG/ACT inhaler Inhale 2 puffs into the lungs every 8 hours as needed for shortness of breath / dyspnea 15 g 3     losartan (COZAAR) 100 MG tablet TAKE 1 TABLET(100 MG) BY MOUTH DAILY 90 tablet 1     metFORMIN (GLUCOPHAGE) 500 MG tablet VISIT DUE TAKE 1 TABLET(500 MG) BY MOUTH DAILY WITH DINNER 90 tablet 0     methocarbamol (ROBAXIN) 750 MG tablet Take 1 tablet (750 mg) by mouth 3 times daily as needed for muscle spasms 30 tablet 0     PARoxetine (PAXIL) 10 MG tablet TAKE 1 TABLET(10 MG) BY MOUTH AT BEDTIME 90 tablet 3     simvastatin (ZOCOR) 20 MG tablet Take 1 tablet (20 mg) by mouth At Bedtime VISIT DUE 90 tablet 0     sotalol (BETAPACE) 80 MG tablet        traZODone (DESYREL) 50 MG tablet TAKE 1 TABLET(50 MG) BY MOUTH AT BEDTIME 90 tablet 1      Allergies   Allergen Reactions     Macrobid [Nitrofurantoin Anhydrous] Shortness Of Breath     Ace Inhibitors Cough     Corticosteroids       Medical, surgical, family, social history, and medications were all reviewed and updated as necessary.   Lab Results    Chemistry/lipid CBC Cardiac Enzymes/BNP/TSH/INR   Recent Labs   Lab Test 07/23/21  0744 03/10/16  0932 12/09/14  0911   CHOL 129   < > 175   HDL 52   < > 47*   LDL 58   < > 99   TRIG 95   < > 144   CHOLHDLRATIO  --   --  3.7    < > = values in this interval not displayed.     Recent Labs   Lab Test 07/23/21  0744 10/01/20  1604 03/21/19  1103   LDL 58 48 53     Recent Labs   Lab Test 07/23/21  0744      POTASSIUM 4.2   CHLORIDE 104   CO2 30   *   BUN 23   CR 1.08*   GFRESTIMATED 51*   ZHAO 9.1     Recent Labs   Lab Test 07/23/21  0744 02/20/21  0615 02/01/21  1451   CR 1.08* 0.97 1.08*     Recent Labs   Lab Test 07/23/21  0701  02/20/21  0615 10/01/20  1604   A1C 6.2* 6.2* 6.3*          Recent Labs   Lab Test 02/20/21  0614   WBC 10.9   HGB 11.0*   HCT 35.1   MCV 90        Recent Labs   Lab Test 02/20/21  0614 02/01/21  1451 12/03/19  1438   HGB 11.0* 12.7 13.4    No results for input(s): TROPONINI in the last 06371 hours.  No results for input(s): BNP, NTBNPI, NTBNP in the last 16612 hours.  Recent Labs   Lab Test 03/21/19  1103   TSH 1.59     No results for input(s): INR in the last 26414 hours.       Total Time- 30 minutes spent on date of encounter doing chart review, history and exam, documentation and further activities as noted above.  This note has been dictated using voice recognition software. Any grammatical, typographical, or context distortions are unintentional and inherent to the software.      Thank you for allowing me to participate in the care of your patient.      Sincerely,     CRISTIAN Earl CNP     [facility]  cc:   CRISTIAN Earl CNP  [address] [place] [address]

## 2022-07-05 NOTE — PROGRESS NOTES
Thank you, Dr. Stockton, for asking the [facility] team to see Ms. Romy Hurley to evaluate paroxysmal atrial fibrillation.    Assessment/Recommendations     Assessment/Plan:    Diagnoses and all orders for this visit:  Paroxysmal atrial fibrillation (H) with good control on low-dose sotalol.  However due to creatinine of 1.08 and estimated GFR of 51 in July 2021 she needs to be on renal dose sotalol which is 80 mg 1 tab daily.  QTC okay today so I can wait a month before putting her on renal dose sotalol if needed.  She needs basic metabolic profile at next lab check.  To call me after plan is determined with Ortho.  If she is going to have surgery I will wait to decrease her sotalol to renal dose until after surgery.  I discussed risk when I decrease sotalol dose of increasing frequency of atrial fibrillation I would not want to do this when she will likely be off of anticoagulation for at least some period of time.  If she has increasing A. fib burden on renal dose sotalol I discussed that I would likely switch her to amiodarone.  I discussed sun sensitivity, risk of hypo or hyperthyroidism, neuro changes and small risk of pulmonary fibrosis with long-term use.  I gave her handout on amiodarone.  No med changes for now.  -     ECG 12-LEAD WITH MUSE (E)  Stage 3a chronic kidney disease (H) and see above for details.  Essential hypertension and blood pressure at goal.  SHARA and on CPAP    [id number] score of 5 and on Eliquis.  Follow up in clinic with me in 2 months for reassessment of plan.     History of Present Illness/Subjective     Romy Hurley is a very pleasant 77 year old female who comes in today for EP follow-up for paroxysmal atrial fibrillation.  Romy Hurley has a known history of was diagnosed with atrial fibrillation in 2012.  Symptoms consist of palpitations, fatigue, and diaphoresis.  She has failed antiarrhythmic therapy with flecainide due to side effects and  sotalol due to breakthrough.  She reports early episodes of A. fib lasting between 2 to 3 days.  She also has a history of hypertension, stage III chronic kidney disease, type 2 diabetes, and obstructive sleep apnea for which she now wears CPAP nightly.    On 2019 Pham had pulmonary vein isolation ablation with cryoablation and RF to 4 pulmonary veins and roofline done.  Normal voltage was seen except at roofline area.  Pham is had recurrence of atrial fibrillation since her ablation.  She typically has several days of atrial fib and her symptoms remain the same as preablation.  Then in 2020 she went into a persistent atrial fibrillation episode.  I restarted her on sotalol and she converted with start of drug and did not need cardioversion.  It has been a year since I have seen her in clinic.  She typically jacome in [place].  Pham comes to clinic today and is having a lot of pain in her neck and right shoulder.  She is having this evaluated in Ortho clinic.  She is unsure if she is going to need surgery or an injection.  Her  had a chronic illness and  suddenly in mid May.  She was having to do a lot more lifting and taking care of him prior to his death.  She was tearful in discussing this.  She is working through a lot of financial issues as well.  This has been stressful.  She tells me that she only goes into atrial fibrillation when she has an alcoholic drink.  She denies any side effects on sotalol.      Cardiographics (reviewed):  Results for orders placed during the hospital encounter of 17   Echo Complete [ECH10] 2017     Narrative 1. Normal left ventricular size and systolic performance with a visually   estimated ejection fraction of 65%.   2. There is mild aortic insufficiency.  3. There is mild biatrial enlargement.     When compared to the prior real-time echocardiogram dated 2012,   mild aortic insufficiency is now noted.  Otherwise, there has been  little   appreciable interval change.    24-hour Holter monitor worn 9/18/2017 shows persistent atrial fibrillation with controlled ventricular response, average ventricular rate of 82 bpm with a range of 52 to 136 bpm.     CT pulmonary veins done 11/4/2019:  1. Four pulmonary veins with normal configuration.   2. Esophagus is adjacent to the posterior left atrial body and left inferior pulmonary vein.  3. No calcified atherosclerotic plaque.  Left superior pulmonary vein: 15 x 16.2mm, area 1.94sq cm.  Left inferior pulmonary vein: 12.7 x 19.1mm, area 1.97sq cm.  Right superior pulmonary vein: 18.4 x 22.8mm, area 3.32sq cm.  Right inferior pulmonary vein: 14.7 x 17mm, area 2.07sq cm.   Esophagus: The esophagus is adjacent to the posterior side of the left atrial body and posterior side of the left inferior pulmonary vein.    Cardiac testing personally reviewed:  Twelve-lead ECG today shows normal sinus rhythm of 60 and QTC of 434 ms.         Problem List:  Patient Active Problem List   Diagnosis     Osteoarthritis     Total knee replacement status     Insomnia     Essential hypertension     CARDIOVASCULAR SCREENING; LDL GOAL LESS THAN 130     Paroxysmal atrial fibrillation (H)     Advanced directives, counseling/discussion     Health Care Home     Mild intermittent asthma without complication     Persistent insomnia     Type 2 diabetes mellitus without complication, without long-term current use of insulin (H)     Adjustment disorder with mixed anxiety and depressed mood     Morbid obesity (H)     CKD (chronic kidney disease) stage 3, GFR 30-59 ml/min (H)     Cervicalgia     Revi  e  Physical Examination Review of Systems   w [facility]  There were no vitals taken for this visit.  There is no height or weight on file to calculate BMI.  Wt Readings from Last 3 Encounters:   06/29/22 117.9 kg (260 lb)   05/26/22 117.9 kg (260 lb)   05/25/22 117.9 kg (260 lb)     General Appearance:   Alert, well-appearing and in no acute  distress.   HEENT: Atraumatic, normocephalic.  No scleral icterus, normal conjunctivae; mucous membranes pink and moist.     Chest: Chest symmetric, spine straight.   Lungs:   Respirations unlabored.   Cardiovascular:   Regular, regular.          Extremities: No cyanosis or clubbing   Musculoskeletal: Moves all extremities   Skin: Warm, dry, intact.    Neurologic: Mood and affect are appropriate, alert and oriented to person, place, time, and situation     ROS: 10 point ROS neg other than the symptoms noted above in the HPI.     Medical History  Surgical History Family History Social History     Past Medical History:   Diagnosis Date     Asthma      Atrial fibrillation (H)      Diabetes mellitus (H)      Hypertension      Insomnia      Mild intermittent asthma      Obesity      SHARA on CPAP      Osteoarthritis     Past Surgical History:   Procedure Laterality Date     ARTHROPLASTY KNEE BILATERAL       ARTHROSCOPY SHOULDER RT/LT  11    right with subacromial decompression and rotator cuff repair, massive      SECTION       EP ABLATION AFLUTTER  2019    Procedure: EP Ablation Atrial Flutter;  Surgeon: Dorian Garland MD;  Location: [facility];  Service: Cardiology     EP ABLATION PVI Left 2019    Procedure: EP Ablation PVI;  Surgeon: Dorian Garland MD;  Location: [facility];  Service: Cardiology     HC CARDIOVERSION  2012    [facility] - done for a fib     JOINT REPLACEMTN, KNEE RT/LT  2010    LEFT     JOINT REPLACEMTN, KNEE RT/LT  2010    right     OPEN REDUCTION INTERNAL FIXATION ANKLE Right 2021    Procedure: OPEN REDUCTION INTERNAL FIXATION MEDIAL MALLEOLUS FRACTURE, ANKLE RIGHT;  Surgeon: Vidal Cohn MD;  Location: [facility] OR;  Service: Orthopedics     REPAIR HAMMER TOE Right 2021    Procedure: FLEXOR TENOTOMIES 3RD TOE HAMMER TOE CORRECTION;  Surgeon: Vidal Cohn MD;  Location: [facility] OR;  Service:  Orthopedics     SHOULDER ARTHROSCOPY W/ ROTATOR CUFF REPAIR Right      ZZC TOTAL ANKLE REPLACEMENT Right 2/19/2021    Procedure: RIGHT TOTAL ANKLE ARTHOPLASTY, DEBRIDE SUBTALAR JOINT;  Surgeon: Vidal Cohn MD;  Location: [facility] OR;  Service: Orthopedics    Family History   Problem Relation Age of Onset     Genitourinary Problems Mother         Ovarian Cancer     Gastrointestinal Disease Mother         Had gallbladder romoved     Cancer Father         Lung     Gastrointestinal Disease Father         Had gallbladder romoved     C.A.D. Paternal Grandfather         Coronary     Gastrointestinal Disease Brother         Had gallbladder romoved     Depression Sister         DDD     Ovarian Cancer Mother 82.00     Lung Cancer Father 75.00     Sleep Apnea Father      Snoring Father      Brain Cancer Sister 57.00        Brain Ca x 1 year     No Known Problems Brother      No Known Problems Sister      No Known Problems Sister     History   Smoking Status     Former Smoker     Packs/day: 0.00     Types: Cigarettes, Cigarettes     Quit date: 6/1/1981   Smokeless Tobacco     Never Used     Social History    Substance and Sexual Activity      Alcohol use: Yes        Comment: Alcoholic Drinks/day: Rare (2-3 month)       Medications  Allergies     Current Outpatient Medications   Medication Sig Dispense Refill     blood glucose (ONETOUCH VERIO IQ) test strip Use to test blood sugars 1 times daily or as directed. 100 each 11     blood glucose monitoring (ONE TOUCH DELICA) lancets Use to test blood sugars 1 times daily or as directed. 100 each 3     Cholecalciferol (VITAMIN D) 2000 UNITS tablet Take 4,000 Units by mouth       CRANBERRY PO        ELIQUIS ANTICOAGULANT 5 MG tablet TAKE 1 TABLET(5 MG) BY MOUTH TWICE DAILY 180 tablet 3     fluticasone-salmeterol (ADVAIR DISKUS) 250-50 MCG/DOSE inhaler Inhale 1 puff into the lungs 2 times daily 60 each 11     hydrochlorothiazide (HYDRODIURIL) 25 MG tablet TAKE 1 TABLET(25  MG) BY MOUTH DAILY 90 tablet 1     levalbuterol (XOPENEX HFA) 45 MCG/ACT inhaler Inhale 2 puffs into the lungs every 8 hours as needed for shortness of breath / dyspnea 15 g 3     losartan (COZAAR) 100 MG tablet TAKE 1 TABLET(100 MG) BY MOUTH DAILY 90 tablet 1     metFORMIN (GLUCOPHAGE) 500 MG tablet VISIT DUE TAKE 1 TABLET(500 MG) BY MOUTH DAILY WITH DINNER 90 tablet 0     methocarbamol (ROBAXIN) 750 MG tablet Take 1 tablet (750 mg) by mouth 3 times daily as needed for muscle spasms 30 tablet 0     PARoxetine (PAXIL) 10 MG tablet TAKE 1 TABLET(10 MG) BY MOUTH AT BEDTIME 90 tablet 3     simvastatin (ZOCOR) 20 MG tablet Take 1 tablet (20 mg) by mouth At Bedtime VISIT DUE 90 tablet 0     sotalol (BETAPACE) 80 MG tablet        traZODone (DESYREL) 50 MG tablet TAKE 1 TABLET(50 MG) BY MOUTH AT BEDTIME 90 tablet 1      Allergies   Allergen Reactions     Macrobid [Nitrofurantoin Anhydrous] Shortness Of Breath     Ace Inhibitors Cough     Corticosteroids       Medical, surgical, family, social history, and medications were all reviewed and updated as necessary.   Lab Results    Chemistry/lipid CBC Cardiac Enzymes/BNP/TSH/INR   Recent Labs   Lab Test 07/23/21  0744 03/10/16  0932 12/09/14  0911   CHOL 129   < > 175   HDL 52   < > 47*   LDL 58   < > 99   TRIG 95   < > 144   CHOLHDLRATIO  --   --  3.7    < > = values in this interval not displayed.     Recent Labs   Lab Test 07/23/21  0744 10/01/20  1604 03/21/19  1103   LDL 58 48 53     Recent Labs   Lab Test 07/23/21  0744      POTASSIUM 4.2   CHLORIDE 104   CO2 30   *   BUN 23   CR 1.08*   GFRESTIMATED 51*   ZHAO 9.1     Recent Labs   Lab Test 07/23/21  0744 02/20/21  0615 02/01/21  1451   CR 1.08* 0.97 1.08*     Recent Labs   Lab Test 07/23/21  0744 02/20/21  0615 10/01/20  1604   A1C 6.2* 6.2* 6.3*          Recent Labs   Lab Test 02/20/21  0614   WBC 10.9   HGB 11.0*   HCT 35.1   MCV 90        Recent Labs   Lab Test 02/20/21  0614 02/01/21  1457  12/03/19  1438   HGB 11.0* 12.7 13.4    No results for input(s): TROPONINI in the last 46243 hours.  No results for input(s): BNP, NTBNPI, NTBNP in the last 25953 hours.  Recent Labs   Lab Test 03/21/19  1103   TSH 1.59     No results for input(s): INR in the last 39714 hours.       Total Time- 30 minutes spent on date of encounter doing chart review, history and exam, documentation and further activities as noted above.  This note has been dictated using voice recognition software. Any grammatical, typographical, or context distortions are unintentional and inherent to the software.

## 2022-07-05 NOTE — PATIENT INSTRUCTIONS
Romy Hurley,    It was a pleasure to see you today at the [facility].     My recommendations after this visit include:    Call my nurse and let me know what the plan is for your neck and shoulder pain.    See your primary physician in the next month or so for lab work.    If you are not having surgery, I will have you decrease sotalol to 80 mg 1 tab in am only .    Please call if atrial fibrillation episodes more frequent.    I discussed possibly changing you to amiodarone.    To followup with me in September 2022      My contact information:  Lucien [place], MELANIE  After Hours or Scheduling  [phone]  My Nurse---Miri Vazquez [phone]

## 2022-07-06 ENCOUNTER — HOSPITAL ENCOUNTER (OUTPATIENT)
Dept: MRI IMAGING | Facility: CLINIC | Age: 77
Discharge: HOME | End: 2022-07-06
Attending: PEDIATRICS | Admitting: PEDIATRICS
Payer: COMMERCIAL

## 2022-07-06 DIAGNOSIS — M54.2 CERVICALGIA: ICD-10-CM

## 2022-07-06 LAB
ATRIAL RATE - MUSE: 60 BPM
DIASTOLIC BLOOD PRESSURE - MUSE: NORMAL MMHG
INTERPRETATION ECG - MUSE: NORMAL
P AXIS - MUSE: 47 DEGREES
PR INTERVAL - MUSE: 200 MS
QRS DURATION - MUSE: 88 MS
QT - MUSE: 434 MS
QTC - MUSE: 434 MS
R AXIS - MUSE: -3 DEGREES
SYSTOLIC BLOOD PRESSURE - MUSE: NORMAL MMHG
T AXIS - MUSE: 6 DEGREES
VENTRICULAR RATE- MUSE: 60 BPM

## 2022-07-06 PROCEDURE — 72141 MRI NECK SPINE W/O DYE: CPT

## 2022-07-07 ENCOUNTER — OFFICE VISIT (OUTPATIENT)
Dept: ORTHOPEDICS | Facility: CLINIC | Age: 77
End: 2022-07-07
Payer: COMMERCIAL

## 2022-07-07 VITALS
BODY MASS INDEX: 41.98 KG/M2 | DIASTOLIC BLOOD PRESSURE: 77 MMHG | SYSTOLIC BLOOD PRESSURE: 120 MMHG | WEIGHT: 277 LBS | HEIGHT: 68 IN

## 2022-07-07 DIAGNOSIS — M54.2 CERVICALGIA: Primary | ICD-10-CM

## 2022-07-07 DIAGNOSIS — M50.30 DEGENERATION OF CERVICAL INTERVERTEBRAL DISC: ICD-10-CM

## 2022-07-07 PROBLEM — M19.019 SHOULDER ARTHRITIS: Status: ACTIVE | Noted: 2022-07-07

## 2022-07-07 PROCEDURE — 99213 OFFICE O/P EST LOW 20 MIN: CPT | Performed by: PEDIATRICS

## 2022-07-07 RX ORDER — METHOCARBAMOL 750 MG/1
750 TABLET, FILM COATED ORAL 3 TIMES DAILY PRN
Qty: 30 TABLET | Refills: 0 | Status: SHIPPED | OUTPATIENT
Start: 2022-07-07 | End: 2022-07-25

## 2022-07-07 NOTE — PROGRESS NOTES
ASSESSMENT & PLAN    Romy was seen today for cervical/thoracic, follow up and mri transcription.    Diagnoses and all orders for this visit:    Cervicalgia  -     Spine Referral; Future  -     methocarbamol (ROBAXIN) 750 MG tablet; Take 1 tablet (750 mg) by mouth 3 times daily as needed for muscle spasms    Degeneration of cervical intervertebral disc  -     Spine Referral; Future      This issue is chronic and Unchanged.    We discussed these other possible diagnosis: Given severe cervical degeneration, will refer to Spine.    Plan:  - Today's Plan of Care:  Referral to a Spine Surgeon  Discussed activity considerations and other supportive care including Ice/Heat, OTC and other topical medications as needed.  Continue Physical Therapy  Methocarbamol refilled (I reviewed the mechanism of action as well as risk/benefit profile of medications. Questions answered.)    -We also discussed other future treatment options:  Referral to Pain Management    Follow Up: as needed    Concerning signs and symptoms were reviewed.  The patient expressed understanding of this management plan and all questions were answered at this time.    Niurka Esquivel MD [facility]  Sports Medicine Physician  [facility]      SUBJECTIVE- Interim History July 7, 2022    Chief Complaint   Patient presents with     Neck - Cervical/Thoracic, Follow Up, MRI Transcription       Romy Hurley is a 77 year old female who is seen in f/u up for    Cervicalgia  Degeneration of cervical intervertebral disc.  Since last visit on 6/29/22 patient has been feeling a little better since last visit.  The Methocarbamol was helpful, but she doesn't like taking them all the time. Here to review MRI.    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    REVIEW OF SYSTEMS:  Review of Systems  Skin: no bruising, no swelling  Musculoskeletal: as above  Neurologic: no numbness, paresthesias  Remainder of review of systems is negative including constitutional, CV, pulmonary, GI, except as noted in HPI or medical history.    OBJECTIVE:  /77   Ht 1.727 m (5' 8\")   Wt 125.6 kg (277 lb)   BMI 42.12 kg/m       General: healthy, alert and in no distress  HEENT: no scleral icterus or conjunctival erythema  Skin: no suspicious lesions or rash. No jaundice.  CV: distal perfusion intact  Resp: normal respiratory effort without conversational dyspnea   Psych: normal mood and affect  Gait: normal steady gait with appropriate coordination and balance  Neuro: Normal light sensory exam of upper extremity     Cervical Spine Exam  Inspection:       No visible deformity     Posture:      rounded shoulders and upper back     Tender:      paraspinal muscles     Non-Tender:      remainder of cervical spine area     Range of Motion:       Full active and passive ROM forward flexion, extension, lateral rotation, lateral flexion.     Painful Motions:      extension and lateral rotation     Strength:     C4 (shoulder shrug)  symmetric 5/5       C5 (shoulder abduction) symmetric 5/5       C6 (elbow flexion) symmetric 5/5       C7 (elbow extension) symmetric 5/5       C8 (finger abduction, thumb flexion) symmetric 5/5     Sensation:     grossly intact througout bilateral upper extremities     Special Tests:      neg (-) Spurling    RADIOLOGY:  Final results and radiologist's interpretation, available in the [facility] " record.  Images were reviewed with the patient in the office today.  My personal interpretation of the performed imaging:   MR Cervical Spine - multilevel degenerative changes, moderate spinal canal stenosis, neural foraminal stenosis    Results for orders placed or performed during the hospital encounter of 07/06/22   MRI Cervical spine w/o contrast    Narrative    MRI CERVICAL SPINE WITHOUT CONTRAST July 6, 2022 10:52 AM     HISTORY: Cervicalgia.     TECHNIQUE: Multiplanar, multisequence MRI of the cervical spine  without contrast.     COMPARISON: Cervical spinal radiograph 6/29/2022.     FINDINGS: Normal vertebral body heights. Straightening of the normal  cervical lordosis. Sagittal alignment otherwise appears within normal  limits. Mild levoconvex curvature of the cervicothoracic spine. Mild  Modic type I degenerative endplate signal changes at C4-C5 and C6-C7.  There is a probable intraosseous hemangioma in the right aspect of the  T1 vertebral body. There is also a probable intraosseous hemangioma in  the partially visualized T3 vertebral body, incompletely evaluated.  Bone marrow signal otherwise appears within normal limits. No  definitive spinal cord signal abnormality identified. Mildly prominent  soft tissue along the base of tongue region may represent reactive  hypertrophy of the lingual tonsils. There are a few apparent T2  hyperintense lesions of the thyroid gland that appear to be  predominately subcentimeter in size and demonstrate no definite  aggressive features, not necessarily requiring follow-up in a patient  of this age. Otherwise unremarkable visualized paraspinous soft  tissues.     Segmental analysis:  Craniovertebral Junction/C1-C2: Mild degenerative changes of the  median atlantoaxial joint. Otherwise unremarkable.    C2-C3: Normal disc height. No herniation. Normal facets. No spinal  canal or neural foraminal stenosis.    C3-C4: Moderate disc height loss. Symmetric disc bulge with  posterior  endplate osteophytic ridging. Right greater than left uncovertebral  spurring. Mild facet arthropathy. Ligamentum flavum thickening. Mild  to moderate spinal canal stenosis. Mild to moderate right neural  foraminal stenosis. Mild left neural foraminal stenosis.    C4-C5: Moderate disc height loss. Symmetric disc bulge with posterior  endplate osteophytic ridging. Bilateral uncovertebral spurring. Mild  facet arthropathy. Ligamentum flavum thickening. Moderate spinal canal  stenosis, with mild flattening of the normal spinal cord contour.  Moderate to severe bilateral neural foraminal stenosis, right greater  than left.    C5-C6: Moderate to severe disc height loss. Symmetric disc bulge with  posterior endplate osteophytic ridging and bilateral uncovertebral  spurring. Mild facet arthropathy. Ligamentum flavum thickening. Mild  to moderate spinal canal stenosis. Severe bilateral neural foraminal  stenosis.    C6-C7: Moderate disc height loss. Disc bulge with posterior endplate  osteophytic ridging, slightly eccentric to the left, with left more  than right uncovertebral spurring. Mild facet arthropathy. No spinal  canal stenosis. Moderate to severe left neural foraminal stenosis.  Mild right neural foraminal stenosis.    C7-T1: Normal disc height. No herniation. Mild facet arthropathy. No  spinal canal or neural foraminal stenosis.      Impression    IMPRESSION:  1. Multilevel degenerative changes of the cervical spine, as  described.  2. Moderate spinal canal stenosis at C4-C5. Mild to moderate spinal  canal stenosis at C3-C4 and C5-C6.  3. Varying degrees of multilevel neural foraminal stenosis, including  severe bilateral neural foraminal stenosis at C5-C6, moderate to  severe bilateral neural foraminal stenosis at C4-C5, and moderate to  severe left neural foraminal stenosis at C6-C7. Please see the body of  the report for additional details.    JENNY COOLEY MD         SYSTEM ID:  [id number]        Review of the result(s) of each unique test - MRI

## 2022-07-07 NOTE — LETTER
7/7/2022         RE: Romy Hurley  [address] [facility] [id number]        Dear Colleague,    Thank you for referring your patient, Romy Hurley, to the [facility] [place]. Please see a copy of my visit note below.    ASSESSMENT & PLAN    Romy was seen today for cervical/thoracic, follow up and mri transcription.    Diagnoses and all orders for this visit:    Cervicalgia  -     Spine Referral; Future  -     methocarbamol (ROBAXIN) 750 MG tablet; Take 1 tablet (750 mg) by mouth 3 times daily as needed for muscle spasms    Degeneration of cervical intervertebral disc  -     Spine Referral; Future      This issue is chronic and Unchanged.    We discussed these other possible diagnosis: Given severe cervical degeneration, will refer to Spine.    Plan:  - Today's Plan of Care:  Referral to a Spine Surgeon  Discussed activity considerations and other supportive care including Ice/Heat, OTC and other topical medications as needed.  Continue Physical Therapy  Methocarbamol refilled (I reviewed the mechanism of action as well as risk/benefit profile of medications. Questions answered.)    -We also discussed other future treatment options:  Referral to Pain Management    Follow Up: as needed    Concerning signs and symptoms were reviewed.  The patient expressed understanding of this management plan and all questions were answered at this time.    Niurka Esquivel MD [facility]  Sports Medicine Physician  [facility]      SUBJECTIVE- Interim History July 7, 2022    Chief Complaint   Patient presents with     Neck - Cervical/Thoracic, Follow Up, MRI Transcription       Romy Hurley is a 77 year old female who is seen in f/u up for    Cervicalgia  Degeneration of cervical intervertebral disc.  Since last visit on 6/29/22 patient has been feeling a little better since last visit.  The Methocarbamol was helpful, but she doesn't like taking them all the  "time. Here to review MRI.    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    REVIEW OF SYSTEMS:  Review of Systems  Skin: no bruising, no swelling  Musculoskeletal: as above  Neurologic: no numbness, paresthesias  Remainder of review of systems is negative including constitutional, CV, pulmonary, GI, except as noted in HPI or medical history.    OBJECTIVE:  /77   Ht 1.727 m (5' 8\")   Wt 125.6 kg (277 lb)   BMI 42.12 kg/m       General: healthy, alert and in no distress  HEENT: no scleral icterus or conjunctival erythema  Skin: no suspicious lesions or rash. No jaundice.  CV: distal perfusion intact  Resp: normal respiratory effort without conversational dyspnea   Psych: normal mood and affect  Gait: normal steady gait with appropriate coordination and balance  Neuro: Normal light sensory exam of upper extremity     Cervical Spine Exam  Inspection:       No visible deformity     Posture:      rounded shoulders and upper back     Tender:      paraspinal muscles     Non-Tender:      remainder of cervical spine area     Range of Motion:       Full active and passive ROM forward flexion, extension, lateral rotation, lateral flexion.     Painful Motions:      extension and lateral rotation     Strength:     C4 (shoulder shrug)  symmetric 5/5       C5 (shoulder abduction) symmetric 5/5       C6 (elbow flexion) symmetric 5/5       C7 (elbow extension) " symmetric 5/5       C8 (finger abduction, thumb flexion) symmetric 5/5     Sensation:     grossly intact througout bilateral upper extremities     Special Tests:      neg (-) [place]    RADIOLOGY:  Final results and radiologist's interpretation, available in the [facility] health record.  Images were reviewed with the patient in the office today.  My personal interpretation of the performed imaging:   MR Cervical Spine - multilevel degenerative changes, moderate spinal canal stenosis, neural foraminal stenosis    Results for orders placed or performed during the hospital encounter of 07/06/22   MRI Cervical spine w/o contrast    Narrative    MRI CERVICAL SPINE WITHOUT CONTRAST July 6, 2022 10:52 AM     HISTORY: Cervicalgia.     TECHNIQUE: Multiplanar, multisequence MRI of the cervical spine  without contrast.     COMPARISON: Cervical spinal radiograph 6/29/2022.     FINDINGS: Normal vertebral body heights. Straightening of the normal  cervical lordosis. Sagittal alignment otherwise appears within normal  limits. Mild levoconvex curvature of the cervicothoracic spine. Mild  Modic type I degenerative endplate signal changes at C4-C5 and C6-C7.  There is a probable intraosseous hemangioma in the right aspect of the  T1 vertebral body. There is also a probable intraosseous hemangioma in  the partially visualized T3 vertebral body, incompletely evaluated.  Bone marrow signal otherwise appears within normal limits. No  definitive spinal cord signal abnormality identified. Mildly prominent  soft tissue along the base of tongue region may represent reactive  hypertrophy of the lingual tonsils. There are a few apparent T2  hyperintense lesions of the thyroid gland that appear to be  predominately subcentimeter in size and demonstrate no definite  aggressive features, not necessarily requiring follow-up in a patient  of this age. Otherwise unremarkable visualized paraspinous soft  tissues.     Segmental analysis:  Craniovertebral  Junction/C1-C2: Mild degenerative changes of the  median atlantoaxial joint. Otherwise unremarkable.    C2-C3: Normal disc height. No herniation. Normal facets. No spinal  canal or neural foraminal stenosis.    C3-C4: Moderate disc height loss. Symmetric disc bulge with posterior  endplate osteophytic ridging. Right greater than left uncovertebral  spurring. Mild facet arthropathy. Ligamentum flavum thickening. Mild  to moderate spinal canal stenosis. Mild to moderate right neural  foraminal stenosis. Mild left neural foraminal stenosis.    C4-C5: Moderate disc height loss. Symmetric disc bulge with posterior  endplate osteophytic ridging. Bilateral uncovertebral spurring. Mild  facet arthropathy. Ligamentum flavum thickening. Moderate spinal canal  stenosis, with mild flattening of the normal spinal cord contour.  Moderate to severe bilateral neural foraminal stenosis, right greater  than left.    C5-C6: Moderate to severe disc height loss. Symmetric disc bulge with  posterior endplate osteophytic ridging and bilateral uncovertebral  spurring. Mild facet arthropathy. Ligamentum flavum thickening. Mild  to moderate spinal canal stenosis. Severe bilateral neural foraminal  stenosis.    C6-C7: Moderate disc height loss. Disc bulge with posterior endplate  osteophytic ridging, slightly eccentric to the left, with left more  than right uncovertebral spurring. Mild facet arthropathy. No spinal  canal stenosis. Moderate to severe left neural foraminal stenosis.  Mild right neural foraminal stenosis.    C7-T1: Normal disc height. No herniation. Mild facet arthropathy. No  spinal canal or neural foraminal stenosis.      Impression    IMPRESSION:  1. Multilevel degenerative changes of the cervical spine, as  described.  2. Moderate spinal canal stenosis at C4-C5. Mild to moderate spinal  canal stenosis at C3-C4 and C5-C6.  3. Varying degrees of multilevel neural foraminal stenosis, including  severe bilateral neural  foraminal stenosis at C5-C6, moderate to  severe bilateral neural foraminal stenosis at C4-C5, and moderate to  severe left neural foraminal stenosis at C6-C7. Please see the body of  the report for additional details.    JENNY COOLEY MD         SYSTEM ID:  [id number]       Review of the result(s) of each unique test - MRI             Again, thank you for allowing me to participate in the care of your patient.        Sincerely,        Niurka Esquivel MD

## 2022-07-07 NOTE — PATIENT INSTRUCTIONS
We discussed these other possible diagnosis: Given severe cervical degeneration, will refer to Spine.    Plan:  - Today's Plan of Care:  Referral to a Spine Surgeon  Discussed activity considerations and other supportive care including Ice/Heat, OTC and other topical medications as needed.  Continue Physical Therapy  Methocarbamol refilled (I reviewed the mechanism of action as well as risk/benefit profile of medications. Questions answered.)    -We also discussed other future treatment options:  Referral to Pain Management    Follow Up: as needed    If you have any further questions for your physician or physician s care team you can call [phone] and use option 3 to leave a voice message. Calls received during business hours will be returned same day.

## 2022-07-20 ENCOUNTER — TRANSFERRED RECORDS (OUTPATIENT)
Dept: HEALTH INFORMATION MANAGEMENT | Facility: CLINIC | Age: 77
End: 2022-07-20

## 2022-08-17 NOTE — PROGRESS NOTES
[facility]    Outpatient Physical Therapy Discharge Note  Patient: Romy Hurley  : 1945    Beginning/End Dates of Reporting Period:  22 to 22    Referring Provider: Dr. Esquivel    Therapy Diagnosis: R shoulder pain     Client Self Report: pt notes an increase in R-sided neck soreness. She notes an increase R shoulder pain on the superior portion and out to the lateral portion of the UE. She notes pain with the isometric ER at this time.    Objective Measurements:  Objective Measure: AROM ER     Unknown status of the pt at this time. The pt attended 2 appointments with limited to no change in mobility since the evaluation.    Goals:  Goal Identifier ST HEP goal   Goal Description Pt will be independent wtih her HEP in 2 weeks to be able to strengthen at home.   Target Date 22   Date Met      Progress (detail required for progress note):   In progress with HEP     Goal Identifier LT Strength goal   Goal Description Pt will have 4/5 ER strength in 8 weeks to stabilize and support the area.   Target Date 22   Date Met      Progress (detail required for progress note):   In progress with HEP     Goal Identifier LT strength goal   Goal Description Pt will have 5/5 shoulder strength with flex, abd, and IR in 8 weeks to improve her mobility and decrease pain.   Target Date 22   Date Met      Progress (detail required for progress note):   In progress with HEP     Goal Identifier ST ROM goal   Goal Description Pt will actively be able to achieve 60 degrees of ER in 4 weeks to strengthen the area and allow full mobility.   Target Date 22   Date Met      Progress (detail required for progress note):   In progress with HEP       Plan:  Discharge from therapy.    Discharge:    Reason for Discharge: Patient chooses to discontinue therapy.    Equipment Issued: none    Discharge  Plan: Patient to continue home program.

## 2022-08-23 ENCOUNTER — OFFICE VISIT (OUTPATIENT)
Dept: CARDIOLOGY | Facility: CLINIC | Age: 77
End: 2022-08-23
Payer: COMMERCIAL

## 2022-08-23 VITALS
HEART RATE: 64 BPM | WEIGHT: 277 LBS | RESPIRATION RATE: 14 BRPM | DIASTOLIC BLOOD PRESSURE: 64 MMHG | BODY MASS INDEX: 42.12 KG/M2 | SYSTOLIC BLOOD PRESSURE: 104 MMHG

## 2022-08-23 DIAGNOSIS — I10 ESSENTIAL HYPERTENSION: ICD-10-CM

## 2022-08-23 DIAGNOSIS — N18.31 STAGE 3A CHRONIC KIDNEY DISEASE (H): ICD-10-CM

## 2022-08-23 DIAGNOSIS — G47.33 OSA ON CPAP: ICD-10-CM

## 2022-08-23 DIAGNOSIS — E11.9 TYPE 2 DIABETES MELLITUS WITHOUT COMPLICATION, WITHOUT LONG-TERM CURRENT USE OF INSULIN (H): ICD-10-CM

## 2022-08-23 DIAGNOSIS — I48.0 PAROXYSMAL ATRIAL FIBRILLATION (H): Primary | ICD-10-CM

## 2022-08-23 LAB
ALT SERPL W P-5'-P-CCNC: 16 U/L (ref 0–45)
ANION GAP SERPL CALCULATED.3IONS-SCNC: 10 MMOL/L (ref 5–18)
BUN SERPL-MCNC: 21 MG/DL (ref 8–28)
CALCIUM SERPL-MCNC: 9.4 MG/DL (ref 8.5–10.5)
CHLORIDE BLD-SCNC: 100 MMOL/L (ref 98–107)
CO2 SERPL-SCNC: 30 MMOL/L (ref 22–31)
CREAT SERPL-MCNC: 0.98 MG/DL (ref 0.6–1.1)
GFR SERPL CREATININE-BSD FRML MDRD: 60 ML/MIN/1.73M2
GLUCOSE BLD-MCNC: 96 MG/DL (ref 70–125)
POTASSIUM BLD-SCNC: 4.1 MMOL/L (ref 3.5–5)
SODIUM SERPL-SCNC: 140 MMOL/L (ref 136–145)
TSH SERPL DL<=0.005 MIU/L-ACNC: 1.43 UIU/ML (ref 0.3–5)

## 2022-08-23 PROCEDURE — 84443 ASSAY THYROID STIM HORMONE: CPT | Performed by: NURSE PRACTITIONER

## 2022-08-23 PROCEDURE — 80048 BASIC METABOLIC PNL TOTAL CA: CPT | Performed by: NURSE PRACTITIONER

## 2022-08-23 PROCEDURE — 84460 ALANINE AMINO (ALT) (SGPT): CPT | Performed by: NURSE PRACTITIONER

## 2022-08-23 PROCEDURE — 99215 OFFICE O/P EST HI 40 MIN: CPT | Performed by: NURSE PRACTITIONER

## 2022-08-23 PROCEDURE — 36415 COLL VENOUS BLD VENIPUNCTURE: CPT | Performed by: NURSE PRACTITIONER

## 2022-08-23 PROCEDURE — 93000 ELECTROCARDIOGRAM COMPLETE: CPT | Performed by: INTERNAL MEDICINE

## 2022-08-23 RX ORDER — ATORVASTATIN CALCIUM 10 MG/1
10 TABLET, FILM COATED ORAL DAILY
Qty: 90 TABLET | Refills: 3 | Status: SHIPPED | OUTPATIENT
Start: 2022-08-23 | End: 2023-08-28

## 2022-08-23 RX ORDER — METOPROLOL SUCCINATE 50 MG/1
50 TABLET, EXTENDED RELEASE ORAL EVERY EVENING
Qty: 90 TABLET | Refills: 3 | Status: SHIPPED | OUTPATIENT
Start: 2022-08-23 | End: 2023-08-28

## 2022-08-23 RX ORDER — AMIODARONE HYDROCHLORIDE 200 MG/1
TABLET ORAL
Qty: 56 TABLET | Refills: 3 | Status: SHIPPED | OUTPATIENT
Start: 2022-08-23 | End: 2022-08-24

## 2022-08-23 NOTE — PROGRESS NOTES
Thank you, Dr. Stockton, for asking the [facility] team to see Ms. Romy Hurley to evaluate paroxysmal atrial fibrillation.    Assessment/Recommendations     Assessment/Plan:    Diagnoses and all orders for this visit:  Paroxysmal atrial fibrillation (H) and most recent episode was 2-1/2 days.  She does not have any pending surgeries.  I discussed that she needs to go to a renal dose sotalol and she will likely have recurrence of A. fib if I decrease sotalol dose given long episode now.  Therefore I recommended that she switch from sotalol to amiodarone.  She should wait about a week before making change given recent episode.  She is very symptomatic with atrial fibrillation.  In about a week to stop sotalol and start metoprolol succinate 50 mg 1 tab orally every evening.  To also start amiodarone 200 mg 2 tablets orally in the a.m. and 1 tablet in the evening for 1 week and then decrease to 1 tablet orally twice daily for 2 weeks.  Then decrease to amiodarone 200 mg 1 tablet orally every day.  Discussed risk with amio to liver, thyroid, skin, eyes and lungs with pulmonary fibrosis being the most significant but dose and time dependent. Recommended routine sunscreen use with exposure to sun and yearly eye exams.   Initial labs done today.  To delay PFTs for  4 months.  Discussed interaction with simvastatin 20 mg orally every day and will switch her to atorvastatin 10 mg orally every day.        She is seeing Dr. Stockton in October and will need ALT and TSH at that appointment.  Essential hypertension and blood pressure well below goal.  SHARA and on CPAP  Stage 3a chronic kidney disease (H) and will get basic metabolic profile today.      [id number] score of 5 and on Eliquis.  Follow up in clinic with me in 3 to 4 months.  To call if frequent episodes of A. fib or A. fib lasting 24 hours or longer.  I discussed there is risk she could have recurrence of A. fib with switching from sotalol to  amiodarone due to long half-life of amiodarone.  I am loading her a little faster the first week to try to prevent recurrence of A. fib.     History of Present Illness/Subjective     Romy Hurley is a very pleasant 77 year old female who comes in today for urgent EP appointment due to ongoing atrial fibrillation for 5 days and very symptomatic.  Romy Hurley was diagnosed with atrial fibrillation in 2012.  Symptoms consist of palpitations, fatigue, and diaphoresis.  She has failed antiarrhythmic therapy with flecainide due to side effects and sotalol due to breakthrough.  She reports early episodes of A. fib lasting between 2 to 3 days.  She also has a history of hypertension, stage III chronic kidney disease, type 2 diabetes, and obstructive sleep apnea for which she now wears CPAP nightly.    On December 6, 2019 Pham had pulmonary vein isolation ablation with cryoablation and RF to 4 pulmonary veins and roofline done.  Normal voltage was seen except at roofline area.  Pham is had recurrence of atrial fibrillation since her ablation.  She typically has several days of atrial fib and her symptoms remain the same as preablation.  Then in January 2020 she went into a persistent atrial fibrillation episode.  I restarted her on sotalol and she converted with start of drug and did not need cardioversion.  When I saw Pham on July 5, 2022 I discussed putting her on renal dose sotalol after upcoming surgery, but have not made the change.  She contacted me on August 21 and reported she was in atrial fibrillation 48 hours and was very fatigued.  She reverted back to sinus rhythm on her own after 2-1/2 days.  She was so fatigued she stayed in her pajamas the whole day and was basically resting as not tolerating any activity as also short of breath with A. fib.  Unfortunately she has not gotten less symptomatic with atrial fibrillation over time.  She had a neck injection is not thinking that she is going to need any  orthopedic surgery on her spine.  She plans on spending February in [place] but not longer.      Cardiographics (reviewed):  Results for orders placed during the hospital encounter of 09/11/17   Echo Complete [ECH10] 09/11/2017     Narrative 1. Normal left ventricular size and systolic performance with a visually   estimated ejection fraction of 65%.   2. There is mild aortic insufficiency.  3. There is mild biatrial enlargement.     When compared to the prior real-time echocardiogram dated 29 March 2012,   mild aortic insufficiency is now noted.  Otherwise, there has been little   appreciable interval change.    24-hour Holter monitor worn 9/18/2017 shows persistent atrial fibrillation with controlled ventricular response, average ventricular rate of 82 bpm with a range of 52 to 136 bpm.     CT pulmonary veins done 11/4/2019:  1. Four pulmonary veins with normal configuration.   2. Esophagus is adjacent to the posterior left atrial body and left inferior pulmonary vein.  3. No calcified atherosclerotic plaque.  Left superior pulmonary vein: 15 x 16.2mm, area 1.94sq cm.  Left inferior pulmonary vein: 12.7 x 19.1mm, area 1.97sq cm.  Right superior pulmonary vein: 18.4 x 22.8mm, area 3.32sq cm.  Right inferior pulmonary vein: 14.7 x 17mm, area 2.07sq cm.   Esophagus: The esophagus is adjacent to the posterior side of the left atrial body and posterior side of the left inferior pulmonary vein.    Cardiac testing personally reviewed:  Twelve-lead ECG today shows normal sinus rhythm of 64 and QTC of 433 ms.       Problem List:  Patient Active Problem List   Diagnosis     Osteoarthritis     Total knee replacement status     Insomnia     Essential hypertension     CARDIOVASCULAR SCREENING; LDL GOAL LESS THAN 130     Paroxysmal atrial fibrillation (H)     Advanced directives, counseling/discussion     Health Care Home     Mild intermittent asthma without complication     Persistent insomnia     Type 2 diabetes mellitus  without complication, without long-term current use of insulin (H)     Adjustment disorder with mixed anxiety and depressed mood     Morbid obesity (H)     CKD (chronic kidney disease) stage 3, GFR 30-59 ml/min (H)     Cervicalgia     Shoulder arthritis     SHARA on CPAP     Revi  e  Physical Examination Review of Systems   w fystems  There were no vitals taken for this visit.  There is no height or weight on file to calculate BMI.  Wt Readings from Last 3 Encounters:   22 125.6 kg (277 lb)   22 125.6 kg (277 lb)   22 117.9 kg (260 lb)     General Appearance:   Alert, well-appearing and in no acute distress.   HEENT: Atraumatic, normocephalic.  No scleral icterus, normal conjunctivae; mucous membranes pink and moist.     Chest: Chest symmetric, spine straight.   Lungs:   Respirations unlabored.   Cardiovascular:   Regular, regular.   Normal JVD, no edema.       Extremities: No cyanosis or clubbing   Musculoskeletal: Moves all extremities   Skin: Warm, dry, intact.    Neurologic: Mood and affect are appropriate, alert and oriented to person, place, time, and situation     ROS: 10 point ROS neg other than the symptoms noted above in the HPI.     Medical History  Surgical History Family History Social History     Past Medical History:   Diagnosis Date     Asthma      Atrial fibrillation (H)      Diabetes mellitus (H)      Hypertension      Insomnia      Mild intermittent asthma      Obesity      SHARA on CPAP      Osteoarthritis     Past Surgical History:   Procedure Laterality Date     ARTHROPLASTY KNEE BILATERAL       ARTHROSCOPY SHOULDER RT/LT  11    right with subacromial decompression and rotator cuff repair, massive      SECTION       EP ABLATION AFLUTTER  2019    Procedure: EP Ablation Atrial Flutter;  Surgeon: Dorian Garland MD;  Location: [facility] Cath Lab;  Service: Cardiology     EP ABLATION PVI Left 2019    Procedure: EP Ablation PVI;  Surgeon: Dorian Garland,  MD;  Location: [facility] Cath Lab;  Service: Cardiology     HC CARDIOVERSION  5/31/2012    [facility]     JOINT REPLACEMTN, KNEE RT/LT  2/1/2010    LEFT     JOINT REPLACEMTN, KNEE RT/LT  6/7/2010    right     OPEN REDUCTION INTERNAL FIXATION ANKLE Right 2/19/2021    Procedure: OPEN REDUCTION INTERNAL FIXATION MEDIAL MALLEOLUS FRACTURE, ANKLE RIGHT;  Surgeon: Vidal Cohn MD;  Location: [facility] OR;  Service: Orthopedics     REPAIR HAMMER TOE Right 2/19/2021    Procedure: FLEXOR TENOTOMIES 3RD TOE HAMMER TOE CORRECTION;  Surgeon: Vidal Cohn MD;  Location: [facility] OR;  Service: Orthopedics     SHOULDER ARTHROSCOPY W/ ROTATOR CUFF REPAIR Right      ZZC TOTAL ANKLE REPLACEMENT Right 2/19/2021    Procedure: RIGHT TOTAL ANKLE ARTHOPLASTY, DEBRIDE SUBTALAR JOINT;  Surgeon: Vidal Cohn MD;  Location: [facility] OR;  Service: Orthopedics    Family History   Problem Relation Age of Onset     Genitourinary Problems Mother         Ovarian Cancer     Gastrointestinal Disease Mother         Had gallbladder romoved     Cancer Father         Lung     Gastrointestinal Disease Father         Had gallbladder romoved     C.A.D. Paternal Grandfather         Coronary     Gastrointestinal Disease Brother         Had gallbladder romoved     Depression Sister         DDD     Ovarian Cancer Mother 82.00     Lung Cancer Father 75.00     Sleep Apnea Father      Snoring Father      Brain Cancer Sister 57.00        Brain Ca x 1 year     No Known Problems Brother      No Known Problems Sister      No Known Problems Sister     History   Smoking Status     Former Smoker     Packs/day: 0.00     Types: Cigarettes, Cigarettes     Quit date: 6/1/1981   Smokeless Tobacco     Never Used     Social History    Substance and Sexual Activity      Alcohol use: Yes        Comment: Alcoholic Drinks/day: Rare (2-3 month)       Medications  Allergies     Current Outpatient Medications   Medication  Sig Dispense Refill     blood glucose (ONETOUCH VERIO IQ) test strip Use to test blood sugars 1 times daily or as directed. 100 each 11     blood glucose monitoring (ONE TOUCH DELICA) lancets Use to test blood sugars 1 times daily or as directed. 100 each 3     Cholecalciferol (VITAMIN D) 2000 UNITS tablet Take 4,000 Units by mouth (Patient not taking: Reported on 7/5/2022)       ELIQUIS ANTICOAGULANT 5 MG tablet TAKE 1 TABLET(5 MG) BY MOUTH TWICE DAILY 180 tablet 3     fluticasone-salmeterol (ADVAIR DISKUS) 250-50 MCG/DOSE inhaler Inhale 1 puff into the lungs 2 times daily 60 each 11     hydrochlorothiazide (HYDRODIURIL) 25 MG tablet TAKE 1 TABLET(25 MG) BY MOUTH DAILY 90 tablet 1     levalbuterol (XOPENEX HFA) 45 MCG/ACT inhaler Inhale 2 puffs into the lungs every 8 hours as needed for shortness of breath / dyspnea 15 g 3     losartan (COZAAR) 100 MG tablet TAKE 1 TABLET(100 MG) BY MOUTH DAILY 90 tablet 1     metFORMIN (GLUCOPHAGE) 500 MG tablet VISIT DUE TAKE 1 TABLET(500 MG) BY MOUTH DAILY WITH DINNER (Patient taking differently: Take 250 mg by mouth 2 times daily (with meals) VISIT DUE TAKE 1 TABLET(500 MG) BY MOUTH DAILY WITH DINNER) 90 tablet 0     methocarbamol (ROBAXIN) 750 MG tablet Take 1 tablet (750 mg) by mouth 3 times daily as needed for muscle spasms 30 tablet 0     PARoxetine (PAXIL) 10 MG tablet TAKE 1 TABLET(10 MG) BY MOUTH AT BEDTIME 90 tablet 3     simvastatin (ZOCOR) 20 MG tablet Take 1 tablet (20 mg) by mouth At Bedtime VISIT DUE 90 tablet 0     sotalol (BETAPACE) 80 MG tablet Take 80 mg by mouth 2 times daily       traZODone (DESYREL) 50 MG tablet TAKE 1 TABLET(50 MG) BY MOUTH AT BEDTIME 90 tablet 1      Allergies   Allergen Reactions     Macrobid [Nitrofurantoin Anhydrous] Shortness Of Breath     Ace Inhibitors Cough     Corticosteroids       Medical, surgical, family, social history, and medications were all reviewed and updated as necessary.   Lab Results    Chemistry/lipid CBC Cardiac  Enzymes/BNP/TSH/INR   Recent Labs   Lab Test 07/23/21  0744 03/10/16  0932 12/09/14  0911   CHOL 129   < > 175   HDL 52   < > 47*   LDL 58   < > 99   TRIG 95   < > 144   CHOLHDLRATIO  --   --  3.7    < > = values in this interval not displayed.     Recent Labs   Lab Test 07/23/21  0744 10/01/20  1604 03/21/19  1103   LDL 58 48 53     Recent Labs   Lab Test 07/23/21  0744      POTASSIUM 4.2   CHLORIDE 104   CO2 30   *   BUN 23   CR 1.08*   GFRESTIMATED 51*   ZHAO 9.1     Recent Labs   Lab Test 07/23/21  0744 02/20/21  0615 02/01/21  1451   CR 1.08* 0.97 1.08*     Recent Labs   Lab Test 07/23/21  0744 02/20/21  0615 10/01/20  1604   A1C 6.2* 6.2* 6.3*          Recent Labs   Lab Test 02/20/21  0614   WBC 10.9   HGB 11.0*   HCT 35.1   MCV 90        Recent Labs   Lab Test 02/20/21  0614 02/01/21  1451 12/03/19  1438   HGB 11.0* 12.7 13.4    No results for input(s): TROPONINI in the last 18306 hours.  No results for input(s): BNP, NTBNPI, NTBNP in the last 72903 hours.  Recent Labs   Lab Test 03/21/19  1103   TSH 1.59     No results for input(s): INR in the last 61136 hours.       Total Time- 40 minutes spent on date of encounter doing chart review, history and exam, documentation and further activities as noted above.  This note has been dictated using voice recognition software. Any grammatical, typographical, or context distortions are unintentional and inherent to the software.

## 2022-08-23 NOTE — LETTER
August 29, 2022      RON DICKEY Hurley  [id number] [facility] [place] [address]        Dear Reece.  Your liver function and thyroid function are normal.  Your kidney function and electrolytes are also normal.    Resulted Orders   ALT   Result Value Ref Range    ALT 16 0 - 45 U/L   TSH with free T4 reflex   Result Value Ref Range    TSH 1.43 0.30 - 5.00 uIU/mL   Basic metabolic panel   Result Value Ref Range    Sodium 140 136 - 145 mmol/L    Potassium 4.1 3.5 - 5.0 mmol/L    Chloride 100 98 - 107 mmol/L    Carbon Dioxide (CO2) 30 22 - 31 mmol/L    Anion Gap 10 5 - 18 mmol/L    Urea Nitrogen 21 8 - 28 mg/dL    Creatinine 0.98 0.60 - 1.10 mg/dL    Calcium 9.4 8.5 - 10.5 mg/dL    Glucose 96 70 - 125 mg/dL    GFR Estimate 60 (L) >60 mL/min/1.73m2      Comment:      Effective December 21, 2021 eGFRcr in adults is calculated using the 2021 CKD-EPI creatinine equation which includes age and gender (Alcides et al., NEJM, DOI: 10.1056/QKEDhe3253955)       If you have any questions or concerns, please call the clinic at the number listed above.       Sincerely,      CRISTIAN Earl CNP

## 2022-08-23 NOTE — LETTER
8/23/2022    Adeola Stockton MD  [address]  [facility] [address]    RE: Romy Hurley       Dear Colleague,     I had the pleasure of seeing Romy Hurley in the [facility].    Thank you, Dr. Stockton, for asking the [facility] team to see Ms. Romy Hurley to evaluate paroxysmal atrial fibrillation.    Assessment/Recommendations     Assessment/Plan:    Diagnoses and all orders for this visit:  Paroxysmal atrial fibrillation (H) and most recent episode was 2-1/2 days.  She does not have any pending surgeries.  I discussed that she needs to go to a renal dose sotalol and she will likely have recurrence of A. fib if I decrease sotalol dose given long episode now.  Therefore I recommended that she switch from sotalol to amiodarone.  She should wait about a week before making change given recent episode.  She is very symptomatic with atrial fibrillation.  In about a week to stop sotalol and start metoprolol succinate 50 mg 1 tab orally every evening.  To also start amiodarone 200 mg 2 tablets orally in the a.m. and 1 tablet in the evening for 1 week and then decrease to 1 tablet orally twice daily for 2 weeks.  Then decrease to amiodarone 200 mg 1 tablet orally every day.  Discussed risk with amio to liver, thyroid, skin, eyes and lungs with pulmonary fibrosis being the most significant but dose and time dependent. Recommended routine sunscreen use with exposure to sun and yearly eye exams.   Initial labs done today.  To delay PFTs for  4 months.  Discussed interaction with simvastatin 20 mg orally every day and will switch her to atorvastatin 10 mg orally every day.        She is seeing Dr. Stockton in October and will need ALT and TSH at that appointment.  Essential hypertension and blood pressure well below goal.  SHARA and on CPAP  Stage 3a chronic kidney disease (H) and will get basic metabolic profile today.      [id number] score of 5 and on  Eliquis.  Follow up in clinic with me in 3 to 4 months.  To call if frequent episodes of A. fib or A. fib lasting 24 hours or longer.  I discussed there is risk she could have recurrence of A. fib with switching from sotalol to amiodarone due to long half-life of amiodarone.  I am loading her a little faster the first week to try to prevent recurrence of A. fib.     History of Present Illness/Subjective     Romy Hurley is a very pleasant 77 year old female who comes in today for urgent EP appointment due to ongoing atrial fibrillation for 5 days and very symptomatic.  Romy Hurley was diagnosed with atrial fibrillation in 2012.  Symptoms consist of palpitations, fatigue, and diaphoresis.  She has failed antiarrhythmic therapy with flecainide due to side effects and sotalol due to breakthrough.  She reports early episodes of A. fib lasting between 2 to 3 days.  She also has a history of hypertension, stage III chronic kidney disease, type 2 diabetes, and obstructive sleep apnea for which she now wears CPAP nightly.    On December 6, 2019 Pham had pulmonary vein isolation ablation with cryoablation and RF to 4 pulmonary veins and roofline done.  Normal voltage was seen except at roofline area.  Pham is had recurrence of atrial fibrillation since her ablation.  She typically has several days of atrial fib and her symptoms remain the same as preablation.  Then in January 2020 she went into a persistent atrial fibrillation episode.  I restarted her on sotalol and she converted with start of drug and did not need cardioversion.  When I saw Pham on July 5, 2022 I discussed putting her on renal dose sotalol after upcoming surgery, but have not made the change.  She contacted me on August 21 and reported she was in atrial fibrillation 48 hours and was very fatigued.  She reverted back to sinus rhythm on her own after 2-1/2 days.  She was so fatigued she stayed in her pajamas the whole day and was basically resting as  not tolerating any activity as also short of breath with A. fib.  Unfortunately she has not gotten less symptomatic with atrial fibrillation over time.  She had a neck injection is not thinking that she is going to need any orthopedic surgery on her spine.  She plans on spending February in [place] but not longer.      Cardiographics (reviewed):  Results for orders placed during the hospital encounter of 09/11/17   Echo Complete [ECH10] 09/11/2017     Narrative 1. Normal left ventricular size and systolic performance with a visually   estimated ejection fraction of 65%.   2. There is mild aortic insufficiency.  3. There is mild biatrial enlargement.     When compared to the prior real-time echocardiogram dated 29 March 2012,   mild aortic insufficiency is now noted.  Otherwise, there has been little   appreciable interval change.    24-hour Holter monitor worn 9/18/2017 shows persistent atrial fibrillation with controlled ventricular response, average ventricular rate of 82 bpm with a range of 52 to 136 bpm.     CT pulmonary veins done 11/4/2019:  1. Four pulmonary veins with normal configuration.   2. Esophagus is adjacent to the posterior left atrial body and left inferior pulmonary vein.  3. No calcified atherosclerotic plaque.  Left superior pulmonary vein: 15 x 16.2mm, area 1.94sq cm.  Left inferior pulmonary vein: 12.7 x 19.1mm, area 1.97sq cm.  Right superior pulmonary vein: 18.4 x 22.8mm, area 3.32sq cm.  Right inferior pulmonary vein: 14.7 x 17mm, area 2.07sq cm.   Esophagus: The esophagus is adjacent to the posterior side of the left atrial body and posterior side of the left inferior pulmonary vein.    Cardiac testing personally reviewed:  Twelve-lead ECG today shows normal sinus rhythm of 64 and QTC of 433 ms.       Problem List:  Patient Active Problem List   Diagnosis     Osteoarthritis     Total knee replacement status     Insomnia     Essential hypertension     CARDIOVASCULAR SCREENING; LDL GOAL LESS  THAN 130     Paroxysmal atrial fibrillation (H)     Advanced directives, counseling/discussion     Health Care Home     Mild intermittent asthma without complication     Persistent insomnia     Type 2 diabetes mellitus without complication, without long-term current use of insulin (H)     Adjustment disorder with mixed anxiety and depressed mood     Morbid obesity (H)     CKD (chronic kidney disease) stage 3, GFR 30-59 ml/min (H)     Cervicalgia     Shoulder arthritis     SHARA on CPAP     Revi  e  Physical Examination Review of Systems   w fystems  There were no vitals taken for this visit.  There is no height or weight on file to calculate BMI.  Wt Readings from Last 3 Encounters:   22 125.6 kg (277 lb)   22 125.6 kg (277 lb)   22 117.9 kg (260 lb)     General Appearance:   Alert, well-appearing and in no acute distress.   HEENT: Atraumatic, normocephalic.  No scleral icterus, normal conjunctivae; mucous membranes pink and moist.     Chest: Chest symmetric, spine straight.   Lungs:   Respirations unlabored.   Cardiovascular:   Regular, regular.   Normal JVD, no edema.       Extremities: No cyanosis or clubbing   Musculoskeletal: Moves all extremities   Skin: Warm, dry, intact.    Neurologic: Mood and affect are appropriate, alert and oriented to person, place, time, and situation     ROS: 10 point ROS neg other than the symptoms noted above in the HPI.     Medical History  Surgical History Family History Social History     Past Medical History:   Diagnosis Date     Asthma      Atrial fibrillation (H) 2012     Diabetes mellitus (H)      Hypertension      Insomnia      Mild intermittent asthma      Obesity      SHARA on CPAP      Osteoarthritis     Past Surgical History:   Procedure Laterality Date     ARTHROPLASTY KNEE BILATERAL       ARTHROSCOPY SHOULDER RT/LT  11    right with subacromial decompression and rotator cuff repair, massive      SECTION       EP ABLATION AFLUTTER  2019     Procedure: EP Ablation Atrial Flutter;  Surgeon: Dorian Garland MD;  Location: [facility];  Service: Cardiology     EP ABLATION PVI Left 12/6/2019    Procedure: EP Ablation PVI;  Surgeon: Dorian Garland MD;  Location: [facility];  Service: Cardiology     HC CARDIOVERSION  5/31/2012    [facility] - done for a fib     JOINT REPLACEMTN, KNEE RT/LT  2/1/2010    LEFT     JOINT REPLACEMTN, KNEE RT/LT  6/7/2010    right     OPEN REDUCTION INTERNAL FIXATION ANKLE Right 2/19/2021    Procedure: OPEN REDUCTION INTERNAL FIXATION MEDIAL MALLEOLUS FRACTURE, ANKLE RIGHT;  Surgeon: Vidal Cohn MD;  Location: [facility] Main OR;  Service: Orthopedics     REPAIR HAMMER TOE Right 2/19/2021    Procedure: FLEXOR TENOTOMIES 3RD TOE HAMMER TOE CORRECTION;  Surgeon: Vidal Cohn MD;  Location: [facility] Main OR;  Service: Orthopedics     SHOULDER ARTHROSCOPY W/ ROTATOR CUFF REPAIR Right      ZZC TOTAL ANKLE REPLACEMENT Right 2/19/2021    Procedure: RIGHT TOTAL ANKLE ARTHOPLASTY, DEBRIDE SUBTALAR JOINT;  Surgeon: Vidal Cohn MD;  Location: [facility] Main OR;  Service: Orthopedics    Family History   Problem Relation Age of Onset     Genitourinary Problems Mother         Ovarian Cancer     Gastrointestinal Disease Mother         Had gallbladder romoved     Cancer Father         Lung     Gastrointestinal Disease Father         Had gallbladder romoved     C.A.D. Paternal Grandfather         Coronary     Gastrointestinal Disease Brother         Had gallbladder romoved     Depression Sister         DDD     Ovarian Cancer Mother 82.00     Lung Cancer Father 75.00     Sleep Apnea Father      Snoring Father      Brain Cancer Sister 57.00        Brain Ca x 1 year     No Known Problems Brother      No Known Problems Sister      No Known Problems Sister     History   Smoking Status     Former Smoker     Packs/day: 0.00     Types: Cigarettes, Cigarettes     Quit date: 6/1/1981   Smokeless Tobacco      Never Used     Social History    Substance and Sexual Activity      Alcohol use: Yes        Comment: Alcoholic Drinks/day: Rare (2-3 month)       Medications  Allergies     Current Outpatient Medications   Medication Sig Dispense Refill     blood glucose (ONETOUCH VERIO IQ) test strip Use to test blood sugars 1 times daily or as directed. 100 each 11     blood glucose monitoring (ONE TOUCH DELICA) lancets Use to test blood sugars 1 times daily or as directed. 100 each 3     Cholecalciferol (VITAMIN D) 2000 UNITS tablet Take 4,000 Units by mouth (Patient not taking: Reported on 7/5/2022)       ELIQUIS ANTICOAGULANT 5 MG tablet TAKE 1 TABLET(5 MG) BY MOUTH TWICE DAILY 180 tablet 3     fluticasone-salmeterol (ADVAIR DISKUS) 250-50 MCG/DOSE inhaler Inhale 1 puff into the lungs 2 times daily 60 each 11     hydrochlorothiazide (HYDRODIURIL) 25 MG tablet TAKE 1 TABLET(25 MG) BY MOUTH DAILY 90 tablet 1     levalbuterol (XOPENEX HFA) 45 MCG/ACT inhaler Inhale 2 puffs into the lungs every 8 hours as needed for shortness of breath / dyspnea 15 g 3     losartan (COZAAR) 100 MG tablet TAKE 1 TABLET(100 MG) BY MOUTH DAILY 90 tablet 1     metFORMIN (GLUCOPHAGE) 500 MG tablet VISIT DUE TAKE 1 TABLET(500 MG) BY MOUTH DAILY WITH DINNER (Patient taking differently: Take 250 mg by mouth 2 times daily (with meals) VISIT DUE TAKE 1 TABLET(500 MG) BY MOUTH DAILY WITH DINNER) 90 tablet 0     methocarbamol (ROBAXIN) 750 MG tablet Take 1 tablet (750 mg) by mouth 3 times daily as needed for muscle spasms 30 tablet 0     PARoxetine (PAXIL) 10 MG tablet TAKE 1 TABLET(10 MG) BY MOUTH AT BEDTIME 90 tablet 3     simvastatin (ZOCOR) 20 MG tablet Take 1 tablet (20 mg) by mouth At Bedtime VISIT DUE 90 tablet 0     sotalol (BETAPACE) 80 MG tablet Take 80 mg by mouth 2 times daily       traZODone (DESYREL) 50 MG tablet TAKE 1 TABLET(50 MG) BY MOUTH AT BEDTIME 90 tablet 1      Allergies   Allergen Reactions     Macrobid [Nitrofurantoin Anhydrous]  Shortness Of Breath     Ace Inhibitors Cough     Corticosteroids       Medical, surgical, family, social history, and medications were all reviewed and updated as necessary.   Lab Results    Chemistry/lipid CBC Cardiac Enzymes/BNP/TSH/INR   Recent Labs   Lab Test 07/23/21  0744 03/10/16  0932 12/09/14  0911   CHOL 129   < > 175   HDL 52   < > 47*   LDL 58   < > 99   TRIG 95   < > 144   CHOLHDLRATIO  --   --  3.7    < > = values in this interval not displayed.     Recent Labs   Lab Test 07/23/21  0744 10/01/20  1604 03/21/19  1103   LDL 58 48 53     Recent Labs   Lab Test 07/23/21  0744      POTASSIUM 4.2   CHLORIDE 104   CO2 30   *   BUN 23   CR 1.08*   GFRESTIMATED 51*   ZHAO 9.1     Recent Labs   Lab Test 07/23/21  0744 02/20/21  0615 02/01/21  1451   CR 1.08* 0.97 1.08*     Recent Labs   Lab Test 07/23/21  0744 02/20/21  0615 10/01/20  1604   A1C 6.2* 6.2* 6.3*          Recent Labs   Lab Test 02/20/21  0614   WBC 10.9   HGB 11.0*   HCT 35.1   MCV 90        Recent Labs   Lab Test 02/20/21  0614 02/01/21  1451 12/03/19  1438   HGB 11.0* 12.7 13.4    No results for input(s): TROPONINI in the last 80715 hours.  No results for input(s): BNP, NTBNPI, NTBNP in the last 53825 hours.  Recent Labs   Lab Test 03/21/19  1103   TSH 1.59     No results for input(s): INR in the last 69273 hours.       Total Time- 40 minutes spent on date of encounter doing chart review, history and exam, documentation and further activities as noted above.  This note has been dictated using voice recognition software. Any grammatical, typographical, or context distortions are unintentional and inherent to the software.        Thank you for allowing me to participate in the care of your patient.      Sincerely,     CRISTIAN [facility]  cc:   No referring provider defined for this encounter.

## 2022-08-23 NOTE — PATIENT INSTRUCTIONS
Romy Hurley,    It was a pleasure to see you today at the [facility].     My recommendations after this visit include:    In about a week, stop sotalol and start amiodarone and metoprolol.  Start metoprolol succinate 50 mg 1 tab orally every evening.  Take amiodarone 200 mg 2 tabs orally in am and 1 tab orally in the evening with food for 1 week and then decrease to 1 tab orally twice a day for 2 weeks.  Then decrease to 1 tab orally every day.  Call me if reoccurrence of atrial fibrillation for 24 hours or longer or frequent episodes before follow up.  When you get atorvastatin, stop simvastatin and switch to atorvastatin 10 mg 1 tab orally every day.    Lab draw for amiodarone when you see Dr. Stockton in October.      To followup with me in 3-4 months.        My contact information:  Lucien Beach, MELANIE  After Hours or Scheduling  [phone]  My Nurse---Miri Vazquez [phone]

## 2022-08-24 DIAGNOSIS — I48.0 PAROXYSMAL ATRIAL FIBRILLATION (H): ICD-10-CM

## 2022-08-24 LAB
ATRIAL RATE - MUSE: 64 BPM
DIASTOLIC BLOOD PRESSURE - MUSE: NORMAL MMHG
INTERPRETATION ECG - MUSE: NORMAL
P AXIS - MUSE: 38 DEGREES
PR INTERVAL - MUSE: 206 MS
QRS DURATION - MUSE: 86 MS
QT - MUSE: 420 MS
QTC - MUSE: 433 MS
R AXIS - MUSE: -3 DEGREES
SYSTOLIC BLOOD PRESSURE - MUSE: NORMAL MMHG
T AXIS - MUSE: -2 DEGREES
VENTRICULAR RATE- MUSE: 64 BPM

## 2022-08-24 RX ORDER — AMIODARONE HYDROCHLORIDE 200 MG/1
TABLET ORAL
Qty: 240 TABLET | Refills: 1 | Status: SHIPPED | OUTPATIENT
Start: 2022-08-24 | End: 2023-01-17

## 2022-08-27 ENCOUNTER — HEALTH MAINTENANCE LETTER (OUTPATIENT)
Age: 77
End: 2022-08-27

## 2022-09-14 DIAGNOSIS — J45.20 MILD INTERMITTENT ASTHMA WITHOUT COMPLICATION: ICD-10-CM

## 2022-09-14 DIAGNOSIS — E11.9 TYPE 2 DIABETES MELLITUS WITHOUT COMPLICATION, WITHOUT LONG-TERM CURRENT USE OF INSULIN (H): ICD-10-CM

## 2022-09-14 DIAGNOSIS — F43.23 ADJUSTMENT DISORDER WITH MIXED ANXIETY AND DEPRESSED MOOD: ICD-10-CM

## 2022-09-15 ENCOUNTER — TELEPHONE (OUTPATIENT)
Dept: FAMILY MEDICINE | Facility: CLINIC | Age: 77
End: 2022-09-15

## 2022-09-15 RX ORDER — PAROXETINE 10 MG/1
TABLET, FILM COATED ORAL
Qty: 90 TABLET | Refills: 3 | Status: SHIPPED | OUTPATIENT
Start: 2022-09-15 | End: 2023-03-29

## 2022-09-15 RX ORDER — LEVALBUTEROL TARTRATE 45 UG/1
AEROSOL, METERED ORAL
Qty: 15 G | Refills: 3 | Status: SHIPPED | OUTPATIENT
Start: 2022-09-15 | End: 2023-03-29

## 2022-09-15 NOTE — TELEPHONE ENCOUNTER
Needs prior authorization done.     Patient has h/o a fib and had significant tachycardia with albuterol.   Has done well with xopenex (levalbuterol).    Adeola Stockton M.D.

## 2022-09-15 NOTE — TELEPHONE ENCOUNTER
"Requested Prescriptions   Pending Prescriptions Disp Refills    XOPENEX HFA 45 MCG/ACT inhaler [Pharmacy Med Name: XOPENEX HFA ORAL INH (200 PFS) 15GM] 15 g 3     Sig: INHALE 2 PUFFS INTO THE LUNGS EVERY 8 HOURS AS NEEDED FOR SHORTNESS OF BREATH OR DIFFICULT BREATHING        Short-Acting Beta Agonist Inhalers Protocol  Failed - 9/14/2022  8:02 PM        Failed - Asthma control assessment score within normal limits in last 6 months     Please review ACT score.           Passed - Patient is age 12 or older        Passed - Medication is active on med list        Passed - Recent (6 mo) or future (30 days) visit within the authorizing provider's specialty     Patient had office visit in the last 6 months or has a visit in the next 30 days with authorizing provider or within the authorizing provider's specialty.  See \"Patient Info\" tab in inbasket, or \"Choose Columns\" in Meds & Orders section of the refill encounter.               PARoxetine (PAXIL) 10 MG tablet [Pharmacy Med Name: PAROXETINE 10MG TABLETS] 90 tablet 3     Sig: TAKE 1 TABLET(10 MG) BY MOUTH AT BEDTIME        SSRIs Protocol Failed - 9/14/2022  8:02 PM        Failed - PHQ-9 score less than 5 in past 6 months     Please review last PHQ-9 score.           Passed - Medication is active on med list        Passed - Patient is age 18 or older        Passed - No active pregnancy on record        Passed - No positive pregnancy test in last 12 months        Passed - Recent (6 mo) or future (30 days) visit within the authorizing provider's specialty     Patient had office visit in the last 6 months or has a visit in the next 30 days with authorizing provider or within the authorizing provider's specialty.  See \"Patient Info\" tab in inbasket, or \"Choose Columns\" in Meds & Orders section of the refill encounter.               metFORMIN (GLUCOPHAGE) 500 MG tablet [Pharmacy Med Name: METFORMIN 500MG TABLETS] 90 tablet 0     Sig: TAKE 1 TABLET BY MOUTH DAILY WITH DINNER    " "    Biguanide Agents Failed - 9/14/2022  8:02 PM        Failed - Patient has documented A1c within the specified period of time.     If HgbA1C is 8 or greater, it needs to be on file within the past 3 months.  If less than 8, must be on file within the past 6 months.     Recent Labs   Lab Test 07/23/21  0744   A1C 6.2*             Passed - Patient is age 10 or older        Passed - Patient's CR is NOT>1.4 OR Patient's EGFR is NOT<45 within past 12 mos.       Recent Labs   Lab Test 08/23/22  1335 07/23/21  0744 02/20/21  0615   GFRESTIMATED 60*   < > 56*   GFRESTBLACK  --   --  >60    < > = values in this interval not displayed.       Recent Labs   Lab Test 08/23/22  1335   CR 0.98             Passed - Patient does NOT have a diagnosis of CHF.        Passed - Medication is active on med list        Passed - Patient is not pregnant        Passed - Patient has not had a positive pregnancy test within the past 12 mos.         Passed - Recent (6 mo) or future (30 days) visit within the authorizing provider's specialty     Patient had office visit in the last 6 months or has a visit in the next 30 days with authorizing provider or within the authorizing provider's specialty.  See \"Patient Info\" tab in inbasket, or \"Choose Columns\" in Meds & Orders section of the refill encounter.                  "

## 2022-09-15 NOTE — TELEPHONE ENCOUNTER
Pharmacy note regarding XOPENEX HFA 45 MCG/ACT inhaler:  Drug not covered by patient plan. The preferred alternative is ALBUTEROLSULFATEHFA

## 2022-09-16 NOTE — TELEPHONE ENCOUNTER
Central Prior Authorization Team   Phone: [phone]      PA Initiation    Medication: Xopenex HFA  Insurance Company: [employer] - Phone [phone] Fax [phone]  Pharmacy Filling the Rx: [id number] [address] AT [address]  Filling Pharmacy Phone: [phone]  Filling Pharmacy Fax:    Start Date: 9/16/2022

## 2022-09-16 NOTE — TELEPHONE ENCOUNTER
Prior Authorization Approval    Authorization Effective Date: 8/17/2022  Authorization Expiration Date: 9/16/2023  Medication: Xopenex HFA-APPROVED  Approved Dose/Quantity:   Reference #:     Insurance Company: [employer] - Phone [phone] Fax [phone]  Expected CoPay:       CoPay Card Available:      Foundation Assistance Needed:    Which Pharmacy is filling the prescription (Not needed for infusion/clinic administered): [facility] #[id number] - [address] AT [address]  Pharmacy Notified: Yes  Patient Notified: No    **Instructed pharmacy to notify patient when script is ready to /ship.**

## 2022-09-16 NOTE — TELEPHONE ENCOUNTER
Prior Authorization Retail Medication Request    Medication/Dose: XOPENEX HFA 45 MCG/ACT inhaler  ICD code (if different than what is on RX):    Previously Tried and Failed:  Adviar; ventolin/proventil; albuterol sulfate; albuterol inhaler; singulair  Rationale:  Patient has h/o a fib and had significant tachycardia with albuterol. Has done well with xopenex (levalbuterol).  Adeola Stockton M.D.    Insurance Name:  [facility]  Insurance ID:  [id number]      Pharmacy Information (if different than what is on RX)  Name:    Phone:

## 2022-10-11 ASSESSMENT — ENCOUNTER SYMPTOMS
NERVOUS/ANXIOUS: 1
WEAKNESS: 0
COUGH: 1
NAUSEA: 0
JOINT SWELLING: 0
DYSURIA: 0
MYALGIAS: 1
DIARRHEA: 0
DIZZINESS: 0
SORE THROAT: 0
HEADACHES: 1
EYE PAIN: 1
HEMATOCHEZIA: 0
CHILLS: 0
HEARTBURN: 0
HEMATURIA: 0
CONSTIPATION: 0
BREAST MASS: 0
PALPITATIONS: 0
SHORTNESS OF BREATH: 0
PARESTHESIAS: 1
FREQUENCY: 0
ARTHRALGIAS: 1
ABDOMINAL PAIN: 0
FEVER: 0

## 2022-10-11 ASSESSMENT — ACTIVITIES OF DAILY LIVING (ADL): CURRENT_FUNCTION: NO ASSISTANCE NEEDED

## 2022-10-13 ENCOUNTER — OFFICE VISIT (OUTPATIENT)
Dept: FAMILY MEDICINE | Facility: CLINIC | Age: 77
End: 2022-10-13
Payer: COMMERCIAL

## 2022-10-13 VITALS
WEIGHT: 281.25 LBS | HEART RATE: 70 BPM | BODY MASS INDEX: 42.62 KG/M2 | TEMPERATURE: 97.8 F | HEIGHT: 68 IN | RESPIRATION RATE: 20 BRPM | DIASTOLIC BLOOD PRESSURE: 68 MMHG | OXYGEN SATURATION: 97 % | SYSTOLIC BLOOD PRESSURE: 132 MMHG

## 2022-10-13 DIAGNOSIS — N18.31 STAGE 3A CHRONIC KIDNEY DISEASE (H): ICD-10-CM

## 2022-10-13 DIAGNOSIS — E66.01 MORBID OBESITY (H): ICD-10-CM

## 2022-10-13 DIAGNOSIS — G47.00 PERSISTENT INSOMNIA: ICD-10-CM

## 2022-10-13 DIAGNOSIS — J01.90 ACUTE SINUSITIS WITH SYMPTOMS > 10 DAYS: ICD-10-CM

## 2022-10-13 DIAGNOSIS — I48.0 PAROXYSMAL ATRIAL FIBRILLATION (H): ICD-10-CM

## 2022-10-13 DIAGNOSIS — Z12.31 VISIT FOR SCREENING MAMMOGRAM: ICD-10-CM

## 2022-10-13 DIAGNOSIS — J45.20 MILD INTERMITTENT ASTHMA WITHOUT COMPLICATION: ICD-10-CM

## 2022-10-13 DIAGNOSIS — E11.9 TYPE 2 DIABETES MELLITUS WITHOUT COMPLICATION, WITHOUT LONG-TERM CURRENT USE OF INSULIN (H): ICD-10-CM

## 2022-10-13 DIAGNOSIS — Z12.11 SCREEN FOR COLON CANCER: ICD-10-CM

## 2022-10-13 DIAGNOSIS — Z78.0 POSTMENOPAUSAL STATUS: ICD-10-CM

## 2022-10-13 DIAGNOSIS — I10 HTN, GOAL BELOW 140/90: ICD-10-CM

## 2022-10-13 DIAGNOSIS — Z00.00 ENCOUNTER FOR MEDICARE ANNUAL WELLNESS EXAM: Primary | ICD-10-CM

## 2022-10-13 DIAGNOSIS — I48.19 PERSISTENT ATRIAL FIBRILLATION (H): ICD-10-CM

## 2022-10-13 LAB
ALT SERPL W P-5'-P-CCNC: 18 U/L (ref 10–35)
ANION GAP SERPL CALCULATED.3IONS-SCNC: 9 MMOL/L (ref 7–15)
BUN SERPL-MCNC: 20.1 MG/DL (ref 8–23)
CALCIUM SERPL-MCNC: 9.4 MG/DL (ref 8.8–10.2)
CHLORIDE SERPL-SCNC: 99 MMOL/L (ref 98–107)
CHOLEST SERPL-MCNC: 149 MG/DL
CREAT SERPL-MCNC: 1.11 MG/DL (ref 0.51–0.95)
CREAT UR-MCNC: 138.5 MG/DL
DEPRECATED HCO3 PLAS-SCNC: 32 MMOL/L (ref 22–29)
GFR SERPL CREATININE-BSD FRML MDRD: 52 ML/MIN/1.73M2
GLUCOSE SERPL-MCNC: 100 MG/DL (ref 70–99)
HBA1C MFR BLD: 6 % (ref 0–5.6)
HDLC SERPL-MCNC: 47 MG/DL
HGB BLD-MCNC: 13 G/DL (ref 11.7–15.7)
LDLC SERPL CALC-MCNC: 74 MG/DL
MICROALBUMIN UR-MCNC: <12 MG/L
MICROALBUMIN/CREAT UR: NORMAL MG/G{CREAT}
NONHDLC SERPL-MCNC: 102 MG/DL
POTASSIUM SERPL-SCNC: 4.3 MMOL/L (ref 3.4–5.3)
SODIUM SERPL-SCNC: 140 MMOL/L (ref 136–145)
T4 FREE SERPL-MCNC: 1.17 NG/DL (ref 0.9–1.7)
TRIGL SERPL-MCNC: 139 MG/DL
TSH SERPL DL<=0.005 MIU/L-ACNC: 5.58 UIU/ML (ref 0.3–4.2)

## 2022-10-13 PROCEDURE — 80048 BASIC METABOLIC PNL TOTAL CA: CPT | Performed by: FAMILY MEDICINE

## 2022-10-13 PROCEDURE — 84460 ALANINE AMINO (ALT) (SGPT): CPT | Performed by: FAMILY MEDICINE

## 2022-10-13 PROCEDURE — 83036 HEMOGLOBIN GLYCOSYLATED A1C: CPT | Performed by: FAMILY MEDICINE

## 2022-10-13 PROCEDURE — 85018 HEMOGLOBIN: CPT | Performed by: FAMILY MEDICINE

## 2022-10-13 PROCEDURE — 82043 UR ALBUMIN QUANTITATIVE: CPT | Performed by: FAMILY MEDICINE

## 2022-10-13 PROCEDURE — 84439 ASSAY OF FREE THYROXINE: CPT | Performed by: FAMILY MEDICINE

## 2022-10-13 PROCEDURE — 36415 COLL VENOUS BLD VENIPUNCTURE: CPT | Performed by: FAMILY MEDICINE

## 2022-10-13 PROCEDURE — 84443 ASSAY THYROID STIM HORMONE: CPT | Performed by: FAMILY MEDICINE

## 2022-10-13 PROCEDURE — 80061 LIPID PANEL: CPT | Performed by: FAMILY MEDICINE

## 2022-10-13 PROCEDURE — 99214 OFFICE O/P EST MOD 30 MIN: CPT | Mod: 25 | Performed by: FAMILY MEDICINE

## 2022-10-13 PROCEDURE — G0439 PPPS, SUBSEQ VISIT: HCPCS | Performed by: FAMILY MEDICINE

## 2022-10-13 RX ORDER — FLUTICASONE PROPIONATE AND SALMETEROL 250; 50 UG/1; UG/1
1 POWDER RESPIRATORY (INHALATION) EVERY 12 HOURS
Qty: 60 EACH | Refills: 11 | Status: SHIPPED | OUTPATIENT
Start: 2022-10-13 | End: 2023-12-11

## 2022-10-13 RX ORDER — LOSARTAN POTASSIUM 100 MG/1
100 TABLET ORAL DAILY
Qty: 90 TABLET | Refills: 3 | Status: SHIPPED | OUTPATIENT
Start: 2022-10-13 | End: 2024-03-11

## 2022-10-13 RX ORDER — HYDROCHLOROTHIAZIDE 25 MG/1
25 TABLET ORAL DAILY
Qty: 90 TABLET | Refills: 3 | Status: SHIPPED | OUTPATIENT
Start: 2022-10-13 | End: 2024-03-11

## 2022-10-13 RX ORDER — TRAZODONE HYDROCHLORIDE 50 MG/1
TABLET, FILM COATED ORAL
Qty: 90 TABLET | Refills: 1 | Status: SHIPPED | OUTPATIENT
Start: 2022-10-13 | End: 2023-05-22

## 2022-10-13 ASSESSMENT — ASTHMA QUESTIONNAIRES
QUESTION_1 LAST FOUR WEEKS HOW MUCH OF THE TIME DID YOUR ASTHMA KEEP YOU FROM GETTING AS MUCH DONE AT WORK, SCHOOL OR AT HOME: A LITTLE OF THE TIME
ACT_TOTALSCORE: 19
QUESTION_5 LAST FOUR WEEKS HOW WOULD YOU RATE YOUR ASTHMA CONTROL: WELL CONTROLLED
QUESTION_3 LAST FOUR WEEKS HOW OFTEN DID YOUR ASTHMA SYMPTOMS (WHEEZING, COUGHING, SHORTNESS OF BREATH, CHEST TIGHTNESS OR PAIN) WAKE YOU UP AT NIGHT OR EARLIER THAN USUAL IN THE MORNING: ONCE OR TWICE
QUESTION_4 LAST FOUR WEEKS HOW OFTEN HAVE YOU USED YOUR RESCUE INHALER OR NEBULIZER MEDICATION (SUCH AS ALBUTEROL): TWO OR THREE TIMES PER WEEK
QUESTION_2 LAST FOUR WEEKS HOW OFTEN HAVE YOU HAD SHORTNESS OF BREATH: ONCE OR TWICE A WEEK
ACT_TOTALSCORE: 19

## 2022-10-13 ASSESSMENT — PAIN SCALES - GENERAL: PAINLEVEL: NO PAIN (0)

## 2022-10-13 NOTE — PROGRESS NOTES
"SUBJECTIVE:   PAT is a 77 year old who presents for Preventive Visit.      Patient has been advised of split billing requirements and indicates understanding: Yes  Are you in the first 12 months of your Medicare coverage?  No    HPI     Sinus Problem   Sinus pain, rhinorrhea and congestion since summer. She does have facial pain and headaches. She is taking Alorest daily with some improvement.  - dealing with this all summer  -has not taken anything yet.   -taking allergy medication regularly      Healthy Habits:     In general, how would you rate your overall health?  Fair    Frequency of exercise:  2-3 days/week    Duration of exercise:  30-45 minutes    Do you usually eat at least 4 servings of fruit and vegetables a day, include whole grains    & fiber and avoid regularly eating high fat or \"junk\" foods?  No    Taking medications regularly:  Yes    Medication side effects:  Other    Ability to successfully perform activities of daily living:  No assistance needed    Home Safety:  No safety concerns identified    Hearing Impairment:  Need to ask people to speak up or repeat themselves and difficulty understanding soft or whispered speech    In the past 6 months, have you been bothered by leaking of urine? Yes    In general, how would you rate your overall mental or emotional health?  Fair      PHQ-2 Total Score: 1    Additional concerns today:  Yes  Do you feel safe in your environment? Yes    Have you ever done Advance Care Planning? (For example, a Health Directive, POLST, or a discussion with a medical provider or your loved ones about your wishes): Yes, advance care planning is on file.       Fall risk  Fallen 2 or more times in the past year?: No  Any fall with injury in the past year?: No    Cognitive Screening   1) Repeat 3 items (Leader, Season, Table)    2) Clock draw: NORMAL  3) 3 item recall: Recalls 3 objects  Results: 3 items recalled: COGNITIVE IMPAIRMENT LESS LIKELY    Mini-CogTM Copyright S " Theron. Licensed by the author for use in [facility]; reprinted with permission (heidi@[email or web address]). All rights reserved.      Do you have sleep apnea, excessive snoring or daytime drowsiness?: YES    Reviewed and updated as needed this visit by clinical staff   Tobacco  Allergies  Meds   Med Hx  Surg Hx  Fam Hx  Soc Hx        Reviewed and updated as needed this visit by Provider                 Social History     Tobacco Use     Smoking status: Former     Packs/day: 0.00     Types: Cigarettes     Quit date: 1981     Years since quittin.3     Smokeless tobacco: Never   Substance Use Topics     Alcohol use: Yes     Comment: Alcoholic Drinks/day: Rare (2-3 month)     If you drink alcohol do you typically have >3 drinks per day or >7 drinks per week? No    Alcohol Use 10/13/2022   Prescreen: >3 drinks/day or >7 drinks/week? -   Prescreen: >3 drinks/day or >7 drinks/week? No     - She declines vaccines today    Diabetes Follow-up  Metformin 500mg qd  How often are you checking your blood sugar? A few times a week  What time of day are you checking your blood sugars (select all that apply)?  Before and after meals  Have you had any blood sugars above 200?  No  Have you had any blood sugars below 70?  No    What symptoms do you notice when your blood sugar is low?  None    What concerns do you have today about your diabetes? None     Do you have any of these symptoms? (Select all that apply)  No numbness or tingling in feet.  No redness, sores or blisters on feet.  No complaints of excessive thirst.  No reports of blurry vision.  No significant changes to weight.    Have you had a diabetic eye exam in the last 12 months? No          Hyperlipidemia Follow-Up  Atorvastatin 10mg qd    Are you regularly taking any medication or supplement to lower your cholesterol?   Yes- statin    Are you having muscle aches or other side effects that you think could be caused by your cholesterol lowering medication?   No    Hypertension Follow-up    Hydrochlorothiazide 25mg every day, losartan 100mg qdDo you check your blood pressure regularly outside of the clinic? No     Are you following a low salt diet? No    Are your blood pressures ever more than 140 on the top number (systolic) OR more   than 90 on the bottom number (diastolic), for example 140/90? N/A    BP Readings from Last 2 Encounters:   22 104/64   22 120/77     Hemoglobin A1C (%)   Date Value   2021 6.2 (H)   2021 6.2 (H)   10/01/2020 6.3 (H)   2019 6.0 (H)     LDL Cholesterol Calculated (mg/dL)   Date Value   2021 58   10/01/2020 48   2019 53       Depression Followup  Paxil 10mg qhs    How are you doing with your depression since your last visit? No change    Are you having other symptoms that might be associated with depression? Yes:  Weight gain, appetite is up and down    Have you had a significant life event?   passed away in May     Are you feeling anxious or having panic attacks?   No    Do you have any concerns with your use of alcohol or other drugs? No    Social History     Tobacco Use     Smoking status: Former     Packs/day: 0.00     Types: Cigarettes     Quit date: 1981     Years since quittin.3     Smokeless tobacco: Never   Substance Use Topics     Alcohol use: Yes     Comment: Alcoholic Drinks/day: Rare (2-3 month)     Drug use: No     PHQ 3/21/2019 10/1/2020   PHQ-9 Total Score 0 7   Q9: Thoughts of better off dead/self-harm past 2 weeks Not at all Not at all     SURINDER-7 SCORE 10/1/2020   Total Score 16         Suicide Assessment Five-step Evaluation and Treatment (SAFE-T)    Asthma Follow-Up  Advair 250-50mcg/dose bid, xopenex 45mcg/ACT 2 puffs q8h prn  Was ACT completed today?    Yes    ACT Total Scores 10/13/2022   ACT TOTAL SCORE -   ASTHMA ER VISITS -   ASTHMA HOSPITALIZATIONS -   ACT TOTAL SCORE (Goal Greater than or Equal to 20) 19   In the past 12 months, how many times did you visit  the emergency room for your asthma without being admitted to the hospital? 0   In the past 12 months, how many times were you hospitalized overnight because of your asthma? 0            How many days per week do you miss taking your asthma controller medication?  0    Please describe any recent triggers for your asthma: dust mites and pollens    Have you had any Emergency Room Visits, Urgent Care Visits, or Hospital Admissions since your last office visit?  No      Chronic Kidney Disease Follow-up    Do you take any over the counter pain medicine?: Yes  What over the counter medicine are you taking for your pain?:  Tylenol      How often do you take this medicine?:  One time daily      Current providers sharing in care for this patient include:   Patient Care Team:  Adeola Stockton MD as PCP - General  Adeola Stockton MD as Assigned PCP  Lida Beach APRN CNP as Assigned Heart and Vascular Provider  Niurka Esquivel MD as Assigned Musculoskeletal Provider    The following health maintenance items are reviewed in Epic and correct as of today:  Health Maintenance   Topic Date Due     DEXA  Never done     ANNUAL REVIEW OF HM ORDERS  Never done     ZOSTER IMMUNIZATION (2 of 3) 02/21/2013     ASTHMA ACTION PLAN  03/21/2020     COLORECTAL CANCER SCREENING  03/21/2020     MAMMO SCREENING  06/12/2021     DTAP/TDAP/TD IMMUNIZATION (2 - Td or Tdap) 07/01/2021     DIABETIC FOOT EXAM  10/01/2021     COVID-19 Vaccine (4 - Booster for Moderna series) 01/11/2022     A1C  01/23/2022     EYE EXAM  02/03/2022     HEMOGLOBIN  02/20/2022     LIPID  07/23/2022     MICROALBUMIN  07/23/2022     INFLUENZA VACCINE (1) 09/01/2022     ASTHMA CONTROL TEST  04/13/2023     BMP  08/23/2023     MEDICARE ANNUAL WELLNESS VISIT  10/13/2023     FALL RISK ASSESSMENT  10/13/2023     ADVANCE CARE PLANNING  07/23/2026     HEPATITIS C SCREENING  Completed     PHQ-2 (once per calendar year)  Completed     Pneumococcal Vaccine: 65+ Years  Completed  "    URINALYSIS  Completed     IPV IMMUNIZATION  Aged Out     MENINGITIS IMMUNIZATION  Aged Out     Lab work is in process      Mammogram Screening - Patient over age 75, has elected to continue with screening.  Pertinent mammograms are reviewed under the imaging tab.    Review of Systems  Constitutional, HEENT, cardiovascular, pulmonary, gi and gu systems are negative, except as otherwise noted.     of 54 years  in May.         OBJECTIVE:   Temp 97.8  F (36.6  C) (Tympanic)   Ht 1.727 m (5' 8\")   Wt 127.6 kg (281 lb 4 oz)   BMI 42.76 kg/m   Estimated body mass index is 42.76 kg/m  as calculated from the following:    Height as of this encounter: 1.727 m (5' 8\").    Weight as of this encounter: 127.6 kg (281 lb 4 oz).  Physical Exam  GENERAL: healthy, alert and no distress  NECK: no adenopathy, no asymmetry, masses, or scars and thyroid normal to palpation  RESP: lungs clear to auscultation - no rales, rhonchi or wheezes  CV: regular rate and rhythm, normal S1 S2, no S3 or S4, no murmur, click or rub, no peripheral edema and peripheral pulses strong  ABDOMEN: soft, nontender, no hepatosplenomegaly, no masses and bowel sounds normal  MS: no gross musculoskeletal defects noted, no edema  PSYCH: mentation appears normal and tearful    Diagnostic Test Results:  Labs reviewed in Epic    ASSESSMENT / PLAN:   (Z00.00) Encounter for Medicare annual wellness exam  (primary encounter diagnosis)  Comment:    Plan:      (E11.9) Type 2 diabetes mellitus without complication, without long-term current use of insulin (H)  Comment: well controlled  Plan: HEMOGLOBIN A1C, Lipid panel reflex to direct         LDL Non-fasting, metFORMIN (GLUCOPHAGE) 500 MG         tablet, OFFICE/OUTPT VISIT,EST,LEVL IV             (N18.31) Stage 3a chronic kidney disease (H)  Comment:    Plan: Hemoglobin, Albumin Random Urine Quantitative         with Creat Ratio, OFFICE/OUTPT VISIT,EST,LEVL         IV, Basic metabolic panel  (Ca, Cl, CO2, " "Creat,        Gluc, K, Na, BUN)             (E66.01) Morbid obesity (H)  Comment:    Plan: diabetes, hypertension would all do better with weight loss    (I48.0) Paroxysmal atrial fibrillation (H)  Comment:  Follows with cardiology  Plan: ALT, TSH with free T4 reflex             (Z12.11) Screen for colon cancer  Comment:    Plan: COLOGUARD([employer])             (Z12.31) Visit for screening mammogram  Comment:    Plan: MA SCREENING DIGITAL BILAT - Future  (s+30)             (I10) HTN, goal below 140/90  Comment: well controlled  Plan: hydrochlorothiazide (HYDRODIURIL) 25 MG tablet,        losartan (COZAAR) 100 MG tablet, OFFICE/OUTPT         VISIT,EST,LEVL IV             (G47.00) Persistent insomnia  Comment:    Plan: traZODone (DESYREL) 50 MG tablet, OFFICE/OUTPT         VISIT,EST,LEVL IV         stable    (J45.20) Mild intermittent asthma without complication  Comment:  Uses the advair intermittently  Plan: fluticasone-salmeterol (ADVAIR) 250-50 MCG/ACT         inhaler             (Z78.0) Postmenopausal status  Comment:    Plan: DEXA HIP/PELVIS/SPINE - Future             (J01.90) Acute sinusitis with symptoms > 10 days  Comment: a few months of symptoms, started likely as allergic  Plan: amoxicillin-clavulanate (AUGMENTIN) 875-125 MG         tablet        Asked to follow up if symptoms not improving    (I48.19) Persistent atrial fibrillation (H)  Comment:    Plan: apixaban ANTICOAGULANT (ELIQUIS ANTICOAGULANT)         5 MG tablet               Patient has been advised of split billing requirements and indicates understanding: Yes    COUNSELING:  Reviewed preventive health counseling, as reflected in patient instructions       Regular exercise       Healthy diet/nutrition    Estimated body mass index is 42.76 kg/m  as calculated from the following:    Height as of this encounter: 1.727 m (5' 8\").    Weight as of this encounter: 127.6 kg (281 lb 4 oz).    Weight management plan: Discussed healthy diet and " exercise guidelines    She reports that she quit smoking about 41 years ago. Her smoking use included cigarettes and cigarettes. She has never used smokeless tobacco.      Appropriate preventive services were discussed with this patient, including applicable screening as appropriate for cardiovascular disease, diabetes, osteopenia/osteoporosis, and glaucoma.  As appropriate for age/gender, discussed screening for colorectal cancer, prostate cancer, breast cancer, and cervical cancer. Checklist reviewing preventive services available has been given to the patient.    Reviewed patients plan of care and provided an AVS. The Basic Care Plan (routine screening as documented in Health Maintenance) for Romy meets the Care Plan requirement. This Care Plan has been established and reviewed with the Patient.    Counseling Resources:  ATP IV Guidelines  Pooled Cohorts Equation Calculator  Breast Cancer Risk Calculator  Breast Cancer: Medication to Reduce Risk  FRAX Risk Assessment  ICSI Preventive Guidelines  Dietary Guidelines for Americans, 2010  USDA's MyPlate  ASA Prophylaxis  Lung CA Screening    Adeola Stockton MD  [facility]    Identified Health Risks:

## 2022-10-13 NOTE — PATIENT INSTRUCTIONS
See me in 6 months    You are due for a diabetic eye exam.  Please schedule that and have results sent to me.     Return the cologuard screening for colon cancer    Schedule dexa (bone density) and mammogram [phone]      Patient Education   Personalized Prevention Plan  You are due for the preventive services outlined below.  Your care team is available to assist you in scheduling these services.  If you have already completed any of these items, please share that information with your care team to update in your medical record.  Health Maintenance Due   Topic Date Due    Osteoporosis Screening  Never done    ANNUAL REVIEW OF HM ORDERS  Never done    Zoster (Shingles) Vaccine (2 of 3) 02/21/2013    Asthma Action Plan - yearly  03/21/2020    Colorectal Cancer Screening  03/21/2020    Asthma Control Test  04/01/2021    Mammogram  06/12/2021    Diptheria Tetanus Pertussis (DTAP/TDAP/TD) Vaccine (2 - Td or Tdap) 07/01/2021    Diabetic Foot Exam  10/01/2021    COVID-19 Vaccine (4 - Booster for Moderna series) 01/11/2022    A1C Lab  01/23/2022    Eye Exam  02/03/2022    Hemoglobin  02/20/2022    Cholesterol Lab  07/23/2022    Kidney Microalbumin Urine Test  07/23/2022    FALL RISK ASSESSMENT  07/23/2022    Flu Vaccine (1) 09/01/2022

## 2022-10-19 ENCOUNTER — TELEPHONE (OUTPATIENT)
Dept: FAMILY MEDICINE | Facility: CLINIC | Age: 77
End: 2022-10-19

## 2022-10-19 NOTE — TELEPHONE ENCOUNTER
Patient Quality Outreach    Patient is due for the following:   Mammogram, colonoscopy    Next Steps:   - Mammogram and DEXA scheduled 11/30  - Complete colonoscopy  - Update vaccines    Type of outreach:    Sent Torrent[employer] message.      Questions for provider review:    None     Анна Cook, CMA

## 2022-11-09 LAB — NONINV COLON CA DNA+OCC BLD SCRN STL QL: NEGATIVE

## 2022-11-13 ENCOUNTER — E-VISIT (OUTPATIENT)
Dept: URGENT CARE | Facility: CLINIC | Age: 77
End: 2022-11-13
Payer: COMMERCIAL

## 2022-11-13 DIAGNOSIS — N39.0 ACUTE UTI (URINARY TRACT INFECTION): Primary | ICD-10-CM

## 2022-11-13 PROCEDURE — 99421 OL DIG E/M SVC 5-10 MIN: CPT | Performed by: NURSE PRACTITIONER

## 2022-11-13 RX ORDER — SULFAMETHOXAZOLE/TRIMETHOPRIM 800-160 MG
1 TABLET ORAL 2 TIMES DAILY
Qty: 6 TABLET | Refills: 0 | Status: SHIPPED | OUTPATIENT
Start: 2022-11-13 | End: 2022-11-16

## 2022-11-13 NOTE — PATIENT INSTRUCTIONS
Dear Romy Hurley    After reviewing your responses, I've been able to diagnose you with a urinary tract infection, which is a common infection of the bladder with bacteria.  This is not a sexually transmitted infection, though urinating immediately after intercourse can help prevent infections.  Drinking lots of fluids is also helpful to clear your current infection and prevent the next one.      I have sent a prescription for antibiotics to your pharmacy to treat this infection.    It is important that you take all of your prescribed medication even if your symptoms are improving after a few doses.  Taking all of your medicine helps prevent the symptoms from returning.     If your symptoms worsen, you develop pain in your back or stomach, develop fevers, or are not improving in 5 days, please contact your primary care provider for an appointment or visit any of our convenient Walk-in or Urgent Care Centers to be seen, which can be found on our website here.    Thanks again for choosing us as your health care partner,    CRISTIAN James CNP    Urinary Tract Infections in Women  Urinary tract infections (UTIs) are most often caused by bacteria. These bacteria enter the urinary tract. The bacteria may come from inside the body. Or they may travel from the skin outside the rectum or vagina into the urethra. Female anatomy makes it easy for bacteria from the bowel to enter a woman s urinary tract, which is the most common source of UTI. This means women develop UTIs more often than men. Pain in or around the urinary tract is a common UTI symptom. But the only way to know for sure if you have a UTI for the healthcare provider to test your urine. The two tests that may be done are the urinalysis and urine culture.     Types of UTIs    Cystitis. A bladder infection (cystitis) is the most common UTI in women. You may have urgent or frequent need to pee. You may also have pain, burning when you pee, and  bloody urine.    Urethritis. This is an inflamed urethra, which is the tube that carries urine from the bladder to outside the body. You may have lower stomach or back pain. You may also have urgent or frequent need to pee.    Pyelonephritis. This is a kidney infection. If not treated, it can be serious and damage your kidneys. In severe cases, you may need to stay in the hospital. You may have a fever and lower back pain.    Medicines to treat a UTI  Most UTIs are treated with antibiotics. These kill the bacteria. The length of time you need to take them depends on the type of infection. It may be as short as 3 days. If you have repeated UTIs, you may need a low-dose antibiotic for several months. Take antibiotics exactly as directed. Don t stop taking them until all of the medicine is gone. If you stop taking the antibiotic too soon, the infection may not go away. You may also develop a resistance to the antibiotic. This can make it much harder to treat.   Lifestyle changes to treat and prevent UTIs   The lifestyle changes below will help get rid of your UTI. They may also help prevent future UTIs.     Drink plenty of fluids. This includes water, juice, or other caffeine-free drinks. Fluids help flush bacteria out of your body.    Empty your bladder. Always empty your bladder when you feel the urge to pee. And always pee before going to sleep. Urine that stays in your bladder can lead to infection. Try to pee before and after sex as well.    Practice good personal hygiene. Wipe yourself from front to back after using the toilet. This helps keep bacteria from getting into the urethra.    Use condoms during sex. These help prevent UTIs caused by sexually transmitted bacteria. Also don't use spermicides during sex. These can increase the risk for UTIs. Choose other forms of birth control instead. For women who tend to get UTIs after sex, a low-dose of a preventive antibiotic may be used. Be sure to discuss this  option with your healthcare provider.    Follow up with your healthcare provider as directed. He or she may test to make sure the infection has cleared. If needed, more treatment may be started.  Dannie last reviewed this educational content on 7/1/2019 2000-2021 [employer]. All rights reserved. This information is not intended as a substitute for professional medical care. Always follow your healthcare professional's instructions.

## 2022-11-30 ENCOUNTER — HOSPITAL ENCOUNTER (OUTPATIENT)
Dept: BONE DENSITY | Facility: CLINIC | Age: 77
Discharge: HOME | End: 2022-11-30
Attending: FAMILY MEDICINE
Payer: COMMERCIAL

## 2022-11-30 ENCOUNTER — HOSPITAL ENCOUNTER (OUTPATIENT)
Dept: MAMMOGRAPHY | Facility: CLINIC | Age: 77
Discharge: HOME | End: 2022-11-30
Attending: FAMILY MEDICINE
Payer: COMMERCIAL

## 2022-11-30 DIAGNOSIS — Z12.31 VISIT FOR SCREENING MAMMOGRAM: ICD-10-CM

## 2022-11-30 DIAGNOSIS — Z78.0 POSTMENOPAUSAL STATUS: ICD-10-CM

## 2022-11-30 PROCEDURE — 77067 SCR MAMMO BI INCL CAD: CPT

## 2022-11-30 PROCEDURE — 77080 DXA BONE DENSITY AXIAL: CPT

## 2022-12-27 ENCOUNTER — OFFICE VISIT (OUTPATIENT)
Dept: CARDIOLOGY | Facility: CLINIC | Age: 77
End: 2022-12-27
Payer: COMMERCIAL

## 2022-12-27 VITALS
HEIGHT: 68 IN | BODY MASS INDEX: 43.04 KG/M2 | WEIGHT: 284 LBS | OXYGEN SATURATION: 97 % | HEART RATE: 54 BPM | SYSTOLIC BLOOD PRESSURE: 142 MMHG | RESPIRATION RATE: 18 BRPM | DIASTOLIC BLOOD PRESSURE: 70 MMHG

## 2022-12-27 DIAGNOSIS — I48.0 PAROXYSMAL ATRIAL FIBRILLATION (H): Primary | ICD-10-CM

## 2022-12-27 DIAGNOSIS — I10 ESSENTIAL HYPERTENSION: ICD-10-CM

## 2022-12-27 DIAGNOSIS — G47.33 OSA ON CPAP: ICD-10-CM

## 2022-12-27 PROCEDURE — 99214 OFFICE O/P EST MOD 30 MIN: CPT | Performed by: NURSE PRACTITIONER

## 2022-12-27 NOTE — PATIENT INSTRUCTIONS
Romy Hurley,    It was a pleasure to see you today at the [facility].     My recommendations after this visit include:      Please call if atrial fibrillation episodes more frequent .      To followup with Dr. Garland  in 5-6 months with limited ECHO 1-2 weeks before appointment.      My contact information:  Lucien Beach CNP  After Hours or Scheduling  [phone]  My Nurse---Miri Vazquez [phone]

## 2022-12-27 NOTE — PROGRESS NOTES
Thank you, Dr. Stockton, for asking the [facility] team to see Ms. Romy Hurley to evaluate paroxysmal atrial fibrillation.    Assessment/Recommendations     Assessment/Plan:    Diagnoses and all orders for this visit:  Paroxysmal atrial fibrillation (H) and reporting amiodarone is doing an excellent job of controlling A. fib.  Once she was loaded she has not had any episodes of atrial fibrillation.  She is directly symptomatic with A. fib.  She has mild elevation in TSH but free T4 was normal on check in October 2022.  Her primary is following this.  She is happy to stay on amiodarone for the winter.  I discussed possible repeat ablation.  She would consider this after she comes back to [place] in the springtime.  Essential hypertension and blood pressure close to goal.  No change in meds.  SHARA and on CPAP    [id number] score of 5 and on Eliquis.  Follow up in clinic with Dr. Garland in 5 to 6 months with complete echo prior to visit.     History of Present Illness/Subjective     Romy Hurley is a very pleasant 77 year old female who comes in today for EP follow-up for paroxysmal atrial fibrillation.  Romy Hurley was diagnosed with atrial fibrillation in 2012.  Symptoms consist of palpitations, fatigue, and diaphoresis.  She has failed antiarrhythmic therapy with flecainide due to side effects and sotalol due to breakthrough.  She reports early episodes of A. fib lasting between 2 to 3 days.  She also has a history of hypertension, stage III chronic kidney disease, type 2 diabetes, and obstructive sleep apnea for which she now wears CPAP nightly.    On December 6, 2019 Pham had pulmonary vein isolation ablation with cryoablation and RF to 4 pulmonary veins and roofline done.  Normal voltage was seen except at roofline area.  Pham is had recurrence of atrial fibrillation since her ablation.  She typically has several days of atrial fib and her symptoms remain the same as  preablation.  Then in January 2020 she went into a persistent atrial fibrillation episode.  I restarted her on sotalol and she converted with start of drug and did not need cardioversion.  When I saw Pham on July 5, 2022 I discussed putting her on renal dose sotalol after upcoming surgery, but have not made the change.  She contacted me on August 21 and reported she was in atrial fibrillation 48 hours and was very fatigued. This summer I switch Pham to renal dose sotalol and with that it was no longer holding her in sinus rhythm.  I therefore loaded her on amiodarone.  Pham is leaving for [place] in a few weeks.  She denies any cardiac symptoms.  She has no tremor.  She denies any symptoms of recurrence of A. fib on amiodarone.  Cardiographics (reviewed):  Results for orders placed during the hospital encounter of 09/11/17   Echo Complete [ECH10] 09/11/2017     Narrative 1. Normal left ventricular size and systolic performance with a visually   estimated ejection fraction of 65%.   2. There is mild aortic insufficiency.  3. There is mild biatrial enlargement.     When compared to the prior real-time echocardiogram dated 29 March 2012,   mild aortic insufficiency is now noted.  Otherwise, there has been little   appreciable interval change.    24-hour Holter monitor worn 9/18/2017 shows persistent atrial fibrillation with controlled ventricular response, average ventricular rate of 82 bpm with a range of 52 to 136 bpm.     CT pulmonary veins done 11/4/2019:  1. Four pulmonary veins with normal configuration.   2. Esophagus is adjacent to the posterior left atrial body and left inferior pulmonary vein.  3. No calcified atherosclerotic plaque.  Left superior pulmonary vein: 15 x 16.2mm, area 1.94sq cm.  Left inferior pulmonary vein: 12.7 x 19.1mm, area 1.97sq cm.  Right superior pulmonary vein: 18.4 x 22.8mm, area 3.32sq cm.  Right inferior pulmonary vein: 14.7 x 17mm, area 2.07sq cm.   Esophagus: The esophagus is  adjacent to the posterior side of the left atrial body and posterior side of the left inferior pulmonary vein.        Problem List:  Patient Active Problem List   Diagnosis     Osteoarthritis     Total knee replacement status     Insomnia     Essential hypertension     CARDIOVASCULAR SCREENING; LDL GOAL LESS THAN 130     Paroxysmal atrial fibrillation (H)     Advanced directives, counseling/discussion     Health Care Home     Mild intermittent asthma without complication     Persistent insomnia     Type 2 diabetes mellitus without complication, without long-term current use of insulin (H)     Adjustment disorder with mixed anxiety and depressed mood     Morbid obesity (H)     CKD (chronic kidney disease) stage 3, GFR 30-59 ml/min (H)     Cervicalgia     Shoulder arthritis     SHARA on CPAP     Revi  e  Physical Examination Review of Systems   w fystems  LMP  (LMP Unknown)   There is no height or weight on file to calculate BMI.  Wt Readings from Last 3 Encounters:   10/13/22 127.6 kg (281 lb 4 oz)   08/23/22 125.6 kg (277 lb)   07/07/22 125.6 kg (277 lb)     General Appearance:   Alert, well-appearing and in no acute distress.   HEENT: Atraumatic, normocephalic.  No scleral icterus, normal conjunctivae.   Chest: Chest symmetric, spine straight.   Lungs:   Respirations unlabored: Lungs are clear to auscultation.   Cardiovascular:   Normal first and second heart sounds with no murmurs, rubs, or gallops.  Regular, regular.   Normal JVD, no edema.       Extremities: No cyanosis or clubbing   Musculoskeletal: Moves all extremities   Skin: Warm, dry, intact.    Neurologic: Mood and affect are appropriate, alert and oriented to person, place, time, and situation     ROS: 10 point ROS neg other than the symptoms noted above in the HPI.     Medical History  Surgical History Family History Social History     Past Medical History:   Diagnosis Date     Asthma      Atrial fibrillation (H) 01/01/2012     Diabetes mellitus (H)       Hypertension      Insomnia      Mild intermittent asthma      Obesity      SHARA on CPAP      Osteoarthritis     Past Surgical History:   Procedure Laterality Date     ARTHROPLASTY KNEE BILATERAL       ARTHROSCOPY SHOULDER RT/LT  2011    right with subacromial decompression and rotator cuff repair, massive      SECTION       EP ABLATION AFLUTTER  2019    Procedure: EP Ablation Atrial Flutter;  Surgeon: Dorian Garland MD;  Location: [facility];  Service: Cardiology     EP ABLATION PVI Left 2019    Procedure: EP Ablation PVI;  Surgeon: Dorian Garland MD;  Location: [facility];  Service: Cardiology     HC CARDIOVERSION  2012    [facility] - done for a fib     JOINT REPLACEMTN, KNEE RT/LT  2010    LEFT     JOINT REPLACEMTN, KNEE RT/LT  2010    right     OPEN REDUCTION INTERNAL FIXATION ANKLE Right 2021    Procedure: OPEN REDUCTION INTERNAL FIXATION MEDIAL MALLEOLUS FRACTURE, ANKLE RIGHT;  Surgeon: Vidal Cohn MD;  Location: [facility] OR;  Service: Orthopedics     REPAIR HAMMER TOE Right 2021    Procedure: FLEXOR TENOTOMIES 3RD TOE HAMMER TOE CORRECTION;  Surgeon: Vidal Cohn MD;  Location: [facility] [place] [facility] OR;  Service: Orthopedics     SHOULDER ARTHROSCOPY W/ ROTATOR CUFF REPAIR Right      ZZC TOTAL ANKLE REPLACEMENT Right 2021    Procedure: RIGHT TOTAL ANKLE ARTHOPLASTY, DEBRIDE SUBTALAR JOINT;  Surgeon: Vidal Cohn MD;  Location: [facility] [place] [facility] OR;  Service: Orthopedics    Family History   Problem Relation Age of Onset     Genitourinary Problems Mother         Ovarian Cancer     Gastrointestinal Disease Mother         Had gallbladder romoved     Cancer Father         Lung     Gastrointestinal Disease Father         Had gallbladder romoved     C.A.D. Paternal Grandfather         Coronary     Gastrointestinal Disease Brother         Had gallbladder romoved     Depression Sister         DDD     Ovarian  Cancer Mother 82.00     Lung Cancer Father 75.00     Sleep Apnea Father      Snoring Father      Brain Cancer Sister 57.00        Brain Ca x 1 year     No Known Problems Brother      No Known Problems Sister      No Known Problems Sister     History   Smoking Status     Former     Packs/day: 0.00     Types: Cigarettes     Quit date: 6/1/1981   Smokeless Tobacco     Never     Social History    Substance and Sexual Activity      Alcohol use: Yes        Comment: Alcoholic Drinks/day: Rare (2-3 month)       Medications  Allergies     Current Outpatient Medications   Medication Sig Dispense Refill     amiodarone (PACERONE) 200 MG tablet Take 2 tabs orally in am and 1 tab orally in the evening for 1 week and then decrease to 1 tab orally twice a day for 2 weeks and then decrease to 1 tab orally every day. 240 tablet 1     atorvastatin (LIPITOR) 10 MG tablet Take 1 tablet (10 mg) by mouth daily 90 tablet 3     blood glucose (ONETOUCH VERIO IQ) test strip Use to test blood sugars 1 times daily or as directed. 100 each 11     blood glucose monitoring (ONE TOUCH DELICA) lancets Use to test blood sugars 1 times daily or as directed. 100 each 3     ELIQUIS ANTICOAGULANT 5 MG tablet TAKE 1 TABLET(5 MG) BY MOUTH EVERY 12 HOURS 180 tablet 1     fluticasone-salmeterol (ADVAIR DISKUS) 250-50 MCG/DOSE inhaler Inhale 1 puff into the lungs 2 times daily 60 each 11     fluticasone-salmeterol (ADVAIR) 250-50 MCG/ACT inhaler Inhale 1 puff into the lungs every 12 hours 60 each 11     hydrochlorothiazide (HYDRODIURIL) 25 MG tablet Take 1 tablet (25 mg) by mouth daily 90 tablet 3     losartan (COZAAR) 100 MG tablet Take 1 tablet (100 mg) by mouth daily 90 tablet 3     metFORMIN (GLUCOPHAGE) 500 MG tablet TAKE 1 TABLET BY MOUTH DAILY WITH DINNER 90 tablet 3     methocarbamol (ROBAXIN) 750 MG tablet Take 1 tablet (750 mg) by mouth 3 times daily as needed for muscle spasms 30 tablet 0     metoprolol succinate ER (TOPROL XL) 50 MG 24 hr tablet  Take 1 tablet (50 mg) by mouth every evening 90 tablet 3     PARoxetine (PAXIL) 10 MG tablet TAKE 1 TABLET(10 MG) BY MOUTH AT BEDTIME 90 tablet 3     traZODone (DESYREL) 50 MG tablet TAKE 1 TABLET(50 MG) BY MOUTH AT BEDTIME 90 tablet 1     XOPENEX HFA 45 MCG/ACT inhaler INHALE 2 PUFFS INTO THE LUNGS EVERY 8 HOURS AS NEEDED FOR SHORTNESS OF BREATH OR DIFFICULT BREATHING 15 g 3      Allergies   Allergen Reactions     Macrobid [Nitrofurantoin Anhydrous] Shortness Of Breath     Ace Inhibitors Cough     Corticosteroids       Medical, surgical, family, social history, and medications were all reviewed and updated as necessary.   Lab Results    Chemistry/lipid CBC Cardiac Enzymes/BNP/TSH/INR   Recent Labs   Lab Test 10/13/22  1553 03/10/16  0932 12/09/14  0911   CHOL 149   < > 175   HDL 47*   < > 47*   LDL 74   < > 99   TRIG 139   < > 144   CHOLHDLRATIO  --   --  3.7    < > = values in this interval not displayed.     Recent Labs   Lab Test 10/13/22  1553 07/23/21  0744 10/01/20  1604   LDL 74 58 48     Recent Labs   Lab Test 10/13/22  1553      POTASSIUM 4.3   CHLORIDE 99   CO2 32*   *   BUN 20.1   CR 1.11*   GFRESTIMATED 52*   ZHAO 9.4     Recent Labs   Lab Test 10/13/22  1553 08/23/22  1335 07/23/21  0744   CR 1.11* 0.98 1.08*     Recent Labs   Lab Test 10/13/22  1553 07/23/21  0744 02/20/21  0615   A1C 6.0* 6.2* 6.2*          Recent Labs   Lab Test 10/13/22  1553 02/20/21  0614   WBC  --  10.9   HGB 13.0 11.0*   HCT  --  35.1   MCV  --  90   PLT  --  241     Recent Labs   Lab Test 10/13/22  1553 02/20/21  0614 02/01/21  1451   HGB 13.0 11.0* 12.7    No results for input(s): TROPONINI in the last 20492 hours.  No results for input(s): BNP, NTBNPI, NTBNP in the last 85465 hours.  Recent Labs   Lab Test 10/13/22  1553   TSH 5.58*     No results for input(s): INR in the last 48358 hours.       Total Time- 30 minutes spent on date of encounter doing chart review, history and exam, documentation and further  activities as noted above.  This note has been dictated using voice recognition software. Any grammatical, typographical, or context distortions are unintentional and inherent to the software.

## 2022-12-27 NOTE — LETTER
12/27/2022    Adeola Stockton MD  [address]  [facility] [address]    RE: Romy Hurley       Dear Colleague,     I had the pleasure of seeing Romy Hurley in the [facility].    Thank you, Dr. Stockton, for asking the [facility] team to see Ms. Romy Hurley to evaluate paroxysmal atrial fibrillation.    Assessment/Recommendations     Assessment/Plan:    Diagnoses and all orders for this visit:  Paroxysmal atrial fibrillation (H) and reporting amiodarone is doing an excellent job of controlling A. fib.  Once she was loaded she has not had any episodes of atrial fibrillation.  She is directly symptomatic with A. fib.  She has mild elevation in TSH but free T4 was normal on check in October 2022.  Her primary is following this.  She is happy to stay on amiodarone for the winter.  I discussed possible repeat ablation.  She would consider this after she comes back to [place] in the springtime.  Essential hypertension and blood pressure close to goal.  No change in meds.  SHARA and on CPAP    [id number] score of 5 and on Eliquis.  Follow up in clinic with Dr. Garland in 5 to 6 months with complete echo prior to visit.     History of Present Illness/Subjective     Romy Hurley is a very pleasant 77 year old female who comes in today for EP follow-up for paroxysmal atrial fibrillation.  Romy Hurley was diagnosed with atrial fibrillation in 2012.  Symptoms consist of palpitations, fatigue, and diaphoresis.  She has failed antiarrhythmic therapy with flecainide due to side effects and sotalol due to breakthrough.  She reports early episodes of A. fib lasting between 2 to 3 days.  She also has a history of hypertension, stage III chronic kidney disease, type 2 diabetes, and obstructive sleep apnea for which she now wears CPAP nightly.    On December 6, 2019 Pat had pulmonary vein isolation ablation with cryoablation and RF to 4 pulmonary veins and roofline  done.  Normal voltage was seen except at roofline area.  Pham is had recurrence of atrial fibrillation since her ablation.  She typically has several days of atrial fib and her symptoms remain the same as preablation.  Then in January 2020 she went into a persistent atrial fibrillation episode.  I restarted her on sotalol and she converted with start of drug and did not need cardioversion.  When I saw Pham on July 5, 2022 I discussed putting her on renal dose sotalol after upcoming surgery, but have not made the change.  She contacted me on August 21 and reported she was in atrial fibrillation 48 hours and was very fatigued. This summer I switch Pham to renal dose sotalol and with that it was no longer holding her in sinus rhythm.  I therefore loaded her on amiodarone.  Pham is leaving for [place] in a few weeks.  She denies any cardiac symptoms.  She has no tremor.  She denies any symptoms of recurrence of A. fib on amiodarone.  Cardiographics (reviewed):  Results for orders placed during the hospital encounter of 09/11/17   Echo Complete [ECH10] 09/11/2017     Narrative 1. Normal left ventricular size and systolic performance with a visually   estimated ejection fraction of 65%.   2. There is mild aortic insufficiency.  3. There is mild biatrial enlargement.     When compared to the prior real-time echocardiogram dated 29 March 2012,   mild aortic insufficiency is now noted.  Otherwise, there has been little   appreciable interval change.    24-hour Holter monitor worn 9/18/2017 shows persistent atrial fibrillation with controlled ventricular response, average ventricular rate of 82 bpm with a range of 52 to 136 bpm.     CT pulmonary veins done 11/4/2019:  1. Four pulmonary veins with normal configuration.   2. Esophagus is adjacent to the posterior left atrial body and left inferior pulmonary vein.  3. No calcified atherosclerotic plaque.  Left superior pulmonary vein: 15 x 16.2mm, area 1.94sq cm.  Left inferior  pulmonary vein: 12.7 x 19.1mm, area 1.97sq cm.  Right superior pulmonary vein: 18.4 x 22.8mm, area 3.32sq cm.  Right inferior pulmonary vein: 14.7 x 17mm, area 2.07sq cm.   Esophagus: The esophagus is adjacent to the posterior side of the left atrial body and posterior side of the left inferior pulmonary vein.        Problem List:  Patient Active Problem List   Diagnosis     Osteoarthritis     Total knee replacement status     Insomnia     Essential hypertension     CARDIOVASCULAR SCREENING; LDL GOAL LESS THAN 130     Paroxysmal atrial fibrillation (H)     Advanced directives, counseling/discussion     Health Care Home     Mild intermittent asthma without complication     Persistent insomnia     Type 2 diabetes mellitus without complication, without long-term current use of insulin (H)     Adjustment disorder with mixed anxiety and depressed mood     Morbid obesity (H)     CKD (chronic kidney disease) stage 3, GFR 30-59 ml/min (H)     Cervicalgia     Shoulder arthritis     SHARA on CPAP     Revi  e  Physical Examination Review of Systems   w fystems  LMP  (LMP Unknown)   There is no height or weight on file to calculate BMI.  Wt Readings from Last 3 Encounters:   10/13/22 127.6 kg (281 lb 4 oz)   08/23/22 125.6 kg (277 lb)   07/07/22 125.6 kg (277 lb)     General Appearance:   Alert, well-appearing and in no acute distress.   HEENT: Atraumatic, normocephalic.  No scleral icterus, normal conjunctivae.   Chest: Chest symmetric, spine straight.   Lungs:   Respirations unlabored: Lungs are clear to auscultation.   Cardiovascular:   Normal first and second heart sounds with no murmurs, rubs, or gallops.  Regular, regular.   Normal JVD, no edema.       Extremities: No cyanosis or clubbing   Musculoskeletal: Moves all extremities   Skin: Warm, dry, intact.    Neurologic: Mood and affect are appropriate, alert and oriented to person, place, time, and situation     ROS: 10 point ROS neg other than the symptoms noted above in  the [facility].     Medical History  Surgical History Family History Social History     Past Medical History:   Diagnosis Date     Asthma      Atrial fibrillation (H) 2012     Diabetes mellitus (H)      Hypertension      Insomnia      Mild intermittent asthma      Obesity      SHARA on CPAP      Osteoarthritis     Past Surgical History:   Procedure Laterality Date     ARTHROPLASTY KNEE BILATERAL       ARTHROSCOPY SHOULDER RT/LT  2011    right with subacromial decompression and rotator cuff repair, massive      SECTION       EP ABLATION AFLUTTER  2019    Procedure: EP Ablation Atrial Flutter;  Surgeon: Dorian Garland MD;  Location: [facility];  Service: Cardiology     EP ABLATION PVI Left 2019    Procedure: EP Ablation PVI;  Surgeon: Dorian Garland MD;  Location: [facility];  Service: Cardiology     HC CARDIOVERSION  2012    [facility] - done for a fib     JOINT REPLACEMTN, KNEE RT/LT  2010    LEFT     JOINT REPLACEMTN, KNEE RT/LT  2010    right     OPEN REDUCTION INTERNAL FIXATION ANKLE Right 2021    Procedure: OPEN REDUCTION INTERNAL FIXATION MEDIAL MALLEOLUS FRACTURE, ANKLE RIGHT;  Surgeon: Vidal Cohn MD;  Location: [facility] OR;  Service: Orthopedics     REPAIR HAMMER TOE Right 2021    Procedure: FLEXOR TENOTOMIES 3RD TOE HAMMER TOE CORRECTION;  Surgeon: Vidal Cohn MD;  Location: [facility] OR;  Service: Orthopedics     SHOULDER ARTHROSCOPY W/ ROTATOR CUFF REPAIR Right      ZZC TOTAL ANKLE REPLACEMENT Right 2021    Procedure: RIGHT TOTAL ANKLE ARTHOPLASTY, DEBRIDE SUBTALAR JOINT;  Surgeon: Vidal Cohn MD;  Location: [facility] OR;  Service: Orthopedics    Family History   Problem Relation Age of Onset     Genitourinary Problems Mother         Ovarian Cancer     Gastrointestinal Disease Mother         Had gallbladder romoved     Cancer Father         Lung     Gastrointestinal Disease  Father         Had gallbladder romoved     C.A.D. Paternal Grandfather         Coronary     Gastrointestinal Disease Brother         Had gallbladder romoved     Depression Sister         DDD     Ovarian Cancer Mother 82.00     Lung Cancer Father 75.00     Sleep Apnea Father      Snoring Father      Brain Cancer Sister 57.00        Brain Ca x 1 year     No Known Problems Brother      No Known Problems Sister      No Known Problems Sister     History   Smoking Status     Former     Packs/day: 0.00     Types: Cigarettes     Quit date: 6/1/1981   Smokeless Tobacco     Never     Social History    Substance and Sexual Activity      Alcohol use: Yes        Comment: Alcoholic Drinks/day: Rare (2-3 month)       Medications  Allergies     Current Outpatient Medications   Medication Sig Dispense Refill     amiodarone (PACERONE) 200 MG tablet Take 2 tabs orally in am and 1 tab orally in the evening for 1 week and then decrease to 1 tab orally twice a day for 2 weeks and then decrease to 1 tab orally every day. 240 tablet 1     atorvastatin (LIPITOR) 10 MG tablet Take 1 tablet (10 mg) by mouth daily 90 tablet 3     blood glucose (ONETOUCH VERIO IQ) test strip Use to test blood sugars 1 times daily or as directed. 100 each 11     blood glucose monitoring (ONE TOUCH DELICA) lancets Use to test blood sugars 1 times daily or as directed. 100 each 3     ELIQUIS ANTICOAGULANT 5 MG tablet TAKE 1 TABLET(5 MG) BY MOUTH EVERY 12 HOURS 180 tablet 1     fluticasone-salmeterol (ADVAIR DISKUS) 250-50 MCG/DOSE inhaler Inhale 1 puff into the lungs 2 times daily 60 each 11     fluticasone-salmeterol (ADVAIR) 250-50 MCG/ACT inhaler Inhale 1 puff into the lungs every 12 hours 60 each 11     hydrochlorothiazide (HYDRODIURIL) 25 MG tablet Take 1 tablet (25 mg) by mouth daily 90 tablet 3     losartan (COZAAR) 100 MG tablet Take 1 tablet (100 mg) by mouth daily 90 tablet 3     metFORMIN (GLUCOPHAGE) 500 MG tablet TAKE 1 TABLET BY MOUTH DAILY WITH  DINNER 90 tablet 3     methocarbamol (ROBAXIN) 750 MG tablet Take 1 tablet (750 mg) by mouth 3 times daily as needed for muscle spasms 30 tablet 0     metoprolol succinate ER (TOPROL XL) 50 MG 24 hr tablet Take 1 tablet (50 mg) by mouth every evening 90 tablet 3     PARoxetine (PAXIL) 10 MG tablet TAKE 1 TABLET(10 MG) BY MOUTH AT BEDTIME 90 tablet 3     traZODone (DESYREL) 50 MG tablet TAKE 1 TABLET(50 MG) BY MOUTH AT BEDTIME 90 tablet 1     XOPENEX HFA 45 MCG/ACT inhaler INHALE 2 PUFFS INTO THE LUNGS EVERY 8 HOURS AS NEEDED FOR SHORTNESS OF BREATH OR DIFFICULT BREATHING 15 g 3      Allergies   Allergen Reactions     Macrobid [Nitrofurantoin Anhydrous] Shortness Of Breath     Ace Inhibitors Cough     Corticosteroids       Medical, surgical, family, social history, and medications were all reviewed and updated as necessary.   Lab Results    Chemistry/lipid CBC Cardiac Enzymes/BNP/TSH/INR   Recent Labs   Lab Test 10/13/22  1553 03/10/16  0932 12/09/14  0911   CHOL 149   < > 175   HDL 47*   < > 47*   LDL 74   < > 99   TRIG 139   < > 144   CHOLHDLRATIO  --   --  3.7    < > = values in this interval not displayed.     Recent Labs   Lab Test 10/13/22  1553 07/23/21  0744 10/01/20  1604   LDL 74 58 48     Recent Labs   Lab Test 10/13/22  1553      POTASSIUM 4.3   CHLORIDE 99   CO2 32*   *   BUN 20.1   CR 1.11*   GFRESTIMATED 52*   ZHAO 9.4     Recent Labs   Lab Test 10/13/22  1553 08/23/22  1335 07/23/21  0744   CR 1.11* 0.98 1.08*     Recent Labs   Lab Test 10/13/22  1553 07/23/21  0744 02/20/21  0615   A1C 6.0* 6.2* 6.2*          Recent Labs   Lab Test 10/13/22  1553 02/20/21  0614   WBC  --  10.9   HGB 13.0 11.0*   HCT  --  35.1   MCV  --  90   PLT  --  241     Recent Labs   Lab Test 10/13/22  1553 02/20/21  0614 02/01/21  1451   HGB 13.0 11.0* 12.7    No results for input(s): TROPONINI in the last 45356 hours.  No results for input(s): BNP, NTBNPI, NTBNP in the last 03818 hours.  Recent Labs   Lab Test  10/13/22  1553   TSH 5.58*     No results for input(s): INR in the last 72928 hours.       Total Time- 30 minutes spent on date of encounter doing chart review, history and exam, documentation and further activities as noted above.  This note has been dictated using voice recognition software. Any grammatical, typographical, or context distortions are unintentional and inherent to the software.      Thank you for allowing me to participate in the care of your patient.      Sincerely,     CRISTIAN [facility]  cc:   No referring provider defined for this encounter.

## 2023-01-11 NOTE — TELEPHONE ENCOUNTER
Lab appointment made for 1/23/23 at 8:15 AM   Refilled both metformin and simvastatin refilled x 90 days. She is due for a diabetic check/labs.    Adeola Stockton M.D.

## 2023-01-17 ENCOUNTER — TELEPHONE (OUTPATIENT)
Dept: CARDIOLOGY | Facility: CLINIC | Age: 78
End: 2023-01-17
Payer: COMMERCIAL

## 2023-01-17 DIAGNOSIS — I48.0 PAROXYSMAL ATRIAL FIBRILLATION (H): ICD-10-CM

## 2023-01-17 RX ORDER — AMIODARONE HYDROCHLORIDE 200 MG/1
TABLET ORAL
Qty: 240 TABLET | Refills: 1 | Status: ON HOLD | OUTPATIENT
Start: 2023-01-17 | End: 2023-07-26

## 2023-01-17 NOTE — TELEPHONE ENCOUNTER
[facility] Call Center    Phone Message    May a detailed message be left on voicemail: yes     Reason for Call: Medication Refill Request    Has the patient contacted the pharmacy for the refill? Yes   Name of medication being requested: amiodarone (PACERONE) 200 MG tablet  Provider who prescribed the medication: Lida Beach  Pharmacy:    [facility] #[id number] [address] AT [facility] [address]    Date medication is needed: 01/17/2023       PLEASE WRITE A NEW PRESCRIPTION FOR THE 1 TABLET A DAY. THANK YOU      Action Taken: Message routed to:  Other: Cardiology    Travel Screening: Not Applicable       Thank you!  [facility]

## 2023-01-18 ENCOUNTER — TRANSFERRED RECORDS (OUTPATIENT)
Dept: HEALTH INFORMATION MANAGEMENT | Facility: CLINIC | Age: 78
End: 2023-01-18
Payer: COMMERCIAL

## 2023-03-07 ENCOUNTER — TELEPHONE (OUTPATIENT)
Dept: FAMILY MEDICINE | Facility: CLINIC | Age: 78
End: 2023-03-07
Payer: COMMERCIAL

## 2023-03-07 NOTE — TELEPHONE ENCOUNTER
Pt is requesting a Prior Auth for XOPENEX Inhale, 45 MCG/ACT    Prior Authorization Retail Medication Request    Medication/Dose: XOPENEX  ICD code (if different than what is on RX):    Previously Tried and Failed:  NO  Rationale:  Pt says she needs Prior Auth done every year.    Insurance Name:    Insurance ID:        Pharmacy Information (if different than what is on RX)  Name:  [facility]  Phone:

## 2023-03-10 DIAGNOSIS — I10 HTN, GOAL BELOW 140/90: ICD-10-CM

## 2023-03-10 RX ORDER — LOSARTAN POTASSIUM 100 MG/1
TABLET ORAL
Qty: 90 TABLET | Refills: 3 | OUTPATIENT
Start: 2023-03-10

## 2023-03-29 ENCOUNTER — MYC MEDICAL ADVICE (OUTPATIENT)
Dept: FAMILY MEDICINE | Facility: CLINIC | Age: 78
End: 2023-03-29
Payer: COMMERCIAL

## 2023-03-29 DIAGNOSIS — F43.23 ADJUSTMENT DISORDER WITH MIXED ANXIETY AND DEPRESSED MOOD: ICD-10-CM

## 2023-03-29 DIAGNOSIS — J45.20 MILD INTERMITTENT ASTHMA WITHOUT COMPLICATION: ICD-10-CM

## 2023-03-29 RX ORDER — PAROXETINE 10 MG/1
10 TABLET, FILM COATED ORAL AT BEDTIME
Qty: 90 TABLET | Refills: 3 | Status: SHIPPED | OUTPATIENT
Start: 2023-03-29 | End: 2024-03-11

## 2023-03-29 RX ORDER — LEVALBUTEROL TARTRATE 45 UG/1
AEROSOL, METERED ORAL
Qty: 15 G | Refills: 3 | Status: SHIPPED | OUTPATIENT
Start: 2023-03-29 | End: 2024-03-19

## 2023-03-29 NOTE — TELEPHONE ENCOUNTER
"Routing refill request to provider for review/approval because:  PHQ and ACT scores/dates      Pending Prescriptions:                       Disp   Refills    PARoxetine (PAXIL) 10 MG tablet           90 tab*3            Sig: Take 1 tablet (10 mg) by mouth At Bedtime    XOPENEX HFA 45 MCG/ACT inhaler            15 g   3              Requested Prescriptions   Pending Prescriptions Disp Refills     PARoxetine (PAXIL) 10 MG tablet 90 tablet 3     Sig: Take 1 tablet (10 mg) by mouth At Bedtime       SSRIs Protocol Failed - 3/29/2023  1:38 PM        Failed - PHQ-9 score less than 5 in past 6 months     Please review last PHQ-9 score.           Passed - Medication is active on med list        Passed - Patient is age 18 or older        Passed - No active pregnancy on record        Passed - No positive pregnancy test in last 12 months        Passed - Recent (6 mo) or future (30 days) visit within the authorizing provider's specialty     Patient had office visit in the last 6 months or has a visit in the next 30 days with authorizing provider or within the authorizing provider's specialty.  See \"Patient Info\" tab in inMediaTroveet, or \"Choose Columns\" in Meds & Orders section of the refill encounter.               XOPENEX HFA 45 MCG/ACT inhaler 15 g 3       Short-Acting Beta Agonist Inhalers Protocol  Failed - 3/29/2023  1:38 PM        Failed - Asthma control assessment score within normal limits in last 6 months     Please review ACT score.           Passed - Patient is age 12 or older        Passed - Medication is active on med list        Passed - Recent (6 mo) or future (30 days) visit within the authorizing provider's specialty     Patient had office visit in the last 6 months or has a visit in the next 30 days with authorizing provider or within the authorizing provider's specialty.  See \"Patient Info\" tab in inbasket, or \"Choose Columns\" in Meds & Orders section of the refill encounter.                      PHQ-9 Total Score " 0 7   SURINDER-7 Total Score - 16    3/21/2019 10/1/2020   Review Flowsheet                 ACT TOTAL SCORE (Goal Greater than or Equal to 20) 25 25 20 25 25 25 19   ACT TOTAL SCORE - - - - - - -    10/21/2016 2/22/2017 3/21/2018 3/21/2019 12/16/2019 10/1/2020 10/13/2022   Review Flowsheet   (More Data Exists)       Lizzie Turner RN on 3/29/2023 at 1:39 PM

## 2023-04-22 ENCOUNTER — HEALTH MAINTENANCE LETTER (OUTPATIENT)
Age: 78
End: 2023-04-22

## 2023-05-22 DIAGNOSIS — G47.00 PERSISTENT INSOMNIA: ICD-10-CM

## 2023-05-22 RX ORDER — TRAZODONE HYDROCHLORIDE 50 MG/1
TABLET, FILM COATED ORAL
Qty: 90 TABLET | Refills: 1 | Status: SHIPPED | OUTPATIENT
Start: 2023-05-22 | End: 2023-11-20

## 2023-05-22 NOTE — TELEPHONE ENCOUNTER
"Routing refill request to provider for review/approval because:  Drug interaction warning    Requested Prescriptions   Pending Prescriptions Disp Refills     traZODone (DESYREL) 50 MG tablet [Pharmacy Med Name: TRAZODONE HCL TABS 50MG] 90 tablet 1     Sig: TAKE 1 TABLET AT BEDTIME       Serotonin Modulators Passed - 5/22/2023  1:03 AM        Passed - Recent (12 mo) or future (30 days) visit within the authorizing provider's specialty     Patient has had an office visit with the authorizing provider or a provider within the authorizing providers department within the previous 12 mos or has a future within next 30 days. See \"Patient Info\" tab in inbasket, or \"Choose Columns\" in Meds & Orders section of the refill encounter.              Passed - Medication is active on med list        Passed - Patient is age 18 or older        Passed - No active pregnancy on record        Passed - No positive pregnancy test in past 12 months                   "

## 2023-05-25 ENCOUNTER — ANCILLARY ORDERS (OUTPATIENT)
Dept: MRI IMAGING | Facility: CLINIC | Age: 78
End: 2023-05-25

## 2023-05-25 DIAGNOSIS — M25.519 SHOULDER PAIN: ICD-10-CM

## 2023-06-01 ENCOUNTER — TRANSFERRED RECORDS (OUTPATIENT)
Dept: HEALTH INFORMATION MANAGEMENT | Facility: CLINIC | Age: 78
End: 2023-06-01

## 2023-06-01 ENCOUNTER — HOSPITAL ENCOUNTER (OUTPATIENT)
Dept: MRI IMAGING | Facility: CLINIC | Age: 78
Discharge: HOME | End: 2023-06-01
Attending: ORTHOPAEDIC SURGERY | Admitting: ORTHOPAEDIC SURGERY
Payer: COMMERCIAL

## 2023-06-01 DIAGNOSIS — M25.519 SHOULDER PAIN: ICD-10-CM

## 2023-06-01 PROCEDURE — 73221 MRI JOINT UPR EXTREM W/O DYE: CPT | Mod: 26 | Performed by: RADIOLOGY

## 2023-06-01 PROCEDURE — 73221 MRI JOINT UPR EXTREM W/O DYE: CPT | Mod: LT

## 2023-06-08 ENCOUNTER — TRANSFERRED RECORDS (OUTPATIENT)
Dept: HEALTH INFORMATION MANAGEMENT | Facility: CLINIC | Age: 78
End: 2023-06-08
Payer: COMMERCIAL

## 2023-06-26 ENCOUNTER — TRANSFERRED RECORDS (OUTPATIENT)
Dept: HEALTH INFORMATION MANAGEMENT | Facility: CLINIC | Age: 78
End: 2023-06-26
Payer: COMMERCIAL

## 2023-06-26 LAB — RETINOPATHY: NEGATIVE

## 2023-06-28 NOTE — ED PROVIDER NOTES
History     Chief Complaint   Patient presents with     Cellulitis     Left leg 12 days     HPI  Romy Hurley is a 76 year old female who presents to the urgent care with concern over possible cellulitis.  She sustained a fall while getting onto pontoon approximately 12 days ago.  She had sudden onset of left lower leg/anterior shin ecchymosis, swelling.  Since then she has had erythema which waxes and wanes in intensity states that the skin feels warm and tight.  She denies any generalized calf swelling.  She has not had any fever, chills, allergies, cough, chest pain, dyspnea, wheezing, distal numbness or paresthesias.  She denies any concern over fracture as she has been ambulating without difficulty.  She is on long term anticoagulation due to history of atrial fibrillation.       Allergies:  Allergies   Allergen Reactions     Macrobid [Nitrofurantoin Anhydrous] Shortness Of Breath     Ace Inhibitors Cough     Corticosteroids      Problem List:    Patient Active Problem List    Diagnosis Date Noted     CKD (chronic kidney disease) stage 3, GFR 30-59 ml/min (H) 10/01/2020     Priority: Medium     Morbid obesity (H) 03/21/2019     Priority: Medium     Adjustment disorder with mixed anxiety and depressed mood 03/21/2018     Priority: Medium     Type 2 diabetes mellitus without complication, without long-term current use of insulin (H) 02/22/2017     Priority: Medium     Mild intermittent asthma without complication 03/10/2016     Priority: Medium     Persistent insomnia 03/10/2016     Priority: Medium     Health Care Home 08/06/2013     Priority: Medium     Yeni Hidalgo RN-PHN   [phone]  FPA / [facility] Care Coordinator /                   Advanced directives, counseling/discussion 05/21/2012     Priority: Medium     Advance Directive received and scanned. Click on Code in the patient header to view. 6/21/2012          Paroxysmal atrial fibrillation (H) 04/05/2012      "Priority: Medium     Presented to [facility] with chest pain  Afib with RVR    Lexican nuclear stress done 3/29/2012  Negative for inducible ischemia  EF 70%  Increased metoprolol from 25 to 50 mg twice daily     Cardioversion 2012       CARDIOVASCULAR SCREENING; LDL GOAL LESS THAN 130 10/31/2010     Priority: Medium     Insomnia 03/10/2010     Priority: Medium     HTN, goal below 140/90 03/10/2010     Priority: Medium     3/10/10- pt has with her results over the past two months- all normal at home and was \"low\" in the hospital with her knee replacement.       Total knee replacement status 2010     Priority: Medium     Osteoarthritis 2010     Priority: Medium     Mild intermittent asthma 10/31/2007     Priority: Medium        Past Medical History:    Past Medical History:   Diagnosis Date     Mild intermittent asthma        Past Surgical History:    Past Surgical History:   Procedure Laterality Date     ARTHROSCOPY SHOULDER RT/LT  11    right with subacromial decompression and rotator cuff repair, massive     HC CARDIOVERSION  2012    [facility] - done for a fib     JOINT REPLACEMTN, KNEE RT/LT  2010    LEFT     JOINT REPLACEMTN, KNEE RT/LT  2010    right       Family History:    Family History   Problem Relation Age of Onset     Genitourinary Problems Mother         Ovarian Cancer     Gastrointestinal Disease Mother         Had gallbladder romoved     Cancer Father         Lung     Gastrointestinal Disease Father         Had gallbladder romoved     C.A.D. Paternal Grandfather         Coronary     Gastrointestinal Disease Brother         Had gallbladder romoved     Depression Sister         DDD       Social History:  Marital Status:   [2]  Social History     Tobacco Use     Smoking status: Former Smoker     Quit date: 1981     Years since quittin.5     Smokeless tobacco: Never Used   Substance Use Topics     Alcohol use: Yes     Comment: very rarely     Drug " "use: No        Medications:    apixaban ANTICOAGULANT (ELIQUIS) 5 MG tablet  blood glucose (ONETOUCH VERIO IQ) test strip  blood glucose monitoring (ONE TOUCH DELICA) lancets  Cholecalciferol (VITAMIN D) 2000 UNITS tablet  CRANBERRY PO  fluticasone-salmeterol (ADVAIR DISKUS) 250-50 MCG/DOSE inhaler  hydrochlorothiazide (HYDRODIURIL) 25 MG tablet  levalbuterol (XOPENEX HFA) 45 MCG/ACT inhaler  losartan (COZAAR) 100 MG tablet  metFORMIN (GLUCOPHAGE) 500 MG tablet  PARoxetine (PAXIL) 10 MG tablet  simvastatin (ZOCOR) 20 MG tablet  sotalol (BETAPACE) 80 MG tablet  traZODone (DESYREL) 50 MG tablet      Review of Systems  CONSTITUTIONAL:NEGATIVE for fever, chills, change in weight  INTEGUMENTARY/SKIN: POSITIVE for erythema, ecchymosis left lower leg NEGATIVE for lacerations, abrasions   RESP:NEGATIVE for significant cough or SOB  MUSCULOSKELETAL: POSITIVE  for left lower leg pain, swelling and NEGATIVE for other concerning new onset arthralgias or myalgias   NEURO: NEGATIVE for numbness, paresthesias  Physical Exam   BP: (!) 156/94  Pulse: 68  Temp: 97.9  F (36.6  C)  Resp: 18  Height: 172.7 cm (5' 8\")  Weight: 122.5 kg (270 lb)  SpO2: 97 %  Physical Exam  Constitutional:       General: She is not in acute distress.     Appearance: She is obese. She is not ill-appearing or toxic-appearing.   Musculoskeletal:      Left knee: Normal.      Left ankle: Normal.      Left lower leg: She exhibits tenderness and swelling.        Legs:    Skin:     Findings: Bruising, ecchymosis and erythema present.   Neurological:      Mental Status: She is alert.      Sensory: No sensory deficit.         ED Course        Procedures        Critical Care time:  none        No results found for this or any previous visit (from the past 24 hour(s)).    Medications - No data to display    Assessments & Plan (with Medical Decision Making)     I have reviewed the nursing notes.    I have reviewed the findings, diagnosis, plan and need for follow up " with the patient.       Discharge Medication List as of 6/23/2021  2:23 PM        Final diagnoses:   Cellulitis     76-year-old female presents to urgent care with concern of erythema of the left lower leg after sustaining a fall 12 days ago.  She had elevated blood pressure upon arrival.  Remainder vital signs stable.  Physical exam findings are most consistent with a hematoma with concern for developing cellulitis.  Differential would also include superficial thrombophlebitis however low suspicion given her anticoagulant use.  I similarly do not suspect DVT and patient agreed to defer further evaluation at this time.  She was discharged home stable with prescription for keflex.  Follow up with PCP if no improvement in 3 days.  Worrisome reasons to return to ER/UC sooner discussed.     Disclaimer: This note consists of symbols derived from keyboarding, dictation, and/or voice recognition software. As a result, there may be errors in the script that have gone undetected.  Please consider this when interpreting information found in the chart.      6/23/2021   [facility] EMERGENCY DEPT     Elizabeth Taveras PA-C  06/25/21 [id number]     No

## 2023-07-05 ENCOUNTER — TRANSFERRED RECORDS (OUTPATIENT)
Dept: HEALTH INFORMATION MANAGEMENT | Facility: CLINIC | Age: 78
End: 2023-07-05
Payer: COMMERCIAL

## 2023-07-10 ASSESSMENT — ASTHMA QUESTIONNAIRES: ACT_TOTALSCORE: 21

## 2023-07-11 ASSESSMENT — PATIENT HEALTH QUESTIONNAIRE - PHQ9
10. IF YOU CHECKED OFF ANY PROBLEMS, HOW DIFFICULT HAVE THESE PROBLEMS MADE IT FOR YOU TO DO YOUR WORK, TAKE CARE OF THINGS AT HOME, OR GET ALONG WITH OTHER PEOPLE: SOMEWHAT DIFFICULT
SUM OF ALL RESPONSES TO PHQ QUESTIONS 1-9: 8
SUM OF ALL RESPONSES TO PHQ QUESTIONS 1-9: 8

## 2023-07-12 ENCOUNTER — LAB (OUTPATIENT)
Dept: LAB | Facility: CLINIC | Age: 78
End: 2023-07-12
Payer: COMMERCIAL

## 2023-07-12 ENCOUNTER — OFFICE VISIT (OUTPATIENT)
Dept: FAMILY MEDICINE | Facility: CLINIC | Age: 78
End: 2023-07-12
Payer: COMMERCIAL

## 2023-07-12 VITALS
TEMPERATURE: 98.8 F | BODY MASS INDEX: 44.41 KG/M2 | WEIGHT: 293 LBS | HEART RATE: 57 BPM | SYSTOLIC BLOOD PRESSURE: 136 MMHG | DIASTOLIC BLOOD PRESSURE: 76 MMHG | RESPIRATION RATE: 20 BRPM | OXYGEN SATURATION: 94 % | HEIGHT: 68 IN

## 2023-07-12 DIAGNOSIS — N18.31 TYPE 2 DIABETES MELLITUS WITH STAGE 3A CHRONIC KIDNEY DISEASE, WITH LONG-TERM CURRENT USE OF INSULIN (H): ICD-10-CM

## 2023-07-12 DIAGNOSIS — E66.01 MORBID OBESITY (H): ICD-10-CM

## 2023-07-12 DIAGNOSIS — G47.33 OSA ON CPAP: ICD-10-CM

## 2023-07-12 DIAGNOSIS — E11.9 TYPE 2 DIABETES MELLITUS WITHOUT COMPLICATION, WITHOUT LONG-TERM CURRENT USE OF INSULIN (H): ICD-10-CM

## 2023-07-12 DIAGNOSIS — M19.019 SHOULDER ARTHRITIS: ICD-10-CM

## 2023-07-12 DIAGNOSIS — I10 ESSENTIAL HYPERTENSION: ICD-10-CM

## 2023-07-12 DIAGNOSIS — Z79.4 TYPE 2 DIABETES MELLITUS WITH STAGE 3A CHRONIC KIDNEY DISEASE, WITH LONG-TERM CURRENT USE OF INSULIN (H): ICD-10-CM

## 2023-07-12 DIAGNOSIS — Z01.818 PREOP GENERAL PHYSICAL EXAM: Primary | ICD-10-CM

## 2023-07-12 DIAGNOSIS — N18.31 STAGE 3A CHRONIC KIDNEY DISEASE (H): ICD-10-CM

## 2023-07-12 DIAGNOSIS — I48.0 PAROXYSMAL ATRIAL FIBRILLATION (H): ICD-10-CM

## 2023-07-12 DIAGNOSIS — Z01.818 PREOP GENERAL PHYSICAL EXAM: ICD-10-CM

## 2023-07-12 DIAGNOSIS — E11.22 TYPE 2 DIABETES MELLITUS WITH STAGE 3A CHRONIC KIDNEY DISEASE, WITH LONG-TERM CURRENT USE OF INSULIN (H): ICD-10-CM

## 2023-07-12 LAB
ANION GAP SERPL CALCULATED.3IONS-SCNC: 11 MMOL/L (ref 7–15)
BUN SERPL-MCNC: 21.5 MG/DL (ref 8–23)
CALCIUM SERPL-MCNC: 9.4 MG/DL (ref 8.8–10.2)
CHLORIDE SERPL-SCNC: 100 MMOL/L (ref 98–107)
CREAT SERPL-MCNC: 1.29 MG/DL (ref 0.51–0.95)
DEPRECATED HCO3 PLAS-SCNC: 29 MMOL/L (ref 22–29)
ERYTHROCYTE [DISTWIDTH] IN BLOOD BY AUTOMATED COUNT: 15.1 % (ref 10–15)
GFR SERPL CREATININE-BSD FRML MDRD: 43 ML/MIN/1.73M2
GLUCOSE SERPL-MCNC: 98 MG/DL (ref 70–99)
HBA1C MFR BLD: 6.1 % (ref 0–5.6)
HCT VFR BLD AUTO: 39.5 % (ref 35–47)
HGB BLD-MCNC: 12.6 G/DL (ref 11.7–15.7)
MCH RBC QN AUTO: 29.4 PG (ref 26.5–33)
MCHC RBC AUTO-ENTMCNC: 31.9 G/DL (ref 31.5–36.5)
MCV RBC AUTO: 92 FL (ref 78–100)
PLATELET # BLD AUTO: 226 10E3/UL (ref 150–450)
POTASSIUM SERPL-SCNC: 4.4 MMOL/L (ref 3.4–5.3)
RBC # BLD AUTO: 4.29 10E6/UL (ref 3.8–5.2)
SODIUM SERPL-SCNC: 140 MMOL/L (ref 136–145)
WBC # BLD AUTO: 6.8 10E3/UL (ref 4–11)

## 2023-07-12 PROCEDURE — 36415 COLL VENOUS BLD VENIPUNCTURE: CPT

## 2023-07-12 PROCEDURE — 85027 COMPLETE CBC AUTOMATED: CPT

## 2023-07-12 PROCEDURE — 83036 HEMOGLOBIN GLYCOSYLATED A1C: CPT

## 2023-07-12 PROCEDURE — 80048 BASIC METABOLIC PNL TOTAL CA: CPT

## 2023-07-12 PROCEDURE — 99214 OFFICE O/P EST MOD 30 MIN: CPT | Performed by: FAMILY MEDICINE

## 2023-07-12 PROCEDURE — 93000 ELECTROCARDIOGRAM COMPLETE: CPT | Performed by: FAMILY MEDICINE

## 2023-07-12 ASSESSMENT — PAIN SCALES - GENERAL: PAINLEVEL: SEVERE PAIN (7)

## 2023-07-12 NOTE — PROGRESS NOTES
[facility]  [address]  [facility] [address]  Phone: [phone]  Primary Provider: Adeola Scott  Pre-op Performing Provider: ADEOLA SCOTT      PREOPERATIVE EVALUATION:  Today's date: 7/12/2023    Romy Hurley is a 78 year old female who presents for a preoperative evaluation.    Surgical Information:  Surgery/Procedure: Reverse Total Shoulder Arthroplasty, Left  Surgery Location: [facility]  Surgeon: Tyshawn  Surgery Date: 7/26/223  Time of Surgery: 12:30PM  Where patient plans to recover: At home with family  Fax number for surgical facility: Note does not need to be faxed, will be available electronically in Epic.    Assessment & Plan     The proposed surgical procedure is considered INTERMEDIATE risk.    Preop general physical exam     - EKG 12-lead complete w/read - Clinics    Shoulder arthritis       Type 2 diabetes mellitus without complication, without long-term current use of insulin (H)  Well controlled    Essential hypertension  Well controlled    Stage 3a chronic kidney disease (H)  Has been stable, will check BMP    SHARA not on CPAP  Attempted cpap, didn't work well for her       (  (E11.22,  N18.31,  Z79.4) Type 2 diabetes mellitus with stage 3a chronic kidney disease, with long-term current use of insulin (H)  Comment:  Well controlled  Plan:      (I48.0) Paroxysmal atrial fibrillation (H)  Comment: is on amiodarone, has kept her in sinus rhythm  Plan: is on eliquis    (E66.01) Morbid obesity (H)  Comment: contributes to risk with hypertension, diabetes  Plan:        Risks and Recommendations:  The patient has the following additional risks and recommendations for perioperative complications:   - No identified additional risk factors other than previously addressed    Antiplatelet or Anticoagulation Medication Instructions:   - apixaban (Eliquis), edoxaban (Savaysa), rivaroxaban (Xarelto): Neuraxial or regional block anticipated AND  CrCL (>=) 50mL/min. HOLD 3 days before surgery.     Additional Medication Instructions:  Patient is to take all scheduled medications on the day of surgery EXCEPT for modifications listed below:   - ACE/ARB: HOLD on day of surgery (minimum 11 hours for general anesthesia).   - Beta Blockers: Continue taking on the day of surgery.   - Diuretics: HOLD on the day of surgery.   - Statins: Continue taking on the day of surgery.    - metformin: HOLD day of surgery.   - LABA, inhaled corticosteroid, long-acting anticholinergics: Continue without modification.   - rescue Inhaler: Continue PRN. Bring to hospital on the day of surgery.    RECOMMENDATION:  APPROVAL GIVEN to proceed with proposed procedure, without further diagnostic evaluation.            Subjective       HPI related to upcoming procedure: 79 yo female with paroxysmal atrial fibrillation, stage 3a CKD, type 2 diabetes, hypertension, SHARA (on cpap) and morbid obesity, here for preoperative evaluation.  She is having a left reverse total shoulder arthroplasty due to OA of the left shoulder that has failed conservative management.        7/10/2023     9:27 AM   Preop Questions   1. Have you ever had a heart attack or stroke? No   2. Have you ever had surgery on your heart or blood vessels, such as a stent placement, a coronary artery bypass, or surgery on an artery in your head, neck, heart, or legs? No   3. Do you have chest pain with activity? No   4. Do you have a history of  heart failure? No   5. Do you currently have a cold, bronchitis or symptoms of other infection? No   6. Do you have a cough, shortness of breath, or wheezing? No   7. Do you or anyone in your family have previous history of blood clots? No   8. Do you or does anyone in your family have a serious bleeding problem such as prolonged bleeding following surgeries or cuts? No   9. Have you ever had problems with anemia or been told to take iron pills? No   10. Have you had any abnormal blood  loss such as black, tarry or bloody stools, or abnormal vaginal bleeding? No   11. Have you ever had a blood transfusion? No   12. Are you willing to have a blood transfusion if it is medically needed before, during, or after your surgery? Yes   13. Have you or any of your relatives ever had problems with anesthesia? No   14. Do you have sleep apnea, excessive snoring or daytime drowsiness? YES - yes, not on CPAP   14a. Do you have a CPAP machine? No   15. Do you have any artifical heart valves or other implanted medical devices like a pacemaker, defibrillator, or continuous glucose monitor? No   16. Do you have artificial joints? YES - bilateral knees, right ankle,    17. Are you allergic to latex? No       Health Care Directive:  Patient does not have a Health Care Directive or Living Will: Discussed advance care planning with patient; however, patient declined at this time.    Preoperative Review of :   reviewed - controlled substances prescribed by other outside provider(s).      Status of Chronic Conditions:  See problem list for active medical problems.  Problems all longstanding and stable, except as noted/documented.  See ROS for pertinent symptoms related to these conditions.      Review of Systems  CONSTITUTIONAL: NEGATIVE for fever, chills, change in weight  ENT/MOUTH: NEGATIVE for ear, mouth and throat problems  RESP: NEGATIVE for significant cough or SOB  CV: NEGATIVE for chest pain, palpitations or peripheral edema    Patient Active Problem List    Diagnosis Date Noted     SHARA on CPAP 08/23/2022     Priority: Medium     Shoulder arthritis 07/07/2022     Priority: Medium     Cervicalgia 06/29/2022     Priority: Medium     CKD (chronic kidney disease) stage 3, GFR 30-59 ml/min (H) 10/01/2020     Priority: Medium     Morbid obesity (H) 03/21/2019     Priority: Medium     Adjustment disorder with mixed anxiety and depressed mood 03/21/2018     Priority: Medium     Type 2 diabetes mellitus without  "complication, without long-term current use of insulin (H) 2017     Priority: Medium     Mild intermittent asthma without complication 03/10/2016     Priority: Medium     Persistent insomnia 03/10/2016     Priority: Medium     Health Care Home 2013     Priority: Medium     Yeni Gwen RN-PHN   [phone]  FPA / [facility] Care Coordinator /                   Advanced directives, counseling/discussion 2012     Priority: Medium     Advance Directive received and scanned. Click on Code in the patient header to view. 2012          Paroxysmal atrial fibrillation (H) 2012     Priority: Medium     Presented to [facility] with chest pain  Afib with RVR    Lexican nuclear stress done 3/29/2012  Negative for inducible ischemia  EF 70%  Increased metoprolol from 25 to 50 mg twice daily     Cardioversion 2012       CARDIOVASCULAR SCREENING; LDL GOAL LESS THAN 130 10/31/2010     Priority: Medium     Insomnia 03/10/2010     Priority: Medium     Essential hypertension 03/10/2010     Priority: Medium     3/10/10- pt has with her results over the past two months- all normal at home and was \"low\" in the hospital with her knee replacement.       Total knee replacement status 2010     Priority: Medium     Osteoarthritis 2010     Priority: Medium      Past Medical History:   Diagnosis Date     Asthma      Atrial fibrillation (H) 2012     Diabetes mellitus (H)      Hypertension      Insomnia      Mild intermittent asthma      Obesity      SHARA on CPAP      Osteoarthritis      Past Surgical History:   Procedure Laterality Date     ARTHROPLASTY KNEE BILATERAL       ARTHROSCOPY SHOULDER RT/LT  2011    right with subacromial decompression and rotator cuff repair, massive      SECTION       EP ABLATION AFLUTTER  2019    Procedure: EP Ablation Atrial Flutter;  Surgeon: Dorian Garland MD;  Location: [facility] Cath Lab;  Service: Cardiology "     EP ABLATION PVI Left 12/06/2019    Procedure: EP Ablation PVI;  Surgeon: Dorian Garland MD;  Location: [facility] Cath Lab;  Service: Cardiology     HC CARDIOVERSION  05/31/2012    [facility] - done for a fib     JOINT REPLACEMTN, KNEE RT/LT  02/01/2010    LEFT     JOINT REPLACEMTN, KNEE RT/LT  06/07/2010    right     OPEN REDUCTION INTERNAL FIXATION ANKLE Right 02/19/2021    Procedure: OPEN REDUCTION INTERNAL FIXATION MEDIAL MALLEOLUS FRACTURE, ANKLE RIGHT;  Surgeon: Vidal Cohn MD;  Location: [facility] OR;  Service: Orthopedics     REPAIR HAMMER TOE Right 02/19/2021    Procedure: FLEXOR TENOTOMIES 3RD TOE HAMMER TOE CORRECTION;  Surgeon: Vidal Cohn MD;  Location: [facility] OR;  Service: Orthopedics     SHOULDER ARTHROSCOPY W/ ROTATOR CUFF REPAIR Right      ZZC TOTAL ANKLE REPLACEMENT Right 02/19/2021    Procedure: RIGHT TOTAL ANKLE ARTHOPLASTY, DEBRIDE SUBTALAR JOINT;  Surgeon: Vidal Cohn MD;  Location: [facility] Main OR;  Service: Orthopedics     Current Outpatient Medications   Medication Sig Dispense Refill     amiodarone (PACERONE) 200 MG tablet Take 2 tabs orally in am and 1 tab orally in the evening for 1 week and then decrease to 1 tab orally twice a day for 2 weeks and then decrease to 1 tab orally every day. 240 tablet 1     atorvastatin (LIPITOR) 10 MG tablet Take 1 tablet (10 mg) by mouth daily 90 tablet 3     ELIQUIS ANTICOAGULANT 5 MG tablet TAKE 1 TABLET(5 MG) BY MOUTH EVERY 12 HOURS 180 tablet 1     fluticasone-salmeterol (ADVAIR) 250-50 MCG/ACT inhaler Inhale 1 puff into the lungs every 12 hours 60 each 11     hydrochlorothiazide (HYDRODIURIL) 25 MG tablet Take 1 tablet (25 mg) by mouth daily 90 tablet 3     losartan (COZAAR) 100 MG tablet Take 1 tablet (100 mg) by mouth daily 90 tablet 3     metFORMIN (GLUCOPHAGE) 500 MG tablet TAKE 1 TABLET BY MOUTH DAILY WITH DINNER 90 tablet 3     methocarbamol (ROBAXIN) 750 MG tablet Take 1 tablet (750 mg) by  mouth 3 times daily as needed for muscle spasms 30 tablet 0     metoprolol succinate ER (TOPROL XL) 50 MG 24 hr tablet Take 1 tablet (50 mg) by mouth every evening 90 tablet 3     PARoxetine (PAXIL) 10 MG tablet Take 1 tablet (10 mg) by mouth At Bedtime 90 tablet 3     XOPENEX HFA 45 MCG/ACT inhaler INHALE 2 PUFFS INTO THE LUNGS EVERY 8 HOURS AS NEEDED FOR SHORTNESS OF BREATH OR DIFFICULT BREATHING Strength: 45 MCG/ACT 15 g 3     blood glucose (ONETOUCH VERIO IQ) test strip Use to test blood sugars 1 times daily or as directed. 100 each 11     blood glucose monitoring (ONE TOUCH DELICA) lancets Use to test blood sugars 1 times daily or as directed. 100 each 3     traZODone (DESYREL) 50 MG tablet TAKE 1 TABLET AT BEDTIME 90 tablet 1       Allergies   Allergen Reactions     Macrobid [Nitrofurantoin Anhydrous] Shortness Of Breath     Ace Inhibitors Cough     Corticosteroids         Social History     Tobacco Use     Smoking status: Former     Packs/day: 0.00     Types: Cigarettes     Quit date: 1981     Years since quittin.1     Smokeless tobacco: Never   Substance Use Topics     Alcohol use: Yes     Comment: Alcoholic Drinks/day: Rare (2-3 month)     Family History   Problem Relation Age of Onset     Genitourinary Problems Mother         Ovarian Cancer     Gastrointestinal Disease Mother         Had gallbladder romoved     Cancer Father         Lung     Gastrointestinal Disease Father         Had gallbladder romoved     C.A.D. Paternal Grandfather         Coronary     Gastrointestinal Disease Brother         Had gallbladder romoved     Depression Sister         DDD     Ovarian Cancer Mother 82.00     Lung Cancer Father 75.00     Sleep Apnea Father      Snoring Father      Brain Cancer Sister 57.00        Brain Ca x 1 year     No Known Problems Brother      No Known Problems Sister      No Known Problems Sister      History   Drug Use No         Objective     /76   Pulse 57   Temp 98.8  F (37.1  C)  "(Tympanic)   Resp 20   Ht 1.727 m (5' 8\")   Wt 133.5 kg (294 lb 4 oz)   LMP  (LMP Unknown)   SpO2 94%   BMI 44.74 kg/m      Physical Exam  GENERAL APPEARANCE: healthy, alert and no distress  HENT: ear canals and TM's normal and nose and mouth without ulcers or lesions  RESP: lungs clear to auscultation - no rales, rhonchi or wheezes  CV: regular rate and rhythm, normal S1 S2, no S3 or S4 and no murmur, click or rub   ABDOMEN: soft, nontender, no HSM or masses and bowel sounds normal  MS: pitting 1+ lower extremity edema bilaterally  NEURO: Normal strength and tone, sensory exam grossly normal, mentation intact and speech normal    Recent Labs   Lab Test 10/13/22  1553 08/23/22  1335 07/23/21  0744   HGB 13.0  --   --     140 139   POTASSIUM 4.3 4.1 4.2   CR 1.11* 0.98 1.08*   A1C 6.0*  --  6.2*        Diagnostics:  Labs pending at this time.  Results will be reviewed when available.   EKG: sinus bradycardia, first degree AV block, normal axis, normal intervals, no acute ST/T changes c/w ischemia, no LVH by voltage criteria, unchanged from previous tracings    Revised Cardiac Risk Index (RCRI):  The patient has the following serious cardiovascular risks for perioperative complications:   - No serious cardiac risks = 0 points     RCRI Interpretation: 0 points: Class I (very low risk - 0.4% complication rate)           Signed Electronically by: Adeola Stockton MD  Copy of this evaluation report is provided to requesting physician.      "

## 2023-07-12 NOTE — H&P (VIEW-ONLY)
[facility]  [address]  [facility] [address]  Phone: [phone]  Primary Provider: Adeola Scott  Pre-op Performing Provider: ADEOLA SCOTT      PREOPERATIVE EVALUATION:  Today's date: 7/12/2023    Romy Hurley is a 78 year old female who presents for a preoperative evaluation.    Surgical Information:  Surgery/Procedure: Reverse Total Shoulder Arthroplasty, Left  Surgery Location: [facility]  Surgeon: Tyshawn  Surgery Date: 7/26/223  Time of Surgery: 12:30PM  Where patient plans to recover: At home with family  Fax number for surgical facility: Note does not need to be faxed, will be available electronically in Epic.    Assessment & Plan     The proposed surgical procedure is considered INTERMEDIATE risk.    Preop general physical exam     - EKG 12-lead complete w/read - Clinics    Shoulder arthritis       Type 2 diabetes mellitus without complication, without long-term current use of insulin (H)  Well controlled    Essential hypertension  Well controlled    Stage 3a chronic kidney disease (H)  Has been stable, will check BMP    SHARA not on CPAP  Attempted cpap, didn't work well for her       (  (E11.22,  N18.31,  Z79.4) Type 2 diabetes mellitus with stage 3a chronic kidney disease, with long-term current use of insulin (H)  Comment:  Well controlled  Plan:      (I48.0) Paroxysmal atrial fibrillation (H)  Comment: is on amiodarone, has kept her in sinus rhythm  Plan: is on eliquis    (E66.01) Morbid obesity (H)  Comment: contributes to risk with hypertension, diabetes  Plan:        Risks and Recommendations:  The patient has the following additional risks and recommendations for perioperative complications:   - No identified additional risk factors other than previously addressed    Antiplatelet or Anticoagulation Medication Instructions:   - apixaban (Eliquis), edoxaban (Savaysa), rivaroxaban (Xarelto): Neuraxial or regional block anticipated AND  CrCL (>=) 50mL/min. HOLD 3 days before surgery.     Additional Medication Instructions:  Patient is to take all scheduled medications on the day of surgery EXCEPT for modifications listed below:   - ACE/ARB: HOLD on day of surgery (minimum 11 hours for general anesthesia).   - Beta Blockers: Continue taking on the day of surgery.   - Diuretics: HOLD on the day of surgery.   - Statins: Continue taking on the day of surgery.    - metformin: HOLD day of surgery.   - LABA, inhaled corticosteroid, long-acting anticholinergics: Continue without modification.   - rescue Inhaler: Continue PRN. Bring to hospital on the day of surgery.    RECOMMENDATION:  APPROVAL GIVEN to proceed with proposed procedure, without further diagnostic evaluation.            Subjective       HPI related to upcoming procedure: 79 yo female with paroxysmal atrial fibrillation, stage 3a CKD, type 2 diabetes, hypertension, SHARA (on cpap) and morbid obesity, here for preoperative evaluation.  She is having a left reverse total shoulder arthroplasty due to OA of the left shoulder that has failed conservative management.        7/10/2023     9:27 AM   Preop Questions   1. Have you ever had a heart attack or stroke? No   2. Have you ever had surgery on your heart or blood vessels, such as a stent placement, a coronary artery bypass, or surgery on an artery in your head, neck, heart, or legs? No   3. Do you have chest pain with activity? No   4. Do you have a history of  heart failure? No   5. Do you currently have a cold, bronchitis or symptoms of other infection? No   6. Do you have a cough, shortness of breath, or wheezing? No   7. Do you or anyone in your family have previous history of blood clots? No   8. Do you or does anyone in your family have a serious bleeding problem such as prolonged bleeding following surgeries or cuts? No   9. Have you ever had problems with anemia or been told to take iron pills? No   10. Have you had any abnormal blood  loss such as black, tarry or bloody stools, or abnormal vaginal bleeding? No   11. Have you ever had a blood transfusion? No   12. Are you willing to have a blood transfusion if it is medically needed before, during, or after your surgery? Yes   13. Have you or any of your relatives ever had problems with anesthesia? No   14. Do you have sleep apnea, excessive snoring or daytime drowsiness? YES - yes, not on CPAP   14a. Do you have a CPAP machine? No   15. Do you have any artifical heart valves or other implanted medical devices like a pacemaker, defibrillator, or continuous glucose monitor? No   16. Do you have artificial joints? YES - bilateral knees, right ankle,    17. Are you allergic to latex? No       Health Care Directive:  Patient does not have a Health Care Directive or Living Will: Discussed advance care planning with patient; however, patient declined at this time.    Preoperative Review of :   reviewed - controlled substances prescribed by other outside provider(s).      Status of Chronic Conditions:  See problem list for active medical problems.  Problems all longstanding and stable, except as noted/documented.  See ROS for pertinent symptoms related to these conditions.      Review of Systems  CONSTITUTIONAL: NEGATIVE for fever, chills, change in weight  ENT/MOUTH: NEGATIVE for ear, mouth and throat problems  RESP: NEGATIVE for significant cough or SOB  CV: NEGATIVE for chest pain, palpitations or peripheral edema    Patient Active Problem List    Diagnosis Date Noted     SHARA on CPAP 08/23/2022     Priority: Medium     Shoulder arthritis 07/07/2022     Priority: Medium     Cervicalgia 06/29/2022     Priority: Medium     CKD (chronic kidney disease) stage 3, GFR 30-59 ml/min (H) 10/01/2020     Priority: Medium     Morbid obesity (H) 03/21/2019     Priority: Medium     Adjustment disorder with mixed anxiety and depressed mood 03/21/2018     Priority: Medium     Type 2 diabetes mellitus without  "complication, without long-term current use of insulin (H) 2017     Priority: Medium     Mild intermittent asthma without complication 03/10/2016     Priority: Medium     Persistent insomnia 03/10/2016     Priority: Medium     Health Care Home 2013     Priority: Medium     Yeni Gwen RN-PHN   [phone]  FPA / [facility] Care Coordinator /                   Advanced directives, counseling/discussion 2012     Priority: Medium     Advance Directive received and scanned. Click on Code in the patient header to view. 2012          Paroxysmal atrial fibrillation (H) 2012     Priority: Medium     Presented to [facility] with chest pain  Afib with RVR    Lexican nuclear stress done 3/29/2012  Negative for inducible ischemia  EF 70%  Increased metoprolol from 25 to 50 mg twice daily     Cardioversion 2012       CARDIOVASCULAR SCREENING; LDL GOAL LESS THAN 130 10/31/2010     Priority: Medium     Insomnia 03/10/2010     Priority: Medium     Essential hypertension 03/10/2010     Priority: Medium     3/10/10- pt has with her results over the past two months- all normal at home and was \"low\" in the hospital with her knee replacement.       Total knee replacement status 2010     Priority: Medium     Osteoarthritis 2010     Priority: Medium      Past Medical History:   Diagnosis Date     Asthma      Atrial fibrillation (H) 2012     Diabetes mellitus (H)      Hypertension      Insomnia      Mild intermittent asthma      Obesity      SHARA on CPAP      Osteoarthritis      Past Surgical History:   Procedure Laterality Date     ARTHROPLASTY KNEE BILATERAL       ARTHROSCOPY SHOULDER RT/LT  2011    right with subacromial decompression and rotator cuff repair, massive      SECTION       EP ABLATION AFLUTTER  2019    Procedure: EP Ablation Atrial Flutter;  Surgeon: Dorian Garland MD;  Location: [facility];  Service: Cardiology "     EP ABLATION PVI Left 12/06/2019    Procedure: EP Ablation PVI;  Surgeon: Dorian Garland MD;  Location: [facility] Cath Lab;  Service: Cardiology     HC CARDIOVERSION  05/31/2012    [facility] - done for a fib     JOINT REPLACEMTN, KNEE RT/LT  02/01/2010    LEFT     JOINT REPLACEMTN, KNEE RT/LT  06/07/2010    right     OPEN REDUCTION INTERNAL FIXATION ANKLE Right 02/19/2021    Procedure: OPEN REDUCTION INTERNAL FIXATION MEDIAL MALLEOLUS FRACTURE, ANKLE RIGHT;  Surgeon: Vidal Cohn MD;  Location: [facility] OR;  Service: Orthopedics     REPAIR HAMMER TOE Right 02/19/2021    Procedure: FLEXOR TENOTOMIES 3RD TOE HAMMER TOE CORRECTION;  Surgeon: Vidal Cohn MD;  Location: [facility] OR;  Service: Orthopedics     SHOULDER ARTHROSCOPY W/ ROTATOR CUFF REPAIR Right      ZZC TOTAL ANKLE REPLACEMENT Right 02/19/2021    Procedure: RIGHT TOTAL ANKLE ARTHOPLASTY, DEBRIDE SUBTALAR JOINT;  Surgeon: Vidal Cohn MD;  Location: [facility] OR;  Service: Orthopedics     Current Outpatient Medications   Medication Sig Dispense Refill     amiodarone (PACERONE) 200 MG tablet Take 2 tabs orally in am and 1 tab orally in the evening for 1 week and then decrease to 1 tab orally twice a day for 2 weeks and then decrease to 1 tab orally every day. 240 tablet 1     atorvastatin (LIPITOR) 10 MG tablet Take 1 tablet (10 mg) by mouth daily 90 tablet 3     ELIQUIS ANTICOAGULANT 5 MG tablet TAKE 1 TABLET(5 MG) BY MOUTH EVERY 12 HOURS 180 tablet 1     fluticasone-salmeterol (ADVAIR) 250-50 MCG/ACT inhaler Inhale 1 puff into the lungs every 12 hours 60 each 11     hydrochlorothiazide (HYDRODIURIL) 25 MG tablet Take 1 tablet (25 mg) by mouth daily 90 tablet 3     losartan (COZAAR) 100 MG tablet Take 1 tablet (100 mg) by mouth daily 90 tablet 3     metFORMIN (GLUCOPHAGE) 500 MG tablet TAKE 1 TABLET BY MOUTH DAILY WITH DINNER 90 tablet 3     methocarbamol (ROBAXIN) 750 MG tablet Take 1 tablet (750 mg) by  mouth 3 times daily as needed for muscle spasms 30 tablet 0     metoprolol succinate ER (TOPROL XL) 50 MG 24 hr tablet Take 1 tablet (50 mg) by mouth every evening 90 tablet 3     PARoxetine (PAXIL) 10 MG tablet Take 1 tablet (10 mg) by mouth At Bedtime 90 tablet 3     XOPENEX HFA 45 MCG/ACT inhaler INHALE 2 PUFFS INTO THE LUNGS EVERY 8 HOURS AS NEEDED FOR SHORTNESS OF BREATH OR DIFFICULT BREATHING Strength: 45 MCG/ACT 15 g 3     blood glucose (ONETOUCH VERIO IQ) test strip Use to test blood sugars 1 times daily or as directed. 100 each 11     blood glucose monitoring (ONE TOUCH DELICA) lancets Use to test blood sugars 1 times daily or as directed. 100 each 3     traZODone (DESYREL) 50 MG tablet TAKE 1 TABLET AT BEDTIME 90 tablet 1       Allergies   Allergen Reactions     Macrobid [Nitrofurantoin Anhydrous] Shortness Of Breath     Ace Inhibitors Cough     Corticosteroids         Social History     Tobacco Use     Smoking status: Former     Packs/day: 0.00     Types: Cigarettes     Quit date: 1981     Years since quittin.1     Smokeless tobacco: Never   Substance Use Topics     Alcohol use: Yes     Comment: Alcoholic Drinks/day: Rare (2-3 month)     Family History   Problem Relation Age of Onset     Genitourinary Problems Mother         Ovarian Cancer     Gastrointestinal Disease Mother         Had gallbladder romoved     Cancer Father         Lung     Gastrointestinal Disease Father         Had gallbladder romoved     C.A.D. Paternal Grandfather         Coronary     Gastrointestinal Disease Brother         Had gallbladder romoved     Depression Sister         DDD     Ovarian Cancer Mother 82.00     Lung Cancer Father 75.00     Sleep Apnea Father      Snoring Father      Brain Cancer Sister 57.00        Brain Ca x 1 year     No Known Problems Brother      No Known Problems Sister      No Known Problems Sister      History   Drug Use No         Objective     /76   Pulse 57   Temp 98.8  F (37.1  C)  "(Tympanic)   Resp 20   Ht 1.727 m (5' 8\")   Wt 133.5 kg (294 lb 4 oz)   LMP  (LMP Unknown)   SpO2 94%   BMI 44.74 kg/m      Physical Exam  GENERAL APPEARANCE: healthy, alert and no distress  HENT: ear canals and TM's normal and nose and mouth without ulcers or lesions  RESP: lungs clear to auscultation - no rales, rhonchi or wheezes  CV: regular rate and rhythm, normal S1 S2, no S3 or S4 and no murmur, click or rub   ABDOMEN: soft, nontender, no HSM or masses and bowel sounds normal  MS: pitting 1+ lower extremity edema bilaterally  NEURO: Normal strength and tone, sensory exam grossly normal, mentation intact and speech normal    Recent Labs   Lab Test 10/13/22  1553 08/23/22  1335 07/23/21  0744   HGB 13.0  --   --     140 139   POTASSIUM 4.3 4.1 4.2   CR 1.11* 0.98 1.08*   A1C 6.0*  --  6.2*        Diagnostics:  Labs pending at this time.  Results will be reviewed when available.   EKG: sinus bradycardia, first degree AV block, normal axis, normal intervals, no acute ST/T changes c/w ischemia, no LVH by voltage criteria, unchanged from previous tracings    Revised Cardiac Risk Index (RCRI):  The patient has the following serious cardiovascular risks for perioperative complications:   - No serious cardiac risks = 0 points     RCRI Interpretation: 0 points: Class I (very low risk - 0.4% complication rate)           Signed Electronically by: Adeola Stockton MD  Copy of this evaluation report is provided to requesting physician.      "

## 2023-07-12 NOTE — PATIENT INSTRUCTIONS
For informational purposes only. Not to replace the advice of your health care provider. Copyright   2003,  [facility]. All rights reserved. Clinically reviewed by Libia Man MD. [employer] [id number] - REV .  Preparing for Your Surgery  Getting started  A nurse will call you to review your health history and instructions. They will give you an arrival time based on your scheduled surgery time. Please be ready to share:  Your doctor's clinic name and phone number  Your medical, surgical, and anesthesia history  A list of allergies and sensitivities  A list of medicines, including herbal treatments and over-the-counter drugs  Whether the patient has a legal guardian (ask how to send us the papers in advance)  Please tell us if you're pregnant--or if there's any chance you might be pregnant. Some surgeries may injure a fetus (unborn baby), so they require a pregnancy test. Surgeries that are safe for a fetus don't always need a test, and you can choose whether to have one.   If you have a child who's having surgery, please ask for a copy of Preparing for Your Child's Surgery.    Preparing for surgery  Within 10 to 30 days of surgery: Have a pre-op exam (sometimes called an H&P, or History and Physical). This can be done at a clinic or pre-operative center.  If you're having a , you may not need this exam. Talk to your care team.  At your pre-op exam, talk to your care team about all medicines you take. If you need to stop any medicines before surgery, ask when to start taking them again.  We do this for your safety. Many medicines can make you bleed too much during surgery. Some change how well surgery (anesthesia) drugs work.  Call your insurance company to let them know you're having surgery. (If you don't have insurance, call [phone].)  Call your clinic if there's any change in your health. This includes signs of a cold or flu (sore throat, runny nose, cough, rash, fever). It  also includes a scrape or scratch near the surgery site.  If you have questions on the day of surgery, call your hospital or surgery center.  Eating and drinking guidelines  For your safety: Unless your surgeon tells you otherwise, follow the guidelines below.  Eat and drink as usual until 8 hours before you arrive for surgery. After that, no food or milk.  Drink clear liquids until 2 hours before you arrive. These are liquids you can see through, like water, Gatorade, and Propel Water. They also include plain black coffee and tea (no cream or milk), candy, and breath mints. You can spit out gum when you arrive.  If you drink alcohol: Stop drinking it the night before surgery.  If your care team tells you to take medicine on the morning of surgery, it's okay to take it with a sip of water.  Preventing infection  Shower or bathe the night before and morning of your surgery. Follow the instructions your clinic gave you. (If no instructions, use regular soap.)  Don't shave or clip hair near your surgery site. We'll remove the hair if needed.  Don't smoke or vape the morning of surgery. You may chew nicotine gum up to 2 hours before surgery. A nicotine patch is okay.  Note: Some surgeries require you to completely quit smoking and nicotine. Check with your surgeon.  Your care team will make every effort to keep you safe from infection. We will:  Clean our hands often with soap and water (or an alcohol-based hand rub).  Clean the skin at your surgery site with a special soap that kills germs.  Give you a special gown to keep you warm. (Cold raises the risk of infection.)  Wear special hair covers, masks, gowns and gloves during surgery.  Give antibiotic medicine, if prescribed. Not all surgeries need antibiotics.  What to bring on the day of surgery  Photo ID and insurance card  Copy of your health care directive, if you have one  Glasses and hearing aids (bring cases)  You can't wear contacts during surgery  Inhaler and  eye drops, if you use them (tell us about these when you arrive)  CPAP machine or breathing device, if you use them  A few personal items, if spending the night  If you have . . .  A pacemaker, ICD (cardiac defibrillator) or other implant: Bring the ID card.  An implanted stimulator: Bring the remote control.  A legal guardian: Bring a copy of the certified (court-stamped) guardianship papers.  Please remove any jewelry, including body piercings. Leave jewelry and other valuables at home.  If you're going home the day of surgery  You must have a responsible adult drive you home. They should stay with you overnight as well.  If you don't have someone to stay with you, and you aren't safe to go home alone, we may keep you overnight. Insurance often won't pay for this.  After surgery  If it's hard to control your pain or you need more pain medicine, please call your surgeon's office.  Questions?   If you have any questions for your care team, list them here: _________________________________________________________________________________________________________________________________________________________________________ ____________________________________ ____________________________________ ____________________________________    How to Take Your Medication Before Surgery  - HOLD (do not take) Eliquis for three days before surgery  -day of surgery, do not take Losartan or hydrochlorothiazide

## 2023-07-25 ENCOUNTER — ANESTHESIA EVENT (OUTPATIENT)
Dept: SURGERY | Facility: CLINIC | Age: 78
End: 2023-07-25
Payer: COMMERCIAL

## 2023-07-25 ASSESSMENT — ENCOUNTER SYMPTOMS: DYSRHYTHMIAS: 1

## 2023-07-25 NOTE — ANESTHESIA PREPROCEDURE EVALUATION
Anesthesia Pre-Procedure Evaluation    Patient: Romy Hurley   MRN: [id number] : 1945        Procedure : Procedure(s):  Reverse Total Shoulder Arthroplasty          Past Medical History:   Diagnosis Date    Asthma     Atrial fibrillation (H) 2012    Diabetes mellitus (H)     Hypertension     Insomnia     Mild intermittent asthma     Obesity     SHARA on CPAP     Osteoarthritis       Past Surgical History:   Procedure Laterality Date    ARTHROPLASTY KNEE BILATERAL      ARTHROSCOPY SHOULDER RT/LT  2011    right with subacromial decompression and rotator cuff repair, massive     SECTION      EP ABLATION AFLUTTER  2019    Procedure: EP Ablation Atrial Flutter;  Surgeon: Dorian Garland MD;  Location: [facility];  Service: Cardiology    EP ABLATION PVI Left 2019    Procedure: EP Ablation PVI;  Surgeon: Dorian Garland MD;  Location: [facility];  Service: Cardiology    HC CARDIOVERSION  2012    [facility] - done for a fib    JOINT REPLACEMTN, KNEE RT/LT  2010    LEFT    JOINT REPLACEMTN, KNEE RT/LT  2010    right    OPEN REDUCTION INTERNAL FIXATION ANKLE Right 2021    Procedure: OPEN REDUCTION INTERNAL FIXATION MEDIAL MALLEOLUS FRACTURE, ANKLE RIGHT;  Surgeon: Vidal Cohn MD;  Location: [facility] OR;  Service: Orthopedics    REPAIR HAMMER TOE Right 2021    Procedure: FLEXOR TENOTOMIES 3RD TOE HAMMER TOE CORRECTION;  Surgeon: Vidal Cohn MD;  Location: [facility] OR;  Service: Orthopedics    SHOULDER ARTHROSCOPY W/ ROTATOR CUFF REPAIR Right     ZZC TOTAL ANKLE REPLACEMENT Right 2021    Procedure: RIGHT TOTAL ANKLE ARTHOPLASTY, DEBRIDE SUBTALAR JOINT;  Surgeon: Vidal Cohn MD;  Location: [facility] OR;  Service: Orthopedics      Allergies   Allergen Reactions    Macrobid [Nitrofurantoin Anhydrous] Shortness Of Breath    Ace Inhibitors Cough    Corticosteroids       Social History      Tobacco Use    Smoking status: Former     Packs/day: 0.00     Types: Cigarettes     Quit date: 1981     Years since quittin.1    Smokeless tobacco: Never   Substance Use Topics    Alcohol use: Yes     Comment: Alcoholic Drinks/day: Rare (2-3 month)      Wt Readings from Last 1 Encounters:   23 133.5 kg (294 lb 4 oz)        Anesthesia Evaluation   Pt has had prior anesthetic. Type: General, MAC and Regional.    No history of anesthetic complications       ROS/MED HX  ENT/Pulmonary: Comment: Non-compliant with CPAP    (+) sleep apnea, doesn't use CPAP,                  Intermittent, asthma                  Neurologic:  - neg neurologic ROS     Cardiovascular: Comment: S/p EP ablation 2019    (+)  hypertension- -   -  - -   Taking blood thinners Pt has received instructions: Instructions Given to patient: stopped 23.                   dysrhythmias, a-fib,        Previous cardiac testing   Echo: Date: Results:    Stress Test:  Date: Results:    ECG Reviewed:  Date: 23 Results:  Sinus Bradycardia -First degree A-V block   Priti = 232  BORDERLINE RHYTHM  Cath:  Date: Results:      METS/Exercise Tolerance: >4 METS    Hematologic:  - neg hematologic  ROS     Musculoskeletal:   (+)  arthritis,             GI/Hepatic:  - neg GI/hepatic ROS     Renal/Genitourinary:     (+) renal disease, type: CRI,            Endo:     (+)  type II DM,             Obesity,       Psychiatric/Substance Use:     (+) psychiatric history anxiety and depression       Infectious Disease:  - neg infectious disease ROS     Malignancy:  - neg malignancy ROS     Other:  - neg other ROS          Physical Exam    Airway        Mallampati: II   TM distance: > 3 FB   Neck ROM: full   Mouth opening: > 3 cm    Respiratory Devices and Support         Dental       (+) Modest Abnormalities - crowns, retainers, 1 or 2 missing teeth      Cardiovascular   cardiovascular exam normal       Rhythm and rate: normal     Pulmonary   pulmonary  exam normal        breath sounds clear to auscultation           OUTSIDE LABS:  CBC:   Lab Results   Component Value Date    WBC 6.8 07/12/2023    WBC 10.9 02/20/2021    HGB 12.6 07/12/2023    HGB 13.0 10/13/2022    HCT 39.5 07/12/2023    HCT 35.1 02/20/2021     07/12/2023     02/20/2021     BMP:   Lab Results   Component Value Date     07/12/2023     10/13/2022    POTASSIUM 4.4 07/12/2023    POTASSIUM 4.3 10/13/2022    CHLORIDE 100 07/12/2023    CHLORIDE 99 10/13/2022    CO2 29 07/12/2023    CO2 32 (H) 10/13/2022    BUN 21.5 07/12/2023    BUN 20.1 10/13/2022    CR 1.29 (H) 07/12/2023    CR 1.11 (H) 10/13/2022    GLC 98 07/12/2023     (H) 10/13/2022     COAGS:   Lab Results   Component Value Date    INR 1.9 (A) 04/24/2012     POC: No results found for: BGM, HCG, HCGS  HEPATIC:   Lab Results   Component Value Date    ALBUMIN 3.2 (L) 07/23/2021    PROTTOTAL 7.6 07/23/2021    ALT 18 10/13/2022    AST 16 07/23/2021    ALKPHOS 60 07/23/2021    BILITOTAL 0.5 07/23/2021     OTHER:   Lab Results   Component Value Date    PH 7.48 (H) 12/06/2019    A1C 6.1 (H) 07/12/2023    ZHAO 9.4 07/12/2023    TSH 5.58 (H) 10/13/2022    T4 1.17 10/13/2022       Anesthesia Plan    ASA Status:  3    NPO Status:  NPO Appropriate    Anesthesia Type: General.     - Airway: ETT   Induction: Intravenous, Propofol.   Maintenance: Balanced.        Consents    Anesthesia Plan(s) and associated risks, benefits, and realistic alternatives discussed. Questions answered and patient/representative(s) expressed understanding.     - Discussed: Risks, Benefits and Alternatives for BOTH SEDATION and the PROCEDURE were discussed     - Discussed with:  Patient      - Extended Intubation/Ventilatory Support Discussed: No.      - Patient is DNR/DNI Status: No     Use of blood products discussed: No .     Postoperative Care    Pain management: Oral pain medications, IV analgesics, Peripheral nerve block (Single Shot).   PONV  prophylaxis: Ondansetron (or other 5HT-3), Dexamethasone or Solumedrol, Background Propofol Infusion     Comments:                CRISTIAN Osorio CRNA

## 2023-07-26 ENCOUNTER — ANESTHESIA (OUTPATIENT)
Dept: SURGERY | Facility: CLINIC | Age: 78
End: 2023-07-26
Payer: COMMERCIAL

## 2023-07-26 ENCOUNTER — HOSPITAL ENCOUNTER (OUTPATIENT)
Facility: CLINIC | Age: 78
Discharge: HOME | End: 2023-07-27
Attending: ORTHOPAEDIC SURGERY | Admitting: ORTHOPAEDIC SURGERY
Payer: COMMERCIAL

## 2023-07-26 ENCOUNTER — APPOINTMENT (OUTPATIENT)
Dept: GENERAL RADIOLOGY | Facility: CLINIC | Age: 78
End: 2023-07-26
Attending: ORTHOPAEDIC SURGERY
Payer: COMMERCIAL

## 2023-07-26 ENCOUNTER — ANCILLARY PROCEDURE (OUTPATIENT)
Dept: ULTRASOUND IMAGING | Facility: CLINIC | Age: 78
End: 2023-07-26
Attending: NURSE ANESTHETIST, CERTIFIED REGISTERED
Payer: COMMERCIAL

## 2023-07-26 DIAGNOSIS — Z96.612 STATUS POST REVERSE TOTAL REPLACEMENT OF LEFT SHOULDER: Primary | ICD-10-CM

## 2023-07-26 PROBLEM — Z96.619 S/P REVERSE TOTAL SHOULDER ARTHROPLASTY, UNSPECIFIED LATERALITY: Status: ACTIVE | Noted: 2023-07-26

## 2023-07-26 LAB
GLUCOSE BLDC GLUCOMTR-MCNC: 131 MG/DL (ref 70–99)
GLUCOSE BLDC GLUCOMTR-MCNC: 98 MG/DL (ref 70–99)

## 2023-07-26 PROCEDURE — 250N000011 HC RX IP 250 OP 636: Mod: JZ

## 2023-07-26 PROCEDURE — 271N000001 HC OR GENERAL SUPPLY NON-STERILE: Performed by: ORTHOPAEDIC SURGERY

## 2023-07-26 PROCEDURE — 250N000025 HC SEVOFLURANE, PER MIN: Performed by: ORTHOPAEDIC SURGERY

## 2023-07-26 PROCEDURE — 250N000013 HC RX MED GY IP 250 OP 250 PS 637

## 2023-07-26 PROCEDURE — 250N000009 HC RX 250: Performed by: NURSE ANESTHETIST, CERTIFIED REGISTERED

## 2023-07-26 PROCEDURE — 999N000065 XR SHOULDER LEFT PORT G/E 2 VIEWS: Mod: LT

## 2023-07-26 PROCEDURE — 258N000003 HC RX IP 258 OP 636: Performed by: ORTHOPAEDIC SURGERY

## 2023-07-26 PROCEDURE — 250N000011 HC RX IP 250 OP 636

## 2023-07-26 PROCEDURE — C1713 ANCHOR/SCREW BN/BN,TIS/BN: HCPCS | Performed by: ORTHOPAEDIC SURGERY

## 2023-07-26 PROCEDURE — 258N000003 HC RX IP 258 OP 636

## 2023-07-26 PROCEDURE — C9290 INJ, BUPIVACAINE LIPOSOME: HCPCS | Performed by: NURSE ANESTHETIST, CERTIFIED REGISTERED

## 2023-07-26 PROCEDURE — 250N000011 HC RX IP 250 OP 636: Performed by: NURSE ANESTHETIST, CERTIFIED REGISTERED

## 2023-07-26 PROCEDURE — C1776 JOINT DEVICE (IMPLANTABLE): HCPCS | Performed by: ORTHOPAEDIC SURGERY

## 2023-07-26 PROCEDURE — 250N000009 HC RX 250

## 2023-07-26 PROCEDURE — 250N000011 HC RX IP 250 OP 636: Mod: JZ | Performed by: ORTHOPAEDIC SURGERY

## 2023-07-26 PROCEDURE — 258N000003 HC RX IP 258 OP 636: Performed by: NURSE ANESTHETIST, CERTIFIED REGISTERED

## 2023-07-26 PROCEDURE — 360N000077 HC SURGERY LEVEL 4, PER MIN: Performed by: ORTHOPAEDIC SURGERY

## 2023-07-26 PROCEDURE — 82962 GLUCOSE BLOOD TEST: CPT

## 2023-07-26 PROCEDURE — 250N000013 HC RX MED GY IP 250 OP 250 PS 637: Performed by: ORTHOPAEDIC SURGERY

## 2023-07-26 PROCEDURE — 999N000141 HC STATISTIC PRE-PROCEDURE NURSING ASSESSMENT: Performed by: ORTHOPAEDIC SURGERY

## 2023-07-26 PROCEDURE — 250N000009 HC RX 250: Performed by: ORTHOPAEDIC SURGERY

## 2023-07-26 PROCEDURE — 710N000009 HC RECOVERY PHASE 1, LEVEL 1, PER MIN: Performed by: ORTHOPAEDIC SURGERY

## 2023-07-26 PROCEDURE — 370N000017 HC ANESTHESIA TECHNICAL FEE, PER MIN: Performed by: ORTHOPAEDIC SURGERY

## 2023-07-26 PROCEDURE — 272N000001 HC OR GENERAL SUPPLY STERILE: Performed by: ORTHOPAEDIC SURGERY

## 2023-07-26 PROCEDURE — 250N000013 HC RX MED GY IP 250 OP 250 PS 637: Performed by: NURSE ANESTHETIST, CERTIFIED REGISTERED

## 2023-07-26 DEVICE — SCREW PERIPHERAL 26MM: Type: IMPLANTABLE DEVICE | Site: SHOULDER | Status: FUNCTIONAL

## 2023-07-26 DEVICE — IMPLANTABLE DEVICE
Type: IMPLANTABLE DEVICE | Site: SHOULDER | Status: FUNCTIONAL
Brand: TORNIER PERFORM® REVERSED GLENOID

## 2023-07-26 DEVICE — IMPLANTABLE DEVICE
Type: IMPLANTABLE DEVICE | Site: SHOULDER | Status: FUNCTIONAL
Brand: TORNIER FLEX SHOULDER SYSTEM

## 2023-07-26 DEVICE — IMPLANTABLE DEVICE
Type: IMPLANTABLE DEVICE | Site: SHOULDER | Status: FUNCTIONAL
Brand: TORNIER PERFORM® REVERSED AUGMENTED GLENOID

## 2023-07-26 DEVICE — SCREW CENTRAL 6.5X30MM DWJ130: Type: IMPLANTABLE DEVICE | Site: SHOULDER | Status: FUNCTIONAL

## 2023-07-26 DEVICE — SCREW PERIPHERAL 5.0X34MM DWJ334: Type: IMPLANTABLE DEVICE | Site: SHOULDER | Status: FUNCTIONAL

## 2023-07-26 RX ORDER — POLYETHYLENE GLYCOL 3350 17 G/17G
17 POWDER, FOR SOLUTION ORAL DAILY
Status: DISCONTINUED | OUTPATIENT
Start: 2023-07-27 | End: 2023-07-27 | Stop reason: HOSPADM

## 2023-07-26 RX ORDER — OXYCODONE HYDROCHLORIDE 5 MG/1
10 TABLET ORAL EVERY 4 HOURS PRN
Status: DISCONTINUED | OUTPATIENT
Start: 2023-07-26 | End: 2023-07-27 | Stop reason: HOSPADM

## 2023-07-26 RX ORDER — LIDOCAINE 40 MG/G
CREAM TOPICAL
Status: DISCONTINUED | OUTPATIENT
Start: 2023-07-26 | End: 2023-07-26 | Stop reason: HOSPADM

## 2023-07-26 RX ORDER — MEPERIDINE HYDROCHLORIDE 25 MG/ML
12.5 INJECTION INTRAMUSCULAR; INTRAVENOUS; SUBCUTANEOUS EVERY 5 MIN PRN
Status: DISCONTINUED | OUTPATIENT
Start: 2023-07-26 | End: 2023-07-26 | Stop reason: HOSPADM

## 2023-07-26 RX ORDER — NALOXONE HYDROCHLORIDE 0.4 MG/ML
0.4 INJECTION, SOLUTION INTRAMUSCULAR; INTRAVENOUS; SUBCUTANEOUS
Status: DISCONTINUED | OUTPATIENT
Start: 2023-07-26 | End: 2023-07-27 | Stop reason: HOSPADM

## 2023-07-26 RX ORDER — CEFAZOLIN SODIUM/WATER 3 G/30 ML
3 SYRINGE (ML) INTRAVENOUS
Status: COMPLETED | OUTPATIENT
Start: 2023-07-26 | End: 2023-07-26

## 2023-07-26 RX ORDER — ONDANSETRON 4 MG/1
4 TABLET, ORALLY DISINTEGRATING ORAL EVERY 30 MIN PRN
Status: DISCONTINUED | OUTPATIENT
Start: 2023-07-26 | End: 2023-07-26 | Stop reason: HOSPADM

## 2023-07-26 RX ORDER — ACETAMINOPHEN 325 MG/1
975 TABLET ORAL ONCE
Status: COMPLETED | OUTPATIENT
Start: 2023-07-26 | End: 2023-07-26

## 2023-07-26 RX ORDER — HYDROXYZINE HYDROCHLORIDE 25 MG/1
25 TABLET, FILM COATED ORAL EVERY 6 HOURS PRN
Status: DISCONTINUED | OUTPATIENT
Start: 2023-07-26 | End: 2023-07-26 | Stop reason: HOSPADM

## 2023-07-26 RX ORDER — HYDROXYZINE HYDROCHLORIDE 25 MG/1
25 TABLET, FILM COATED ORAL EVERY 6 HOURS PRN
Qty: 30 TABLET | Refills: 0 | Status: SHIPPED | OUTPATIENT
Start: 2023-07-26 | End: 2024-03-06

## 2023-07-26 RX ORDER — SODIUM CHLORIDE, SODIUM LACTATE, POTASSIUM CHLORIDE, CALCIUM CHLORIDE 600; 310; 30; 20 MG/100ML; MG/100ML; MG/100ML; MG/100ML
INJECTION, SOLUTION INTRAVENOUS CONTINUOUS
Status: DISCONTINUED | OUTPATIENT
Start: 2023-07-26 | End: 2023-07-26 | Stop reason: HOSPADM

## 2023-07-26 RX ORDER — BUPIVACAINE HYDROCHLORIDE AND EPINEPHRINE 5; 5 MG/ML; UG/ML
INJECTION, SOLUTION PERINEURAL
Status: COMPLETED | OUTPATIENT
Start: 2023-07-26 | End: 2023-07-26

## 2023-07-26 RX ORDER — DEXTROSE MONOHYDRATE 25 G/50ML
25-50 INJECTION, SOLUTION INTRAVENOUS
Status: DISCONTINUED | OUTPATIENT
Start: 2023-07-26 | End: 2023-07-27 | Stop reason: HOSPADM

## 2023-07-26 RX ORDER — NALOXONE HYDROCHLORIDE 0.4 MG/ML
0.2 INJECTION, SOLUTION INTRAMUSCULAR; INTRAVENOUS; SUBCUTANEOUS
Status: DISCONTINUED | OUTPATIENT
Start: 2023-07-26 | End: 2023-07-27 | Stop reason: HOSPADM

## 2023-07-26 RX ORDER — HYDRALAZINE HYDROCHLORIDE 20 MG/ML
2.5-5 INJECTION INTRAMUSCULAR; INTRAVENOUS EVERY 10 MIN PRN
Status: DISCONTINUED | OUTPATIENT
Start: 2023-07-26 | End: 2023-07-26 | Stop reason: HOSPADM

## 2023-07-26 RX ORDER — ACETAMINOPHEN 500 MG
2 TABLET ORAL EVERY 6 HOURS PRN
Status: ON HOLD | COMMUNITY
End: 2023-07-27

## 2023-07-26 RX ORDER — LOSARTAN POTASSIUM 50 MG/1
100 TABLET ORAL DAILY
Status: DISCONTINUED | OUTPATIENT
Start: 2023-07-27 | End: 2023-07-27 | Stop reason: HOSPADM

## 2023-07-26 RX ORDER — HYDROMORPHONE HCL IN WATER/PF 6 MG/30 ML
0.4 PATIENT CONTROLLED ANALGESIA SYRINGE INTRAVENOUS
Status: DISCONTINUED | OUTPATIENT
Start: 2023-07-26 | End: 2023-07-27 | Stop reason: HOSPADM

## 2023-07-26 RX ORDER — METOPROLOL SUCCINATE 50 MG/1
50 TABLET, EXTENDED RELEASE ORAL EVERY EVENING
Status: DISCONTINUED | OUTPATIENT
Start: 2023-07-26 | End: 2023-07-27 | Stop reason: HOSPADM

## 2023-07-26 RX ORDER — AMIODARONE HYDROCHLORIDE 200 MG/1
200 TABLET ORAL AT BEDTIME
COMMUNITY
End: 2024-03-05

## 2023-07-26 RX ORDER — HYDROXYZINE HYDROCHLORIDE 25 MG/1
25 TABLET, FILM COATED ORAL EVERY 6 HOURS PRN
Status: DISCONTINUED | OUTPATIENT
Start: 2023-07-26 | End: 2023-07-27 | Stop reason: HOSPADM

## 2023-07-26 RX ORDER — ALBUTEROL SULFATE 0.83 MG/ML
2.5 SOLUTION RESPIRATORY (INHALATION) EVERY 4 HOURS PRN
Status: DISCONTINUED | OUTPATIENT
Start: 2023-07-26 | End: 2023-07-26 | Stop reason: HOSPADM

## 2023-07-26 RX ORDER — HYDROCHLOROTHIAZIDE 25 MG/1
25 TABLET ORAL DAILY
Status: DISCONTINUED | OUTPATIENT
Start: 2023-07-27 | End: 2023-07-27 | Stop reason: HOSPADM

## 2023-07-26 RX ORDER — EPHEDRINE SULFATE 50 MG/ML
INJECTION, SOLUTION INTRAMUSCULAR; INTRAVENOUS; SUBCUTANEOUS PRN
Status: DISCONTINUED | OUTPATIENT
Start: 2023-07-26 | End: 2023-07-26

## 2023-07-26 RX ORDER — GLYCOPYRROLATE 0.2 MG/ML
INJECTION, SOLUTION INTRAMUSCULAR; INTRAVENOUS PRN
Status: DISCONTINUED | OUTPATIENT
Start: 2023-07-26 | End: 2023-07-26

## 2023-07-26 RX ORDER — PROCHLORPERAZINE MALEATE 5 MG
5 TABLET ORAL EVERY 6 HOURS PRN
Status: DISCONTINUED | OUTPATIENT
Start: 2023-07-26 | End: 2023-07-27 | Stop reason: HOSPADM

## 2023-07-26 RX ORDER — KETOROLAC TROMETHAMINE 15 MG/ML
15 INJECTION, SOLUTION INTRAMUSCULAR; INTRAVENOUS EVERY 6 HOURS
Status: DISCONTINUED | OUTPATIENT
Start: 2023-07-26 | End: 2023-07-27

## 2023-07-26 RX ORDER — OXYCODONE HYDROCHLORIDE 5 MG/1
5 TABLET ORAL EVERY 4 HOURS PRN
Status: DISCONTINUED | OUTPATIENT
Start: 2023-07-26 | End: 2023-07-27 | Stop reason: HOSPADM

## 2023-07-26 RX ORDER — BISACODYL 10 MG
10 SUPPOSITORY, RECTAL RECTAL DAILY PRN
Status: DISCONTINUED | OUTPATIENT
Start: 2023-07-26 | End: 2023-07-27 | Stop reason: HOSPADM

## 2023-07-26 RX ORDER — NICOTINE POLACRILEX 4 MG
15-30 LOZENGE BUCCAL
Status: DISCONTINUED | OUTPATIENT
Start: 2023-07-26 | End: 2023-07-27 | Stop reason: HOSPADM

## 2023-07-26 RX ORDER — ACETAMINOPHEN 325 MG/1
650 TABLET ORAL EVERY 4 HOURS PRN
Qty: 100 TABLET | Refills: 0
Start: 2023-07-26 | End: 2024-03-06

## 2023-07-26 RX ORDER — OXYCODONE HYDROCHLORIDE 5 MG/1
5-10 TABLET ORAL EVERY 4 HOURS PRN
Qty: 30 TABLET | Refills: 0 | Status: SHIPPED | OUTPATIENT
Start: 2023-07-26 | End: 2023-07-27

## 2023-07-26 RX ORDER — CEFAZOLIN SODIUM 2 G/100ML
2 INJECTION, SOLUTION INTRAVENOUS EVERY 8 HOURS
Status: COMPLETED | OUTPATIENT
Start: 2023-07-26 | End: 2023-07-27

## 2023-07-26 RX ORDER — AMIODARONE HYDROCHLORIDE 200 MG/1
200 TABLET ORAL AT BEDTIME
Status: DISCONTINUED | OUTPATIENT
Start: 2023-07-26 | End: 2023-07-27 | Stop reason: HOSPADM

## 2023-07-26 RX ORDER — ATORVASTATIN CALCIUM 10 MG/1
10 TABLET, FILM COATED ORAL AT BEDTIME
Status: DISCONTINUED | OUTPATIENT
Start: 2023-07-26 | End: 2023-07-27 | Stop reason: HOSPADM

## 2023-07-26 RX ORDER — FENTANYL CITRATE 50 UG/ML
25 INJECTION, SOLUTION INTRAMUSCULAR; INTRAVENOUS EVERY 5 MIN PRN
Status: DISCONTINUED | OUTPATIENT
Start: 2023-07-26 | End: 2023-07-26 | Stop reason: HOSPADM

## 2023-07-26 RX ORDER — FENTANYL CITRATE 50 UG/ML
INJECTION, SOLUTION INTRAMUSCULAR; INTRAVENOUS PRN
Status: DISCONTINUED | OUTPATIENT
Start: 2023-07-26 | End: 2023-07-26

## 2023-07-26 RX ORDER — ONDANSETRON 2 MG/ML
4 INJECTION INTRAMUSCULAR; INTRAVENOUS EVERY 30 MIN PRN
Status: DISCONTINUED | OUTPATIENT
Start: 2023-07-26 | End: 2023-07-26 | Stop reason: HOSPADM

## 2023-07-26 RX ORDER — SODIUM CHLORIDE, SODIUM LACTATE, POTASSIUM CHLORIDE, CALCIUM CHLORIDE 600; 310; 30; 20 MG/100ML; MG/100ML; MG/100ML; MG/100ML
INJECTION, SOLUTION INTRAVENOUS CONTINUOUS
Status: DISCONTINUED | OUTPATIENT
Start: 2023-07-26 | End: 2023-07-27 | Stop reason: HOSPADM

## 2023-07-26 RX ORDER — ACETAMINOPHEN 325 MG/1
975 TABLET ORAL EVERY 8 HOURS
Status: DISCONTINUED | OUTPATIENT
Start: 2023-07-26 | End: 2023-07-27 | Stop reason: HOSPADM

## 2023-07-26 RX ORDER — ONDANSETRON 2 MG/ML
4 INJECTION INTRAMUSCULAR; INTRAVENOUS EVERY 6 HOURS PRN
Status: DISCONTINUED | OUTPATIENT
Start: 2023-07-26 | End: 2023-07-27 | Stop reason: HOSPADM

## 2023-07-26 RX ORDER — TRANEXAMIC ACID 650 MG/1
1950 TABLET ORAL ONCE
Status: COMPLETED | OUTPATIENT
Start: 2023-07-26 | End: 2023-07-26

## 2023-07-26 RX ORDER — ACETAMINOPHEN 325 MG/1
650 TABLET ORAL EVERY 4 HOURS PRN
Status: DISCONTINUED | OUTPATIENT
Start: 2023-07-29 | End: 2023-07-27 | Stop reason: HOSPADM

## 2023-07-26 RX ORDER — LIDOCAINE 40 MG/G
CREAM TOPICAL
Status: DISCONTINUED | OUTPATIENT
Start: 2023-07-26 | End: 2023-07-27 | Stop reason: HOSPADM

## 2023-07-26 RX ORDER — FENTANYL CITRATE 50 UG/ML
50 INJECTION, SOLUTION INTRAMUSCULAR; INTRAVENOUS EVERY 5 MIN PRN
Status: DISCONTINUED | OUTPATIENT
Start: 2023-07-26 | End: 2023-07-26 | Stop reason: HOSPADM

## 2023-07-26 RX ORDER — HYDROMORPHONE HCL IN WATER/PF 6 MG/30 ML
0.2 PATIENT CONTROLLED ANALGESIA SYRINGE INTRAVENOUS EVERY 5 MIN PRN
Status: DISCONTINUED | OUTPATIENT
Start: 2023-07-26 | End: 2023-07-26 | Stop reason: HOSPADM

## 2023-07-26 RX ORDER — HYDROMORPHONE HCL IN WATER/PF 6 MG/30 ML
0.4 PATIENT CONTROLLED ANALGESIA SYRINGE INTRAVENOUS EVERY 5 MIN PRN
Status: DISCONTINUED | OUTPATIENT
Start: 2023-07-26 | End: 2023-07-26 | Stop reason: HOSPADM

## 2023-07-26 RX ORDER — OXYCODONE HYDROCHLORIDE 5 MG/1
5 TABLET ORAL EVERY 6 HOURS PRN
Status: ON HOLD | COMMUNITY
Start: 2023-07-05 | End: 2023-07-27

## 2023-07-26 RX ORDER — ACETAMINOPHEN 325 MG/1
975 TABLET ORAL ONCE
Status: DISCONTINUED | OUTPATIENT
Start: 2023-07-26 | End: 2023-07-26 | Stop reason: HOSPADM

## 2023-07-26 RX ORDER — CEFAZOLIN SODIUM/WATER 3 G/30 ML
3 SYRINGE (ML) INTRAVENOUS SEE ADMIN INSTRUCTIONS
Status: DISCONTINUED | OUTPATIENT
Start: 2023-07-26 | End: 2023-07-26 | Stop reason: HOSPADM

## 2023-07-26 RX ORDER — IPRATROPIUM BROMIDE AND ALBUTEROL SULFATE 2.5; .5 MG/3ML; MG/3ML
3 SOLUTION RESPIRATORY (INHALATION) EVERY 4 HOURS PRN
Status: DISCONTINUED | OUTPATIENT
Start: 2023-07-26 | End: 2023-07-27 | Stop reason: HOSPADM

## 2023-07-26 RX ORDER — HYDROMORPHONE HCL IN WATER/PF 6 MG/30 ML
0.2 PATIENT CONTROLLED ANALGESIA SYRINGE INTRAVENOUS
Status: DISCONTINUED | OUTPATIENT
Start: 2023-07-26 | End: 2023-07-27 | Stop reason: HOSPADM

## 2023-07-26 RX ORDER — MAGNESIUM SULFATE HEPTAHYDRATE 40 MG/ML
2 INJECTION, SOLUTION INTRAVENOUS ONCE
Status: DISCONTINUED | OUTPATIENT
Start: 2023-07-26 | End: 2023-07-26 | Stop reason: HOSPADM

## 2023-07-26 RX ORDER — ONDANSETRON 4 MG/1
4 TABLET, ORALLY DISINTEGRATING ORAL EVERY 6 HOURS PRN
Status: DISCONTINUED | OUTPATIENT
Start: 2023-07-26 | End: 2023-07-27 | Stop reason: HOSPADM

## 2023-07-26 RX ORDER — AMOXICILLIN 250 MG
1 CAPSULE ORAL 2 TIMES DAILY
Status: DISCONTINUED | OUTPATIENT
Start: 2023-07-26 | End: 2023-07-27 | Stop reason: HOSPADM

## 2023-07-26 RX ORDER — PROPOFOL 10 MG/ML
INJECTION, EMULSION INTRAVENOUS PRN
Status: DISCONTINUED | OUTPATIENT
Start: 2023-07-26 | End: 2023-07-26

## 2023-07-26 RX ORDER — AMOXICILLIN 250 MG
1-2 CAPSULE ORAL 2 TIMES DAILY PRN
Qty: 15 TABLET | Refills: 0 | Status: SHIPPED | OUTPATIENT
Start: 2023-07-26 | End: 2023-11-01

## 2023-07-26 RX ADMIN — PHENYLEPHRINE HYDROCHLORIDE 100 MCG: 10 INJECTION INTRAVENOUS at 13:25

## 2023-07-26 RX ADMIN — Medication 100 MG: at 12:56

## 2023-07-26 RX ADMIN — LIDOCAINE HYDROCHLORIDE 0.2 ML: 10 INJECTION, SOLUTION EPIDURAL; INFILTRATION; INTRACAUDAL; PERINEURAL at 11:33

## 2023-07-26 RX ADMIN — ACETAMINOPHEN 975 MG: 325 TABLET, FILM COATED ORAL at 10:56

## 2023-07-26 RX ADMIN — Medication 3 G: at 12:52

## 2023-07-26 RX ADMIN — CEFAZOLIN SODIUM 2 G: 2 INJECTION, SOLUTION INTRAVENOUS at 21:44

## 2023-07-26 RX ADMIN — FENTANYL CITRATE 100 MCG: 50 INJECTION, SOLUTION INTRAMUSCULAR; INTRAVENOUS at 12:56

## 2023-07-26 RX ADMIN — METOPROLOL SUCCINATE 50 MG: 50 TABLET, EXTENDED RELEASE ORAL at 18:26

## 2023-07-26 RX ADMIN — Medication 10 MG: at 13:15

## 2023-07-26 RX ADMIN — SENNOSIDES AND DOCUSATE SODIUM 1 TABLET: 50; 8.6 TABLET ORAL at 21:24

## 2023-07-26 RX ADMIN — MIDAZOLAM 2 MG: 1 INJECTION INTRAMUSCULAR; INTRAVENOUS at 12:52

## 2023-07-26 RX ADMIN — SODIUM CHLORIDE, POTASSIUM CHLORIDE, SODIUM LACTATE AND CALCIUM CHLORIDE: 600; 310; 30; 20 INJECTION, SOLUTION INTRAVENOUS at 16:45

## 2023-07-26 RX ADMIN — ATORVASTATIN CALCIUM 10 MG: 10 TABLET, FILM COATED ORAL at 21:24

## 2023-07-26 RX ADMIN — BUPIVACAINE HYDROCHLORIDE AND EPINEPHRINE BITARTRATE 15 ML: 5; .005 INJECTION, SOLUTION PERINEURAL at 11:57

## 2023-07-26 RX ADMIN — ROCURONIUM BROMIDE 10 MG: 50 INJECTION, SOLUTION INTRAVENOUS at 13:53

## 2023-07-26 RX ADMIN — ROCURONIUM BROMIDE 50 MG: 50 INJECTION, SOLUTION INTRAVENOUS at 12:58

## 2023-07-26 RX ADMIN — MIDAZOLAM 2 MG: 1 INJECTION INTRAMUSCULAR; INTRAVENOUS at 11:50

## 2023-07-26 RX ADMIN — SODIUM CHLORIDE, POTASSIUM CHLORIDE, SODIUM LACTATE AND CALCIUM CHLORIDE: 600; 310; 30; 20 INJECTION, SOLUTION INTRAVENOUS at 11:33

## 2023-07-26 RX ADMIN — SODIUM CHLORIDE, POTASSIUM CHLORIDE, SODIUM LACTATE AND CALCIUM CHLORIDE: 600; 310; 30; 20 INJECTION, SOLUTION INTRAVENOUS at 13:42

## 2023-07-26 RX ADMIN — AMIODARONE HYDROCHLORIDE 200 MG: 200 TABLET ORAL at 21:24

## 2023-07-26 RX ADMIN — Medication 10 MG: at 13:28

## 2023-07-26 RX ADMIN — BUPIVACAINE 10 ML: 13.3 INJECTION, SUSPENSION, LIPOSOMAL INFILTRATION at 11:59

## 2023-07-26 RX ADMIN — KETOROLAC TROMETHAMINE 15 MG: 15 INJECTION, SOLUTION INTRAMUSCULAR; INTRAVENOUS at 23:12

## 2023-07-26 RX ADMIN — Medication 5 MG: at 13:10

## 2023-07-26 RX ADMIN — GLYCOPYRROLATE 0.2 MG: 0.2 INJECTION, SOLUTION INTRAMUSCULAR; INTRAVENOUS at 13:11

## 2023-07-26 RX ADMIN — KETOROLAC TROMETHAMINE 15 MG: 15 INJECTION, SOLUTION INTRAMUSCULAR; INTRAVENOUS at 17:37

## 2023-07-26 RX ADMIN — TRANEXAMIC ACID 1950 MG: 650 TABLET ORAL at 10:50

## 2023-07-26 RX ADMIN — ACETAMINOPHEN 975 MG: 325 TABLET, FILM COATED ORAL at 18:16

## 2023-07-26 RX ADMIN — PROPOFOL 200 MG: 10 INJECTION, EMULSION INTRAVENOUS at 12:56

## 2023-07-26 ASSESSMENT — ACTIVITIES OF DAILY LIVING (ADL)
ADLS_ACUITY_SCORE: 20

## 2023-07-26 NOTE — ANESTHESIA CARE TRANSFER NOTE
Patient: Romy Hurley    Procedure: Procedure(s):  Reverse Total Shoulder Arthroplasty       Diagnosis: Arthritis of shoulder region, left [M19.012]  Diagnosis Additional Information: No value filed.    Anesthesia Type:   General     Note:    Oropharynx: oropharynx clear of all foreign objects  Level of Consciousness: awake      Independent Airway: airway patency satisfactory and stable  Dentition: dentition unchanged  Vital Signs Stable: post-procedure vital signs reviewed and stable  Report to RN Given: handoff report given  Patient transferred to: PACU    Handoff Report: Identifed the Patient, Identified the Reponsible Provider, Reviewed the pertinent medical history, Discussed the surgical course, Reviewed Intra-OP anesthesia mangement and issues during anesthesia, Set expectations for post-procedure period and Allowed opportunity for questions and acknowledgement of understanding      Vitals:  Vitals Value Taken Time   BP     Temp     Pulse 54 07/26/23 1453   Resp 15 07/26/23 1453   SpO2 96 % 07/26/23 1453   Vitals shown include unvalidated device data.    Electronically Signed By: CRISTIAN Quach CRNA  July 26, 2023  2:54 PM

## 2023-07-26 NOTE — PROCEDURES
July 26, 2023    PREOPERATIVE DIAGNOSIS:  Left shoulder cuff tear arthropathy       POSTOPERATIVE DIAGNOSIS:  Left shoulder cuff tear arthropathy       PROCEDURES:  Reverse left total shoulder replacement.       SURGEON:  Saurabh Villagran MD       ASSISTANT:  Ольга Arriaga PA-C.  The presence of a PA was necessary for position, retraction, and safe progression of the case.       ANESTHESIA:  Interscalene block with general.       ESTIMATED BLOOD LOSS:  300 mL.       COMPLICATIONS:  None apparent.       DISPOSITION:  Stable to PACU.       IMPLANTS USED:      Tornier Aequalis reverse total shoulder arthroplasty system:                         3B humeral stem, +0 low eccentricity humeral tray with a 36mm x +6mm  polyethylene bearing                         25mm +3mm lateralized baseplate with a 6.5mm central screw and two 5.0mm locking screws peripherally, 36mm concentric glenosphere.       INDICATIONS: Pham is a 78 year old female with a long history of progressive left shoulder pain.  Work up revealed cuff tear arthropathy with an irreparable cuff tear and glenohumeral arthritis.  Due to ongoing pain and dysfunction, she decided to proceed with a reverse total shoulder arthroplasty in order to improve pain and function.  She understood the risks of infection, damage to vessels and nerves, ongoing pain, loosening, acromial fracture, instability, need for further surgery.       DESCRIPTION OF PROCEDURE:  Romy was met in the preoperative holding area; the left shoulder was marked.  Consent was reviewed.  She had an interscalene block placed by Anesthesia which was tolerated well.  Following this, she was transferred to the operating theater.  After smooth induction of general anesthesia, she was positioned in a beach chair position with all bony prominences well padded.  A timeout was performed verifying the correct patient, surgery, location.  She received preoperative antibiotics.  The left upper extremity was  then prepped and draped in standard sterile fashion.       We started making an incision in line with a standard deltopectoral approach to the shoulder.  Dissection was sharply carried down through the skin and subcutaneous tissue.  The cephalic vein was identified and retracted medially.  We then used a Arley to free up the subdeltoid space.  We incised the clavicpectoral fascia and placed a Kolbel retractor.  We identified the bicipital groove and released this all the way up through the rotator interval to the glenoid.  The subscapularis was not intact.  The capsule was released off inferior humeral head with progressive external rotation, and we were able to dislocate the shoulder.  There was diffuse grade III-IV chondral changes on the humeral head with a large tear of the supra and infraspinatus cuff tear.      We then drilled a hole on top of the humerus to gain access to the canal.  We use an intramedullary guide to make a humeral neck cut in 135 deg of inclination and 30 of retroversion, with the superior portion of the cut exiting laterally near the mid portion of the greater tuberosity.  We then sounds and broached to a size 3B where we had good fit.  We then placed a metal protective cap on top of the broach and placed retractors to expose the glenoid.  We put a guide pin in the glenoid, cheating slightly inferiorly to minimize risk of scapular notching.  We then used the centralized glenoid reamer over the guide pin and reamed down to a bed of healthy bone for our baseplate.  Following this, we drilled our central boss for the baseplate.  We inserted a standard 25mm x +3mm lateralized baseplate and secured it with a 30mm x 6.5mm screw.   We then placed 5.0mm locking screws in the superior and inferior holes. Following this, we placed a standard glenosphere was placed, utilizing the set screw to engage the muro taper.  This was then impacted in place.  We then turned our attention back to the  humerus.  We placed a low eccentricity trial humeral tray, maximizing coverage over the osteotomized head, with a 36 mm standard bearing and then reduced the shoulder. Motion was external rotation to 40, with the arm abducted 90 degrees internal rotation to about 50, and forward flexion to about 145.  We then placed our final humeral stem with a very good press-fit.  We placed our final humeral tray with the 36 mm standard bearing.  The shoulder was again reduced.  Subscapularis was not repaired as we felt it was irreparable given the chronic tearing.  The wound was then soaked in dilute betadine and thoroughly irrigated.  We loosely closed the deltopectoral interval, marking it with a FiberWire suture and then running a Vicryl.  Subcutaneous layer was closed with 2-0 Vicryl and skin with staples.  A sterile compressive dressing followed by a sling was applied and she was awoken from anesthesia and transferred to the PACU in stable condition.       POSTOPERATIVE PLAN:   1.  Admit to hospital for pain control, PT, and IV antibiotics.   2.  Discharge home in 1 to 2 days when able.   3.  Sling for the next 2 weeks, but may do pendulums as able.  Start PT with reverse TSA protocol at 2 weeks postop          ENOC WHEELER MD

## 2023-07-26 NOTE — ANESTHESIA PROCEDURE NOTES
Airway       Patient location during procedure: OR       Procedure Start/Stop Times: 7/26/2023 12:57 PM and 7/26/2023 12:58 PM  Staff -        CRNA: Raghu Middleton APRN CRNA       Performed By: CRNA  Consent for Airway        Urgency: elective  Indications and Patient Condition       Indications for airway management: devonte-procedural       Induction type:intravenous       Mask difficulty assessment: 1 - vent by mask    Final Airway Details       Final airway type: endotracheal airway       Successful airway: ETT - single  Endotracheal Airway Details        ETT size (mm): 7.0       Cuffed: yes       Successful intubation technique: video laryngoscopy       VL Blade Size: Richard 3       Grade View of Cords: 1       Adjucts: stylet       Position: Center       Measured from: gums/teeth       Secured at (cm): 21       Bite block used: None    Post intubation assessment        Placement verified by: capnometry, equal breath sounds and chest rise        Number of attempts at approach: 1       Secured with: plastic tape       Ease of procedure: easy       Dentition: Intact    Medication(s) Administered   Medication Administration Time: 7/26/2023 12:57 PM

## 2023-07-26 NOTE — ANESTHESIA POSTPROCEDURE EVALUATION
Patient: Romy Hurley    Procedure: Procedure(s):  Reverse Total Shoulder Arthroplasty       Anesthesia Type:  General    Note:  Disposition: Inpatient   Postop Pain Control: Uneventful            Sign Out: Well controlled pain   PONV: No   Neuro/Psych: Uneventful            Sign Out: Acceptable/Baseline neuro status   Airway/Respiratory: Uneventful            Sign Out: Acceptable/Baseline resp. status   CV/Hemodynamics: Uneventful            Sign Out: Acceptable CV status; No obvious hypovolemia; No obvious fluid overload   Other NRE:    DID A NON-ROUTINE EVENT OCCUR?            Last vitals:  Vitals Value Taken Time   BP     Temp     Pulse 54 07/26/23 1454   Resp 25 07/26/23 1454   SpO2 95 % 07/26/23 1454   Vitals shown include unvalidated device data.    Electronically Signed By: CRISTIAN Quach CRNA  July 26, 2023  2:55 PM

## 2023-07-26 NOTE — MEDICATION SCRIBE - ADMISSION MEDICATION HISTORY
Medication Scribe Admission Medication History    Admission medication history is complete. The information provided in this note is only as accurate as the sources available at the time of the update.    Medication reconciliation/reorder completed by provider prior to medication history? No    Information Source(s): Patient via in-person    Pertinent Information: eliquis, hydrochlorothiazide, losartan on hold or surgery    Changes made to PTA medication list:  Added: tylenol, oxycodone 5  Deleted: None  Changed: pt takes metformin 1/2 tab BID instead of 1 tab at dinner    Medication Affordability:  Not including over the counter (OTC) medications, was there a time in the past 3 months when you did not take your medications as prescribed because of cost?: No    Allergies reviewed with patient and updates made in EHR: yes    Medication History Completed By: Nola Hays 7/26/2023 11:16 AM    Prior to Admission medications    Medication Sig Last Dose Taking? Auth Provider Long Term End Date   acetaminophen (TYLENOL) 500 MG tablet Take 2 tablets by mouth every 6 hours as needed for mild pain 7/25/2023 at hs Yes Reported, Patient     amiodarone (PACERONE) 200 MG tablet Take 200 mg by mouth At Bedtime 7/25/2023 at hs Yes Reported, Patient Yes    atorvastatin (LIPITOR) 10 MG tablet Take 1 tablet (10 mg) by mouth daily 7/25/2023 at hs Yes Lida Beach APRN CNP Yes    ELIQUIS ANTICOAGULANT 5 MG tablet TAKE 1 TABLET(5 MG) BY MOUTH EVERY 12 HOURS 7/22/2023 at pm Yes Lida Beach APRN CNP     fluticasone-salmeterol (ADVAIR) 250-50 MCG/ACT inhaler Inhale 1 puff into the lungs every 12 hours More than a month at unknown Yes Adeola Stockton MD Yes    hydrochlorothiazide (HYDRODIURIL) 25 MG tablet Take 1 tablet (25 mg) by mouth daily 7/22/2023 at am Yes Adeola Stockton MD Yes    losartan (COZAAR) 100 MG tablet Take 1 tablet (100 mg) by mouth daily 7/22/2023 at am Yes Adeola Stockton MD Yes    metFORMIN  (GLUCOPHAGE) 500 MG tablet TAKE 1 TABLET BY MOUTH DAILY WITH DINNER 7/26/2023 at am Yes Adeola Stockton MD Yes    methocarbamol (ROBAXIN) 750 MG tablet Take 1 tablet (750 mg) by mouth 3 times daily as needed for muscle spasms Past Week at unknown Yes Niurka Esquivel MD     metoprolol succinate ER (TOPROL XL) 50 MG 24 hr tablet Take 1 tablet (50 mg) by mouth every evening 7/25/2023 at pm Yes Lida Beach APRN CNP Yes    oxyCODONE (ROXICODONE) 5 MG tablet Take 5 mg by mouth every 6 hours as needed for pain 7/23/2023 at unknown Yes Reported, Patient     PARoxetine (PAXIL) 10 MG tablet Take 1 tablet (10 mg) by mouth At Bedtime 7/25/2023 at hs Yes Adeola Stockton MD Yes    traZODone (DESYREL) 50 MG tablet TAKE 1 TABLET AT BEDTIME 7/25/2023 at hs Yes Adeola Stockton MD Yes    XOPENEX HFA 45 MCG/ACT inhaler INHALE 2 PUFFS INTO THE LUNGS EVERY 8 HOURS AS NEEDED FOR SHORTNESS OF BREATH OR DIFFICULT BREATHING Strength: 45 MCG/ACT Past Week at unknown Yes Adeola Stockton MD Yes    blood glucose (ONETOUCH VERIO IQ) test strip Use to test blood sugars 1 times daily or as directed.   Adeola Stockton MD     blood glucose monitoring (ONE TOUCH DELICA) lancets Use to test blood sugars 1 times daily or as directed.   Adeola Stockton MD

## 2023-07-26 NOTE — INTERVAL H&P NOTE
"I have reviewed the surgical (or preoperative) H&P that is linked to this encounter, and examined the patient. There are no significant changes    Clinical Conditions Present on Arrival:  Clinically Significant Risk Factors Present on Admission                # Drug Induced Coagulation Defect: home medication list includes an anticoagulant medication   # Severe Obesity: Estimated body mass index is 44.74 kg/m  as calculated from the following:    Height as of 7/12/23: 1.727 m (5' 8\").    Weight as of 7/12/23: 133.5 kg (294 lb 4 oz).       "

## 2023-07-26 NOTE — PROGRESS NOTES
"SPIRITUAL HEALTH SERVICES  [facility] - Surgery    Referral Source: Patient Request during Pre-Op Phone Call    - Pat stated that she was definitely \"ready for this surgery.\"  Her daughter was with her as they waited for surgery time.  Pham mentioned that her  had  here in our ER about a year ago.  I expressed my sympathies and she said \"it's been hard but it's a part of life.\"    - I offered prayer for her surgery, for Dr Villagran and his team and offered a follow up visit tomorrow, which she welcomed.    Plan:  will remain available for any ongoing support needs during LOS.      Frank Jones M.A., Psychiatric  Lead   [facility]  Office: [phone]    "

## 2023-07-26 NOTE — PROGRESS NOTES
[place] [facility] [place]    Patient admitted to room 2405 at approximately 1630 via cart from surgery. Patient was accompanied by transport tech.     Verbal SBAR report received from Lizzie RUDD prior to patient arrival.     Patient trasferred to bed via air desiree. Patient alert and oriented X 3. The patient is not having any pain.  . Admission vital signs: Blood pressure 136/66, pulse 56, temperature 97.4  F (36.3  C), temperature source Oral, resp. rate 16, height 1.72 m (5' 7.72\"), weight 133.4 kg (294 lb 1.5 oz), SpO2 93 %, not currently breastfeeding. Patient was oriented to plan of care, call light, bed controls, tv, telephone, bathroom, and visiting hours.     Risk Assessment    The following safety risks were identified during admission: fall. Yellow risk band applied: YES.     Skin Initial Assessment    This writer admitted this patient and completed a full skin assessment and Zurdo score in the Adult PCS flowsheet. Appropriate interventions initiated as needed.     Secondary skin check completed by Geri RUDD.         Education    Patient has a Zearing to Observation order: No  Observation education completed and documented: N/A      Av Santamaria RN      "

## 2023-07-27 ENCOUNTER — APPOINTMENT (OUTPATIENT)
Dept: OCCUPATIONAL THERAPY | Facility: CLINIC | Age: 78
End: 2023-07-27
Attending: ORTHOPAEDIC SURGERY
Payer: COMMERCIAL

## 2023-07-27 VITALS
TEMPERATURE: 97.8 F | RESPIRATION RATE: 16 BRPM | SYSTOLIC BLOOD PRESSURE: 115 MMHG | HEIGHT: 68 IN | OXYGEN SATURATION: 96 % | DIASTOLIC BLOOD PRESSURE: 50 MMHG | HEART RATE: 57 BPM | WEIGHT: 293 LBS | BODY MASS INDEX: 44.41 KG/M2

## 2023-07-27 LAB
ANION GAP SERPL CALCULATED.3IONS-SCNC: 10 MMOL/L (ref 7–15)
BUN SERPL-MCNC: 24 MG/DL (ref 8–23)
CALCIUM SERPL-MCNC: 8.6 MG/DL (ref 8.8–10.2)
CHLORIDE SERPL-SCNC: 100 MMOL/L (ref 98–107)
CREAT SERPL-MCNC: 1.37 MG/DL (ref 0.51–0.95)
DEPRECATED HCO3 PLAS-SCNC: 27 MMOL/L (ref 22–29)
GFR SERPL CREATININE-BSD FRML MDRD: 40 ML/MIN/1.73M2
GLUCOSE BLDC GLUCOMTR-MCNC: 109 MG/DL (ref 70–99)
GLUCOSE BLDC GLUCOMTR-MCNC: 110 MG/DL (ref 70–99)
GLUCOSE SERPL-MCNC: 118 MG/DL (ref 70–99)
GLUCOSE SERPL-MCNC: 118 MG/DL (ref 70–99)
HGB BLD-MCNC: 11.4 G/DL (ref 11.7–15.7)
HOLD SPECIMEN: NORMAL
POTASSIUM SERPL-SCNC: 4.4 MMOL/L (ref 3.4–5.3)
SODIUM SERPL-SCNC: 137 MMOL/L (ref 136–145)

## 2023-07-27 PROCEDURE — 250N000013 HC RX MED GY IP 250 OP 250 PS 637: Performed by: ORTHOPAEDIC SURGERY

## 2023-07-27 PROCEDURE — 250N000013 HC RX MED GY IP 250 OP 250 PS 637

## 2023-07-27 PROCEDURE — 97110 THERAPEUTIC EXERCISES: CPT | Mod: GO

## 2023-07-27 PROCEDURE — 82310 ASSAY OF CALCIUM: CPT

## 2023-07-27 PROCEDURE — 36415 COLL VENOUS BLD VENIPUNCTURE: CPT | Performed by: ORTHOPAEDIC SURGERY

## 2023-07-27 PROCEDURE — 99204 OFFICE O/P NEW MOD 45 MIN: CPT

## 2023-07-27 PROCEDURE — 250N000011 HC RX IP 250 OP 636: Mod: JZ | Performed by: ORTHOPAEDIC SURGERY

## 2023-07-27 PROCEDURE — 97165 OT EVAL LOW COMPLEX 30 MIN: CPT | Mod: GO

## 2023-07-27 PROCEDURE — 82962 GLUCOSE BLOOD TEST: CPT

## 2023-07-27 PROCEDURE — 97535 SELF CARE MNGMENT TRAINING: CPT | Mod: GO

## 2023-07-27 PROCEDURE — 85018 HEMOGLOBIN: CPT | Performed by: ORTHOPAEDIC SURGERY

## 2023-07-27 PROCEDURE — 82947 ASSAY GLUCOSE BLOOD QUANT: CPT | Performed by: ORTHOPAEDIC SURGERY

## 2023-07-27 RX ORDER — KETOROLAC TROMETHAMINE 15 MG/ML
15 INJECTION, SOLUTION INTRAMUSCULAR; INTRAVENOUS EVERY 6 HOURS
Status: CANCELLED | OUTPATIENT
Start: 2023-07-27 | End: 2023-07-27

## 2023-07-27 RX ORDER — OXYCODONE HYDROCHLORIDE 5 MG/1
5-10 TABLET ORAL EVERY 4 HOURS PRN
Qty: 30 TABLET | Refills: 0 | Status: SHIPPED | OUTPATIENT
Start: 2023-07-27 | End: 2024-03-06

## 2023-07-27 RX ADMIN — ACETAMINOPHEN 975 MG: 325 TABLET, FILM COATED ORAL at 11:47

## 2023-07-27 RX ADMIN — POLYETHYLENE GLYCOL 3350 17 G: 17 POWDER, FOR SOLUTION ORAL at 08:33

## 2023-07-27 RX ADMIN — LOSARTAN POTASSIUM 100 MG: 50 TABLET, FILM COATED ORAL at 08:32

## 2023-07-27 RX ADMIN — OXYCODONE HYDROCHLORIDE 5 MG: 5 TABLET ORAL at 03:32

## 2023-07-27 RX ADMIN — APIXABAN 5 MG: 5 TABLET, FILM COATED ORAL at 08:33

## 2023-07-27 RX ADMIN — HYDROCHLOROTHIAZIDE 25 MG: 25 TABLET ORAL at 08:33

## 2023-07-27 RX ADMIN — ACETAMINOPHEN 975 MG: 325 TABLET, FILM COATED ORAL at 03:32

## 2023-07-27 RX ADMIN — CEFAZOLIN SODIUM 2 G: 2 INJECTION, SOLUTION INTRAVENOUS at 05:45

## 2023-07-27 RX ADMIN — KETOROLAC TROMETHAMINE 15 MG: 15 INJECTION, SOLUTION INTRAMUSCULAR; INTRAVENOUS at 05:47

## 2023-07-27 RX ADMIN — OXYCODONE HYDROCHLORIDE 5 MG: 5 TABLET ORAL at 09:51

## 2023-07-27 RX ADMIN — SENNOSIDES AND DOCUSATE SODIUM 1 TABLET: 50; 8.6 TABLET ORAL at 08:33

## 2023-07-27 ASSESSMENT — ACTIVITIES OF DAILY LIVING (ADL)
ADLS_ACUITY_SCORE: 20
PREVIOUS_RESPONSIBILITIES: MEAL PREP;HOUSEKEEPING;LAUNDRY;SHOPPING;YARDWORK;MEDICATION MANAGEMENT;FINANCES;DRIVING
ADLS_ACUITY_SCORE: 20

## 2023-07-27 NOTE — PLAN OF CARE
Patient vital signs are at baseline: Yes  Patient able to ambulate as they were prior to admission or with assist devices provided by therapies during their stay:  Yes, ambulated in hallway with standby assist.  Patient MUST void prior to discharge:  Yes  Patient able to tolerate oral intake:  Yes  Pain has adequate pain control using Oral analgesics:  Yes, PRN oxycodone 5 mg given.

## 2023-07-27 NOTE — PROGRESS NOTES
[facility] Medicine Progress Note  Date of Service: 07/27/2023    Assessment & Plan   Romy Hurley is a 78 year old female who presented on 7/26/2023 for scheduled Procedure(s):  Left Reverse Total Shoulder Arthroplasty by Saurabh Villagran MD and is being followed by the hospital medicine service for co-management of acute and/or chronic perioperative medical problems.      S/p Procedure(s):  Left Reverse Total Shoulder Arthroplasty   1 Day Post-Op    Following plan per orthopedic surgery service;  -Pain control  -Wound cares  -Antibiotic prophylaxis  -Physical/occupational therapy  -DVT prophylaxis; resume PTA apixiban 5 mg BID    Diabetes Mellitus, Type II    Chronic.  Last a1C 6.1% on 07/2023. Managed PTA with metformin 500 mg. . Controlled.  - Continue PTA metformin.   - Medium SSI  - Hypo/hyperglycemia protocol with blood glucose monitoring    Hypertension    Chronic. Blood pressures reviewed, largely controlled. Managed PTA with hydrochlorothiazide 25 mg daily, losartan 100 mg daily, metoprolol succinate 50 mg daily.   - Continue PTA antihypertensives with hold parameters.    Chronic kidney disease    Post operatively, creatinine 1.37, GFR 40. Recent baseline creatinine 1.15. Slight decrease in kidney function, likely secondary to Toradol post operatively.  - Follow BMP  - Metformin held for GFR 40.   - Toradol held, avoid nephrotoxic agents as able  - Continue IV fluids    Paroxysmal atrial fibrillation    Chronic. Managed PTA with metoprolol succinate ER 50 mg daily, amiodarone 20 mg HS, and apixiban 5 mg BID.  - Continue PTA metoprolol, amiodarone, and apixiban.    Acute anemia    Anemia in the post operative setting. Post-op hemoglobin 11.6 from pre-op 12.6.  ml. No signs of acute bleed. Has remained hemodynamically stable.    Mild persistent asthma     Chronic. Managed PTA with flutiisone-salmeterol 250-50 MCG/ACT inhaler BID and xopenex HFA  45 MCG/ACT PRN.  - Hold PTA home inhalers  - Duoneb q4hrs PRN for shortness of breath    Hyperlipidemia  Chronic. Managed PTA with atorvastatin 10 mg daily.  - Continue PTA statin    DVT Prophylaxis: as per orthopedic surgery service - resume PTA apixiban 5 mg BID  Code Status: Full Code    Lines: PIV   Whitfield catheter: None    Discussion: Medically, the patient appears to be recovering well, stable, and does not have any barriers to discharge.     Disposition: Anticipate discharge 07/27/23    Attestation:  I have discussed, or will be discussing, the patient with hospitalist physician, Dr. Taveras.    LIVAN GRAY PA-C     Interval History   POD#1: Routine post-operative morning rounds. No overnight events. Appetite intact, tolerating PO without nausea or vomiting. Ambulating well, long distances, feeling steady on feet without lightheadedness or dizziness. Passing flatus without a bowel movement at this time. Urinating without difficulty. Denies fever, chills, chest pain, palpitations, shortness of breath, lightheadedness, nausea, vomiting, abdominal pain, and paresthesias.     Physical Exam   Temp:  [97.4  F (36.3  C)-97.8  F (36.6  C)] 97.8  F (36.6  C)  Pulse:  [52-61] 57  Resp:  [5-20] 16  BP: ()/(47-87) 115/50  SpO2:  [93 %-98 %] 96 %    Weights:   Vitals:    07/26/23 1045 07/26/23 1053   Weight: (P) 133.4 kg (294 lb) 133.4 kg (294 lb 1.5 oz)    Body mass index is 45.09 kg/m .    General:  Appears stated age, alert, oriented, sitting upright in chair, smiling, cooperative, pleasant, no acute distress, non-toxic appearing.  CV: Regular rate and rhythm. Normal S1 and S2. No S3, S4, or murmurs. Radial pulse regular and strong bilaterally. No lower extremity edema.   Respiratory: Lungs clear to auscultation bilaterally. No wheezes, rhonchi, or crackles. Normal respiratory effort on room air. Speaking in full sentences.  GI: Soft, flat, nondistended, nontender to palpation throughout. Normoactive bowel  "sounds. No HSM or abdominal masses appreciated.  Skin: Warm and dry.  Musculoskeletal: Surgical site exam deferred to orthopedic/surgery provider. Moving finger and hand appropriatle. Resolving numbness and tingling in left had, had minimal tingling to tip of left thumb. No calf pain, negative Tip's bilaterally. Aquacell covering left shoulder clean, dry, and intact.    Data   Recent Labs   Lab 07/27/23  0512 07/27/23  0322 07/26/23  2306   HGB 11.4*  --   --    * 110* 131*     Recent Labs   Lab 07/27/23  0512 07/27/23  0322 07/26/23  2306 07/26/23  1057   * 110* 131* 98      Unresulted Labs Ordered in the Past 30 Days of this Admission       No orders found for last 31 day(s).           Imaging  Recent Results (from the past 24 hour(s))   POC US Guidance Needle Placement    Impression    The visualized anatomic structures appeared normal.  There were no apparent abnormal pathologic findings.    XR Shoulder Left Port G/E 2 Views    Narrative    LEFT SHOULDER PORTABLE TWO OR MORE VIEWS 7/26/2023 3:49 PM     HISTORY: Status post surgery.    COMPARISON: 6/1/2023.       Impression    IMPRESSION:  Reverse glenohumeral joint replacement. Hardware is in  place without fracture.     AMBROSE WEBER MD         SYSTEM ID:  [id number]      I reviewed all new labs and imaging results over the last 24 hours. I personally reviewed no images or EKG's today.    Medications    bupivacaine liposome (EXPAREL) LA inj was given in the infiltration site to produce post-op analgesia. Duration of action is up to 72 hours. Other \"myles\" meds should not be given for 96 hours except for lidocaine 4% patch. This is for INFORMATION ONLY.      lactated ringers 75 mL/hr at 07/26/23 1645      acetaminophen  975 mg Oral Q8H    amiodarone  200 mg Oral At Bedtime    apixaban ANTICOAGULANT  5 mg Oral BID    atorvastatin  10 mg Oral At Bedtime    hydrochlorothiazide  25 mg Oral Daily    insulin aspart  1-7 Units Subcutaneous TID AC "    insulin aspart  1-5 Units Subcutaneous At Bedtime    ketorolac  15 mg Intravenous Q6H    losartan  100 mg Oral Daily    metFORMIN  250 mg Oral BID w/meals    metoprolol succinate ER  50 mg Oral QPM    polyethylene glycol  17 g Oral Daily    senna-docusate  1 tablet Oral BID    sodium chloride (PF)  3 mL Intracatheter Q8H     LIVAN GRAY PA-C

## 2023-07-27 NOTE — CONSULTS
Care Management Note:    Care Management team received referral from Ortho team to assist pt with discharge planning services post surgical services.    Per IDT rounds, EMR review, and/or discussion with PT/OT staff, it has been determined that pt will discharge to home and attend outpatient therapy services.    Care Management will close referral at this time.    IVORY Ramos  Care Transitions   Tele: [phone]

## 2023-07-27 NOTE — PROGRESS NOTES
"[facility] Orthopaedics Progress Note      Post-operative Day: 1 Day Post-Op    Procedure(s):  Left Reverse Total Shoulder Arthroplasty  Subjective:    Pt reports mild pain in the left shoulder; the block is wearing off in her hand and the numbness and tingling there has resolved. She hopes to go home later today.     Chest pain, SOB:  No  Nausea, vomiting:  No  Lightheadedness, dizziness:  No  Neuro:  Patient denies new onset numbness or paresthesias      Objective:  Blood pressure 115/50, pulse 57, temperature 97.8  F (36.6  C), temperature source Oral, resp. rate 16, height 1.72 m (5' 7.72\"), weight 133.4 kg (294 lb 1.5 oz), SpO2 96 %, not currently breastfeeding.    Patient Vitals for the past 24 hrs:   BP Temp Temp src Pulse Resp SpO2 Height Weight   07/27/23 0845 -- -- -- -- 16 -- -- --   07/27/23 0621 115/50 97.8  F (36.6  C) Oral 57 16 96 % -- --   07/27/23 0324 (!) 142/66 97.6  F (36.4  C) Oral 54 16 95 % -- --   07/26/23 2314 124/54 97.4  F (36.3  C) Oral 52 16 94 % -- --   07/26/23 2023 (!) 145/69 97.4  F (36.3  C) Oral 59 16 96 % -- --   07/26/23 1800 129/63 -- -- 53 -- -- -- --   07/26/23 1745 138/60 -- -- 59 -- -- -- --   07/26/23 1730 130/67 -- -- 61 -- -- -- --   07/26/23 1715 135/62 -- -- 57 -- -- -- --   07/26/23 1700 138/70 -- -- 60 -- -- -- --   07/26/23 1645 110/61 -- -- 56 -- -- -- --   07/26/23 1631 136/66 97.4  F (36.3  C) Oral 56 16 93 % -- --   07/26/23 1630 136/66 -- -- 56 -- -- -- --   07/26/23 1545 115/67 97.4  F (36.3  C) Axillary 55 10 96 % -- --   07/26/23 1540 114/81 -- -- 55 14 97 % -- --   07/26/23 1530 102/65 -- -- 53 (!) 9 97 % -- --   07/26/23 1524 118/66 -- -- 53 20 95 % -- --   07/26/23 1515 111/58 -- -- 53 (!) 5 93 % -- --   07/26/23 1510 103/64 -- -- 54 14 97 % -- --   07/26/23 1505 111/60 -- -- 53 (!) 7 98 % -- --   07/26/23 1500 (!) 78/55 -- -- 54 10 97 % -- --   07/26/23 1443 90/47 97.6  F (36.4  C) Axillary 53 16 96 % -- --   07/26/23 1215 121/58 -- -- 57 15 94 % -- -- " "  07/26/23 1200 111/66 -- -- 56 19 96 % -- --   07/26/23 1155 136/70 -- -- 54 17 97 % -- --   07/26/23 1150 124/63 -- -- 54 16 96 % -- --   07/26/23 1059 139/80 -- -- 59 16 96 % -- --   07/26/23 1053 (!) 178/87 -- -- 60 16 -- 1.72 m (5' 7.72\") 133.4 kg (294 lb 1.5 oz)   07/26/23 1045 -- -- -- -- -- -- (P) 1.727 m (5' 8\") (P) 133.4 kg (294 lb)       Wt Readings from Last 4 Encounters:   07/26/23 133.4 kg (294 lb 1.5 oz)   07/12/23 133.5 kg (294 lb 4 oz)   12/27/22 128.8 kg (284 lb)   10/13/22 127.6 kg (281 lb 4 oz)       Gen: A&O x 3. NAD. Appears tired. Up to the recliner.   Wound status: Covered, Aquacel dressing is c/d/I.   Circulation, motion and sensation: Hand and wrist ROM intact and equal bilaterally; distal upper extremity sensation is intact and equal bilaterally. Fingers are warm and well perfused.    Swelling: Mild    Pertinent Labs   Lab Results: personally reviewed.     Recent Labs   Lab Test 07/27/23  0512 07/12/23  1417 10/13/22  1553 02/20/21  0615 02/20/21  0614 02/01/21  1451   HGB 11.4* 12.6 13.0  --  11.0* 12.7   HCT  --  39.5  --   --  35.1 40.2   MCV  --  92  --   --  90 90   PLT  --  226  --   --  241 270    140 140   < >  --  135    < > = values in this interval not displayed.       Plan:   Continue current cares and rehabilitation.  Anticoagulation protocol: Resume pre op Eliquis    Pain medications:  oxycodone, tylenol, and vistaril  Weight bearing status:  NWB  Disposition:  Plan for discharge to home with outpatient therapy when medically stable and pain is controlled, cleared by therapy.  Likely later today.             Report completed by:  Grady Conway PA-C  Date: 7/27/2023  Time: 9:01 AM   "

## 2023-07-27 NOTE — PROGRESS NOTES
"Occupational Therapy     07/27/23 0948   Appointment Info   Signing Clinician's Name / Credentials (OT) Lacey Vidal, OTR/L   Quick Adds   Quick Adds Certification   Living Environment   People in Home alone   Current Living Arrangements house   Home Accessibility no concerns   Living Environment Comments Pt lives in one-level home.   Self-Care   Equipment Currently Used at Home grab bar, tub/shower;shower chair  (handheld shower head)   Fall history within last six months no   Activity/Exercise/Self-Care Comment Pt reports indep with ADLs, does not use walking device at baseline.   Instrumental Activities of Daily Living (IADL)   Previous Responsibilities meal prep;housekeeping;laundry;shopping;yardwork;medication management;finances;driving   General Information   Onset of Illness/Injury or Date of Surgery 07/26/23   Referring Physician Saurabh Villagran MD   Patient/Family Therapy Goal Statement (OT) To return home   Additional Occupational Profile Info/Pertinent History of Current Problem Romy A. \"Pat\" Xavi is a 78 year-old female who presents S/P reverse toital shoulder arthroplasty, LEFT shoulder.   Existing Precautions/Restrictions shoulder;weight bearing  (LUE: NWB, no lifting, int/ext rotation; sling to be worn at all times except bathing, UB dressing, skin checks, exercises)   Left Upper Extremity (Weight-bearing Status) non weight-bearing (NWB)   General Observations and Info Pt R hand dominant.   Cognitive Status Examination   Orientation Status orientation to person, place and time   Cognitive Status Comments Pt A&O x4, able to follow 2-step directions, conversation and communicate needs.   Pain Assessment   Patient Currently in Pain Yes, see Vital Sign flowsheet  (Pt reports LUE shoulder pain as 4-5/10 at rest.)   Range of Motion Comprehensive   General Range of Motion other (see comments)   Comment, General Range of Motion LUE not tested d/t shoulder precautions; RUE WFL.   Strength " Comprehensive (MMT)   General Manual Muscle Testing (MMT) Assessment other (see comments)   Comment, General Manual Muscle Testing (MMT) Assessment LUE not tested d/t shoulder precautions; RUE WFL.   Transfers   Transfers sit-stand transfer   Sit-Stand Transfer   Sit-Stand [place] (Transfers) supervision;independent   Sit/Stand Transfer Comments Pt completes sit>stand from chair SBA, sturdy upon standing, denies dizziness. Pt ambulates in room ~30ft SBA, sturdy with no LOB.   Toileting   [place] Level (Toileting) supervision;independent   Position (Toileting) unsupported sitting   Comment, (Toileting) Pt completes toilet transfers, LB dressing and pericares SBA while maintaining LUE shoulder precautions. Pt completes standing g/h washing hands at sink SBA.   Clinical Impression   Criteria for Skilled Therapeutic Interventions Met (OT) Yes, treatment indicated   OT Diagnosis Decreased ADL indep   Influenced by the following impairments S/P reverse total shoulder arthroplasty, LEFT shoulder   OT Problem List-Impairments impacting ADL problems related to;pain;post-surgical precautions   Assessment of Occupational Performance 1-3 Performance Deficits   Identified Performance Deficits transfers, UB/LB dressing, bathing   Planned Therapy Interventions (OT) ADL retraining;home program guidelines;progressive activity/exercise   Clinical Decision Making Complexity (OT) low complexity   Risk & Benefits of therapy have been explained evaluation/treatment results reviewed;care plan/treatment goals reviewed;risks/benefits reviewed;current/potential barriers reviewed;participants voiced agreement with care plan;participants included;patient   OT Total Evaluation Time   OT Eval, Low Complexity Minutes (15859) 10   Therapy Certification   Medical Diagnosis S/P reverse total shoulder arthroplasty, LEFT shoulder   Start of Care Date 07/27/23   Certification date from 07/27/23   Certification date to 07/27/23   OT Goals    Therapy Frequency (OT) One time eval and treatment   OT Predicted Duration/Target Date for Goal Attainment 07/27/23   OT Goals Upper Body Dressing;Lower Body Dressing;Transfers;Toilet Transfer/Toileting;OT Goal 1   OT: Upper Body Dressing Supervision/stand-by assist;within precautions;Goal Met   OT: Lower Body Dressing Supervision/stand-by assist;within precautions;Goal Met   OT: Transfer Supervision/stand-by assist;within precautions;Goal Met   OT: Toilet Transfer/Toileting Supervision/stand-by assist;toilet transfer;cleaning and garment management;Goal Met   OT: Goal 1 Pt will verbalize and demonstrate understanding of HEP to mobilize shoulder within post-surgical precautions within one session.  (Goal Met)   Self-Care/Home Management   Self-Care/Home Mgmt/ADL, Compensatory, Meal Prep Minutes (14391) 15   Symptoms Noted During/After Treatment (Meal Preparation/Planning Training) none   Treatment Detail/Skilled Intervention Pt seated in chair. Education provided on post-surgical shoulder precautions including NWB, lifting, no ext/int rotation, no flexion beyond 90 degrees. Education provided on how to don/doff sling, pt instructed to wear sling at all times with the exceptions of bathing, skin checks, UB dressing and during exercises. Pt verbalizes understanding. Education provided (verbal, demo, handout) on one-arm dressing technique. Pt able to don button-up shirt using technique SBA. Education provided on LB dressing including pulling undergarments and pants up to knees while seated using unaffected UE prior to standing to pull up. Pt demonstrates understanding dons pants SBA.   Therapeutic Procedures/Exercise   Therapeutic Procedure: strength, endurance, ROM, flexibillity minutes (50354) 10   Symptoms Noted During/After Treatment none   Treatment Detail/Skilled Intervention Pt seated in chair. Education provided (verbal, demo, handout) on post-surgical UB exercises for LUE. Pt completes 5 exercises 10x each:  elbow flex, forearm sup/pron, wrist flex/ext, wrist uln/rad deviation and fist pumps. Education provided on pendulum exercise, pt demonstrates ability to complete using bed to stabilize RUE. Pt instructed to complete exercises daily as tolerated.   OT Discharge Planning   OT Plan One time eval & treatment   OT Discharge Recommendation (DC Rec) home;home with assist   OT Rationale for DC Rec Pt previously indep with I/ADLs living alone in one-level home currently limited by LUE post-surgical shoulder precautions and pain. Today pt demonstrates indep/safety with transfer, mobility and ADLs, demos understanding of LUE early mobility. Recommend pt return home as previous once medically cleared. Pt's DTR lives about 30 min away and can provide assist if needed.   OT Brief overview of current status [place] UB/LB dressing; SBA sit<>stand, toileting, standing g/h, ambulating in room (no walking device)   Total Session Time   Timed Code Treatment Minutes 25   Total Session Time (sum of timed and untimed services) 35    [facility]  OUTPATIENT OCCUPATIONAL THERAPY  EVALUATION  PLAN OF TREATMENT FOR OUTPATIENT REHABILITATION  (COMPLETE FOR INITIAL CLAIMS ONLY)  Patient's Last Name, First Name, M.I.  YOB: 1945  HurleyRomy bean  NOBLE                          Provider's Name  [facility] Medical Record No.  [id number]                             Onset Date:  07/26/23   Start of Care Date:  07/27/23   Type:     ___PT   _X_OT   ___SLP Medical Diagnosis:  S/P reverse total shoulder arthroplasty, LEFT shoulder                    OT Diagnosis:  Decreased ADL indep Visits from SOC:  1     See note for plan of treatment, functional goals and certification details    I CERTIFY THE NEED FOR THESE SERVICES FURNISHED UNDER        THIS PLAN OF TREATMENT AND WHILE UNDER MY CARE     (Physician co-signature of this document indicates review and certification of the therapy  plan).

## 2023-07-27 NOTE — DISCHARGE SUMMARY
[facility] Discharge Summary                                  [facility]     ANITRA RAPP [id number]   Age: 78 year old  PCP: Adeola Stockton, [phone] 1945     Date of Admission:  7/26/2023  Date of Discharge::  7/27/2023  Discharge Physician:  Grady Conway PA-C    Code status:  Full Code    Admission Information:  Admission Diagnosis:  Arthritis of shoulder region, left [M19.012]  S/P reverse total shoulder arthroplasty, unspecified laterality [Z96.619]    Post-Operative Day: 1 Day Post-Op     Reason for admission:  The patient was admitted for the following:Procedure(s) (LRB):  Left Reverse Total Shoulder Arthroplasty (Left)    Principal Problem:    S/P reverse total shoulder arthroplasty, unspecified laterality      Allergies:  Macrobid [nitrofurantoin anhydrous], Ace inhibitors, and Corticosteroids    Following the procedure noted above the patient was transferred to the post-op floor and started on:    Therapy:  occupational therapy  Anticoagulation Plan: Resume pre op Eliquis   Pain Management: oxycodone, tylenol, and vistaril  Weight bearing status: Non-weight bearing     The patient was followed and co-managed by the hospitalist service during the inpatient treatment course  Complications:  None  Consultations:  None     Pertinent Labs   Lab Results: personally reviewed.     Recent Labs   Lab Test 07/27/23  0512 07/12/23  1417 10/13/22  1553 02/20/21  0615 02/20/21  0614 02/01/21  1451   HGB 11.4* 12.6 13.0  --  11.0* 12.7   HCT  --  39.5  --   --  35.1 40.2   MCV  --  92  --   --  90 90   PLT  --  226  --   --  241 270    140 140   < >  --  135    < > = values in this interval not displayed.          Discharge Information:  Condition at discharge: Stable  Discharge destination:  Discharged to home     Medications at discharge:  Current Discharge Medication List        START taking these medications    Details   hydrOXYzine (ATARAX) 25 MG tablet Take 1  tablet (25 mg) by mouth every 6 hours as needed for itching or anxiety (with pain, moderate pain)  Qty: 30 tablet, Refills: 0    Associated Diagnoses: Status post reverse total replacement of left shoulder      senna-docusate (SENOKOT-S/PERICOLACE) 8.6-50 MG tablet Take 1-2 tablets by mouth 2 times daily as needed for constipation Take while on oral narcotics to prevent or treat constipation.  Qty: 15 tablet, Refills: 0    Comments: While taking narcotics  Associated Diagnoses: Status post reverse total replacement of left shoulder           CONTINUE these medications which have CHANGED    Details   acetaminophen (TYLENOL) 325 MG tablet Take 2 tablets (650 mg) by mouth every 4 hours as needed for other (mild pain)  Qty: 100 tablet, Refills: 0    Associated Diagnoses: Status post reverse total replacement of left shoulder      oxyCODONE (ROXICODONE) 5 MG tablet Take 1-2 tablets (5-10 mg) by mouth every 4 hours as needed for moderate to severe pain  Qty: 30 tablet, Refills: 0    Associated Diagnoses: Status post reverse total replacement of left shoulder           CONTINUE these medications which have NOT CHANGED    Details   amiodarone (PACERONE) 200 MG tablet Take 200 mg by mouth At Bedtime      atorvastatin (LIPITOR) 10 MG tablet Take 1 tablet (10 mg) by mouth daily  Qty: 90 tablet, Refills: 3    Associated Diagnoses: Type 2 diabetes mellitus without complication, without long-term current use of insulin (H)      ELIQUIS ANTICOAGULANT 5 MG tablet TAKE 1 TABLET(5 MG) BY MOUTH EVERY 12 HOURS  Qty: 180 tablet, Refills: 1    Comments: Due for cardiology follow-up with Lucien Beach CNP.  Associated Diagnoses: Persistent atrial fibrillation (H)      fluticasone-salmeterol (ADVAIR) 250-50 MCG/ACT inhaler Inhale 1 puff into the lungs every 12 hours  Qty: 60 each, Refills: 11    Associated Diagnoses: Mild intermittent asthma without complication      hydrochlorothiazide (HYDRODIURIL) 25 MG tablet Take 1 tablet (25 mg) by  mouth daily  Qty: 90 tablet, Refills: 3    Associated Diagnoses: HTN, goal below 140/90      losartan (COZAAR) 100 MG tablet Take 1 tablet (100 mg) by mouth daily  Qty: 90 tablet, Refills: 3    Associated Diagnoses: HTN, goal below 140/90      metFORMIN (GLUCOPHAGE) 500 MG tablet TAKE 1 TABLET BY MOUTH DAILY WITH DINNER  Qty: 90 tablet, Refills: 3    Associated Diagnoses: Type 2 diabetes mellitus without complication, without long-term current use of insulin (H)      methocarbamol (ROBAXIN) 750 MG tablet Take 1 tablet (750 mg) by mouth 3 times daily as needed for muscle spasms  Qty: 30 tablet, Refills: 0    Associated Diagnoses: Cervicalgia      metoprolol succinate ER (TOPROL XL) 50 MG 24 hr tablet Take 1 tablet (50 mg) by mouth every evening  Qty: 90 tablet, Refills: 3    Associated Diagnoses: Paroxysmal atrial fibrillation (H)      PARoxetine (PAXIL) 10 MG tablet Take 1 tablet (10 mg) by mouth At Bedtime  Qty: 90 tablet, Refills: 3    Associated Diagnoses: Adjustment disorder with mixed anxiety and depressed mood      traZODone (DESYREL) 50 MG tablet TAKE 1 TABLET AT BEDTIME  Qty: 90 tablet, Refills: 1    Associated Diagnoses: Persistent insomnia      XOPENEX HFA 45 MCG/ACT inhaler INHALE 2 PUFFS INTO THE LUNGS EVERY 8 HOURS AS NEEDED FOR SHORTNESS OF BREATH OR DIFFICULT BREATHING Strength: 45 MCG/ACT  Qty: 15 g, Refills: 3    Associated Diagnoses: Mild intermittent asthma without complication      blood glucose (ONETOUCH VERIO IQ) test strip Use to test blood sugars 1 times daily or as directed.  Qty: 100 each, Refills: 11    Associated Diagnoses: Type 2 diabetes mellitus without complication, without long-term current use of insulin (H)      blood glucose monitoring (ONE TOUCH DELICA) lancets Use to test blood sugars 1 times daily or as directed.  Qty: 100 each, Refills: 3    Associated Diagnoses: Type 2 diabetes mellitus without complication, without long-term current use of insulin (H)                         Follow-Up Care:  Patient should be seen in the office in 14 days by the Orthopedic Surgeon/Physician Assistant.  Call [phone] for appointment or questions.    Grady Conway PA-C

## 2023-07-27 NOTE — PROGRESS NOTES
Patient vital signs are at baseline: Yes  Patient able to ambulate as they were prior to admission or with assist devices provided by therapies during their stay:  Yes  Patient MUST void prior to discharge:  Yes  Patient able to tolerate oral intake:  Yes  Pain has adequate pain control using Oral analgesics:  Yes  Does patient have an identified :  Yes  Has goal D/C date and time been discussed with patient:  Yes    Pt reports no pain, her block has been effective. She has eaten dinner without troubles and was saline locked.

## 2023-07-27 NOTE — PROGRESS NOTES
ADRIANNE ZAMORANO DISCHARGE NOTE    Patient discharged to home at 12:10 PM via wheel chair. Accompanied by daughter and staff. Discharge instructions reviewed with patient and daughter, opportunity offered to ask questions. Prescriptions sent to patients preferred pharmacy. All belongings sent with patient.    Av Santamaria RN

## 2023-08-08 ENCOUNTER — TRANSFERRED RECORDS (OUTPATIENT)
Dept: HEALTH INFORMATION MANAGEMENT | Facility: CLINIC | Age: 78
End: 2023-08-08
Payer: COMMERCIAL

## 2023-08-27 DIAGNOSIS — E11.9 TYPE 2 DIABETES MELLITUS WITHOUT COMPLICATION, WITHOUT LONG-TERM CURRENT USE OF INSULIN (H): ICD-10-CM

## 2023-08-27 DIAGNOSIS — I48.0 PAROXYSMAL ATRIAL FIBRILLATION (H): ICD-10-CM

## 2023-08-28 RX ORDER — METOPROLOL SUCCINATE 50 MG/1
50 TABLET, EXTENDED RELEASE ORAL EVERY EVENING
Qty: 90 TABLET | Refills: 1 | Status: SHIPPED | OUTPATIENT
Start: 2023-08-28 | End: 2024-03-05

## 2023-08-28 RX ORDER — ATORVASTATIN CALCIUM 10 MG/1
10 TABLET, FILM COATED ORAL DAILY
Qty: 90 TABLET | Refills: 0 | Status: SHIPPED | OUTPATIENT
Start: 2023-08-28 | End: 2024-03-06

## 2023-09-05 ENCOUNTER — TRANSFERRED RECORDS (OUTPATIENT)
Dept: HEALTH INFORMATION MANAGEMENT | Facility: CLINIC | Age: 78
End: 2023-09-05
Payer: COMMERCIAL

## 2023-09-13 ENCOUNTER — PATIENT OUTREACH (OUTPATIENT)
Dept: CARE COORDINATION | Facility: CLINIC | Age: 78
End: 2023-09-13
Payer: COMMERCIAL

## 2023-09-27 ENCOUNTER — PATIENT OUTREACH (OUTPATIENT)
Dept: CARE COORDINATION | Facility: CLINIC | Age: 78
End: 2023-09-27
Payer: COMMERCIAL

## 2023-10-04 ENCOUNTER — TRANSFERRED RECORDS (OUTPATIENT)
Dept: HEALTH INFORMATION MANAGEMENT | Facility: CLINIC | Age: 78
End: 2023-10-04
Payer: COMMERCIAL

## 2023-10-16 NOTE — ANESTHESIA PROCEDURE NOTES
Anesthesia Pre Eval Note    Anesthesia ROS/Med Hx        Anesthetic Complication History:  Patient does not have a history of anesthetic complications      Pulmonary Review:  Patient does not have a pulmonary history      Neuro/Psych Review:    Positive for CVA    Cardiovascular Review:    Positive for past MI  Positive for CAD  Positive for hypertension  Positive for hyperlipidemia    GI/HEPATIC/RENAL Review:  Patient does not have a GI/hepatic/renalhistory       End/Other Review:  Positive for diabetes  Positive for obesity   Additional Results:     ALLERGIES:  No Known Allergies   Last Labs        Component                Value               Date/Time                  WBC                      5.9                 12/21/2022 0603            RBC                      3.98 (L)            12/21/2022 0603            HGB                      13.0                12/21/2022 0603            HCT                      38.4                12/21/2022 0603            MCV                      96.5                12/21/2022 0603            MCH                      32.7                12/21/2022 0603            MCHC                     33.9                12/21/2022 0603            RDW-CV                   13.6                12/21/2022 0603            Sodium                   141                 08/17/2023 0945            Potassium                4.0                 08/17/2023 0945            Chloride                 108                 08/17/2023 0945            Carbon Dioxide           26                  08/17/2023 0945            Glucose                  154 (H)             08/17/2023 0945            BUN                      29 (H)              08/17/2023 0945            Creatinine               0.95                08/17/2023 0945            Glomerular Filtrati*     63                  08/17/2023 0945            Calcium                  9.5                 08/17/2023 0945            PLT                      187                  Interscalene Procedure Note    Pre-Procedure   Staff -        CRNA: Gaurav Sheldon APRN CRNA       Performed By: CRNA       Location: pre-op       Pre-Anesthestic Checklist: patient identified, IV checked, site marked, risks and benefits discussed, informed consent, monitors and equipment checked, pre-op evaluation, at physician/surgeon's request and post-op pain management  Timeout:       Correct Patient: Yes        Correct Procedure: Yes        Correct Site: Yes        Correct Position: Yes        Correct Laterality: Yes        Site Marked: Yes  Procedure Documentation  Procedure: interscalene       Laterality: left       Patient Position: supine       Patient Prep/Sterile Barriers: sterile gloves, mask, patient draped       Skin prep: Chloraprep       Local skin infiltrated with 1 mL of 1% lidocaine.        Needle Type: insulated and short bevel       Needle Gauge: 21.        Needle Length (millimeters): 100        Ultrasound guided       1. Ultrasound was used to identify targeted nerve, plexus, vascular marker, or fascial plane and place a needle adjacent to it in real-time.       2. Ultrasound was used to visualize the spread of anesthetic in close proximity to the above referenced structure.       3. A permanent image is entered into the patient's record.       4. The visualized anatomic structures appeared normal.       5. There were no apparent abnormal pathologic findings.       Nerve Stim: Initial Level 0.8 mA.  Lowest motor response 0.4 mA.    Assessment/Narrative         The placement was negative for: blood aspirated, painful injection and site bleeding       Paresthesias: No.       Test dose of mL at.         Test dose negative, 3 minutes after injection, for signs of intravascular, subdural, or intrathecal injection.       Bolus given via needle. no blood aspirated via catheter.        Secured via.        Insertion/Infusion Method: Single Shot       Complications: none       Injection made  "incrementally with aspirations every 5 mL.    Medication(s) Administered   Bupivacaine 0.5% w/ 1:200K Epi (Other) - Other, Neck   15 mL - 7/26/2023 11:57:00 AM  Bupivacaine liposome (Exparel) 1.3% LA inj susp (Infiltration) - Infiltration, Neck   10 mL - 7/26/2023 11:59:00 AM    FOR [facility] ([facility]) ONLY:   Pain Team Contact information: please page the Pain Team Via [employer]. Search \"Pain\". During daytime hours, please page the attending first. At night please page the resident first.      " 12/21/2022 0603            INR                      3                   09/14/2023 1606        Past Medical History:  No date: Cataract  05/2008: CVA (cerebral vascular accident) (CMD)  No date: Diabetes mellitus (CMD)  No date: Essential (primary) hypertension  No date: High cholesterol  No date: Macular degeneration  3/11/2019: Medicare annual wellness visit, subsequent      Comment:  Hm - mammo april 2018 benign - repeat due april 2019 -                ordered pap normal 2012 & 2009 - will stop given age                c-scope may 2015 - with multiple polyps - repeat 5 yrs                (2020)  dexa march 2015 - normal - repeat 3-5yrs  No date: Myocardial infarction (CMD)  03/11/2019: Sebaceous cyst  12/19/2022: Syncope, unspecified syncope type  Past Surgical History:  No date: Cataract extraction, bilateral  No date: Colonoscopy   Prior to Admission medications :  Medication atorvastatin (LIPITOR) 40 MG tablet, Sig TAKE 1 TABLET BY MOUTH EVERYDAY AT BEDTIME, Start Date 9/13/23, End Date , Taking? Yes, Authorizing Provider Tete Edouard MD    Medication losartan (COZAAR) 100 MG tablet, Sig TAKE 1 TABLET BY MOUTH EVERY DAY, Start Date 9/13/23, End Date , Taking? Yes, Authorizing Provider Tete Edouard MD    Medication carvedilol (COREG) 6.25 MG tablet, Sig TAKE 1 TABLET BY MOUTH IN THE MORNING AND IN THE EVENING, Start Date 9/13/23, End Date , Taking? Yes, Authorizing Provider Tete Edouard MD    Medication hydroCHLOROthiazide (HYDRODIURIL) 25 MG tablet, Sig TAKE 1 TABLET BY MOUTH EVERY DAY, Start Date 9/13/23, End Date , Taking? Yes, Authorizing Provider Tete Edouard MD    Medication metFORMIN (GLUCOPHAGE) 500 MG tablet, Sig TAKE 1 TABLET BY MOUTH TWICE A DAY WITH MEALS, Start Date 3/17/23, End Date , Taking? Yes, Authorizing Provider Tete Edouard MD    Medication isosorbide mononitrate (IMDUR) 30 MG 24 hr tablet, Sig Take 1 tablet by mouth daily., Start Date 1/24/23, End Date , Taking? Yes, Authorizing  Provider Tete Edouard MD    Medication spironolactone (ALDACTONE) 25 MG tablet, Sig Take 1 tablet by mouth daily., Start Date 1/24/23, End Date , Taking? Yes, Authorizing Provider Tete Edouard MD    Medication Probiotic Product (Probiotic-10) Chew Tab, Sig , Start Date , End Date , Taking? , Authorizing Provider Provider, Outside    Medication traZODone (DESYREL) 50 MG tablet, Sig TAKE 1 TABLET BY MOUTH NIGHTLY, Start Date 9/13/23, End Date , Taking? , Authorizing Provider Tete Edouard MD    Medication warfarin (COUMADIN) 5 MG tablet, Sig TAKE 1 TABLET BY MOUTH EVERY DAY OR AS DIRECTED, Start Date 3/17/23, End Date , Taking? , Authorizing Provider Tete Edouard MD    Medication Accu-Chek FastClix Lancets Misc, Sig Test once daily.  DX: E11.9 Meter: Accu-chek Adalgisa Plus, Start Date 1/4/23, End Date , Taking? , Authorizing Provider Tete Edouard MD    Medication Blood Glucose Monitoring Suppl (Accu-Chek Adalgisa Plus) w/Device Kit, Sig 1 each as directed., Start Date 1/4/23, End Date , Taking? , Authorizing Provider Tete Edouard MD    Medication blood glucose (Accu-Chek Adalgisa Plus) test strip, Sig Test once daily.  DX: E11.9 Meter: Accu-chek Adalgisa Plus, Start Date 1/4/23, End Date , Taking? , Authorizing Provider Tete Edouard MD    Medication aspirin 81 MG chewable tablet, Sig Chew 1 tablet by mouth daily., Start Date 9/16/22, End Date , Taking? , Authorizing Provider Gary Pinto, DO    Medication Calcium Carb-Cholecalciferol (CALCIUM 1000 + D PO), Sig , Start Date , End Date , Taking? , Authorizing Provider Provider, Outside    Medication aflibercept (EYLEA) 2 MG/0.05ML Solution injection, Sig , Start Date , End Date , Taking? , Authorizing Provider Provider, Outside    Medication Multiple Vitamins-Minerals (WOMENS 50+ ADVANCED) Cap, Sig Take 1 tablet by mouth daily. , Start Date , End Date , Taking? , Authorizing Provider Provider, Outside    Medication B Complex-Folic Acid (SUPER B COMPLEX MAXI) Tab, Sig TAKE  1 TABLET DAILY, Start Date , End Date , Taking? , Authorizing Provider Provider, Outside    Medication Ascorbic Acid (VITAMIN C) 1000 MG tablet, Sig TAKE 1 TABLET DAILY, Start Date , End Date , Taking? , Authorizing Provider Provider, Outside    Medication Cholecalciferol (VITAMIN D3) 1000 units capsule, Sig TAKE 1 CAPSULE DAILY, Start Date , End Date , Taking? , Authorizing Provider Provider, Outside         Patient Vitals in the past 24 hrs:  10/16/23 1429, BP:124/82, Temp:36 °C (96.8 °F), Temp src:Temporal, Pulse:77, Resp:18, SpO2:97 %, Height:5' 6\" (1.676 m), Weight:88.5 kg (195 lb)  Social history reviewed:  Social History    Tobacco Use      Smoking status: Former        Packs/day: 0.00        Types: Cigarettes        Quit date:         Years since quittin.7      Smokeless tobacco: Never   E-Cigarette/Vaping Substances & Devices  E-Cigarette/Vaping Use: Never Used  Nicotine: No  CBD: No  Pre-filled or Refillable Cartridge: No  Pre-filled Pod: No    Social History    Substance and Sexual Activity      Alcohol use: Not Currently        Comment: occ        Relevant Problems   No relevant active problems       Physical Exam     Airway   Mallampati: II  TM Distance: >3 FB  Neck ROM: Full  Neck: Non-tender and Able to place in sniff position  TMJ Mobility: Good    Cardiovascular  Cardiovascular exam normal  Cardio Rhythm: Regular  Cardio Rate: Normal    Head Assessment  Head assessment: Normocephalic and Atraumatic    General Assessment  General Assessment: Alert and oriented and No acute distress    Pulmonary Exam  Pulmonary exam normal  Breath sounds clear to auscultation:  Yes    Abdominal Exam  Abdominal exam normal      Anesthesia Plan:    ASA Status: 3  Anesthesia Type: MAC    Induction: Intravenous  Maintenance: TIVA    Post-op Pain Management: Per Surgeon      Checklist  Reviewed: NPO Status, Allergies, Medications, Problem list, Past Med History and Patient Summary  Consent/Risks Discussed  Statement:  The proposed anesthetic plan, including its risks and benefits, have been discussed with the Patient along with the risks and benefits of alternatives. Questions were encouraged and answered and the patient and/or representative understands and agrees to proceed.        I discussed with the patient (and/or patient's legal representative) the risks and benefits of the proposed anesthesia plan, MAC, which may include services performed by other anesthesia providers.    Alternative anesthesia plans, if available, were reviewed with the patient (and/or patient's legal representative). Discussion has been held with the patient (and/or patient's legal representative) regarding risks of anesthesia, which include Intra-operative Awareness and emergent situations that may require change in anesthesia plan.    The patient (and/or patient's legal representative) has indicated understanding, his/her questions have been answered, and he/she wishes to proceed with the planned anesthetic.    Blood Products: Not Anticipated

## 2023-10-18 NOTE — TELEPHONE ENCOUNTER
Patient received asleep but rousable. Lethargic with occasional anxiety which has improved with ativan. Morphine gtt at 3mg/hr. Kessler in place for comfort measures. Family at bedside. Patient rounded on routinely.            Problem: Patient/Family Goals  Goal: Patient/Family Long Term Goal  Description: Patient's Long Term Goal:   Go home on hospice care    Interventions:  - sw consult for discharge planning  - supportive care  - See additional Care Plan goals for specific interventions  Outcome: Progressing  Goal: Patient/Family Short Term Goal  Description: Patient's Short Term Goal:   10/13/2023 - \"sleep and pain control\"  10/14/2023 - KEEP PT COMFORTABLE  10/15: comfort care  10/14/2023 - keep patient comfortable  10/15: remain comfortable  Interventions:   - Morphine and Ativan as ordered  - MORPHINE DRIP  - POSITION FOR COMFORT    - See additional Care Plan goals for specific interventions  Outcome: Progressing     Problem: PAIN - ADULT  Goal: Verbalizes/displays adequate comfort level or patient's stated pain goal  Description: INTERVENTIONS:  - Encourage pt to monitor pain and request assistance  - Assess pain using appropriate pain scale  - Administer analgesics based on type and severity of pain and evaluate response  - Implement non-pharmacological measures as appropriate and evaluate response  - Consider cultural and social influences on pain and pain management  - Manage/alleviate anxiety  - Utilize distraction and/or relaxation techniques  - Monitor for opioid side effects  - Notify MD/LIP if interventions unsuccessful or patient reports new pain  - Anticipate increased pain with activity and pre-medicate as appropriate  Outcome: Progressing Routed to AARON Gramajo and Rosa STEINER

## 2023-11-01 ENCOUNTER — E-VISIT (OUTPATIENT)
Dept: FAMILY MEDICINE | Facility: CLINIC | Age: 78
End: 2023-11-01
Payer: COMMERCIAL

## 2023-11-01 DIAGNOSIS — M54.2 CERVICALGIA: ICD-10-CM

## 2023-11-01 DIAGNOSIS — J20.9 ACUTE BRONCHITIS WITH SYMPTOMS > 10 DAYS: Primary | ICD-10-CM

## 2023-11-01 PROCEDURE — 99421 OL DIG E/M SVC 5-10 MIN: CPT | Performed by: FAMILY MEDICINE

## 2023-11-01 PROCEDURE — 99207 PR NON-BILLABLE SERV PER CHARTING: CPT | Performed by: FAMILY MEDICINE

## 2023-11-01 RX ORDER — METHOCARBAMOL 750 MG/1
750 TABLET, FILM COATED ORAL 3 TIMES DAILY PRN
Qty: 30 TABLET | Refills: 0 | Status: SHIPPED | OUTPATIENT
Start: 2023-11-01 | End: 2024-03-06

## 2023-11-01 RX ORDER — PREDNISONE 20 MG/1
40 TABLET ORAL DAILY
Qty: 10 TABLET | Refills: 0 | Status: SHIPPED | OUTPATIENT
Start: 2023-11-01 | End: 2023-11-06

## 2023-11-01 RX ORDER — DOXYCYCLINE HYCLATE 100 MG
100 TABLET ORAL 2 TIMES DAILY
Qty: 14 TABLET | Refills: 0 | Status: SHIPPED | OUTPATIENT
Start: 2023-11-01 | End: 2023-11-08

## 2023-11-13 ENCOUNTER — E-VISIT (OUTPATIENT)
Dept: FAMILY MEDICINE | Facility: CLINIC | Age: 78
End: 2023-11-13
Payer: COMMERCIAL

## 2023-11-13 DIAGNOSIS — J20.9 ACUTE BRONCHITIS WITH SYMPTOMS > 10 DAYS: Primary | ICD-10-CM

## 2023-11-13 PROCEDURE — 99421 OL DIG E/M SVC 5-10 MIN: CPT | Performed by: FAMILY MEDICINE

## 2023-11-13 RX ORDER — DOXYCYCLINE HYCLATE 100 MG
100 TABLET ORAL 2 TIMES DAILY
Qty: 20 TABLET | Refills: 0 | Status: SHIPPED | OUTPATIENT
Start: 2023-11-13 | End: 2023-11-23

## 2023-11-24 DIAGNOSIS — E11.9 TYPE 2 DIABETES MELLITUS WITHOUT COMPLICATION, WITHOUT LONG-TERM CURRENT USE OF INSULIN (H): ICD-10-CM

## 2023-11-24 RX ORDER — ATORVASTATIN CALCIUM 10 MG/1
TABLET, FILM COATED ORAL
Qty: 90 TABLET | Refills: 3 | OUTPATIENT
Start: 2023-11-24

## 2023-12-02 ENCOUNTER — HEALTH MAINTENANCE LETTER (OUTPATIENT)
Age: 78
End: 2023-12-02

## 2023-12-07 DIAGNOSIS — I48.19 PERSISTENT ATRIAL FIBRILLATION (H): ICD-10-CM

## 2023-12-08 RX ORDER — APIXABAN 5 MG/1
5 TABLET, FILM COATED ORAL 2 TIMES DAILY
Qty: 180 TABLET | Refills: 3 | Status: SHIPPED | OUTPATIENT
Start: 2023-12-08 | End: 2024-03-05

## 2023-12-11 DIAGNOSIS — J45.20 MILD INTERMITTENT ASTHMA WITHOUT COMPLICATION: ICD-10-CM

## 2023-12-11 RX ORDER — FLUTICASONE PROPIONATE AND SALMETEROL 250; 50 UG/1; UG/1
POWDER RESPIRATORY (INHALATION)
Qty: 60 EACH | Refills: 11 | Status: SHIPPED | OUTPATIENT
Start: 2023-12-11

## 2023-12-13 ENCOUNTER — E-VISIT (OUTPATIENT)
Dept: FAMILY MEDICINE | Facility: CLINIC | Age: 78
End: 2023-12-13
Payer: COMMERCIAL

## 2023-12-13 DIAGNOSIS — R30.0 DYSURIA: Primary | ICD-10-CM

## 2023-12-13 DIAGNOSIS — N39.0 URINARY TRACT INFECTION, ACUTE: ICD-10-CM

## 2023-12-13 PROCEDURE — 99421 OL DIG E/M SVC 5-10 MIN: CPT | Performed by: FAMILY MEDICINE

## 2023-12-13 NOTE — PATIENT INSTRUCTIONS
Dear Pham,     After reviewing your responses, I would like you to come in for a urine test to make sure we treat you correctly. This urine test is to evaluate you for a possible urinary tract infection, and should be scheduled for today or tomorrow. Schedule a Lab Only appointment here.     Lab appointments are not available at most locations on the weekends. If no Lab Only appointment is available, you should be seen in any of our convenient Walk-in or Urgent Care Centers, which can be found on our website here.     You will receive instructions with your results in [employer] once they are available.     If your symptoms worsen, you develop pain in your back or stomach, develop fevers, or are not improving in 5 days, please contact your primary care provider for an appointment or visit a Walk-in or Urgent Care Center to be seen.     Thanks again for choosing us as your health care partner,     Adeola Stockton MD

## 2023-12-14 ENCOUNTER — LAB (OUTPATIENT)
Dept: LAB | Facility: CLINIC | Age: 78
End: 2023-12-14
Payer: COMMERCIAL

## 2023-12-14 DIAGNOSIS — R30.0 DYSURIA: ICD-10-CM

## 2023-12-14 LAB
ALBUMIN UR-MCNC: 30 MG/DL
APPEARANCE UR: CLEAR
BACTERIA #/AREA URNS HPF: ABNORMAL /HPF
BILIRUB UR QL STRIP: NEGATIVE
COLOR UR AUTO: YELLOW
GLUCOSE UR STRIP-MCNC: NEGATIVE MG/DL
HGB UR QL STRIP: ABNORMAL
KETONES UR STRIP-MCNC: NEGATIVE MG/DL
LEUKOCYTE ESTERASE UR QL STRIP: ABNORMAL
NITRATE UR QL: NEGATIVE
PH UR STRIP: 5.5 [PH] (ref 5–7)
RBC #/AREA URNS AUTO: ABNORMAL /HPF
SP GR UR STRIP: >=1.03 (ref 1–1.03)
SQUAMOUS #/AREA URNS AUTO: ABNORMAL /LPF
UROBILINOGEN UR STRIP-ACNC: 0.2 E.U./DL
WBC #/AREA URNS AUTO: ABNORMAL /HPF

## 2023-12-14 PROCEDURE — 87086 URINE CULTURE/COLONY COUNT: CPT

## 2023-12-14 PROCEDURE — 87186 SC STD MICRODIL/AGAR DIL: CPT

## 2023-12-14 PROCEDURE — 81001 URINALYSIS AUTO W/SCOPE: CPT

## 2023-12-14 RX ORDER — SULFAMETHOXAZOLE/TRIMETHOPRIM 800-160 MG
1 TABLET ORAL 2 TIMES DAILY
Qty: 6 TABLET | Refills: 0 | Status: SHIPPED | OUTPATIENT
Start: 2023-12-14 | End: 2023-12-17

## 2023-12-15 LAB — BACTERIA UR CULT: ABNORMAL

## 2024-02-10 ENCOUNTER — HEALTH MAINTENANCE LETTER (OUTPATIENT)
Age: 79
End: 2024-02-10

## 2024-02-14 ENCOUNTER — TELEPHONE (OUTPATIENT)
Dept: PHARMACY | Facility: OTHER | Age: 79
End: 2024-02-14
Payer: COMMERCIAL

## 2024-02-14 NOTE — TELEPHONE ENCOUNTER
MTM Recruitment: [facility] insurance     Referral outreach attempt #1 on February 14, 2024      Outcome: patient requested call back at later date    Julee Levine, Pharm.D, BCACP  Medication Therapy Management Pharmacist

## 2024-03-05 ENCOUNTER — TELEPHONE (OUTPATIENT)
Dept: CARDIOLOGY | Facility: CLINIC | Age: 79
End: 2024-03-05
Payer: COMMERCIAL

## 2024-03-05 DIAGNOSIS — I48.19 PERSISTENT ATRIAL FIBRILLATION (H): Primary | ICD-10-CM

## 2024-03-05 DIAGNOSIS — I48.0 PAROXYSMAL ATRIAL FIBRILLATION (H): ICD-10-CM

## 2024-03-05 RX ORDER — METOPROLOL SUCCINATE 50 MG/1
50 TABLET, EXTENDED RELEASE ORAL EVERY EVENING
Qty: 90 TABLET | Refills: 0 | Status: SHIPPED | OUTPATIENT
Start: 2024-03-05 | End: 2024-07-01

## 2024-03-05 RX ORDER — AMIODARONE HYDROCHLORIDE 200 MG/1
200 TABLET ORAL AT BEDTIME
Qty: 90 TABLET | Refills: 0 | Status: SHIPPED | OUTPATIENT
Start: 2024-03-05 | End: 2024-07-01

## 2024-03-05 NOTE — TELEPHONE ENCOUNTER
89 Pt has not been seen since Lucien Homerding NP appt in 2022, Lucien has since retired. Pt schedule to establish care with Dr. Garland in May, 90day refill sent to get her to this appt.    Yessenia

## 2024-03-05 NOTE — TELEPHONE ENCOUNTER
[facility] Call Center    Phone Message    May a detailed message be left on voicemail: yes     Reason for Call: Medication Refill Request    Has the patient contacted the pharmacy for the refill? Yes   Name of medication being requested: Metoprolol 50mg, amiodarone 200mg, and Eliquis 5mg  Provider who prescribed the medication: Dr. Garland  Pharmacy: [facility] mail order, Telephone [phone] and fax [phone]  Date medication is needed: 03/05/2024   Pt needs these meds renewed    Action Taken: Other: Cardio    Travel Screening: Not Applicable

## 2024-03-06 ENCOUNTER — VIRTUAL VISIT (OUTPATIENT)
Dept: PHARMACY | Facility: CLINIC | Age: 79
End: 2024-03-06
Payer: COMMERCIAL

## 2024-03-06 DIAGNOSIS — I48.0 PAROXYSMAL ATRIAL FIBRILLATION (H): ICD-10-CM

## 2024-03-06 DIAGNOSIS — J45.20 MILD INTERMITTENT ASTHMA WITHOUT COMPLICATION: ICD-10-CM

## 2024-03-06 DIAGNOSIS — I10 ESSENTIAL HYPERTENSION: ICD-10-CM

## 2024-03-06 DIAGNOSIS — E11.9 TYPE 2 DIABETES MELLITUS WITHOUT COMPLICATION, WITHOUT LONG-TERM CURRENT USE OF INSULIN (H): Primary | ICD-10-CM

## 2024-03-06 DIAGNOSIS — E78.5 DYSLIPIDEMIA: ICD-10-CM

## 2024-03-06 DIAGNOSIS — F43.23 ADJUSTMENT DISORDER WITH MIXED ANXIETY AND DEPRESSED MOOD: ICD-10-CM

## 2024-03-06 DIAGNOSIS — M19.90 OSTEOARTHRITIS, UNSPECIFIED OSTEOARTHRITIS TYPE, UNSPECIFIED SITE: ICD-10-CM

## 2024-03-06 PROCEDURE — 99605 MTMS BY PHARM NP 15 MIN: CPT | Mod: 93 | Performed by: PHARMACIST

## 2024-03-06 PROCEDURE — 99607 MTMS BY PHARM ADDL 15 MIN: CPT | Mod: 93 | Performed by: PHARMACIST

## 2024-03-06 RX ORDER — ATORVASTATIN CALCIUM 10 MG/1
10 TABLET, FILM COATED ORAL DAILY
Qty: 90 TABLET | Refills: 0 | Status: SHIPPED | OUTPATIENT
Start: 2024-03-06 | End: 2024-03-19

## 2024-03-06 RX ORDER — SENNOSIDES 8.6 MG
650 CAPSULE ORAL EVERY 8 HOURS PRN
COMMUNITY

## 2024-03-06 NOTE — LETTER
March 6, 2024  Romy NOBLE Xavi  [address] [address] [facility] [place] [id number]    Dear Ms. Hurley, [facility] MTM     Thank you for talking with me on Mar 6, 2024 about your health and medications. As a follow-up to our conversation, I have included two documents:      1. Your Recommended To-Do List has steps you should take to get the best results from your medications.  2. Your Medication List will help you keep track of your medications and how to take them.    If you want to talk about these documents, please call Yoel Wilkes RPH at phone: [phone], Monday-Friday 8-4:30pm.    I look forward to working with you and your doctors to make sure your medications work well for you.    Sincerely,  Yoel Wilkes RPH  MTM Pharmacist, [facility]

## 2024-03-06 NOTE — PROGRESS NOTES
Medication Therapy Management (MTM) Encounter    ASSESSMENT:                            Medication Adherence/Access: No issues identified    Diabetes   Blood sugars well controlled per patient report. Of note, her GFR has been declining the last few lab draws. She may be a good candidate for an SGLT-2 inhibitor. Will discuss with PCP. Would be beneficial to get an albumin/Cr ratio as well.    Hypertension /AFIB  BPs well controlled <130/80, could consider reducing hydrochlorothiazide as she does not have any higher BPs. Will discuss with PCP.     Hyperlipidemia   Stable. Pt needs refill for Atorvastatin, this was sent over.     Depression:  Patient is wondering if she can trial a higher dose of Trazodone. Will discuss with PCP.     Asthma:   Considering her frequent Xopenex use when she has allergies, she should be using Advair as well. Discussed this with patient.     Arthritis Pain:   Recommended patient try Acetaminophen ER formulation for better arthritis control.     PLAN:                            Dr. Stockton...  Consider starting SGLT-2 inhibitor, may be useful to get an albumin/Cr ratio too.   Reduce Hydrochlorothiazide to 12.5mg daily, patient's highest BP was 124/80, lowest is 90/45.   Patient is requesting Trazodone dose increase to help her sleep.     Pt to...  Refill sent for Atorvastatin.  Start Advair 250/50mcg twice daily during your high allergy times. If you are using Xopenex more than a couple times a week you should be taking Advair daily.   Consider Tylenol Arthritis 650mg tablets for your arthritis. Can take up to 3000mg per day.     Follow-up: as needed    SUBJECTIVE/OBJECTIVE:                          RON Hurley is a 78 year old female called for an initial visit. She was referred to me from [facility].      Reason for visit: general med review.     Allergies/ADRs: Reviewed in chart  Past Medical History: Reviewed in chart  Tobacco: She reports that she quit smoking about 42 years ago. Her  smoking use included cigarettes. She has never used smokeless tobacco.  Alcohol: once a month.     Medication Adherence/Access: Patient uses pill box(es). Does not forget medications frequently. Maybe once a week.     Diabetes   Metformin 250mg twice daily  Takes it 1/2 tablet twice daily as she had nausea when she took the whole pill at once. Now controlled.   Patient is not experiencing side effects.  Blood sugar monitorin-1 time(s) daily; Ranges: (patient reported) 100-110   Current diabetes symptoms: none   Eye exam is up to date  Foot exam: about a year ago   Lab Results   Component Value Date    A1C 6.1 (H) 2023     Hypertension /AFIB  Eliquis 5mg twice daily  Amiodarone 200mg daily  Metoprolol ER 50mg every evening  Losartan 100mg every morning  Hydrochlorothiazide 25mg every morning.   Patient reports no recent episodes, planning on an ablation in the future  Patient self monitors blood pressure.  Home BP monitoring 124/80 is the highest, lowest is 90/45 but very rarely .       BP Readings from Last 3 Encounters:   23 115/50   23 136/76   22 (!) 142/70     Pulse Readings from Last 3 Encounters:   23 57   23 57   22 54     Hyperlipidemia   Atorvastatin 10mg daily  Patient reports no significant myalgias or other side effects.  The 10-year ASCVD risk score (Lilly DURHAM, et al., 2019) is: 33.2%    Values used to calculate the score:      Age: 78 years      Sex: Female      Is Non- : No      Diabetic: Yes      Tobacco smoker: No      Systolic Blood Pressure: 115 mmHg      Is BP treated: Yes      HDL Cholesterol: 47 mg/dL      Total Cholesterol: 149 mg/dL   Recent Labs   Lab Test 10/13/22  1553 21  0744   CHOL 149 129   HDL 47* 52   LDL 74 58   TRIG 139 95     Depression:  Paroxetine 10mg once daily.   Trazodone 50mg at bedtime- for sleep. Sleeps about 8-9 hours, but the night before was 2 hours.   Patient reports no current medication side  effects.  Patient reports symptoms are stable.      3/21/2019    10:39 AM 10/1/2020     3:52 PM 7/11/2023     3:19 PM   PHQ-9 SCORE   PHQ-9 Total Score Adrianahart   8 (Mild depression)   PHQ-9 Total Score 0 7 8         10/1/2020     3:54 PM   SURINDER-7 SCORE   Total Score 16     Asthma:   Advair 250/50mcg twice daily when she is sick  Xopenex PRN but using more frequently than advair. Use multiple times daily 3 weeks out of 4 if she is having allergy symptoms.   Patient rinses their mouth after using steroid inhaler.   Patient reports no current medication side effects.      Triggers include: pollens.  Patient reports the following symptoms: none.    Today's Vitals: LMP  (LMP Unknown)   ----------------    I spent 25 minutes with this patient today. All changes were made via collaborative practice agreement with Dr. Stockton. A copy of the visit note was provided to the patient's provider(s).    A summary of these recommendations was sent via [employer].    Mila RiggsD  [facility] Pharmacist    Phone: [phone]     Telemedicine Visit Details  Type of service:  Telephone visit  Start Time: 2:41 PM  End Time:  3:07 PM     Medication Therapy Recommendations  Essential hypertension    Current Medication: hydrochlorothiazide (HYDRODIURIL) 25 MG tablet   Rationale: Dose too high - Dosage too high - Safety   Recommendation: Decrease Dose   Status: Contact Provider - Awaiting Response         Insomnia    Current Medication: traZODone (DESYREL) 50 MG tablet   Rationale: Dose too low - Dosage too low - Effectiveness   Recommendation: Increase Dose   Status: Contact Provider - Awaiting Response         Mild intermittent asthma without complication    Current Medication: fluticasone-salmeterol (ADVAIR) 250-50 MCG/ACT inhaler   Rationale: Incorrect administration - Dosage too low - Effectiveness   Recommendation: Increase Frequency   Status: Accepted - no CPA Needed         Osteoarthritis    Current Medication: acetaminophen  (TYLENOL) 325 MG tablet (Discontinued)   Rationale: More effective medication available - Ineffective medication - Effectiveness   Recommendation: Change Medication Formulation  - acetaminophen 650 MG CR tablet   Status: Accepted - no CPA Needed         Type 2 diabetes mellitus with stage 3a chronic kidney disease, with long-term current use of insulin (H)    Rationale: Synergistic therapy - Needs additional medication therapy - Indication   Recommendation: Start Medication - Jardiance 10 MG Tabs   Status: Contact Provider - Awaiting Response

## 2024-03-06 NOTE — LETTER
INPATIENT PROGRESS NOTE    DATE OF SERVICE:  8/25/2017    PROBLEM LIST:   Patient Active Problem List   Diagnosis   • Chronic kidney disease, stage III (moderate)   • Benign hypertension with CKD (chronic kidney disease) stage III   • Cerebral infarction due to occlusion or stenosis of right middle cerebral artery   • Arthritis of right hip   • Preoperative examination   • Long-term (current) use of anticoagulants   • Status post total replacement of right hip       SUBJECTIVE:  Ms. Singh was seen today while she was on dialysis. She feels well. She did not report any chest pain, palpitations or dizziness. She may have had very mild shortness of breath while ambulating to the bathroom earlier in the morning but she did not report any orthopnea or PND. She seems to be happy with her current status. She did not report any further hemoptysis.    Allergies and Current medications reviewed.    OBJECTIVE:     VITAL SIGNS:   Visit Vitals  /68 (BP Location: [facility], Patient Position: Semi-Gusman's)   Pulse 74   Temp 98.7 °F (37.1 °C) (Temporal Artery)   Resp 18   Ht 5' 2\" (1.575 m)   Wt 64.3 kg   SpO2 93%   BMI 25.93 kg/m²       GENERAL: Alert and appropriate and she was in no acute distress. She is currently on dialysis and she seems to be tolerating it quite well.    NECK:  There was no jugular venous distention noted at 30°. Trachea is midline.  LUNGS:  Her lung fields were clear anteriorly and no wheezes or crackles were appreciated.   HEART:  The rhythm was regular with a soft systolic ejection murmur and a soft S4. No rub or S3 was appreciated.   ABDOMEN:  Soft and nontender. She had no epigastric tenderness on today's exam in the abdomen was not distended.  EXTREMITIES:  No gross clubbing or cyanosis were noted. There was no peripheral edema noted. Her left forearm AV graft was functioning well on dialysis.  MUSCULOSKELETAL: No gross joint deformities are noted. No obvious  _  Medication List        Prepared on: 03/06/2024     Bring your Medication List when you go to the doctor, hospital, or   emergency room. And, share it with your family or caregivers.     Note any changes to how you take your medications.  Cross out medications when you no longer use them.    Medication How I take it Why I use it Prescriber   acetaminophen (TYLENOL) 650 MG CR tablet Take 650 mg by mouth every 8 hours as needed for pain (max dose 3000mg per day.)  Pain Patient Reported   amiodarone (PACERONE) 200 MG tablet Take 1 tablet (200 mg) by mouth at bedtime Persistent Atrial Fibrillation (H) Dorian Garland MD   apixaban ANTICOAGULANT (ELIQUIS ANTICOAGULANT) 5 MG tablet Take 1 tablet (5 mg) by mouth 2 times daily Persistent Atrial Fibrillation (H) Dorian Garland MD   atorvastatin (LIPITOR) 10 MG tablet Take 1 tablet (10 mg) by mouth daily Type 2 diabetes mellitus without complication, without long-term current use of insulin (H) Adeola Stockton MD   blood glucose (ONETOUCH VERIO IQ) test strip Use to test blood sugars 1 times daily or as directed. Type 2 diabetes mellitus without complication, without long-term current use of insulin (H) Adeola Stockton MD   blood glucose monitoring (ONE TOUCH DELICA) lancets Use to test blood sugars 1 times daily or as directed. Type 2 diabetes mellitus without complication, without long-term current use of insulin (H) Adeola Stockton MD   fluticasone-salmeterol (ADVAIR) 250-50 MCG/ACT inhaler USE 1 INHALATION EVERY 12 HOURS Mild intermittent asthma without complication Adeola Stockton MD   hydrochlorothiazide (HYDRODIURIL) 25 MG tablet Take 1 tablet (25 mg) by mouth daily HTN, goal below 140/90 Adeola Stockton MD   losartan (COZAAR) 100 MG tablet Take 1 tablet (100 mg) by mouth daily HTN, goal below 140/90 Adeola Stockton MD   metFORMIN (GLUCOPHAGE) 500 MG tablet TAKE 1 TABLET BY MOUTH DAILY WITH DINNER Type 2 diabetes mellitus without complication, without long-term current  tenderness was appreciated  SKIN:  Warm and without gross rash or cellulitis.  NEURO-PSYCHIATRIC: No gross cranial nerve or motor deficits. Oriented to person, place and time    LABORATORY & DIAGNOSTICS:  LABS - reviewed:     Recent Labs  Lab 08/25/17  0537 08/24/17  0452 08/23/17  0450 08/22/17  0250   SODIUM 140 139 138 138   POTASSIUM 3.7 3.6 4.2 3.5   CHLORIDE 103 101 102 96*   CO2 32 33* 27 31   BUN 27* 23* 16 22*   CREATININE 3.66* 3.12* 2.13* 2.52*   GLUCOSE 93 94 86 109*   ALBUMIN  --   --  2.8* 3.0*   PHOS  --  3.2 3.3  --    AST  --   --  37 29   BILIRUBIN  --   --  0.7 0.4       Recent Labs  Lab 08/25/17  1230 08/25/17  0537 08/25/17  0050  08/24/17  0452  08/23/17  0450  08/22/17  1340 08/22/17  0532   WBC  --  6.3  --   --  7.4  --  9.2  --   --   --    HGB 8.3* 7.4* 7.3*  < > 7.7*  < > 8.6*  < >  --   --    HCT 26.9* 23.5* 24.3*  < > 25.2*  < > 27.2*  < >  --   --    PLT  --  266  --   --  290  --  264  --   --   --    BNP  --   --   --   --  2,137*  --  1,939*  --   --   --    RAPDTR  --  0.09*  --   --   --   --   --   --  0.08* 0.05*   < > = values in this interval not displayed.    ASSESSMENT AND PLAN:  1. Small non-ST elevation MI-beta blockers will be continued and she will be switched to oral nitrates today. If she has no further hemoptysis or further GI issues, it may be reasonable to resume low-dose aspirin therapy (81 mg) if the GI service agrees. Plavix therapy will not be resumed because of her anemia and recent hemoptysis.    2. Coronary Artery Disease-maximum medical therapy will be planned for her subtotally occluded circumflex.    3. Hypertension-if her blood pressure remains elevated, amlodipine can be increased to 10 mg daily. She is artery taking beta blockers and Hydralazine along with her oral nitrates.    4. End-stage renal disease-she will continue dialysis 3 days a week.    5. Anemia-her hemoglobin remains stable. Her hemoglobin will need to be carefully monitored on dialysis  use of insulin (H) Adeola Stockton MD   metoprolol succinate ER (TOPROL XL) 50 MG 24 hr tablet Take 1 tablet (50 mg) by mouth every evening Paroxysmal Atrial Fibrillation (H) Dorian Garland MD   PARoxetine (PAXIL) 10 MG tablet Take 1 tablet (10 mg) by mouth At Bedtime Adjustment Disorder with Mixed Anxiety and Depressed Mood Adeola Stockton MD   traZODone (DESYREL) 50 MG tablet TAKE 1 TABLET AT BEDTIME Persistent Insomnia Adeola Stockton MD   XOPENEX HFA 45 MCG/ACT inhaler INHALE 2 PUFFS INTO THE LUNGS EVERY 8 HOURS AS NEEDED FOR SHORTNESS OF BREATH OR DIFFICULT BREATHING Strength: 45 MCG/ACT Mild intermittent asthma without complication Adeola Stockton MD         Add new medications, over-the-counter drugs, herbals, vitamins, or  minerals in the blank rows below.    Medication How I take it Why I use it Prescriber                                      Allergies:      macrobid [nitrofurantoin anhydrous]; ace inhibitors; corticosteroids        Side effects I have had:               Other Information:              My notes and questions:   and it may be reasonable to transfuse her if her hemoglobin drops below 8 g in view of her intermittently unstable cardiac status.    If she remains stable overnight, she can be discharged tomorrow morning from a cardiac standpoint. I have invited her to return to our office next week for follow-up and she is agreeable.        Jaiden Quiñones MD

## 2024-03-06 NOTE — LETTER
"Recommended To-Do List      Prepared on: 03/06/2024       You can get the best results from your medications by completing the items on this \"To-Do List.\"      Bring your To-Do List when you go to your doctor. And, share it with your family or caregivers.    My To-Do List:  What we talked about: What I should do:   Your medication dosage being too high    Pharmacist will talk to PCP about reducing hydrochlorothiazide          What we talked about: What I should do:   Your medication dosage being too low    Pharmacist will talk to PCP about increasing Trazodone.           What we talked about: What I should do:   A new medication that may be of benefit to you    Pharmacist will talk to PCP about other diabetes medications that are good for your kidneys          What we talked about: What I should do:   Your medication dosage being too low    Increase how often you take fluticasone-salmeterol (ADVAIR) during your high allergy seasons.          What we talked about: What I should do:   A medication that is not working    Change the medication you are taking from acetaminophen (TYLENOL) to Tylenol Arthritis ER 650mg strength.          What we talked about: What I should do:                     "

## 2024-03-06 NOTE — PATIENT INSTRUCTIONS
"Recommendations from today's MTM visit:                                                      Refill sent for Atorvastatin.  Start Advair 250/50mcg twice daily during your high allergy times. If you are using Xopenex more than a couple times a week you should be taking Advair daily.   Consider Tylenol Arthritis 650mg tablets for your arthritis. Can take up to 3000mg per day.     Follow-up: as needed    It was great speaking with you today.  I value your experience and would be very thankful for your time in providing feedback in our clinic survey. In the next few days, you may receive an email or text message from [employer] with a link to a survey related to your  clinical pharmacist.\"     To schedule another MTM appointment, please call the clinic directly or you may call the MTM scheduling line at [phone] or toll-free at [phone].     My Clinical Pharmacist's contact information:                                                      Please feel free to contact me with any questions or concerns you have.      Yoel Wilkes, PharmJH  MTM Pharmacist    Phone: [phone]     "

## 2024-03-11 DIAGNOSIS — I10 HTN, GOAL BELOW 140/90: ICD-10-CM

## 2024-03-11 DIAGNOSIS — E11.9 TYPE 2 DIABETES MELLITUS WITHOUT COMPLICATION, WITHOUT LONG-TERM CURRENT USE OF INSULIN (H): ICD-10-CM

## 2024-03-11 DIAGNOSIS — F43.23 ADJUSTMENT DISORDER WITH MIXED ANXIETY AND DEPRESSED MOOD: ICD-10-CM

## 2024-03-11 RX ORDER — PAROXETINE 10 MG/1
10 TABLET, FILM COATED ORAL AT BEDTIME
Qty: 90 TABLET | Refills: 3 | Status: SHIPPED | OUTPATIENT
Start: 2024-03-11

## 2024-03-11 RX ORDER — HYDROCHLOROTHIAZIDE 25 MG/1
25 TABLET ORAL DAILY
Qty: 90 TABLET | Refills: 3 | Status: SHIPPED | OUTPATIENT
Start: 2024-03-11

## 2024-03-11 RX ORDER — LOSARTAN POTASSIUM 100 MG/1
100 TABLET ORAL DAILY
Qty: 90 TABLET | Refills: 3 | Status: SHIPPED | OUTPATIENT
Start: 2024-03-11

## 2024-03-11 NOTE — TELEPHONE ENCOUNTER
Has appointment scheduled for 3/19/24      Requested Prescriptions   Pending Prescriptions Disp Refills    hydrochlorothiazide (HYDRODIURIL) 25 MG tablet 90 tablet 3     Sig: Take 1 tablet (25 mg) by mouth daily       Diuretics (Including Combos) Protocol Failed - 3/11/2024 11:22 AM        Failed - Has GFR on file in past 12 months and most recent value is normal        Passed - Blood pressure under 140/90 in past 12 months     BP Readings from Last 3 Encounters:   07/27/23 115/50   07/12/23 136/76   12/27/22 (!) 142/70                 Passed - Medication is active on med list        Passed - Medication indicated for associated diagnosis     Medication is associated with one or more of the following diagnoses:     Edema   Hypertension   Heart Failure   Meniere's Disease          Passed - Recent (12 mo) or future (90 days) visit within the authorizing provider's specialty     The patient must have completed an in-person or virtual visit within the past 12 months or has a future visit scheduled within the next 90 days with the authorizing provider s specialty.  Urgent care and e-visits do not quality as an office visit for this protocol.          Passed - Patient is age 18 or older        Passed - No active pregancy on record        Passed - No positive pregnancy test in past 12 months          PARoxetine (PAXIL) 10 MG tablet 90 tablet 3     Sig: Take 1 tablet (10 mg) by mouth at bedtime       There is no refill protocol information for this order       metFORMIN (GLUCOPHAGE) 500 MG tablet 90 tablet 3     Sig: TAKE 1 TABLET BY MOUTH DAILY WITH DINNER       There is no refill protocol information for this order       losartan (COZAAR) 100 MG tablet 90 tablet 3     Sig: Take 1 tablet (100 mg) by mouth daily       There is no refill protocol information for this order

## 2024-03-12 ASSESSMENT — ASTHMA QUESTIONNAIRES
ACT_TOTALSCORE: 21
QUESTION_1 LAST FOUR WEEKS HOW MUCH OF THE TIME DID YOUR ASTHMA KEEP YOU FROM GETTING AS MUCH DONE AT WORK, SCHOOL OR AT HOME: A LITTLE OF THE TIME
ACT_TOTALSCORE: 21
QUESTION_4 LAST FOUR WEEKS HOW OFTEN HAVE YOU USED YOUR RESCUE INHALER OR NEBULIZER MEDICATION (SUCH AS ALBUTEROL): TWO OR THREE TIMES PER WEEK
QUESTION_2 LAST FOUR WEEKS HOW OFTEN HAVE YOU HAD SHORTNESS OF BREATH: NOT AT ALL
QUESTION_5 LAST FOUR WEEKS HOW WOULD YOU RATE YOUR ASTHMA CONTROL: WELL CONTROLLED
QUESTION_3 LAST FOUR WEEKS HOW OFTEN DID YOUR ASTHMA SYMPTOMS (WHEEZING, COUGHING, SHORTNESS OF BREATH, CHEST TIGHTNESS OR PAIN) WAKE YOU UP AT NIGHT OR EARLIER THAN USUAL IN THE MORNING: NOT AT ALL

## 2024-03-19 ENCOUNTER — LAB (OUTPATIENT)
Dept: LAB | Facility: CLINIC | Age: 79
End: 2024-03-19
Payer: COMMERCIAL

## 2024-03-19 ENCOUNTER — OFFICE VISIT (OUTPATIENT)
Dept: FAMILY MEDICINE | Facility: CLINIC | Age: 79
End: 2024-03-19
Payer: COMMERCIAL

## 2024-03-19 VITALS
SYSTOLIC BLOOD PRESSURE: 124 MMHG | TEMPERATURE: 97.5 F | OXYGEN SATURATION: 95 % | BODY MASS INDEX: 43.69 KG/M2 | WEIGHT: 288.25 LBS | HEIGHT: 68 IN | RESPIRATION RATE: 20 BRPM | HEART RATE: 61 BPM | DIASTOLIC BLOOD PRESSURE: 74 MMHG

## 2024-03-19 DIAGNOSIS — I10 ESSENTIAL HYPERTENSION: ICD-10-CM

## 2024-03-19 DIAGNOSIS — G47.00 PERSISTENT INSOMNIA: ICD-10-CM

## 2024-03-19 DIAGNOSIS — Z79.899 MEDICATION MANAGEMENT: ICD-10-CM

## 2024-03-19 DIAGNOSIS — J01.90 ACUTE SINUSITIS WITH SYMPTOMS > 10 DAYS: ICD-10-CM

## 2024-03-19 DIAGNOSIS — Z79.4 TYPE 2 DIABETES MELLITUS WITH STAGE 3A CHRONIC KIDNEY DISEASE, WITH LONG-TERM CURRENT USE OF INSULIN (H): ICD-10-CM

## 2024-03-19 DIAGNOSIS — J45.20 MILD INTERMITTENT ASTHMA WITHOUT COMPLICATION: ICD-10-CM

## 2024-03-19 DIAGNOSIS — N18.31 TYPE 2 DIABETES MELLITUS WITH STAGE 3A CHRONIC KIDNEY DISEASE, WITH LONG-TERM CURRENT USE OF INSULIN (H): ICD-10-CM

## 2024-03-19 DIAGNOSIS — E66.01 MORBID OBESITY (H): ICD-10-CM

## 2024-03-19 DIAGNOSIS — R42 DIZZINESS: ICD-10-CM

## 2024-03-19 DIAGNOSIS — Z00.00 ENCOUNTER FOR MEDICARE ANNUAL WELLNESS EXAM: Primary | ICD-10-CM

## 2024-03-19 DIAGNOSIS — I48.0 PAROXYSMAL ATRIAL FIBRILLATION (H): ICD-10-CM

## 2024-03-19 DIAGNOSIS — E11.9 TYPE 2 DIABETES MELLITUS WITHOUT COMPLICATION, WITHOUT LONG-TERM CURRENT USE OF INSULIN (H): ICD-10-CM

## 2024-03-19 DIAGNOSIS — E11.22 TYPE 2 DIABETES MELLITUS WITH STAGE 3A CHRONIC KIDNEY DISEASE, WITH LONG-TERM CURRENT USE OF INSULIN (H): ICD-10-CM

## 2024-03-19 DIAGNOSIS — N18.31 STAGE 3A CHRONIC KIDNEY DISEASE (H): ICD-10-CM

## 2024-03-19 LAB
ALBUMIN SERPL BCG-MCNC: 3.9 G/DL (ref 3.5–5.2)
ALP SERPL-CCNC: 74 U/L (ref 40–150)
ALT SERPL W P-5'-P-CCNC: 26 U/L (ref 0–50)
ANION GAP SERPL CALCULATED.3IONS-SCNC: 10 MMOL/L (ref 7–15)
AST SERPL W P-5'-P-CCNC: 29 U/L (ref 0–45)
BILIRUB SERPL-MCNC: 0.4 MG/DL
BUN SERPL-MCNC: 26.8 MG/DL (ref 8–23)
CALCIUM SERPL-MCNC: 9.6 MG/DL (ref 8.8–10.2)
CHLORIDE SERPL-SCNC: 99 MMOL/L (ref 98–107)
CHOLEST SERPL-MCNC: 205 MG/DL
CREAT SERPL-MCNC: 1.23 MG/DL (ref 0.51–0.95)
CREAT UR-MCNC: 156.5 MG/DL
DEPRECATED HCO3 PLAS-SCNC: 30 MMOL/L (ref 22–29)
EGFRCR SERPLBLD CKD-EPI 2021: 45 ML/MIN/1.73M2
ERYTHROCYTE [DISTWIDTH] IN BLOOD BY AUTOMATED COUNT: 15.3 % (ref 10–15)
FASTING STATUS PATIENT QL REPORTED: YES
GLUCOSE SERPL-MCNC: 117 MG/DL (ref 70–99)
HBA1C MFR BLD: 6 % (ref 0–5.6)
HCT VFR BLD AUTO: 43 % (ref 35–47)
HDLC SERPL-MCNC: 53 MG/DL
HGB BLD-MCNC: 13.4 G/DL (ref 11.7–15.7)
LDLC SERPL CALC-MCNC: 133 MG/DL
MCH RBC QN AUTO: 28.3 PG (ref 26.5–33)
MCHC RBC AUTO-ENTMCNC: 31.2 G/DL (ref 31.5–36.5)
MCV RBC AUTO: 91 FL (ref 78–100)
MICROALBUMIN UR-MCNC: 22.1 MG/L
MICROALBUMIN/CREAT UR: 14.12 MG/G CR (ref 0–25)
NONHDLC SERPL-MCNC: 152 MG/DL
PLATELET # BLD AUTO: 243 10E3/UL (ref 150–450)
POTASSIUM SERPL-SCNC: 4.3 MMOL/L (ref 3.4–5.3)
PROT SERPL-MCNC: 7.3 G/DL (ref 6.4–8.3)
RBC # BLD AUTO: 4.73 10E6/UL (ref 3.8–5.2)
SODIUM SERPL-SCNC: 139 MMOL/L (ref 135–145)
TRIGL SERPL-MCNC: 95 MG/DL
VIT B12 SERPL-MCNC: 320 PG/ML (ref 232–1245)
WBC # BLD AUTO: 6.4 10E3/UL (ref 4–11)

## 2024-03-19 PROCEDURE — 82043 UR ALBUMIN QUANTITATIVE: CPT

## 2024-03-19 PROCEDURE — 83036 HEMOGLOBIN GLYCOSYLATED A1C: CPT

## 2024-03-19 PROCEDURE — 99213 OFFICE O/P EST LOW 20 MIN: CPT | Mod: 25 | Performed by: FAMILY MEDICINE

## 2024-03-19 PROCEDURE — 99207 PR FOOT EXAM NO CHARGE: CPT | Performed by: FAMILY MEDICINE

## 2024-03-19 PROCEDURE — 82607 VITAMIN B-12: CPT

## 2024-03-19 PROCEDURE — G0439 PPPS, SUBSEQ VISIT: HCPCS | Performed by: FAMILY MEDICINE

## 2024-03-19 PROCEDURE — 85027 COMPLETE CBC AUTOMATED: CPT

## 2024-03-19 PROCEDURE — 80053 COMPREHEN METABOLIC PANEL: CPT

## 2024-03-19 PROCEDURE — 36415 COLL VENOUS BLD VENIPUNCTURE: CPT

## 2024-03-19 PROCEDURE — 82570 ASSAY OF URINE CREATININE: CPT

## 2024-03-19 PROCEDURE — 80061 LIPID PANEL: CPT

## 2024-03-19 RX ORDER — ATORVASTATIN CALCIUM 10 MG/1
10 TABLET, FILM COATED ORAL DAILY
Qty: 90 TABLET | Refills: 3 | Status: SHIPPED | OUTPATIENT
Start: 2024-03-19 | End: 2024-03-20 | Stop reason: DRUGHIGH

## 2024-03-19 RX ORDER — TRAZODONE HYDROCHLORIDE 50 MG/1
TABLET, FILM COATED ORAL
Qty: 90 TABLET | Refills: 3 | Status: SHIPPED | OUTPATIENT
Start: 2024-03-19 | End: 2024-06-20

## 2024-03-19 RX ORDER — LEVALBUTEROL TARTRATE 45 UG/1
AEROSOL, METERED ORAL
Qty: 15 G | Refills: 3 | Status: SHIPPED | OUTPATIENT
Start: 2024-03-19

## 2024-03-19 ASSESSMENT — PAIN SCALES - GENERAL: PAINLEVEL: NO PAIN (0)

## 2024-03-19 ASSESSMENT — ACTIVITIES OF DAILY LIVING (ADL): CURRENT_FUNCTION: NO ASSISTANCE NEEDED

## 2024-03-19 NOTE — PATIENT INSTRUCTIONS
Preventive Care Advice   This is general advice given by our system to help you stay healthy. However, your care team may have specific advice just for you. Please talk to your care team about your preventive care needs.  Nutrition  Eat 5 or more servings of fruits and vegetables each day.  Try wheat bread, brown rice and whole grain pasta (instead of white bread, rice, and pasta).  Get enough calcium and vitamin D. Check the label on foods and aim for 100% of the RDA (recommended daily allowance).  Lifestyle  Exercise at least 150 minutes each week   (30 minutes a day, 5 days a week).  Do muscle strengthening activities 2 days a week. These help control your weight and prevent disease.  No smoking.  Wear sunscreen to prevent skin cancer.  Have a dental exam and cleaning every 6 months.  Yearly exams  See your health care team every year to talk about:  Any changes in your health.  Any medicines your care team has prescribed.  Preventive care, family planning, and ways to prevent chronic diseases.  Shots (vaccines)   HPV shots (up to age 26), if you've never had them before.  Hepatitis B shots (up to age 59), if you've never had them before.  COVID-19 shot: Get this shot when it's due.  Flu shot: Get a flu shot every year.  Tetanus shot: Get a tetanus shot every 10 years.  Pneumococcal, hepatitis A, and RSV shots: Ask your care team if you need these based on your risk.  Shingles shot (for age 50 and up).  General health tests  Diabetes screening:  Starting at age 35, Get screened for diabetes at least every 3 years.  If you are younger than age 35, ask your care team if you should be screened for diabetes.  Cholesterol test: At age 39, start having a cholesterol test every 5 years, or more often if advised.  Bone density scan (DEXA): At age 50, ask your care team if you should have this scan for osteoporosis (brittle bones).  Hepatitis C: Get tested at least once in your life.  STIs (sexually transmitted  infections)  Before age 24: Ask your care team if you should be screened for STIs.  After age 24: Get screened for STIs if you're at risk. You are at risk for STIs (including HIV) if:  You are sexually active with more than one person.  You don't use condoms every time.  You or a partner was diagnosed with a sexually transmitted infection.  If you are at risk for HIV, ask about PrEP medicine to prevent HIV.  Get tested for HIV at least once in your life, whether you are at risk for HIV or not.  Cancer screening tests  Cervical cancer screening: If you have a cervix, begin getting regular cervical cancer screening tests at age 21. Most people who have regular screenings with normal results can stop after age 65. Talk about this with your provider.  Breast cancer scan (mammogram): If you've ever had breasts, begin having regular mammograms starting at age 40. This is a scan to check for breast cancer.  Colon cancer screening: It is important to start screening for colon cancer at age 45.  Have a colonoscopy test every 10 years (or more often if you're at risk) Or, ask your provider about stool tests like a FIT test every year or Cologuard test every 3 years.  To learn more about your testing options, visit: https://www.[employer]/379746.pdf.  For help making a decision, visit: https://bit.ly/wq67301.  Prostate cancer screening test: If you have a prostate and are age 55 to 69, ask your provider if you would benefit from a yearly prostate cancer screening test.  Lung cancer screening: If you are a current or former smoker age 50 to 80, ask your care team if ongoing lung cancer screenings are right for you.  For informational purposes only. Not to replace the advice of your health care provider. Copyright   2023 [employer]. All rights reserved. Clinically reviewed by the [facility] Transitions Program. [employer] 555987 - REV 01/24.    Preventing Falls: Care Instructions  Injuries and health  problems such as trouble walking or poor eyesight can increase your risk of falling. So can some medicines. But there are things you can do to help prevent falls. You can exercise to get stronger. You can also arrange your home to make it safer.    Talk to your doctor about the medicines you take. Ask if any of them increase the risk of falls and whether they can be changed or stopped.   Try to exercise regularly. It can help improve your strength and balance. This can help lower your risk of falling.     Practice fall safety and prevention.    Wear low-heeled shoes that fit well and give your feet good support. Talk to your doctor if you have foot problems that make this hard.  Carry a cellphone or wear a medical alert device that you can use to call for help.  Use stepladders instead of chairs to reach high objects. Don't climb if you're at risk for falls. Ask for help, if needed.  Wear the correct eyeglasses, if you need them.    Make your home safer.    Remove rugs, cords, clutter, and furniture from walkways.  Keep your house well lit. Use night-lights in hallways and bathrooms.  Install and use sturdy handrails on stairways.  Wear nonskid footwear, even inside. Don't walk barefoot or in socks without shoes.    Be safe outside.    Use handrails, curb cuts, and ramps whenever possible.  Keep your hands free by using a shoulder bag or backpack.  Try to walk in well-lit areas. Watch out for uneven ground, changes in pavement, and debris.  Be careful in the winter. Walk on the grass or gravel when sidewalks are slippery. Use de-icer on steps and walkways. Add non-slip devices to shoes.    Put grab bars and nonskid mats in your shower or tub and near the toilet. Try to use a shower chair or bath bench when bathing.   Get into a tub or shower by putting in your weaker leg first. Get out with your strong side first. Have a phone or medical alert device in the bathroom with you.   Where can you learn more?  Go to  "https://www.[employer].net/patiented  Enter G117 in the search box to learn more about \"Preventing Falls: Care Instructions.\"  Current as of: July 17, 2023               Content Version: 14.0    5268-4881 [employer].   Care instructions adapted under license by your healthcare professional. If you have questions about a medical condition or this instruction, always ask your healthcare professional. [employer] disclaims any warranty or liability for your use of this information.      Hearing Loss: Care Instructions  Overview     Hearing loss is a sudden or slow decrease in how well you hear. It can range from slight to profound. Permanent hearing loss can occur with aging. It also can happen when you are exposed long-term to loud noise. Examples include listening to loud music, riding motorcycles, or being around other loud machines.  Hearing loss can affect your work and home life. It can make you feel lonely or depressed. You may feel that you have lost your independence. But hearing aids and other devices can help you hear better and feel connected to others.  Follow-up care is a key part of your treatment and safety. Be sure to make and go to all appointments, and call your doctor if you are having problems. It's also a good idea to know your test results and keep a list of the medicines you take.  How can you care for yourself at home?  Avoid loud noises whenever possible. This helps keep your hearing from getting worse.  Always wear hearing protection around loud noises.  Wear a hearing aid as directed.  A professional can help you pick a hearing aid that will work best for you.  You can also get hearing aids over the counter for mild to moderate hearing loss.  Have hearing tests as your doctor suggests. They can show whether your hearing has changed. Your hearing aid may need to be adjusted.  Use other devices as needed. These may include:  Telephone amplifiers and hearing aids that " "can connect to a television, stereo, radio, or microphone.  Devices that use lights or vibrations. These alert you to the doorbell, a ringing telephone, or a baby monitor.  Television closed-captioning. This shows the words at the bottom of the screen. Most new TVs can do this.  TTY (text telephone). This lets you type messages back and forth on the telephone instead of talking or listening. These devices are also called TDD. When messages are typed on the keyboard, they are sent over the phone line to a receiving TTY. The message is shown on a monitor.  Use text messaging, social media, and email if it is hard for you to communicate by telephone.  Try to learn a listening technique called speechreading. It is not lipreading. You pay attention to people's gestures, expressions, posture, and tone of voice. These clues can help you understand what a person is saying. Face the person you are talking to, and have them face you. Make sure the lighting is good. You need to see the other person's face clearly.  Think about counseling if you need help to adjust to your hearing loss.  When should you call for help?  Watch closely for changes in your health, and be sure to contact your doctor if:    You think your hearing is getting worse.     You have new symptoms, such as dizziness or nausea.   Where can you learn more?  Go to https://www.[employer].net/patiented  Enter R798 in the search box to learn more about \"Hearing Loss: Care Instructions.\"  Current as of: September 27, 2023               Content Version: 14.0    1340-6703 [employer].   Care instructions adapted under license by your healthcare professional. If you have questions about a medical condition or this instruction, always ask your healthcare professional. [employer] disclaims any warranty or liability for your use of this information.      "

## 2024-03-19 NOTE — PROGRESS NOTES
"  Assessment & Plan     Encounter for Medicare annual wellness exam       Type 2 diabetes mellitus with stage 3a chronic kidney disease, with long-term current use of insulin (H)  Control is good  From a cardiac standpoint, a SGLT2i would be a good idea, she wants to discuss this with cardiology  - atorvastatin (LIPITOR) 10 MG tablet; Take 1 tablet (10 mg) by mouth daily    Persistent insomnia     - traZODone (DESYREL) 50 MG tablet; TAKE 1 TABLET AT BEDTIME    Mild intermittent asthma without complication     - XOPENEX HFA 45 MCG/ACT inhaler; INHALE 2 PUFFS INTO THE LUNGS EVERY 8 HOURS AS NEEDED FOR SHORTNESS OF BREATH OR DIFFICULT BREATHING Strength: 45 MCG/ACT    Medication management       Morbid obesity (H)  Contributes to overall risk    Stage 3a chronic kidney disease (H)       Paroxysmal atrial fibrillation (H)       Essential hypertension  Question if at times she is hypotensive or bradycardic   Has follow up with cardiology soon    Dizziness  See above  Doesn't seem to be BPPV, ?if hypotensive or bradycardic  Patient will monitor blood pressure and HR more frequently    - CBC with platelets; Future    Acute sinusitis with symptoms > 10 days   Foul post nasal drainage, facial pressure  - amoxicillin-clavulanate (AUGMENTIN) 875-125 MG tablet; Take 1 tablet by mouth 2 times daily for 7 days            BMI  Estimated body mass index is 44.19 kg/m  as calculated from the following:    Height as of this encounter: 1.72 m (5' 7.72\").    Weight as of this encounter: 130.7 kg (288 lb 4 oz).             Subjective   PAT is a 78 year old, presenting for the following health issues:  Diabetes        3/19/2024     7:47 AM   Additional Questions   Roomed by Анна LEWIS CMA   Accompanied by Self       Diabetes Follow-up  Metformin 500mg every day    Hypertension Follow-up  Hydrochlorothiazide 25mg every day, losartan 100mg every day, metoprolol 50mg every day    Hyperlipidemia Follow-Up  Atorvastatin 10mg every day - she ran " out of this last week  Are you regularly taking any medication or supplement to lower your cholesterol?   Yes- statin  Are you having muscle aches or other side effects that you think could be caused by your cholesterol lowering medication?  No    Asthma Follow-Up  Advair 250-50mcg/ACT bid, xopenex HFA 45mcg/ACT 2 puffs q8h prn    History of Present Illness     Asthma:  She presents for follow up of asthma.  She has no cough, no wheezing, and some shortness of breath.  She is using a relief medication a few times a week. She does not have a controller medication. Patient is aware of the following triggers: same as previous visit, cold air, pollens, smoke, strong odors and fumes and upper respiratory infections. The patient has not had a visit to the Emergency Room, Urgent Care or Hospital due to asthma since the last clinic visit.     Back Pain:  She presents for follow up of back pain. Patient's back pain is a recurring problem.  Location of back pain:  Right lower back and left lower back  Description of back pain: dull ache and gnawing  Back pain spreads: nowhere    Since patient first noticed back pain, pain is: always present, but gets better and worse  Does back pain interfere with her job:  Not applicable       CKD: She uses over the counter pain medication, including Tylenol, a few times a week.    Diabetes:   She presents for follow up of diabetes.  She is checking home blood glucose a few times a month.   She checks blood glucose before meals.  Blood glucose is never over 200 and never under 70. She is aware of hypoglycemia symptoms including shakiness and weakness.    She has no concerns regarding her diabetes at this time.   She is not experiencing numbness or burning in feet, excessive thirst, blurry vision, weight changes or redness, sores or blisters on feet.           Hypertension: She presents for follow up of hypertension.  She does not check blood pressure  regularly outside of the clinic.  "Outpatient blood pressures have not been over 140/90. She does not follow a low salt diet.     She eats 2-3 servings of fruits and vegetables daily.She consumes 1 sweetened beverage(s) daily.She exercises with enough effort to increase her heart rate 9 or less minutes per day.  She exercises with enough effort to increase her heart rate 5 days per week. She is missing 1 dose(s) of medications per week.  She is not taking prescribed medications regularly due to remembering to take.  Healthy Habits:     In general, how would you rate your overall health?  Good    Frequency of exercise:  None    Duration of exercise:  Other    Do you usually eat at least 4 servings of fruit and vegetables a day, include whole grains    & fiber and avoid regularly eating high fat or \"junk\" foods?  Yes    Taking medications regularly:  1    Barriers to taking medications:  Remembering to take    Medication side effects:  Other    Ability to successfully perform activities of daily living:  No assistance needed    Home Safety:  No safety concerns identified    Hearing Impairment:  Difficulty following a conversation in a noisy restaurant or crowded room, feel that people are mumbling or not speaking clearly, need to ask people to speak up or repeat themselves and difficulty understanding soft or whispered speech    In the past 6 months, have you been bothered by leaking of urine?  No    In general, how would you rate your overall mental or emotional health?  Good    Additional concerns today:  Yes       Dizzy lately.  Feels \"off\" when walking \"like I'm drunk or something\".   It's there a little bit every day.   Feels like she wants to fall backwards  No true vertigo  Balance feels off.   Last few months is when she's been more aware of this  Had one fall but it was more of a mechanical fall.     Doesn't feel she's getting good sleep.  Often times can't fall asleep until 2-3 am.            BP Readings from Last 2 Encounters:   03/19/24 " "124/74   23 115/50     Hemoglobin A1C (%)   Date Value   2024 6.0 (H)   2023 6.1 (H)   10/01/2020 6.3 (H)   2019 6.0 (H)     LDL Cholesterol Calculated (mg/dL)   Date Value   10/13/2022 74   2021 58   10/01/2020 48   2019 53       Depression and Anxiety   Paxil 10mg qhs  How are you doing with your depression since your last visit? No change  How are you doing with your anxiety since your last visit?  No change  Are you having other symptoms that might be associated with depression or anxiety? No  Have you had a significant life event? No   Do you have any concerns with your use of alcohol or other drugs? No    Social History     Tobacco Use    Smoking status: Former     Packs/day: 0     Types: Cigarettes     Quit date: 1981     Years since quittin.8    Smokeless tobacco: Never   Substance Use Topics    Alcohol use: Yes     Comment: Alcoholic Drinks/day: Rare (2-3 month)    Drug use: No         3/21/2019    10:39 AM 10/1/2020     3:52 PM 2023     3:19 PM   PHQ   PHQ-9 Total Score 0 7 8   Q9: Thoughts of better off dead/self-harm past 2 weeks Not at all Not at all Not at all         10/1/2020     3:54 PM   SURINDER-7 SCORE   Total Score 16         Suicide Assessment Five-step Evaluation and Treatment (SAFE-T)        Review of Systems  Constitutional, HEENT, cardiovascular, pulmonary, gi and gu systems are negative, except as otherwise noted.      Objective    /74   Pulse 61   Temp 97.5  F (36.4  C) (Tympanic)   Resp 20   Ht 1.72 m (5' 7.72\")   Wt 130.7 kg (288 lb 4 oz)   LMP  (LMP Unknown)   SpO2 95%   BMI 44.19 kg/m    Body mass index is 44.19 kg/m .  Physical Exam   GENERAL: alert and no distress  NECK: no adenopathy, no asymmetry, masses, or scars  RESP: lungs clear to auscultation - no rales, rhonchi or wheezes  CV: regular rates and rhythm, normal S1 S2, no S3 or S4, grade 2/6 systolic murmur heard best over the rusb, peripheral pulses strong, and no " peripheral edema  ABDOMEN: soft, nontender, no hepatosplenomegaly, no masses and bowel sounds normal  MS: no gross musculoskeletal defects noted, no edema    Results for orders placed or performed in visit on 03/19/24   Hemoglobin A1c     Status: Abnormal   Result Value Ref Range    Hemoglobin A1C 6.0 (H) 0.0 - 5.6 %             Signed Electronically by: Adeola Stockton MD

## 2024-03-19 NOTE — LETTER
March 25, 2024      [place]  [address] [facility] [place] [id number]        Dear ,    We are writing to inform you of your test results.    Test results indicate you may require additional follow up, see comment below.    Resulted Orders   Lipid panel reflex to direct LDL Fasting   Result Value Ref Range    Cholesterol 205 (H) <200 mg/dL    Triglycerides 95 <150 mg/dL    Direct Measure HDL 53 >=50 mg/dL    LDL Cholesterol Calculated 133 (H) <=100 mg/dL    Non HDL Cholesterol 152 (H) <130 mg/dL    Patient Fasting > 8hrs? Yes     Narrative    Cholesterol  Desirable:  <200 mg/dL    Triglycerides  Normal:  Less than 150 mg/dL  Borderline High:  150-199 mg/dL  High:  200-499 mg/dL  Very High:  Greater than or equal to 500 mg/dL    Direct Measure HDL  Female:  Greater than or equal to 50 mg/dL   Male:  Greater than or equal to 40 mg/dL    LDL Cholesterol  Desirable:  <100mg/dL  Above Desirable:  100-129 mg/dL   Borderline High:  130-159 mg/dL   High:  160-189 mg/dL   Very High:  >= 190 mg/dL    Non HDL Cholesterol  Desirable:  130 mg/dL  Above Desirable:  130-159 mg/dL  Borderline High:  160-189 mg/dL  High:  190-219 mg/dL  Very High:  Greater than or equal to 220 mg/dL   Vitamin B12   Result Value Ref Range    Vitamin B12 320 232 - 1,245 pg/mL     Pat-     Kidney function overall pretty stable from July.  Again, I'd like you to talk to your cardiologist about the SGLT2 inhibitors as they are recommended given your kidney disease.       Cholesterol is above goal (LDL goal is <100).  I would like to increase your Atorvastatin to 20 mg daily.  New prescription sent to pharmacy.     B12 is low.  You should be taking 1000 mcg of oral B12 daily.    Adeola Stockton M.D.  If you have any questions or concerns, please call the clinic at the number listed above.       Sincerely,      Adeola Stockton MD

## 2024-03-20 ENCOUNTER — MYC MEDICAL ADVICE (OUTPATIENT)
Dept: FAMILY MEDICINE | Facility: CLINIC | Age: 79
End: 2024-03-20
Payer: COMMERCIAL

## 2024-03-20 DIAGNOSIS — E78.5 HYPERLIPIDEMIA LDL GOAL <100: Primary | ICD-10-CM

## 2024-03-20 DIAGNOSIS — J01.90 ACUTE SINUSITIS WITH SYMPTOMS > 10 DAYS: ICD-10-CM

## 2024-03-20 RX ORDER — ATORVASTATIN CALCIUM 20 MG/1
20 TABLET, FILM COATED ORAL DAILY
Qty: 100 TABLET | Refills: 3 | Status: SHIPPED | OUTPATIENT
Start: 2024-03-20

## 2024-03-20 NOTE — RESULT ENCOUNTER NOTE
Pat-    Kidney function overall pretty stable from July.  Again, I'd like you to talk to your cardiologist about the SGLT2 inhibitors as they are recommended given your kidney disease.      Cholesterol is above goal (LDL goal is <100).  I would like to increase your Atorvastatin to 20 mg daily.  New prescription sent to pharmacy.    B12 is low.  You should be taking 1000 mcg of oral B12 daily.    Adeola Stockton M.D.

## 2024-03-21 NOTE — TELEPHONE ENCOUNTER
Talked with patient.  She had not yet started antibiotic as I had sent that to the mail order pharmacy.  She will pick that up from [facility] in [place] now.    That would likely treat bladder infection.     If by Monday still having symptoms, will have her leave a urinalysis.    Adeola Stockton M.D.

## 2024-03-21 NOTE — TELEPHONE ENCOUNTER
Patient message that you indicated she has UTI.   Now having burning and frequency.   Requesting antibiotic.   Currently on Augmentin for sinus.     Lizzie Turner RN on 3/21/2024 at 2:48 PM

## 2024-03-21 NOTE — TELEPHONE ENCOUNTER
Patient [facility] to obtain faster.     Re-sent    See my chart message    Lizzie Turner RN on 3/21/2024 at 9:30 AM

## 2024-03-25 ENCOUNTER — TELEPHONE (OUTPATIENT)
Dept: FAMILY MEDICINE | Facility: CLINIC | Age: 79
End: 2024-03-25
Payer: COMMERCIAL

## 2024-03-25 NOTE — TELEPHONE ENCOUNTER
Prior authorization - patient uses xopenex (levalbuterol) because she has had problems with her heart rate and regular albuterol in the past.     Aedola Stockton M.D.

## 2024-03-25 NOTE — TELEPHONE ENCOUNTER
Per fax received from [facility]  - Xopenex HFA 45 mcg/ACT AER LUPI is not covered by patient's Insurance Company  Dr. Stockton - Please choose:  1.  Change medication that is not covered to a different medication and send new prescription to patient's pharmacy?  2.  Patient will need to pay for the non-covered medication out-of-pocket?   3.  Try for Prior Authorization with Insurance Company to get medication covered?                 Evelyn Torrez on 3/25/2024 at 10:20 AM

## 2024-04-04 ENCOUNTER — E-VISIT (OUTPATIENT)
Dept: FAMILY MEDICINE | Facility: CLINIC | Age: 79
End: 2024-04-04
Payer: COMMERCIAL

## 2024-04-04 ENCOUNTER — LAB (OUTPATIENT)
Dept: LAB | Facility: CLINIC | Age: 79
End: 2024-04-04
Payer: COMMERCIAL

## 2024-04-04 DIAGNOSIS — N39.0 ACUTE UTI (URINARY TRACT INFECTION): ICD-10-CM

## 2024-04-04 DIAGNOSIS — N89.8 VAGINAL DISCHARGE: ICD-10-CM

## 2024-04-04 DIAGNOSIS — N39.0 ACUTE UTI (URINARY TRACT INFECTION): Primary | ICD-10-CM

## 2024-04-04 LAB
BACTERIA #/AREA URNS HPF: ABNORMAL /HPF
CLUE CELLS: ABNORMAL
RBC #/AREA URNS AUTO: ABNORMAL /HPF
SQUAMOUS #/AREA URNS AUTO: ABNORMAL /LPF
TRICHOMONAS, WET PREP: ABNORMAL
WBC #/AREA URNS AUTO: ABNORMAL /HPF
WBC CLUMPS #/AREA URNS HPF: PRESENT /HPF
WBC'S/HIGH POWER FIELD, WET PREP: ABNORMAL
YEAST, WET PREP: ABNORMAL

## 2024-04-04 PROCEDURE — 87086 URINE CULTURE/COLONY COUNT: CPT

## 2024-04-04 PROCEDURE — 87210 SMEAR WET MOUNT SALINE/INK: CPT

## 2024-04-04 PROCEDURE — 99421 OL DIG E/M SVC 5-10 MIN: CPT | Performed by: FAMILY MEDICINE

## 2024-04-04 PROCEDURE — 81015 MICROSCOPIC EXAM OF URINE: CPT

## 2024-04-04 PROCEDURE — 87186 SC STD MICRODIL/AGAR DIL: CPT

## 2024-04-04 RX ORDER — SULFAMETHOXAZOLE/TRIMETHOPRIM 800-160 MG
1 TABLET ORAL 2 TIMES DAILY
Qty: 6 TABLET | Refills: 0 | Status: SHIPPED | OUTPATIENT
Start: 2024-04-04 | End: 2024-04-07

## 2024-04-04 NOTE — TELEPHONE ENCOUNTER
Retail Pharmacy Prior Authorization Team   Phone: [phone]    PA Initiation    Medication: XOPENEX HFA 45 MCG/ACT IN AERO  Insurance Company: [employer] - Phone [phone] Fax [phone]  Pharmacy Filling the Rx: [id number] [place] [place] - [address]  Filling Pharmacy Phone: [phone]  Filling Pharmacy Fax:    Start Date: 4/4/2024

## 2024-04-05 LAB — BACTERIA UR CULT: ABNORMAL

## 2024-04-05 NOTE — TELEPHONE ENCOUNTER
Retail Pharmacy Prior Authorization Team   Phone: [phone]    Additional information requested by [employer].   Faxed QS:

## 2024-04-08 NOTE — TELEPHONE ENCOUNTER
Prior Authorization Approval    Medication: XOPENEX HFA 45 MCG/ACT IN AERO  Authorization Effective Date: 4/7/2024  Authorization Expiration Date: 4/7/2025  Approved Dose/Quantity:   Reference #:     Insurance Company: [employer] - Phone [phone] Fax [phone]  Expected CoPay: $    CoPay Card Available:      Financial Assistance Needed:   Which Pharmacy is filling the prescription: [id number] - [place] [address]  Pharmacy Notified: Yes  Patient Notified: **Instructed pharmacy to notify patient when script is ready to /ship.**

## 2024-05-08 ENCOUNTER — APPOINTMENT (OUTPATIENT)
Dept: GENERAL RADIOLOGY | Facility: CLINIC | Age: 79
End: 2024-05-08
Attending: STUDENT IN AN ORGANIZED HEALTH CARE EDUCATION/TRAINING PROGRAM
Payer: COMMERCIAL

## 2024-05-08 ENCOUNTER — HOSPITAL ENCOUNTER (EMERGENCY)
Facility: CLINIC | Age: 79
Discharge: HOME | End: 2024-05-08
Attending: STUDENT IN AN ORGANIZED HEALTH CARE EDUCATION/TRAINING PROGRAM | Admitting: STUDENT IN AN ORGANIZED HEALTH CARE EDUCATION/TRAINING PROGRAM
Payer: COMMERCIAL

## 2024-05-08 VITALS
SYSTOLIC BLOOD PRESSURE: 123 MMHG | RESPIRATION RATE: 22 BRPM | DIASTOLIC BLOOD PRESSURE: 79 MMHG | BODY MASS INDEX: 43.19 KG/M2 | OXYGEN SATURATION: 97 % | HEIGHT: 69 IN | HEART RATE: 59 BPM | TEMPERATURE: 99.4 F

## 2024-05-08 DIAGNOSIS — J18.9 PNEUMONIA OF LEFT LUNG DUE TO INFECTIOUS ORGANISM, UNSPECIFIED PART OF LUNG: ICD-10-CM

## 2024-05-08 DIAGNOSIS — J45.901 ASTHMA WITH ACUTE EXACERBATION, UNSPECIFIED ASTHMA SEVERITY, UNSPECIFIED WHETHER PERSISTENT: ICD-10-CM

## 2024-05-08 LAB
ANION GAP SERPL CALCULATED.3IONS-SCNC: 11 MMOL/L (ref 7–15)
BASOPHILS # BLD AUTO: 0 10E3/UL (ref 0–0.2)
BASOPHILS NFR BLD AUTO: 0 %
BUN SERPL-MCNC: 19.3 MG/DL (ref 8–23)
CALCIUM SERPL-MCNC: 8.9 MG/DL (ref 8.8–10.2)
CHLORIDE SERPL-SCNC: 99 MMOL/L (ref 98–107)
CREAT SERPL-MCNC: 1.13 MG/DL (ref 0.51–0.95)
DEPRECATED HCO3 PLAS-SCNC: 26 MMOL/L (ref 22–29)
EGFRCR SERPLBLD CKD-EPI 2021: 50 ML/MIN/1.73M2
EOSINOPHIL # BLD AUTO: 0.1 10E3/UL (ref 0–0.7)
EOSINOPHIL NFR BLD AUTO: 2 %
ERYTHROCYTE [DISTWIDTH] IN BLOOD BY AUTOMATED COUNT: 15.5 % (ref 10–15)
FLUAV RNA SPEC QL NAA+PROBE: NEGATIVE
FLUBV RNA RESP QL NAA+PROBE: NEGATIVE
GLUCOSE SERPL-MCNC: 114 MG/DL (ref 70–99)
HCT VFR BLD AUTO: 37.7 % (ref 35–47)
HGB BLD-MCNC: 12.2 G/DL (ref 11.7–15.7)
HOLD SPECIMEN: NORMAL
IMM GRANULOCYTES # BLD: 0 10E3/UL
IMM GRANULOCYTES NFR BLD: 0 %
LYMPHOCYTES # BLD AUTO: 1.1 10E3/UL (ref 0.8–5.3)
LYMPHOCYTES NFR BLD AUTO: 16 %
MCH RBC QN AUTO: 29.3 PG (ref 26.5–33)
MCHC RBC AUTO-ENTMCNC: 32.4 G/DL (ref 31.5–36.5)
MCV RBC AUTO: 90 FL (ref 78–100)
MONOCYTES # BLD AUTO: 1 10E3/UL (ref 0–1.3)
MONOCYTES NFR BLD AUTO: 15 %
NEUTROPHILS # BLD AUTO: 4.4 10E3/UL (ref 1.6–8.3)
NEUTROPHILS NFR BLD AUTO: 67 %
NRBC # BLD AUTO: 0 10E3/UL
NRBC BLD AUTO-RTO: 0 /100
PLATELET # BLD AUTO: 186 10E3/UL (ref 150–450)
POTASSIUM SERPL-SCNC: 4.1 MMOL/L (ref 3.4–5.3)
RBC # BLD AUTO: 4.17 10E6/UL (ref 3.8–5.2)
RSV RNA SPEC NAA+PROBE: NEGATIVE
SARS-COV-2 RNA RESP QL NAA+PROBE: NEGATIVE
SODIUM SERPL-SCNC: 136 MMOL/L (ref 135–145)
WBC # BLD AUTO: 6.5 10E3/UL (ref 4–11)

## 2024-05-08 PROCEDURE — 250N000009 HC RX 250: Performed by: STUDENT IN AN ORGANIZED HEALTH CARE EDUCATION/TRAINING PROGRAM

## 2024-05-08 PROCEDURE — 250N000012 HC RX MED GY IP 250 OP 636 PS 637: Performed by: STUDENT IN AN ORGANIZED HEALTH CARE EDUCATION/TRAINING PROGRAM

## 2024-05-08 PROCEDURE — 99285 EMERGENCY DEPT VISIT HI MDM: CPT | Mod: 25 | Performed by: STUDENT IN AN ORGANIZED HEALTH CARE EDUCATION/TRAINING PROGRAM

## 2024-05-08 PROCEDURE — 93005 ELECTROCARDIOGRAM TRACING: CPT | Performed by: STUDENT IN AN ORGANIZED HEALTH CARE EDUCATION/TRAINING PROGRAM

## 2024-05-08 PROCEDURE — 94640 AIRWAY INHALATION TREATMENT: CPT

## 2024-05-08 PROCEDURE — 999N000157 HC STATISTIC RCP TIME EA 10 MIN

## 2024-05-08 PROCEDURE — 71046 X-RAY EXAM CHEST 2 VIEWS: CPT

## 2024-05-08 PROCEDURE — 85025 COMPLETE CBC W/AUTO DIFF WBC: CPT | Performed by: STUDENT IN AN ORGANIZED HEALTH CARE EDUCATION/TRAINING PROGRAM

## 2024-05-08 PROCEDURE — 93010 ELECTROCARDIOGRAM REPORT: CPT | Performed by: STUDENT IN AN ORGANIZED HEALTH CARE EDUCATION/TRAINING PROGRAM

## 2024-05-08 PROCEDURE — 99284 EMERGENCY DEPT VISIT MOD MDM: CPT | Performed by: STUDENT IN AN ORGANIZED HEALTH CARE EDUCATION/TRAINING PROGRAM

## 2024-05-08 PROCEDURE — 36415 COLL VENOUS BLD VENIPUNCTURE: CPT | Performed by: STUDENT IN AN ORGANIZED HEALTH CARE EDUCATION/TRAINING PROGRAM

## 2024-05-08 PROCEDURE — 80048 BASIC METABOLIC PNL TOTAL CA: CPT | Performed by: STUDENT IN AN ORGANIZED HEALTH CARE EDUCATION/TRAINING PROGRAM

## 2024-05-08 PROCEDURE — 87637 SARSCOV2&INF A&B&RSV AMP PRB: CPT | Performed by: STUDENT IN AN ORGANIZED HEALTH CARE EDUCATION/TRAINING PROGRAM

## 2024-05-08 RX ORDER — PREDNISONE 20 MG/1
TABLET ORAL
Qty: 10 TABLET | Refills: 0 | Status: SHIPPED | OUTPATIENT
Start: 2024-05-08 | End: 2024-08-21

## 2024-05-08 RX ORDER — PREDNISONE 20 MG/1
60 TABLET ORAL ONCE
Status: COMPLETED | OUTPATIENT
Start: 2024-05-08 | End: 2024-05-08

## 2024-05-08 RX ORDER — AZITHROMYCIN 250 MG/1
TABLET, FILM COATED ORAL
Qty: 6 TABLET | Refills: 0 | Status: SHIPPED | OUTPATIENT
Start: 2024-05-08 | End: 2024-05-13

## 2024-05-08 RX ORDER — IPRATROPIUM BROMIDE AND ALBUTEROL SULFATE 2.5; .5 MG/3ML; MG/3ML
3 SOLUTION RESPIRATORY (INHALATION) ONCE
Status: COMPLETED | OUTPATIENT
Start: 2024-05-08 | End: 2024-05-08

## 2024-05-08 RX ADMIN — PREDNISONE 60 MG: 20 TABLET ORAL at 04:58

## 2024-05-08 RX ADMIN — IPRATROPIUM BROMIDE AND ALBUTEROL SULFATE 3 ML: .5; 3 SOLUTION RESPIRATORY (INHALATION) at 05:03

## 2024-05-08 ASSESSMENT — COLUMBIA-SUICIDE SEVERITY RATING SCALE - C-SSRS
2. HAVE YOU ACTUALLY HAD ANY THOUGHTS OF KILLING YOURSELF IN THE PAST MONTH?: NO
1. IN THE PAST MONTH, HAVE YOU WISHED YOU WERE DEAD OR WISHED YOU COULD GO TO SLEEP AND NOT WAKE UP?: NO
6. HAVE YOU EVER DONE ANYTHING, STARTED TO DO ANYTHING, OR PREPARED TO DO ANYTHING TO END YOUR LIFE?: NO

## 2024-05-08 ASSESSMENT — ACTIVITIES OF DAILY LIVING (ADL): ADLS_ACUITY_SCORE: 35

## 2024-05-08 NOTE — ED TRIAGE NOTES
Patient states she has had SOB, wheezing, and cough for about a weak. No chest pain. States she thinks she has a dental abscess that has caused her to get bacterial pneumonia. No fevers noted. She has a hx of asthma.     Triage Assessment (Adult)       Row Name 05/08/24 0440          Triage Assessment    Additional Documentation Breath Sounds (Group)        Respiratory WDL    Respiratory WDL cough     Cough Frequency frequent     Cough Type productive        Breath Sounds    Breath Sounds All Fields     All Lung Fields Breath Sounds wheezes, expiratory;wheezes, inspiratory        Skin Circulation/Temperature WDL    Skin Circulation/Temperature WDL WDL        Cardiac WDL    Cardiac WDL WDL     Cardiac Rhythm NSR        Peripheral/Neurovascular WDL    Peripheral Neurovascular WDL WDL        Cognitive/Neuro/Behavioral WDL    Cognitive/Neuro/Behavioral WDL WDL        Nandini Coma Scale    Best Eye Response 4-->(E4) spontaneous     Best Motor Response 6-->(M6) obeys commands     Best Verbal Response 5-->(V5) oriented     Nandini Coma Scale Score 15

## 2024-05-08 NOTE — ED PROVIDER NOTES
"  History     Chief Complaint   Patient presents with    Shortness of Breath     HPI  Romy Hurley is a 79 year old female who has hypertension, atrial fibrillation on apixaban, asthma, chronic kidney disease, SHARA, type 2 diabetes, hyperlipidemia who presents for evaluation of shortness of breath.  Patient states she has been having symptoms for about the last week.  She she is concerned that she has \"bacterial pneumonia.\"  However, seems to have worsened last night and she is having trouble sleeping due to the wheezing and cough.  She states she has had blood-tinged sputum.  She denies any gross hemoptysis.  She denies fever, chills or bodyaches.  She has been using her inhalers as prescribed.  She has Xopenex and Advair.  She denies chest pain.  States she is eating and drinking well.  No nausea or vomiting.  No abdominal pain.  She states she is concerned she could have a dental infection causing her to have pneumonia.  She denies having any dental pain or swelling, but states she has a foul taste in her mouth.  No known ill contacts.  Patient is never had to be admitted for her asthma.  She is vaccinated for influenza and COVID.    Allergies:  Allergies   Allergen Reactions    Macrobid [Nitrofurantoin Anhydrous] Shortness Of Breath    Ace Inhibitors Cough    Corticosteroids      Crabby, hot/red skin       Problem List:    Patient Active Problem List    Diagnosis Date Noted    Hyperlipidemia LDL goal <100 03/20/2024     Priority: Medium    S/P reverse total shoulder arthroplasty, unspecified laterality 07/26/2023     Priority: Medium    Type 2 diabetes mellitus with stage 3a chronic kidney disease, with long-term current use of insulin (H) 07/12/2023     Priority: Medium    SHARA on CPAP 08/23/2022     Priority: Medium    Shoulder arthritis 07/07/2022     Priority: Medium    Cervicalgia 06/29/2022     Priority: Medium    CKD (chronic kidney disease) stage 3, GFR 30-59 ml/min (H) 10/01/2020     Priority: Medium " "   Morbid obesity (H) 2019     Priority: Medium    Adjustment disorder with mixed anxiety and depressed mood 2018     Priority: Medium    Mild intermittent asthma without complication 03/10/2016     Priority: Medium    Persistent insomnia 03/10/2016     Priority: Medium    Health Care Home 2013     Priority: Medium     Yeni TOBIN Hidalgo-PHN   [phone]  FPA / TOR [facility] Care Coordinator /                  Advanced directives, counseling/discussion 2012     Priority: Medium     Advance Directive received and scanned. Click on Code in the patient header to view. 2012         Paroxysmal atrial fibrillation (H) 2012     Priority: Medium     Presented to [facility] with chest pain  Afib with RVR    Lexican nuclear stress done 3/29/2012  Negative for inducible ischemia  EF 70%  Increased metoprolol from 25 to 50 mg twice daily     Cardioversion 2012      CARDIOVASCULAR SCREENING; LDL GOAL LESS THAN 130 10/31/2010     Priority: Medium    Insomnia 03/10/2010     Priority: Medium    Essential hypertension 03/10/2010     Priority: Medium     3/10/10- pt has with her results over the past two months- all normal at home and was \"low\" in the hospital with her knee replacement.      Total knee replacement status 2010     Priority: Medium    Osteoarthritis 2010     Priority: Medium        Past Medical History:    Past Medical History:   Diagnosis Date    Asthma     Atrial fibrillation (H) 2012    Diabetes mellitus (H)     Hypertension     Insomnia     Mild intermittent asthma     Obesity     SHARA on CPAP     Osteoarthritis        Past Surgical History:    Past Surgical History:   Procedure Laterality Date    ARTHROPLASTY KNEE BILATERAL      ARTHROSCOPY SHOULDER RT/LT  2011    right with subacromial decompression and rotator cuff repair, massive     SECTION      EP ABLATION AFLUTTER  2019    Procedure: EP Ablation Atrial Flutter; "  Surgeon: Dorian Garland MD;  Location: [facility] Cath Lab;  Service: Cardiology    EP ABLATION PVI Left 12/06/2019    Procedure: EP Ablation PVI;  Surgeon: Dorian Garland MD;  Location: [facility] Cath Lab;  Service: Cardiology    HC CARDIOVERSION  05/31/2012    [facility] - done for a fib    JOINT REPLACEMTN, KNEE RT/LT  02/01/2010    LEFT    JOINT REPLACEMTN, KNEE RT/LT  06/07/2010    right    OPEN REDUCTION INTERNAL FIXATION ANKLE Right 02/19/2021    Procedure: OPEN REDUCTION INTERNAL FIXATION MEDIAL MALLEOLUS FRACTURE, ANKLE RIGHT;  Surgeon: Vidal Cohn MD;  Location: [facility] OR;  Service: Orthopedics    REPAIR HAMMER TOE Right 02/19/2021    Procedure: FLEXOR TENOTOMIES 3RD TOE HAMMER TOE CORRECTION;  Surgeon: Vidal Cohn MD;  Location: [facility] OR;  Service: Orthopedics    REVERSE ARTHROPLASTY SHOULDER Left 7/26/2023    Procedure: Left Reverse Total Shoulder Arthroplasty;  Surgeon: Saurabh Villagran MD;  [facility]: [facility]    SHOULDER ARTHROSCOPY W/ ROTATOR CUFF REPAIR Right     ZZC TOTAL ANKLE REPLACEMENT Right 02/19/2021    Procedure: RIGHT TOTAL ANKLE ARTHOPLASTY, DEBRIDE SUBTALAR JOINT;  Surgeon: Vidal Cohn MD;  Location: [facility] OR;  Service: Orthopedics       Family History:    Family History   Problem Relation Age of Onset    Genitourinary Problems Mother         Ovarian Cancer    Gastrointestinal Disease Mother         Had gallbladder romoved    Cancer Father         Lung    Gastrointestinal Disease Father         Had gallbladder romoved    C.A.D. Paternal Grandfather         Coronary    Gastrointestinal Disease Brother         Had gallbladder romoved    Depression Sister         DDD    Ovarian Cancer Mother 82.00    Lung Cancer Father 75.00    Sleep Apnea Father     Snoring Father     Brain Cancer Sister 57.00        Brain Ca x 1 year    No Known Problems Brother     No Known Problems Sister     No Known Problems Sister        Social  "History:  Marital Status:   [5]  Social History     Tobacco Use    Smoking status: Former     Current packs/day: 0.00     Types: Cigarettes     Quit date: 1981     Years since quittin.9    Smokeless tobacco: Never   Substance Use Topics    Alcohol use: Yes     Comment: Alcoholic Drinks/day: Rare (2-3 month)    Drug use: No        Medications:    amoxicillin-clavulanate (AUGMENTIN) 875-125 MG tablet  azithromycin (ZITHROMAX) 250 MG tablet  predniSONE (DELTASONE) 20 MG tablet  acetaminophen (TYLENOL) 650 MG CR tablet  amiodarone (PACERONE) 200 MG tablet  amoxicillin-clavulanate (AUGMENTIN) 875-125 MG tablet  apixaban ANTICOAGULANT (ELIQUIS ANTICOAGULANT) 5 MG tablet  atorvastatin (LIPITOR) 20 MG tablet  blood glucose (ONETOUCH VERIO IQ) test strip  blood glucose monitoring (ONE TOUCH DELICA) lancets  fluticasone-salmeterol (ADVAIR) 250-50 MCG/ACT inhaler  hydrochlorothiazide (HYDRODIURIL) 25 MG tablet  losartan (COZAAR) 100 MG tablet  metFORMIN (GLUCOPHAGE) 500 MG tablet  metoprolol succinate ER (TOPROL XL) 50 MG 24 hr tablet  PARoxetine (PAXIL) 10 MG tablet  traZODone (DESYREL) 50 MG tablet  XOPENEX HFA 45 MCG/ACT inhaler          Review of Systems  See HPI  Physical Exam   BP: (!) 146/78  Pulse: 68  Temp: 99.4  F (37.4  C)  Resp: 22  Height: 174 cm (5' 8.5\")  SpO2: 95 %      Physical Exam  BP (!) 146/78   Pulse 68   Temp 99.4  F (37.4  C) (Oral)   Resp 22   Ht 1.74 m (5' 8.5\")   LMP  (LMP Unknown)   SpO2 95%   BMI 43.19 kg/m    General: alert, interactive, in no apparent distress  Head: atraumatic  Nose: no rhinorrhea or epistaxis  Ears: no external auditory canal discharge or bleeding.    Eyes: Sclera nonicteric. Conjunctiva noninjected. PERRL, EOMI  Mouth: no tonsillar erythema, edema, or exudate.  Moist mucous membranes  Neck: supple, moving spontaneously no midline cervical tenderness  Lungs: No increased work of breathing.  Nonproductive cough noted throughout the exam.  She is diffuse " expiratory wheezing bilateral lungs.  She is not in any respiratory distress and not using any accessory muscles and speaking in full sentences.  CV: RRR, peripheral pulses palpable and symmetric  Abdomen: soft, nt, nd, no guarding or rebound.   Extremities: Warm and well-perfused.  No edema or calf tenderness bilaterally.  Skin: no rash or diaphoresis  Neuro: CN II-XII grossly intact, strength 5/5 in UE and LEs bilaterally, sensation intact to light touch in UE and LEs bilaterally;     ED Course        Procedures              EKG Interpretation:      Interpreted by Edgardo Narvaez MD  Time reviewed: 4:43 AM    Symptoms at time of EKG: Shortness of breath   Rhythm: normal sinus   Rate: normal  Axis: normal  Ectopy: none  Conduction: normal  ST Segments/ T Waves: No ST-T wave changes  Q Waves: none  Comparison to prior: Unchanged    Clinical Impression: normal EKG      Critical Care time:  none             Results for orders placed or performed during the hospital encounter of 05/08/24 (from the past 24 hour(s))   [place] Draw    Narrative    The following orders were created for panel order [place] Draw.  Procedure                               Abnormality         Status                     ---------                               -----------         ------                     Extra Blue Top Tube[[id number]]                              In process                 Extra Green Top (Lithium...[[id number]]                      Final result               Extra Purple Top Tube[[id number]]                            Final result                 Please view results for these tests on the individual orders.   Extra Green Top (Lithium Heparin) Tube   Result Value Ref Range    Hold Specimen     Extra Purple Top Tube   Result Value Ref Range    Hold Specimen     CBC with platelets differential    Narrative    The following orders were created for panel order CBC with platelets differential.  Procedure                                Abnormality         Status                     ---------                               -----------         ------                     CBC with platelets and d...[[id number]]  Abnormal            Final result                 Please view results for these tests on the individual orders.   Basic metabolic panel   Result Value Ref Range    Sodium 136 135 - 145 mmol/L    Potassium 4.1 3.4 - 5.3 mmol/L    Chloride 99 98 - 107 mmol/L    Carbon Dioxide (CO2) 26 22 - 29 mmol/L    Anion Gap 11 7 - 15 mmol/L    Urea Nitrogen 19.3 8.0 - 23.0 mg/dL    Creatinine 1.13 (H) 0.51 - 0.95 mg/dL    GFR Estimate 50 (L) >60 mL/min/1.73m2    Calcium 8.9 8.8 - 10.2 mg/dL    Glucose 114 (H) 70 - 99 mg/dL   CBC with platelets and differential   Result Value Ref Range    WBC Count 6.5 4.0 - 11.0 10e3/uL    RBC Count 4.17 3.80 - 5.20 10e6/uL    Hemoglobin 12.2 11.7 - 15.7 g/dL    Hematocrit 37.7 35.0 - 47.0 %    MCV 90 78 - 100 fL    MCH 29.3 26.5 - 33.0 pg    MCHC 32.4 31.5 - 36.5 g/dL    RDW 15.5 (H) 10.0 - 15.0 %    Platelet Count 186 150 - 450 10e3/uL    % Neutrophils 67 %    % Lymphocytes 16 %    % Monocytes 15 %    % Eosinophils 2 %    % Basophils 0 %    % Immature Granulocytes 0 %    NRBCs per 100 WBC 0 <1 /100    Absolute Neutrophils 4.4 1.6 - 8.3 10e3/uL    Absolute Lymphocytes 1.1 0.8 - 5.3 10e3/uL    Absolute Monocytes 1.0 0.0 - 1.3 10e3/uL    Absolute Eosinophils 0.1 0.0 - 0.7 10e3/uL    Absolute Basophils 0.0 0.0 - 0.2 10e3/uL    Absolute Immature Granulocytes 0.0 <=0.4 10e3/uL    Absolute NRBCs 0.0 10e3/uL   Symptomatic Influenza A/B, RSV, & SARS-CoV2 PCR (COVID-19) Nasopharyngeal    Specimen: Nasopharyngeal; Swab   Result Value Ref Range    Influenza A PCR Negative Negative    Influenza B PCR Negative Negative    RSV PCR Negative Negative    SARS CoV2 PCR Negative Negative    Narrative    Testing was performed using the Xpert Xpress CoV2/Flu/RSV Assay on the "[employer]pert Instrument. This test should be ordered for the  detection of SARS-CoV-2, influenza, and RSV viruses in individuals who meet clinical and/or epidemiological criteria. Test performance is unknown in asymptomatic patients. This test is for in vitro diagnostic use under the FDA EUA for laboratories certified under CLIA to perform high or moderate complexity testing. This test has not been FDA cleared or approved. A negative result does not rule out the presence of PCR inhibitors in the specimen or target RNA in concentration below the limit of detection for the assay. If only one viral target is positive but coinfection with multiple targets is suspected, the sample should be re-tested with another FDA cleared, approved, or authorized test, if coinfection would change clinical management. This test was validated by the [facility]. These laboratories are certified under the Clinical Laboratory Improvement Amendments of 1988 (CLIA-88) as qualified to perform high complexity laboratory testing.   XR Chest 2 Views    Narrative    EXAM: CHEST 2 VIEWS  LOCATION: [facility]  DATE: 05/08/2024    INDICATION: Cough and wheezing.  COMPARISON: 10/31/2007.    FINDINGS: An apparent small patchy opacity is present in the retrocardiac region of the left lung base. The lungs are otherwise clear. Mildly increased pulmonary vascularity. Unchanged cardiac silhouette. Atherosclerotic calcification in the thoracic   aorta. Partial visualization of a left shoulder arthroplasty.      Impression    IMPRESSION:   1.  An apparent small patchy opacity is present in the retrocardiac region of the left lung base. This could represent pneumonia or atelectasis. A follow-up chest radiograph would be useful in one month to ensure resolution.  2.  Mildly increased pulmonary vascularity, possibly related to pulmonary vascular congestion.            Medications   ipratropium - albuterol 0.5 mg/2.5 mg/3 mL (DUONEB) neb solution 3 mL (3 mLs Nebulization  "$Given 5/8/24 7192)   predniSONE (DELTASONE) tablet 60 mg (60 mg Oral $Given 5/8/24 4900)       Assessments & Plan (with Medical Decision Making)     I have reviewed the nursing notes.    I have reviewed the findings, diagnosis, plan and need for follow up with the patient.          Medical Decision Making  Romy Hurley is a 79 year old female who has hypertension, atrial fibrillation on apixaban, asthma, chronic kidney disease, SHARA, type 2 diabetes, hyperlipidemia who presents for evaluation of shortness of breath.  Vital signs reviewed and notable for hypertension, otherwise afebrile and reassuring.  EKG reassuring.  Patient has significant wheezing on exam, but normal oxygen saturations and no respiratory distress.  She is given a dose of steroids and a DuoNeb.  Patient has corticosteroids listed as an allergy.  She states that steroids make her \"bitchy\" but she has taken them many times before and tolerates them.  She is okay going forward with steroids.  She was feeling much better after the DuoNeb.  Labs are obtained and are reassuring.  Viral swabs negative.  CBC is without leukocytosis or anemia.  BMP shows stable renal function otherwise normal electrolytes.  Chest x-ray is independently reviewed by me and I agree with radiology.  There is an opacity in the left lung base, and I suspect this is likely due to pneumonia.  Will treat with antibiotics.  A trial of outpatient management is warranted as patient improved and has stable vitals.  Given her comorbidities, will treat with Augmentin and azithromycin x 5 days.  Also will put her on a short course of prednisone.  She does not need any refills of her inhalers.  Given that she improved with a DuoNeb today, I did offer a prescription for neb machine and supplies, but she declined.  I recommended close outpatient follow-up if not improving.  Return precautions discussed.  All questions were answered.  Patient is discharged in stable " condition.        New Prescriptions    AMOXICILLIN-CLAVULANATE (AUGMENTIN) 875-125 MG TABLET    Take 1 tablet by mouth 2 times daily for 5 days    AZITHROMYCIN (ZITHROMAX) 250 MG TABLET    Take 2 tablets (500 mg) by mouth daily for 1 day, THEN 1 tablet (250 mg) daily for 4 days.    PREDNISONE (DELTASONE) 20 MG TABLET    Take two tablets (= 40mg) each day for 5 (five) days       Final diagnoses:   Pneumonia of left lung due to infectious organism, unspecified part of lung   Asthma with acute exacerbation, unspecified asthma severity, unspecified whether persistent       5/8/2024   [facility] EMERGENCY DEPT       Edgardo Narvaez MD  05/08/24 0546       Edgardo Narvaez MD  05/08/24 0541

## 2024-05-08 NOTE — DISCHARGE INSTRUCTIONS
You were diagnosed with pneumonia.  This be treated with 2 separate antibiotics.  I recommend taking a probiotic while taking these antibiotics to prevent diarrhea or upset stomach.  I have also prescribed you 5 days of prednisone to help with your asthma.  Do not start taking the steroids until tomorrow as you got the first dose here in the ER today.  Continue to use your inhalers as prescribed.  Follow-up with your regular doctor to ensure you are improving.  Return to the ER with new or worsening symptoms.

## 2024-05-21 ENCOUNTER — OFFICE VISIT (OUTPATIENT)
Dept: CARDIOLOGY | Facility: CLINIC | Age: 79
End: 2024-05-21
Payer: COMMERCIAL

## 2024-05-21 VITALS
SYSTOLIC BLOOD PRESSURE: 120 MMHG | BODY MASS INDEX: 44.35 KG/M2 | DIASTOLIC BLOOD PRESSURE: 78 MMHG | WEIGHT: 293 LBS | RESPIRATION RATE: 20 BRPM | HEART RATE: 60 BPM

## 2024-05-21 DIAGNOSIS — I48.0 PAROXYSMAL ATRIAL FIBRILLATION (H): Primary | ICD-10-CM

## 2024-05-21 DIAGNOSIS — Z79.899 LONG TERM CURRENT USE OF AMIODARONE: ICD-10-CM

## 2024-05-21 LAB
ALT SERPL W P-5'-P-CCNC: 25 U/L (ref 0–50)
AST SERPL W P-5'-P-CCNC: 26 U/L (ref 0–45)
T4 FREE SERPL-MCNC: 0.99 NG/DL (ref 0.9–1.7)
TSH SERPL DL<=0.005 MIU/L-ACNC: 6.12 UIU/ML (ref 0.3–4.2)

## 2024-05-21 PROCEDURE — 84443 ASSAY THYROID STIM HORMONE: CPT | Performed by: INTERNAL MEDICINE

## 2024-05-21 PROCEDURE — 99204 OFFICE O/P NEW MOD 45 MIN: CPT | Performed by: INTERNAL MEDICINE

## 2024-05-21 PROCEDURE — 36415 COLL VENOUS BLD VENIPUNCTURE: CPT | Performed by: INTERNAL MEDICINE

## 2024-05-21 PROCEDURE — 84439 ASSAY OF FREE THYROXINE: CPT | Performed by: INTERNAL MEDICINE

## 2024-05-21 PROCEDURE — 84460 ALANINE AMINO (ALT) (SGPT): CPT | Performed by: INTERNAL MEDICINE

## 2024-05-21 PROCEDURE — G2211 COMPLEX E/M VISIT ADD ON: HCPCS | Performed by: INTERNAL MEDICINE

## 2024-05-21 PROCEDURE — 84450 TRANSFERASE (AST) (SGOT): CPT | Performed by: INTERNAL MEDICINE

## 2024-05-21 NOTE — LETTER
"5/21/2024    Adeola Stockton MD  [address]  [facility] [address]    RE: Romy A Hurley       Dear Colleague,     I had the pleasure of seeing Romy Hurley in the [facility].    [facility]  Cardiac Electrophysiology     Progress Note: Dorian Garland MD    Primary Care: Adeola Stockton MD    Primary Cardiologist: None    Primary Electrophysiologist: Dorian Garland MD    Assessment:       Paroxysmal atrial fibrillation: Chronic problem for the patient dating back to 2012.  The patient previously underwent mapping and ablation of her atrial fibrillation in 2019 (PVI + LA roofline) with recurrence of her atrial fibrillation a couple of years later.  She initially was treated with sotalol but this resulted in bradycardia and she was switched to amiodarone in 2022.  She has not had her routine screening amiodarone laboratories in 18 months.  We discussed the fact that long-term amiodarone may not be the best choice for controlling her symptoms.  She is certainly a candidate for consideration of repeat mapping and ablation of her arrhythmia.  I have asked her to consider this option further and follow-up in the arrhythmia clinic with the EP SIM.  Obstructive sleep apnea: Chronic problem for the patient who states that she is currently not using her CPAP and states that \"I did not feel like I needed it\".  We discussed that untreated SHARA can significantly increase her risk of recurrent atrial arrhythmias.  She will consider this further as she moves ahead with her recommended testing.  Recent pneumonia: Patient reports that she developed pneumonia with cough and congestion.  She reports feeling much improved and her lung exam today reveals good airflow with no focal signs consistent with pneumonia.      Recommendations:  Laboratories today: TSH + T4, ALT, AST  Echocardiogram to assess patient's systolic performance, chamber sizes, and valve status.  Chest x-ray at approximately 6 " weeks for pneumonia clearance and looking for any signs of amiodarone toxicity.  Follow-up in the arrhythmia clinic with the EP SIM in 3 months.    Time spent: 45 minutes spent on the date of the encounter doing chart review, history and exam, documentation and further activities as noted above.    Subjective:  Romy Hurley (79 year old female) returns to the arrhythmia clinic for interval follow-up after an absence of approximately 18 months.  The patient reports no recurrent tachypalpitations or other symptoms suggesting return of atrial fibrillation.  She does notice that her resting heart rate is somewhat diminished.  She has not had any recent recheck of her amiodarone laboratories.  She reports that she is not wearing her CPAP therapy.  She is not experiencing any orthopnea or PND.  She does have some mild bilateral ankle swelling and did undergo right ankle replacement surgery in the not-too-distant past.    Current Outpatient Medications   Medication Sig Dispense Refill    acetaminophen (TYLENOL) 650 MG CR tablet Take 650 mg by mouth every 8 hours as needed for pain (max dose 3000mg per day.)      amiodarone (PACERONE) 200 MG tablet Take 1 tablet (200 mg) by mouth at bedtime 90 tablet 0    apixaban ANTICOAGULANT (ELIQUIS ANTICOAGULANT) 5 MG tablet Take 1 tablet (5 mg) by mouth 2 times daily 180 tablet 0    atorvastatin (LIPITOR) 20 MG tablet Take 1 tablet (20 mg) by mouth daily 100 tablet 3    blood glucose (ONETOUCH VERIO IQ) test strip Use to test blood sugars 1 times daily or as directed. 100 each 11    blood glucose monitoring (ONE TOUCH DELICA) lancets Use to test blood sugars 1 times daily or as directed. 100 each 3    fluticasone-salmeterol (ADVAIR) 250-50 MCG/ACT inhaler USE 1 INHALATION EVERY 12 HOURS 60 each 11    hydrochlorothiazide (HYDRODIURIL) 25 MG tablet Take 1 tablet (25 mg) by mouth daily 90 tablet 3    losartan (COZAAR) 100 MG tablet Take 1 tablet (100 mg) by mouth daily 90 tablet 3     metFORMIN (GLUCOPHAGE) 500 MG tablet TAKE 1 TABLET BY MOUTH DAILY WITH DINNER 90 tablet 3    metoprolol succinate ER (TOPROL XL) 50 MG 24 hr tablet Take 1 tablet (50 mg) by mouth every evening 90 tablet 0    PARoxetine (PAXIL) 10 MG tablet Take 1 tablet (10 mg) by mouth at bedtime 90 tablet 3    traZODone (DESYREL) 50 MG tablet TAKE 1 TABLET AT BEDTIME 90 tablet 3    XOPENEX HFA 45 MCG/ACT inhaler INHALE 2 PUFFS INTO THE LUNGS EVERY 8 HOURS AS NEEDED FOR SHORTNESS OF BREATH OR DIFFICULT BREATHING Strength: 45 MCG/ACT 15 g 3    amoxicillin-clavulanate (AUGMENTIN) 875-125 MG tablet Take 1 tablet by mouth 2 times daily 14 tablet 0    predniSONE (DELTASONE) 20 MG tablet Take two tablets (= 40mg) each day for 5 (five) days 10 tablet 0       Review of Systems:     Family History  Family History   Problem Relation Age of Onset    Genitourinary Problems Mother         Ovarian Cancer    Gastrointestinal Disease Mother         Had gallbladder romoved    Cancer Father         Lung    Gastrointestinal Disease Father         Had gallbladder romoved    C.A.D. Paternal Grandfather         Coronary    Gastrointestinal Disease Brother         Had gallbladder romoved    Depression Sister         DDD    Ovarian Cancer Mother 82.00    Lung Cancer Father 75.00    Sleep Apnea Father     Snoring Father     Brain Cancer Sister 57.00        Brain Ca x 1 year    No Known Problems Brother     No Known Problems Sister     No Known Problems Sister        Social History   reports that she quit smoking about 43 years ago. Her smoking use included cigarettes. She has never used smokeless tobacco. She reports current alcohol use. She reports that she does not use drugs.    Objective:   Vital signs in last 24 hours:  /78 (BP Location: Left arm, Patient Position: Sitting, Cuff Size: Adult Regular)   Pulse 60   Resp 20   Wt 134.3 kg (296 lb)   LMP  (LMP Unknown)   BMI 44.35 kg/m      Physical Exam:  General: The patient is alert oriented  to person place and situation.  The patient is in no acute distress at the time of my evaluation.  Eyes: Pupils are equal, round, and reactive to light.  Conjunctiva and sclera are clear.  ENT: Oral mucosa is moist and without redness. No evident nasal discharge.  Pulmonary: Lungs are clear bilaterally with no rales, rhonchi, or wheezes.    Cardiovascular exam: Rhythm is regular. S1 and S2 are normal. No significant murmur is present. JVP is normal. Lower extremities demonstrate 1+ pretibial edema bilaterally.  Distal pulses are intact bilaterally.  Abdomen is soft, nontender, no organomegaly, and bowel sounds present.  Musculoskeletal: Spine is straight. Extremities without deformity.  Neuro: Gait is normal.   Skin is warm, dry, and otherwise intact.      Cardiographics:     Lab Results:     Outside record review:    Thank you for allowing me to participate in the care of your patient.      Sincerely,     Dorian Garland MD   [facility]  cc:   Adeola Stockton [place]  [address] [place] [address]

## 2024-05-21 NOTE — PROGRESS NOTES
"  [facility]  Cardiac Electrophysiology     Progress Note: Dorian Garland MD    Primary Care: Adeola Stockton MD    Primary Cardiologist: None    Primary Electrophysiologist: Dorian Garland MD    Assessment:       Paroxysmal atrial fibrillation: Chronic problem for the patient dating back to 2012.  The patient previously underwent mapping and ablation of her atrial fibrillation in 2019 (PVI + LA roofline) with recurrence of her atrial fibrillation a couple of years later.  She initially was treated with sotalol but this resulted in bradycardia and she was switched to amiodarone in 2022.  She has not had her routine screening amiodarone laboratories in 18 months.  We discussed the fact that long-term amiodarone may not be the best choice for controlling her symptoms.  She is certainly a candidate for consideration of repeat mapping and ablation of her arrhythmia.  I have asked her to consider this option further and follow-up in the arrhythmia clinic with the EP SIM.  Obstructive sleep apnea: Chronic problem for the patient who states that she is currently not using her CPAP and states that \"I did not feel like I needed it\".  We discussed that untreated SHARA can significantly increase her risk of recurrent atrial arrhythmias.  She will consider this further as she moves ahead with her recommended testing.  Recent pneumonia: Patient reports that she developed pneumonia with cough and congestion.  She reports feeling much improved and her lung exam today reveals good airflow with no focal signs consistent with pneumonia.      Recommendations:  Laboratories today: TSH + T4, ALT, AST  Echocardiogram to assess patient's systolic performance, chamber sizes, and valve status.  Chest x-ray at approximately 6 weeks for pneumonia clearance and looking for any signs of amiodarone toxicity.  Follow-up in the arrhythmia clinic with the EP SIM in 3 months.    Time spent: 45 minutes spent on the date of the encounter doing " chart review, history and exam, documentation and further activities as noted above.    Subjective:  Romy Hurley (79 year old female) returns to the arrhythmia clinic for interval follow-up after an absence of approximately 18 months.  The patient reports no recurrent tachypalpitations or other symptoms suggesting return of atrial fibrillation.  She does notice that her resting heart rate is somewhat diminished.  She has not had any recent recheck of her amiodarone laboratories.  She reports that she is not wearing her CPAP therapy.  She is not experiencing any orthopnea or PND.  She does have some mild bilateral ankle swelling and did undergo right ankle replacement surgery in the not-too-distant past.    Current Outpatient Medications   Medication Sig Dispense Refill    acetaminophen (TYLENOL) 650 MG CR tablet Take 650 mg by mouth every 8 hours as needed for pain (max dose 3000mg per day.)      amiodarone (PACERONE) 200 MG tablet Take 1 tablet (200 mg) by mouth at bedtime 90 tablet 0    apixaban ANTICOAGULANT (ELIQUIS ANTICOAGULANT) 5 MG tablet Take 1 tablet (5 mg) by mouth 2 times daily 180 tablet 0    atorvastatin (LIPITOR) 20 MG tablet Take 1 tablet (20 mg) by mouth daily 100 tablet 3    blood glucose (ONETOUCH VERIO IQ) test strip Use to test blood sugars 1 times daily or as directed. 100 each 11    blood glucose monitoring (ONE TOUCH DELICA) lancets Use to test blood sugars 1 times daily or as directed. 100 each 3    fluticasone-salmeterol (ADVAIR) 250-50 MCG/ACT inhaler USE 1 INHALATION EVERY 12 HOURS 60 each 11    hydrochlorothiazide (HYDRODIURIL) 25 MG tablet Take 1 tablet (25 mg) by mouth daily 90 tablet 3    losartan (COZAAR) 100 MG tablet Take 1 tablet (100 mg) by mouth daily 90 tablet 3    metFORMIN (GLUCOPHAGE) 500 MG tablet TAKE 1 TABLET BY MOUTH DAILY WITH DINNER 90 tablet 3    metoprolol succinate ER (TOPROL XL) 50 MG 24 hr tablet Take 1 tablet (50 mg) by mouth every evening 90 tablet 0     PARoxetine (PAXIL) 10 MG tablet Take 1 tablet (10 mg) by mouth at bedtime 90 tablet 3    traZODone (DESYREL) 50 MG tablet TAKE 1 TABLET AT BEDTIME 90 tablet 3    XOPENEX HFA 45 MCG/ACT inhaler INHALE 2 PUFFS INTO THE LUNGS EVERY 8 HOURS AS NEEDED FOR SHORTNESS OF BREATH OR DIFFICULT BREATHING Strength: 45 MCG/ACT 15 g 3    amoxicillin-clavulanate (AUGMENTIN) 875-125 MG tablet Take 1 tablet by mouth 2 times daily 14 tablet 0    predniSONE (DELTASONE) 20 MG tablet Take two tablets (= 40mg) each day for 5 (five) days 10 tablet 0       Review of Systems:     Family History  Family History   Problem Relation Age of Onset    Genitourinary Problems Mother         Ovarian Cancer    Gastrointestinal Disease Mother         Had gallbladder romoved    Cancer Father         Lung    Gastrointestinal Disease Father         Had gallbladder romoved    C.A.D. Paternal Grandfather         Coronary    Gastrointestinal Disease Brother         Had gallbladder romoved    Depression Sister         DDD    Ovarian Cancer Mother 82.00    Lung Cancer Father 75.00    Sleep Apnea Father     Snoring Father     Brain Cancer Sister 57.00        Brain Ca x 1 year    No Known Problems Brother     No Known Problems Sister     No Known Problems Sister        Social History   reports that she quit smoking about 43 years ago. Her smoking use included cigarettes. She has never used smokeless tobacco. She reports current alcohol use. She reports that she does not use drugs.    Objective:   Vital signs in last 24 hours:  /78 (BP Location: Left arm, Patient Position: Sitting, Cuff Size: Adult Regular)   Pulse 60   Resp 20   Wt 134.3 kg (296 lb)   LMP  (LMP Unknown)   BMI 44.35 kg/m      Physical Exam:  General: The patient is alert oriented to person place and situation.  The patient is in no acute distress at the time of my evaluation.  Eyes: Pupils are equal, round, and reactive to light.  Conjunctiva and sclera are clear.  ENT: Oral mucosa is  moist and without redness. No evident nasal discharge.  Pulmonary: Lungs are clear bilaterally with no rales, rhonchi, or wheezes.    Cardiovascular exam: Rhythm is regular. S1 and S2 are normal. No significant murmur is present. JVP is normal. Lower extremities demonstrate 1+ pretibial edema bilaterally.  Distal pulses are intact bilaterally.  Abdomen is soft, nontender, no organomegaly, and bowel sounds present.  Musculoskeletal: Spine is straight. Extremities without deformity.  Neuro: Gait is normal.   Skin is warm, dry, and otherwise intact.      Cardiographics:       Lab Results:         Outside record review:

## 2024-06-19 DIAGNOSIS — G47.00 PERSISTENT INSOMNIA: ICD-10-CM

## 2024-06-19 NOTE — TELEPHONE ENCOUNTER
Reason for Call:  Medication or medication refill:    Do you use a [facility]?  Name of the pharmacy and phone number for the current request:  [facility]    Name of the medication requested:    Disp Refills Start End SOLOMON   traZODone (DESYREL) 50 MG tablet            Other request: None    Can we leave a detailed message on this number? YES    Phone number patient can be reached at: Home number on file [phone] (home)    Best Time: any    Call taken on 6/19/2024 at 4:34 PM by Marbella Cooper

## 2024-06-20 RX ORDER — TRAZODONE HYDROCHLORIDE 50 MG/1
TABLET, FILM COATED ORAL
Qty: 90 TABLET | Refills: 1 | Status: SHIPPED | OUTPATIENT
Start: 2024-06-20

## 2024-06-20 NOTE — TELEPHONE ENCOUNTER
Prescription approved per [facility] Refill Protocol.    Pending Prescriptions:                       Disp   Refills    traZODone (DESYREL) 50 MG tablet          90 tab*3            Sig: TAKE 1 TABLET AT BEDTIME      Requested Prescriptions   Pending Prescriptions Disp Refills    traZODone (DESYREL) 50 MG tablet 90 tablet 3     Sig: TAKE 1 TABLET AT BEDTIME       Serotonin Modulators Passed - 6/20/2024  9:27 AM        Passed - Medication is active on med list        Passed - Recent (12 mo) or future (90 days) visit within the authorizing provider's specialty     The patient must have completed an in-person or virtual visit within the past 12 months or has a future visit scheduled within the next 90 days with the authorizing provider s specialty.  Urgent care and e-visits do not quality as an office visit for this protocol.          Passed - Medication indicated for associated diagnosis     Medication is associated with one or more of the following diagnoses:     Depression   Insomnia          Passed - Patient is age 18 or older        Passed - No active pregnancy on record        Passed - No positive pregnancy test in past 12 months         ,Lizzie Turner RN on 6/20/2024 at 9:27 AM

## 2024-06-29 DIAGNOSIS — I48.0 PAROXYSMAL ATRIAL FIBRILLATION (H): ICD-10-CM

## 2024-06-29 DIAGNOSIS — I48.19 PERSISTENT ATRIAL FIBRILLATION (H): ICD-10-CM

## 2024-07-01 RX ORDER — METOPROLOL SUCCINATE 50 MG/1
50 TABLET, EXTENDED RELEASE ORAL EVERY EVENING
Qty: 90 TABLET | Refills: 3 | Status: SHIPPED | OUTPATIENT
Start: 2024-07-01

## 2024-07-01 RX ORDER — APIXABAN 5 MG/1
5 TABLET, FILM COATED ORAL 2 TIMES DAILY
Qty: 180 TABLET | Refills: 3 | Status: SHIPPED | OUTPATIENT
Start: 2024-07-01

## 2024-07-01 RX ORDER — AMIODARONE HYDROCHLORIDE 200 MG/1
200 TABLET ORAL AT BEDTIME
Qty: 90 TABLET | Refills: 3 | Status: SHIPPED | OUTPATIENT
Start: 2024-07-01

## 2024-08-12 ENCOUNTER — HOSPITAL ENCOUNTER (OUTPATIENT)
Dept: GENERAL RADIOLOGY | Facility: CLINIC | Age: 79
Discharge: HOME | End: 2024-08-12
Attending: INTERNAL MEDICINE
Payer: COMMERCIAL

## 2024-08-12 ENCOUNTER — HOSPITAL ENCOUNTER (OUTPATIENT)
Dept: CARDIOLOGY | Facility: CLINIC | Age: 79
Discharge: HOME | End: 2024-08-12
Attending: INTERNAL MEDICINE
Payer: COMMERCIAL

## 2024-08-12 DIAGNOSIS — I48.0 PAROXYSMAL ATRIAL FIBRILLATION (H): ICD-10-CM

## 2024-08-12 DIAGNOSIS — Z79.899 LONG TERM CURRENT USE OF AMIODARONE: ICD-10-CM

## 2024-08-12 LAB — LVEF ECHO: NORMAL

## 2024-08-12 PROCEDURE — 93306 TTE W/DOPPLER COMPLETE: CPT

## 2024-08-12 PROCEDURE — 93306 TTE W/DOPPLER COMPLETE: CPT | Mod: 26 | Performed by: INTERNAL MEDICINE

## 2024-08-12 PROCEDURE — 71046 X-RAY EXAM CHEST 2 VIEWS: CPT

## 2024-08-21 ENCOUNTER — OFFICE VISIT (OUTPATIENT)
Dept: CARDIOLOGY | Facility: CLINIC | Age: 79
End: 2024-08-21
Payer: COMMERCIAL

## 2024-08-21 ENCOUNTER — TELEPHONE (OUTPATIENT)
Dept: CARDIOLOGY | Facility: CLINIC | Age: 79
End: 2024-08-21

## 2024-08-21 VITALS
RESPIRATION RATE: 20 BRPM | DIASTOLIC BLOOD PRESSURE: 78 MMHG | HEART RATE: 54 BPM | OXYGEN SATURATION: 98 % | WEIGHT: 293 LBS | BODY MASS INDEX: 44.5 KG/M2 | SYSTOLIC BLOOD PRESSURE: 144 MMHG

## 2024-08-21 DIAGNOSIS — G47.33 OSA (OBSTRUCTIVE SLEEP APNEA): ICD-10-CM

## 2024-08-21 DIAGNOSIS — I10 ESSENTIAL HYPERTENSION: ICD-10-CM

## 2024-08-21 DIAGNOSIS — Z79.899 LONG TERM CURRENT USE OF AMIODARONE: ICD-10-CM

## 2024-08-21 DIAGNOSIS — I48.19 PERSISTENT ATRIAL FIBRILLATION (H): Primary | ICD-10-CM

## 2024-08-21 PROCEDURE — G2211 COMPLEX E/M VISIT ADD ON: HCPCS | Performed by: NURSE PRACTITIONER

## 2024-08-21 PROCEDURE — 99215 OFFICE O/P EST HI 40 MIN: CPT | Performed by: NURSE PRACTITIONER

## 2024-08-21 NOTE — Clinical Note
She would like to schedule repeat PVI with Dr. Garland.  On amio--hold for ? Weeks prior?  She is aware likely schedule for November.  She is in [place] Feb-March, so needs to be done Nov or Dec. ThanksBreanne

## 2024-08-21 NOTE — PATIENT INSTRUCTIONS
Romy Hurley,    It was a pleasure to see you today at the [facility].     My recommendations after this visit include:    Continue current medications    Plan for repeat ablation with Dr. Garland    Next follow up will be in preparation for ablation    Breanne Augustin CNP  [facility], Electrophysiology  [phone]  EP nurses [phone]           Detail Level: Detailed Depth Of Biopsy: dermis Was A Bandage Applied: Yes Size Of Lesion In Cm: 0 Biopsy Type: H and E Biopsy Method: Dermablade Anesthesia Type: 1% lidocaine with epinephrine Anesthesia Volume In Cc (Will Not Render If 0): 0.5 Hemostasis: Drysol Wound Care: Petrolatum Dressing: bandage Destruction After The Procedure: No Type Of Destruction Used: Curettage Curettage Text: The wound bed was treated with curettage after the biopsy was performed. Cryotherapy Text: The wound bed was treated with cryotherapy after the biopsy was performed. Electrodesiccation Text: The wound bed was treated with electrodesiccation after the biopsy was performed. Electrodesiccation And Curettage Text: The wound bed was treated with electrodesiccation and curettage after the biopsy was performed. Silver Nitrate Text: The wound bed was treated with silver nitrate after the biopsy was performed. Lab: 6 Lab Facility: 3 Consent: Written consent was obtained and risks were reviewed including but not limited to scarring, infection, bleeding, scabbing, incomplete removal, nerve damage and allergy to anesthesia. Post-Care Instructions: I reviewed with the patient in detail post-care instructions. Patient is to keep the biopsy site dry overnight, and then apply bacitracin twice daily until healed. Patient may apply hydrogen peroxide soaks to remove any crusting. Notification Instructions: Patient will be notified of biopsy results. However, patient instructed to call the office if not contacted within 2 weeks. Billing Type: Third-Party Bill Information: Selecting Yes will display possible errors in your note based on the variables you have selected. This validation is only offered as a suggestion for you. PLEASE NOTE THAT THE VALIDATION TEXT WILL BE REMOVED WHEN YOU FINALIZE YOUR NOTE. IF YOU WANT TO FAX A PRELIMINARY NOTE YOU WILL NEED TO TOGGLE THIS TO 'NO' IF YOU DO NOT WANT IT IN YOUR FAXED NOTE.

## 2024-08-21 NOTE — LETTER
8/21/2024    Adeola Stockton MD  [id number]  [facility] [address]    RE: Romy Hurley       Dear Colleague,     I had the pleasure of seeing Romy Hurley in the [facility].    HEART CARE ELECTROPHYSIOLOGY NOTE      [facility]  [phone]      Assessment/Recommendations   Assessment/Plan:  1.  Persistent Atrial Fibrillation: Recent episodes of AF.  However, she has been on amiodarone for 2 years and do not recommend long-term use due to risk for side effects.  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  We discussed treatment options, in particular repeat ablation including advancements in ablations since her index procedure in 2019.  We discussed <1 to 2% risk for complication and expected recovery.  She would like to proceed with repeat ablation with Dr. Garland.  -- Schedule repeat AF ablation with Dr. Garland  -- Continue amiodarone 200 mg twice daily    She was reassured that atrial fibrillation is not life-threatening, but carries an increased risk for stroke.  She has a [id number] score of 5 for age >75, female gender, HTN, T2DM.    -- Continue Eliquis 5 mg twice daily for stroke prophylaxis    2.  Hypertension: Controlled with metoprolol XL, losartan, hydrochlorothiazide    3.  Obstructive sleep apnea:  Not using CPAP.  Discussed correlation between untreated sleep apnea and atrial fibrillation.  She does not believe previous sleep study was a representative sample.  Discussed repeat evaluation, strongly encouraged resumtption CPAP therapy.    Anticipate next follow-up will be in preparation for ablation       History of Present Illness/Subjective    HPI: Romy Hurley is a 79 year old female who comes in today for EP follow-up of fibrillation.  She has a history of atrial fibrillation, hypertension, stage III chronic kidney disease, type 2 diabetes, and obstructive sleep apnea on CPAP.  Retired respiratory  therapist.    Arrhythmia history  Dx/date: PAF 2012.  Persistent requiring DCCV ~2013 recurrent PAF after ablation.  Sx: palpitations, fatigue, and diaphoresis   [id number] score: 5 for age >75, female gender, HTN, T2DM  HAS-BLED score of 1 for age >65   Oral anticoagulation:  Antiarrhythmic medications, AV chapito blocking agents: Failed AAD with flecainide due to side effects, sotalol due to breakthrough and bradycardia.  Amiodarone (8/2022-present)  Procedures  DCCV: ~2013, 10/11/2017, 11/2/2017  Ablation: 12/6/2019 by Dr. Garland (cryo PVI + LA roofline)     Pat states that she feels well and has not had any recent episodes of AF.  She denies chest discomfort, palpitations, abdominal fullness/bloating or peripheral edema, shortness of breath, paroxysmal nocturnal dyspnea, orthopnea, lightheadedness, dizziness, pre-syncope, or syncope.  She jacome in [place] February to March.    Cardiographics (EKG personally reviewed):  EKG done 5/8/2024 shows sinus rhythm at 64 bpm,  ms, QT/QTc 426/433 ms.    ECHO done 8/12/2024:  1. The left ventricle is normal in size. There is normal left ventricular wall  thickness. Left ventricular systolic function is normal. The visual ejection  fraction is 55-60%. Diastolic Doppler findings (E/E' ratio and/or other  parameters) suggest left ventricular filling pressures are normal. No regional  wall motion abnormalities noted.  2.The right ventricle is normal size. The right ventricular systolic function  is normal.  3. Trace mitral and tricuspid regurgitation.  4. No pericardial effusion.  5. No previous study for comparison.    CTA pulmonary veins done 11/4/2019:  1. Four pulmonary veins with normal configuration.   2. Esophagus is adjacent to the posterior left atrial body and left inferior pulmonary vein.  3. No calcified atherosclerotic plaque.    I have reviewed and updated the patient's Past Medical History, Social History, Family History and Medication List.  Outside  records personally reviewed.     Physical Examination  Review of Systems   Vitals: BP (!) 144/78 (BP Location: Right arm, Patient Position: Sitting, Cuff Size: Adult Large)   Pulse 54   Resp 20   Wt 134.7 kg (297 lb)   LMP  (LMP Unknown)   SpO2 98%   BMI 44.50 kg/m    BMI= Body mass index is 44.5 kg/m .  Wt Readings from Last 3 Encounters:   24 134.7 kg (297 lb)   24 134.3 kg (296 lb)   24 130.7 kg (288 lb 4 oz)       General Appearance:   Alert, well-appearing and in no acute distress.   HEENT: Atraumatic, normocephalic.  No scleral icterus, normal conjunctivae, EOMs intact, PERRL.  Mucous membranes pink and moist.     Chest/Lungs:   Chest symmetric, spine straight.  Respirations unlabored.  Lungs are clear to auscultation.   Cardiovascular:   Regular rate and rhythm.  Normal first and second heart sounds with no murmurs, rubs, or gallops; radial pulses are intact, No edema.   Abdomen:  Soft, nondistended.   Extremities: No cyanosis or clubbing.   Musculoskeletal: Moves all extremities.     Skin: Warm, dry, intact.    Neurologic: Mood and affect are appropriate.  Alert and oriented to person, place, time, and situation.     ROS: 10 point ROS neg other than the symptoms noted above in the HPI.         Medical History  Surgical History Family History Social History   Past Medical History:   Diagnosis Date     Asthma      Atrial fibrillation (H) 2012     Diabetes mellitus (H)      Hypertension      Insomnia      Mild intermittent asthma      Obesity      SHARA on CPAP      Osteoarthritis      Past Surgical History:   Procedure Laterality Date     ARTHROPLASTY KNEE BILATERAL       ARTHROSCOPY SHOULDER RT/LT  2011    right with subacromial decompression and rotator cuff repair, massive      SECTION       EP ABLATION AFLUTTER  2019    Procedure: EP Ablation Atrial Flutter;  Surgeon: Dorian Garland MD;  Location: [facility] Cath Lab;  Service: Cardiology     EP ABLATION  PVI Left 12/06/2019    Procedure: EP Ablation PVI;  Surgeon: Dorian Garland MD;  Location: [facility] Cath Lab;  Service: Cardiology     HC CARDIOVERSION  05/31/2012    [facility] - done for a fib     JOINT REPLACEMTN, KNEE RT/LT  02/01/2010    LEFT     JOINT REPLACEMTN, KNEE RT/LT  06/07/2010    right     OPEN REDUCTION INTERNAL FIXATION ANKLE Right 02/19/2021    Procedure: OPEN REDUCTION INTERNAL FIXATION MEDIAL MALLEOLUS FRACTURE, ANKLE RIGHT;  Surgeon: Vidal Cohn MD;  Location: [facility] OR;  Service: Orthopedics     REPAIR HAMMER TOE Right 02/19/2021    Procedure: FLEXOR TENOTOMIES 3RD TOE HAMMER TOE CORRECTION;  Surgeon: Vidal Cohn MD;  Location: [facility] OR;  Service: Orthopedics     REVERSE ARTHROPLASTY SHOULDER Left 7/26/2023    Procedure: Left Reverse Total Shoulder Arthroplasty;  Surgeon: Saurabh Villagran MD;  Location: [facility] OR     SHOULDER ARTHROSCOPY W/ ROTATOR CUFF REPAIR Right      ZZC TOTAL ANKLE REPLACEMENT Right 02/19/2021    Procedure: RIGHT TOTAL ANKLE ARTHOPLASTY, DEBRIDE SUBTALAR JOINT;  Surgeon: Vidal Cohn MD;  Location: [facility] OR;  Service: Orthopedics     Family History   Problem Relation Age of Onset     Genitourinary Problems Mother         Ovarian Cancer     Gastrointestinal Disease Mother         Had gallbladder romoved     Cancer Father         Lung     Gastrointestinal Disease Father         Had gallbladder romoved     C.A.D. Paternal Grandfather         Coronary     Gastrointestinal Disease Brother         Had gallbladder romoved     Depression Sister         DDD     Ovarian Cancer Mother 82.00     Lung Cancer Father 75.00     Sleep Apnea Father      Snoring Father      Brain Cancer Sister 57.00        Brain Ca x 1 year     No Known Problems Brother      No Known Problems Sister      No Known Problems Sister         Social History     Socioeconomic History     Marital status:      Spouse name: Not on file     Number of  children: 1     Years of education: Not on file     Highest education level: Not on file   Occupational History     Not on file   Tobacco Use     Smoking status: Former     Current packs/day: 0.00     Types: Cigarettes     Quit date: 1981     Years since quittin.2     Smokeless tobacco: Never   Substance and Sexual Activity     Alcohol use: Yes     Comment: Alcoholic Drinks/day: Rare (2-3 month)     Drug use: No     Sexual activity: Yes     Partners: Male     Birth control/protection: Post-menopausal     Comment: Menopause   Other Topics Concern     Parent/sibling w/ CABG, MI or angioplasty before 65F 55M? No   Social History Narrative     Not on file     Social Determinants of Health     Financial Resource Strain: Not on file   Food Insecurity: Not on file   Transportation Needs: Not on file   Physical Activity: Not on file   Stress: Not on file   Social Connections: Not on file   Interpersonal Safety: Low Risk  (3/19/2024)    Interpersonal Safety      Do you feel physically and emotionally safe where you currently live?: Yes      Within the past 12 months, have you been hit, slapped, kicked or otherwise physically hurt by someone?: No      Within the past 12 months, have you been humiliated or emotionally abused in other ways by your partner or ex-partner?: No   Housing Stability: Not on file           Medications  Allergies   Current Outpatient Medications   Medication Sig Dispense Refill     acetaminophen (TYLENOL) 650 MG CR tablet Take 650 mg by mouth every 8 hours as needed for pain (max dose 3000mg per day.)       amiodarone (PACERONE) 200 MG tablet TAKE ONE TABLET BY MOUTH ONCE DAILY AT BEDTIME 90 tablet 3     apixaban ANTICOAGULANT (ELIQUIS ANTICOAGULANT) 5 MG tablet TAKE ONE TABLET BY MOUTH TWICE DAILY 180 tablet 3     atorvastatin (LIPITOR) 20 MG tablet Take 1 tablet (20 mg) by mouth daily 100 tablet 3     blood glucose (ONETOUCH VERIO IQ) test strip Use to test blood sugars 1 times daily or as  directed. 100 each 11     blood glucose monitoring (ONE TOUCH DELICA) lancets Use to test blood sugars 1 times daily or as directed. 100 each 3     fluticasone-salmeterol (ADVAIR) 250-50 MCG/ACT inhaler USE 1 INHALATION EVERY 12 HOURS (Patient taking differently: as needed.) 60 each 11     hydrochlorothiazide (HYDRODIURIL) 25 MG tablet Take 1 tablet (25 mg) by mouth daily 90 tablet 3     losartan (COZAAR) 100 MG tablet Take 1 tablet (100 mg) by mouth daily 90 tablet 3     metFORMIN (GLUCOPHAGE) 500 MG tablet TAKE 1 TABLET BY MOUTH DAILY WITH DINNER 90 tablet 3     metoprolol succinate ER (TOPROL XL) 50 MG 24 hr tablet TAKE ONE TABLET BY MOUTH ONCE DAILY IN THE EVENING 90 tablet 3     PARoxetine (PAXIL) 10 MG tablet Take 1 tablet (10 mg) by mouth at bedtime 90 tablet 3     traZODone (DESYREL) 50 MG tablet TAKE 1 TABLET AT BEDTIME 90 tablet 1     XOPENEX HFA 45 MCG/ACT inhaler INHALE 2 PUFFS INTO THE LUNGS EVERY 8 HOURS AS NEEDED FOR SHORTNESS OF BREATH OR DIFFICULT BREATHING Strength: 45 MCG/ACT 15 g 3     amoxicillin-clavulanate (AUGMENTIN) 875-125 MG tablet Take 1 tablet by mouth 2 times daily 14 tablet 0     predniSONE (DELTASONE) 20 MG tablet Take two tablets (= 40mg) each day for 5 (five) days 10 tablet 0       Allergies   Allergen Reactions     Macrobid [Nitrofurantoin Anhydrous] Shortness Of Breath     Ace Inhibitors Cough     Corticosteroids      Crabby, hot/red skin          Lab Results    Chemistry/lipid CBC Cardiac Enzymes/BNP/TSH/INR   Recent Labs   Lab Test 03/19/24  0754   CHOL 205*   HDL 53   *   TRIG 95     Recent Labs   Lab Test 03/19/24  0754 10/13/22  1553 07/23/21  0744   * 74 58     Recent Labs   Lab Test 05/08/24  0444      POTASSIUM 4.1   CHLORIDE 99   CO2 26   *   BUN 19.3   CR 1.13*   GFRESTIMATED 50*   ZHAO 8.9     Recent Labs   Lab Test 05/08/24  0444 03/19/24  0754 07/27/23  0512   CR 1.13* 1.23* 1.37*     Recent Labs   Lab Test 03/19/24  0754 07/12/23  1417  "10/13/22  1553   A1C 6.0* 6.1* 6.0*      Recent Labs   Lab Test 05/08/24  0444   WBC 6.5   HGB 12.2   HCT 37.7   MCV 90        Recent Labs   Lab Test 05/08/24  0444 03/19/24  0754 07/27/23  0512   HGB 12.2 13.4 11.4*    No results for input(s): \"TROPONINI\" in the last 48298 hours.  No results for input(s): \"BNP\", \"NTBNPI\", \"NTBNP\" in the last 52809 hours.  Recent Labs   Lab Test 05/21/24  1525   TSH 6.12*     No results for input(s): \"INR\" in the last 54119 hours.   42 minutes were spent on the date of encounter performing chart review, history and exam, documentation, and further activities as noted above.    The longitudinal plan of care for the diagnosis(es)/condition(s) as documented were addressed during this visit. Due to the added complexity in care, I will continue to support Romy in the subsequent management and with ongoing continuity of care.                                           Thank you for allowing me to participate in the care of your patient.      Sincerely,     CRISTIAN [facility]  cc:   Dorian Garland MD  [address] [facility] [address]  [place] [address]      "

## 2024-08-21 NOTE — TELEPHONE ENCOUNTER
Breanne Augustin, APRN CNP  P Hcc Ep Support Pool - [facility]  She would like to schedule repeat PVI with Dr. Garland.  On amio--hold for ? Weeks prior?  She is aware likely schedule for November.  She is in [place] Feb-March, so needs to be done Nov or Dec.  ThanksBreanne

## 2024-08-21 NOTE — PROGRESS NOTES
HEART CARE ELECTROPHYSIOLOGY NOTE      [facility]  [phone]      Assessment/Recommendations   Assessment/Plan:  1.  Persistent Atrial Fibrillation: Recent episodes of AF.  However, she has been on amiodarone for 2 years and do not recommend long-term use due to risk for side effects.  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  We discussed treatment options, in particular repeat ablation including advancements in ablations since her index procedure in 2019.  We discussed <1 to 2% risk for complication and expected recovery.  She would like to proceed with repeat ablation with Dr. Garland.  -- Schedule repeat AF ablation with Dr. Garland  -- Continue amiodarone 200 mg twice daily    She was reassured that atrial fibrillation is not life-threatening, but carries an increased risk for stroke.  She has a [id number] score of 5 for age >75, female gender, HTN, T2DM.    -- Continue Eliquis 5 mg twice daily for stroke prophylaxis    2.  Hypertension: Controlled with metoprolol XL, losartan, hydrochlorothiazide    3.  Obstructive sleep apnea:  Not using CPAP.  Discussed correlation between untreated sleep apnea and atrial fibrillation.  She does not believe previous sleep study was a representative sample.  Discussed repeat evaluation, strongly encouraged resumtption CPAP therapy.    Anticipate next follow-up will be in preparation for ablation       History of Present Illness/Subjective    HPI: Romy Hurley is a 79 year old female who comes in today for EP follow-up of fibrillation.  She has a history of atrial fibrillation, hypertension, stage III chronic kidney disease, type 2 diabetes, and obstructive sleep apnea on CPAP.  Retired respiratory therapist.    Arrhythmia history  Dx/date: PAF 2012.  Persistent requiring DCCV ~2013 recurrent PAF after ablation.  Sx: palpitations, fatigue, and diaphoresis   [id number] score: 5 for age >75, female gender, HTN,  T2DM  HAS-BLED score of 1 for age >65   Oral anticoagulation:  Antiarrhythmic medications, AV chapito blocking agents: Failed AAD with flecainide due to side effects, sotalol due to breakthrough and bradycardia.  Amiodarone (8/2022-present)  Procedures  DCCV: ~2013, 10/11/2017, 11/2/2017  Ablation: 12/6/2019 by Dr. Garland (cryo PVI + LA roofline)     Pat states that she feels well and has not had any recent episodes of AF.  She denies chest discomfort, palpitations, abdominal fullness/bloating or peripheral edema, shortness of breath, paroxysmal nocturnal dyspnea, orthopnea, lightheadedness, dizziness, pre-syncope, or syncope.  She jacome in [place] February to March.    Cardiographics (EKG personally reviewed):  EKG done 5/8/2024 shows sinus rhythm at 64 bpm,  ms, QT/QTc 426/433 ms.    ECHO done 8/12/2024:  1. The left ventricle is normal in size. There is normal left ventricular wall  thickness. Left ventricular systolic function is normal. The visual ejection  fraction is 55-60%. Diastolic Doppler findings (E/E' ratio and/or other  parameters) suggest left ventricular filling pressures are normal. No regional  wall motion abnormalities noted.  2.The right ventricle is normal size. The right ventricular systolic function  is normal.  3. Trace mitral and tricuspid regurgitation.  4. No pericardial effusion.  5. No previous study for comparison.    CTA pulmonary veins done 11/4/2019:  1. Four pulmonary veins with normal configuration.   2. Esophagus is adjacent to the posterior left atrial body and left inferior pulmonary vein.  3. No calcified atherosclerotic plaque.    I have reviewed and updated the patient's Past Medical History, Social History, Family History and Medication List.  Outside records personally reviewed.     Physical Examination  Review of Systems   Vitals: BP (!) 144/78 (BP Location: Right arm, Patient Position: Sitting, Cuff Size: Adult Large)   Pulse 54   Resp 20   Wt 134.7 kg (297 lb)    LMP  (LMP Unknown)   SpO2 98%   BMI 44.50 kg/m    BMI= Body mass index is 44.5 kg/m .  Wt Readings from Last 3 Encounters:   24 134.7 kg (297 lb)   24 134.3 kg (296 lb)   24 130.7 kg (288 lb 4 oz)       General Appearance:   Alert, well-appearing and in no acute distress.   HEENT: Atraumatic, normocephalic.  No scleral icterus, normal conjunctivae, EOMs intact, PERRL.  Mucous membranes pink and moist.     Chest/Lungs:   Chest symmetric, spine straight.  Respirations unlabored.  Lungs are clear to auscultation.   Cardiovascular:   Regular rate and rhythm.  Normal first and second heart sounds with no murmurs, rubs, or gallops; radial pulses are intact, No edema.   Abdomen:  Soft, nondistended.   Extremities: No cyanosis or clubbing.   Musculoskeletal: Moves all extremities.     Skin: Warm, dry, intact.    Neurologic: Mood and affect are appropriate.  Alert and oriented to person, place, time, and situation.     ROS: 10 point ROS neg other than the symptoms noted above in the HPI.         Medical History  Surgical History Family History Social History   Past Medical History:   Diagnosis Date    Asthma     Atrial fibrillation (H) 2012    Diabetes mellitus (H)     Hypertension     Insomnia     Mild intermittent asthma     Obesity     SHARA on CPAP     Osteoarthritis      Past Surgical History:   Procedure Laterality Date    ARTHROPLASTY KNEE BILATERAL      ARTHROSCOPY SHOULDER RT/LT  2011    right with subacromial decompression and rotator cuff repair, massive     SECTION      EP ABLATION AFLUTTER  2019    Procedure: EP Ablation Atrial Flutter;  Surgeon: Dorian Garland MD;  Location: [facility];  Service: Cardiology    EP ABLATION PVI Left 2019    Procedure: EP Ablation PVI;  Surgeon: Dorian Garland MD;  Location: [facility];  Service: Cardiology    HC CARDIOVERSION  2012    [facility] - done for a fib    JOINT REPLACEMTN, KNEE  RT/LT  02/01/2010    LEFT    JOINT REPLACEMTN, KNEE RT/LT  06/07/2010    right    OPEN REDUCTION INTERNAL FIXATION ANKLE Right 02/19/2021    Procedure: OPEN REDUCTION INTERNAL FIXATION MEDIAL MALLEOLUS FRACTURE, ANKLE RIGHT;  Surgeon: Vidal Cohn MD;  Location: [facility] OR;  Service: Orthopedics    REPAIR HAMMER TOE Right 02/19/2021    Procedure: FLEXOR TENOTOMIES 3RD TOE HAMMER TOE CORRECTION;  Surgeon: Vidal Cohn MD;  Location: [facility] OR;  Service: Orthopedics    REVERSE ARTHROPLASTY SHOULDER Left 7/26/2023    Procedure: Left Reverse Total Shoulder Arthroplasty;  Surgeon: Saurabh Villagran MD;  Location: [facility]    SHOULDER ARTHROSCOPY W/ ROTATOR CUFF REPAIR Right     ZZC TOTAL ANKLE REPLACEMENT Right 02/19/2021    Procedure: RIGHT TOTAL ANKLE ARTHOPLASTY, DEBRIDE SUBTALAR JOINT;  Surgeon: Vidal Cohn MD;  Location: [facility];  Service: Orthopedics     Family History   Problem Relation Age of Onset    Genitourinary Problems Mother         Ovarian Cancer    Gastrointestinal Disease Mother         Had gallbladder romoved    Cancer Father         Lung    Gastrointestinal Disease Father         Had gallbladder romoved    C.A.D. Paternal Grandfather         Coronary    Gastrointestinal Disease Brother         Had gallbladder romoved    Depression Sister         DDD    Ovarian Cancer Mother 82.00    Lung Cancer Father 75.00    Sleep Apnea Father     Snoring Father     Brain Cancer Sister 57.00        Brain Ca x 1 year    No Known Problems Brother     No Known Problems Sister     No Known Problems Sister         Social History     Socioeconomic History    Marital status:      Spouse name: Not on file    Number of children: 1    Years of education: Not on file    Highest education level: Not on file   Occupational History    Not on file   Tobacco Use    Smoking status: Former     Current packs/day: 0.00     Types: Cigarettes     Quit date: 6/1/1981     Years since  quittin.2    Smokeless tobacco: Never   Substance and Sexual Activity    Alcohol use: Yes     Comment: Alcoholic Drinks/day: Rare (2-3 month)    Drug use: No    Sexual activity: Yes     Partners: Male     Birth control/protection: Post-menopausal     Comment: Menopause   Other Topics Concern    Parent/sibling w/ CABG, MI or angioplasty before 65F 55M? No   Social History Narrative    Not on file     Social Determinants of Health     Financial Resource Strain: Not on file   Food Insecurity: Not on file   Transportation Needs: Not on file   Physical Activity: Not on file   Stress: Not on file   Social Connections: Not on file   Interpersonal Safety: Low Risk  (3/19/2024)    Interpersonal Safety     Do you feel physically and emotionally safe where you currently live?: Yes     Within the past 12 months, have you been hit, slapped, kicked or otherwise physically hurt by someone?: No     Within the past 12 months, have you been humiliated or emotionally abused in other ways by your partner or ex-partner?: No   Housing Stability: Not on file           Medications  Allergies   Current Outpatient Medications   Medication Sig Dispense Refill    acetaminophen (TYLENOL) 650 MG CR tablet Take 650 mg by mouth every 8 hours as needed for pain (max dose 3000mg per day.)      amiodarone (PACERONE) 200 MG tablet TAKE ONE TABLET BY MOUTH ONCE DAILY AT BEDTIME 90 tablet 3    apixaban ANTICOAGULANT (ELIQUIS ANTICOAGULANT) 5 MG tablet TAKE ONE TABLET BY MOUTH TWICE DAILY 180 tablet 3    atorvastatin (LIPITOR) 20 MG tablet Take 1 tablet (20 mg) by mouth daily 100 tablet 3    blood glucose (ONETOUCH VERIO IQ) test strip Use to test blood sugars 1 times daily or as directed. 100 each 11    blood glucose monitoring (ONE TOUCH DELICA) lancets Use to test blood sugars 1 times daily or as directed. 100 each 3    fluticasone-salmeterol (ADVAIR) 250-50 MCG/ACT inhaler USE 1 INHALATION EVERY 12 HOURS (Patient taking differently: as needed.)  "60 each 11    hydrochlorothiazide (HYDRODIURIL) 25 MG tablet Take 1 tablet (25 mg) by mouth daily 90 tablet 3    losartan (COZAAR) 100 MG tablet Take 1 tablet (100 mg) by mouth daily 90 tablet 3    metFORMIN (GLUCOPHAGE) 500 MG tablet TAKE 1 TABLET BY MOUTH DAILY WITH DINNER 90 tablet 3    metoprolol succinate ER (TOPROL XL) 50 MG 24 hr tablet TAKE ONE TABLET BY MOUTH ONCE DAILY IN THE EVENING 90 tablet 3    PARoxetine (PAXIL) 10 MG tablet Take 1 tablet (10 mg) by mouth at bedtime 90 tablet 3    traZODone (DESYREL) 50 MG tablet TAKE 1 TABLET AT BEDTIME 90 tablet 1    XOPENEX HFA 45 MCG/ACT inhaler INHALE 2 PUFFS INTO THE LUNGS EVERY 8 HOURS AS NEEDED FOR SHORTNESS OF BREATH OR DIFFICULT BREATHING Strength: 45 MCG/ACT 15 g 3    amoxicillin-clavulanate (AUGMENTIN) 875-125 MG tablet Take 1 tablet by mouth 2 times daily 14 tablet 0    predniSONE (DELTASONE) 20 MG tablet Take two tablets (= 40mg) each day for 5 (five) days 10 tablet 0       Allergies   Allergen Reactions    Macrobid [Nitrofurantoin Anhydrous] Shortness Of Breath    Ace Inhibitors Cough    Corticosteroids      Crabby, hot/red skin          Lab Results    Chemistry/lipid CBC Cardiac Enzymes/BNP/TSH/INR   Recent Labs   Lab Test 03/19/24  0754   CHOL 205*   HDL 53   *   TRIG 95     Recent Labs   Lab Test 03/19/24  0754 10/13/22  1553 07/23/21  0744   * 74 58     Recent Labs   Lab Test 05/08/24 0444      POTASSIUM 4.1   CHLORIDE 99   CO2 26   *   BUN 19.3   CR 1.13*   GFRESTIMATED 50*   ZHAO 8.9     Recent Labs   Lab Test 05/08/24 0444 03/19/24  0754 07/27/23  0512   CR 1.13* 1.23* 1.37*     Recent Labs   Lab Test 03/19/24  0754 07/12/23  1417 10/13/22  1553   A1C 6.0* 6.1* 6.0*      Recent Labs   Lab Test 05/08/24  0444   WBC 6.5   HGB 12.2   HCT 37.7   MCV 90        Recent Labs   Lab Test 05/08/24 0444 03/19/24  0754 07/27/23  0512   HGB 12.2 13.4 11.4*    No results for input(s): \"TROPONINI\" in the last 13891 hours.  No " "results for input(s): \"BNP\", \"NTBNPI\", \"NTBNP\" in the last 34397 hours.  Recent Labs   Lab Test 05/21/24  1525   TSH 6.12*     No results for input(s): \"INR\" in the last 30975 hours.   42 minutes were spent on the date of encounter performing chart review, history and exam, documentation, and further activities as noted above.    The longitudinal plan of care for the diagnosis(es)/condition(s) as documented were addressed during this visit. Due to the added complexity in care, I will continue to support Romy in the subsequent management and with ongoing continuity of care.                                       "

## 2024-08-21 NOTE — TELEPHONE ENCOUNTER
PVI Referral Request    Dr Garland-    Pt has Persistent AF, would be re-do PVI    If agreeable will schedule as follows:  Consult: Pt will meet with you am of PVI, as pt has had previous consult with you prior  Imaging Prior: Per guidelines no imaging will be ordered, KAMINI and CT of PV will not be ordered unless you indicate otherwise  Anticoagulation Status: Continue Eliquis    AAD: Amiodarone-Please verify if you would like this held prior to PVI or continued  PPI: start Protonix 40mg Daily 3 days prior, and continue s/p for 6 wks  Scheduling Info: Dr Garland preferance-2:1 day  Mapping: Dr Garland preference- REGINA, unless indicated other wise      Ok to to order/schedule with you?  Please advise  Thank you  Yessenia Jensen RN  8/21/2024 11:44 AM

## 2024-08-23 ENCOUNTER — DOCUMENTATION ONLY (OUTPATIENT)
Dept: CARDIOLOGY | Facility: CLINIC | Age: 79
End: 2024-08-23
Payer: COMMERCIAL

## 2024-08-23 ENCOUNTER — PREP FOR PROCEDURE (OUTPATIENT)
Dept: CARDIOLOGY | Facility: CLINIC | Age: 79
End: 2024-08-23
Payer: COMMERCIAL

## 2024-08-23 DIAGNOSIS — I48.0 PAROXYSMAL ATRIAL FIBRILLATION (H): Primary | ICD-10-CM

## 2024-08-23 RX ORDER — LIDOCAINE 40 MG/G
CREAM TOPICAL
OUTPATIENT
Start: 2024-08-23

## 2024-08-23 RX ORDER — SODIUM CHLORIDE 9 MG/ML
100 INJECTION, SOLUTION INTRAVENOUS CONTINUOUS
OUTPATIENT
Start: 2024-08-23

## 2024-08-23 NOTE — LETTER
Romy Hurley  [address] [facility] [address]        The following are the dates and times of your appointments related to your Atrial Fibrillation (AF) Ablation Procedure:    Pre-procedural Requirements/Testing:You do not have any pre-procedural testing that is required prior to your procedure    Pre-procedural Visit: You are scheduled for a History & Physical, EKG, labs, and nurse visit on Thursday October 24, 2024. Please arrive at 10:15 am to the [facility]    Ablation Procedure: Thursday October 31, 2024 with Dr Garland. Please arrive at the welcome desk at [facility] at 5:30 am.    Post-procedural Nursing Phone Call Visit: Monday November 4, 2024  at 1:10 pm. We will call you!     Post-procedural Nurse Practitioner Follow-Up Visit:Monday December 16, 2024 arrive at 11:10 am to the [facility]    If you have any scheduling concerns, please contact the procedure schedulers at: [phone]       IMPORTANT INFORMATION REGARDING YOUR ABLATION     Preoperative Visit:   This visit is scheduled with the electrophysiology Nurse Practitioner and/or Registered Nurse, this visit is to complete the necessary documentation needed prior to your procedure, pre procedural lab work, and time to address any questions or concerns you may have.   Please note the lab work that you will have done for this appointment does NOT require you to be fasting.  Please bring these instructions with you to your appointment.  Currently [facility] is not requiring COVID testing to be completed prior to your procedure. Please note, if you are experiencing COVID like symptoms you will need to test for COVID at home with a rapid antigen COVID test 1-2 days prior to your procedure date. If you test positive for COVID your procedure will need to be rescheduled, please contact our CV scheduling line as soon as possible [phone].    Arriving for your Ablation Procedure:   Please note we do  not have exact start times for patients procedures, this time varies due to individualized time that is needed to prepare each pt for your their procedure and may vary based on unforeseen circumstances.   Please arrive at [facility] [address] [place] [address]  [facility], has available patient/visitor parking across from the main entrance of the hospital. Please enter through the main entrance of the hospital where you will check in and be directed by staff to the appropriate location for your procedure.  Please note due to the ongoing changes in COVID restrictions if you have questions about how many individuals can accompany you to your procedure or if  will be available, please contact the [facility] number at [phone].    Preparing at Home for your Ablation Procedure:  Have nothing to eat or drink after midnight the night prior to your ablation.  You will need to bring a current list of your medications with you for our admissions process the day of your procedure, please do not bring any of your own medications with you to the hospital.  The hospital cannot store your valuables, so please leave them at home  You will need to take off your jewelry prior to your procedure, we advise leaving your jewelry at home, as we cannot store your valuables  We ask that you please shower the morning of your procedure, or the night before.  If you use a CPAP machine for breathing while you are sleeping, please bring this with you to the hospital.    Medication Instructions:  Please hold your Metformin and any multivitamins or supplements the morning of your procedure, please take the rest of your medication including your anticoagulant medication with small sips of water.  It is important for you to be on anticoagulation medication prior and after your procedure for stroke prevention, you will need to continue taking Eliquis as scheduled without  interruption prior to your procedure.  You may not be able to have this procedure if you interrupt or miss a dose of your anticoagulation medication prior to your procedure. We encourage you to make sure you are taking this medication without missing any doses, if you happen to miss a dose prior to your procedure please contact us to make further arrangements  You will need to START Protonix 40mg Daily 3 days prior to your ablation, and continue this for 6 weeks after your ablation. We will be sending this prescription in for you at the time of your office visit scheduled prior to your procedure, you will be called if these changes need to be made prior to this appointment. It is important to take this medication to help reduce the discomfort caused through the process of your ablation, that potentially can cause irritation to the esophagus.  You will need to hold your rhythm medication Amiodarone for 6 weeks prior to your procedure, take the medication entirely though the date of 9/19/24, then stop.    Postoperative Information :  Please plan to be in the hospital for around 6-8 hours from your arrival time to your discharge time, please not that this is an estimated time depending on how you recover after your procedure. The staff who will be helping you recover, will give you a more accurate discharge time after your procedure is finished. It is uncommon for patients to be required to spend the night after this procedure, if this is required by your provider, hospital staff asks that you arrange your family/health  to be available to pick you up the next following morning.   You will need to stay with a responsible adult for 24 hours after your procedure. This is a requirement due to the type of procedure you are having, and the sedation/anesthetic you are receiving.  Detailed information regarding discharge instructions will be given to you when you leave the hospital.  We will be following up with you  after your ablation procedure via phone. Please be aware the appointment time and date may be changed if you need to be seen in clinic. We ask that you do not drive until this visit is completed.  You will need to plan on taking 5-7 days after the procedure for recuperation.  Please refrain from extended travel outside the [place] for 3 weeks, and no traveling by plane for 4 weeks.  You will not be able to interrupt your anticoagulation medication for 3 months after your procedure. If you are needing to have a procedure that requires you to stop your anticoagulation that is urgent or emergent within this timeframe, please contact the electrophysiology nurse line directly. The exception is for patients that have a left atrial appendage closer device (Watchman/Amulet), you will not be able to interrupt your anticoagulation medication for 6 weeks after your procedure.    Follow Up Requirements After Your Ablation:  The staff will schedule you for all your appointment after your procedure, you do not need to call in to schedule these. Please refer to the above time and dates for these appointments.   EP Nurse Clinician phone call visit 3-4 days after your ablation.  EP Nurse Practitioner in clinic follow up appointment 4-6 weeks after your ablation.  Electrophysiologist or the Electrophysiology Nurse Practitioner 3 months after the ablation, this appointment will be made at the time of your 4-6 week follow up visit depending on how you are doing after your ablation.    Contact Information :  Please call the Nursing line with any questions or concerns regarding the procedure at : [phone]  If you have any Scheduling concerns please contact our procedure  at: [phone]  For urgent needs after clinic hours or on weekends please contact the physician on call at [phone]     Information on Atrial Fibrillation Ablations    What is Catheter Ablation?  A Catheter Ablation is a procedure that treats  certain types of abnormal heart rhythms (arrhythmia). There are several components to the procedure, but the final purpose is target and destroy (ablate) small areas of your heart muscle that are causing the arrhythmia.     Why is an Ablations Done?  A catheter ablation is an effective way to treat some types of abnormal heart rhythms. An ablation is a relatively low risk procedure that may permanently cure your abnormal heart rhythm.  The ablation process damages the heart cells which results in scarring of that area. The scar is electrically inactive and can produce a permanent cure for the abnormal rhythm.  Ablation procedures can help avoid the need for rhythm medications and give patients the ability to return to their normal activity and live an active life. In patients that do not have symptoms, ablations are not typically done as there may still be an increased risk of stroke.    Why is Catheter Ablation Done?  Sometimes, the heart s electrical system does not work properly.  This can cause abnormal heart rhythms, called arrhythmias.  During an arrhythmia, the heart may beat too fast, too slowly, or irregularly.  Your doctor has recommended catheter ablation to treat a rapid (fast) heart rhythm, or tachycardia.      How Catheter Ablation Is Done  Catheter ablation uses thin, flexible wires called electrode catheters to find and destroy (ablate) problem cells.     Here is how the procedure is done:  The pulmonary veins will be treated first. There are currently two tools used to ablate around the pulmonary veins.  Radiofrequency catheter will heat the tissue.  Cryo-balloon catheter will freeze the tissue.   Testing will be done to confirm that effective treatment has been delivered.   Further testing may be performed to see if a fib is still present or if some other rhythm problem such as atrial flutter is present. If an ongoing rhythm problem is discovered then further ablation can be done to isolate and  destroy those areas responsible for the arrhythmia.     Your  Experience during Catheter Ablation    In most cases, catheter ablations are done in an electrophysiology (EP) lab.  The procedural area: You will be transferred back to the procedure room once you have been appropriately prepped by the nursing staff and you are ready for your ablation.   Sedation: You to be put completely asleep for your ablation using general anesthesia.  Inserting the catheters: You will have 3-4 catheters inserted into the veins. Catheter locations can include the shoulder, neck, and groins. Catheters are guided to the heart with the help of ultrasound and x-ray monitors.  Finishing up: When the procedure is finished, the catheters are taken out of your body. A special closure device or suture may be used to help seal these puncture sites. You re then taken to your room to rest, and will be cared for by a nurse during your recovery.    Risks and Complications  The risks of catheter ablation are fairly low compared to the benefits you receive. Possible risks and complications include:  Common (up to 10%)  Bleeding or bruising  Shortness of breath  Heartburn  Uncommon (< 1%)  Blood clots  A slow heart rhythm (requiring a permanent pacemaker)  Perforation of the heart muscle, blood vessel, or lung (may require an emergency procedure)  Stroke  Damage to a heart valve   Heart attack, also known as acute myocardial infarction, or AMI   Death (extremely rare)    Before your Catheter Ablation  Before your catheter ablation, you will meet with the Electrophysiologist (specially trained heart doctor) who will do the procedure. The provider or a registered nurse will provide you with detailed instructions on how to prepare for this procedure, some of these instructions are listed below.  You will likely be told to stop or change your heart rhythm medications for a period of time before your ablation.   You may have testing done several days  prior to your ablation or the morning of your ablation, such as an ECG, x-ray, blood tests, or echocardiogram.   You will not be allowed to eat or drink 8 hours before your ablation. You will be given further instructions by your physician or a registered nurse regarding the medication you will take the morning of your procedure.  You will need to arrange to have a  home from the hospital; you will not be permitted to drive after your procedure due to the sedation that you receive.   You are allowed to bring personal items and clothing to the hospital, but please refrain from bringing any valuables as the hospital is not responsible for any lost or stolen items.  You will need to bring a list of the names and dosages of the medication you are taking to the hospital.  It is important to mention to your doctor or registered nurse if you have any allergies, reactions to anesthesia, or have had history of bleeding problems.    Arriving at the Hospital the morning of your Catheter Ablation  Please check in at the time that was given to you by the , who scheduled your procedure. You do not need to arrive any earlier than the time that you were given.    When you arrive, you will be directed to the area where they will be performing your procedure. The doctor or registered nurse will meet with you prior to your ablation, this is a good time to ask questions and address any concerns you may have. You will then be asked to sign the consent form for your ablation, if this has not already been done.    The nursing staff will begin to prepare you for your procedure:  A nurse will shave and cleanse the area where the ablation catheters will be placed. These areas are most commonly the left and right groin sites (the fold between your thigh and abdomen), and in some cases the chest, arm, and neck. This is done to reduce the risk of infection.    The nursing staff will start an intravenous (IV) line into a vein in  your arm, which allows the staff to give you medication and sedatives to help you relax prior and during your ablation.  In some cases, the nursing staff will need to place a catheter that will drain urine from your bladder (Whitfield Catheter), which is required due to the length and complexity of the ablation you are having.    After Catheter Ablation  Recovery immediately after your ablation in the hospital  After your catheter ablation procedure, you will be taken to a recovery room. After your ablation, you may be required to lay flat or be on bed rest. During this time, a nurse will monitor you, and you will be given medication to make you comfortable.     Going Home  When it is time to go home, your will need to have an adult family member or friend drive you. Most people can walk, climb stairs, and perform light activity soon after catheter ablation. You can most likely return to your full routine within a few days. However, you may be told to avoid running, heavy lifting, and other strenuous activities for a short time. Please make sure to follow any specific activity restrictions provided by the medical staff at the time of your discharge from the hospital.  Doctor's typically advise that you not drive until your post procedure assessment visit.   Avoid heavy physical activity and heavy lifting for several days after the procedure to allow your body to heal.  Ask your doctor when you can expect to return to work.  Take your temperature and check your incision for signs of infection (redness, swelling, drainage, or warmth) every day for a week. It is normal to have a small bruise or lump where the catheter was inserted.  Take your medications exactly as directed. Do not skip doses or stop medication without consulting your physician prior.  Learn to take your own pulse and keep a record of your results.    Follow-Up  After your ablations you will have a follow up visit to see how you are doing, to assess your  rhythm after your ablation, and to address any medication changes if necessary. In many cases, one ablation is enough to treat an arrhythmia. However, sometimes the problem returns or another is found. If this happens, you may need a second catheter ablation. Tell your healthcare provider if you have any new or returning symptoms.    Common Symptoms after Catheter Ablation  In the first few weeks after catheter ablation, you may feel mild chest fullness or aching. You may also feel as if your heart is skipping beats or your heartbeat may feel faster than normal. You may think that your heart rhythm problem is about to return. These sensations are normal and usually go away with time. Talk to your healthcare provider if you are concerned.      When to Call Your Doctor  Increased bleeding, bruising, or pain at the insertion site  Episodes of atrial fibrillation are common post procedure, call the clinic if episodes are lasting longer than 4-6 hours  Difficulty with your speech or walking, or any visual disturbance  Lightheaded, dizziness, or feeling faint  Shortness of breath or chest pain  Coldness, swelling, or numbness of the arm or leg near the insertion site  A bruise or lump at the insertion site that is larger than a walnut  A fever over 100 F

## 2024-08-23 NOTE — PROGRESS NOTES
H&P:  Card OV  Date:  EP SIM [] PVI  Order Case Req Y  Order Set Y Lab/EKG   Orders [] Imaging Order None     AC Eliquis-CONTINUE   AAD Amiodarone-6 week hold prior  Metoprolol- Continue   PPI/H2 Blocker Start Protonix 40mg Daily 3 days prior, 6wk post   Diuretics None   DM/GLP-1 DM Meds-  Metformin  GLP-1- None     Dorian Garland MD Holen, Megan, RN; P [facility] Ep Support Pool - e  Caller: Unspecified (2 days ago, 11:43 AM)  Hi team,  Okay forward with scheduling patient for repeat atrial fibrillation ablation.  Mapping system: [employer]  No additional diagnostic testing needed at this time.  Okay to meet with patient morning of procedure.  Thanks  Dorian Hurley, 1945, [id number]  Home:[phone] (home) Cell:[phone] (mobile)  Emergency Contact: CELIA HERNANDEZ   PCP: Adeola Stockton, [phone]    Important patient information for CSC/Cath Lab staff : None    [facility] EP Cath Lab Procedure Order   Ablation Type:  Atrial Fibrillation (Paroxysmal)  Ordering Provider: Dr Garland  Date Ordered and Prepped: 8/23/2024 Lurdes Gurrola RN    Scheduling Information:  Anticipated Case Duration:  Complex case/Repeat, (Cases per day SWA 1:1, DW 4:1, KA 2:1)   Scheduling Timeframe:  Next Available  Scheduling Restrictions: None  Scheduling Contact: Please contact pt to schedule, if you are unable to schedule date within the next 24 hours please contact pt to update on scheduling process  EP RN Follow Up Apt: Schedule EP RN PC visit 3-4 days s/p PVI  MD Preference: Scheduling with ordering provider  Current Device/Device Co Needed for Procedure: None NoneNone  Pre-Procedural Testing needed: None  Mapping System Required:  [employer] (Dr Garland)  ICE Needed:  Yes  Anesthesia:General Anesthesia    [facility] EP Cath Lab Prep   H&P:  Compled by cardiology on 8/21/24 if scheduled within 30 days, pt will need RN education and Labs apt within 3 days of procedure. If pts PVI scheduled outside of 30 days, pt to schedule H&P with EP  SIM, RN Teach, and Labs within 30 days of PVI  Pre-op Labs: CBC, BMP, Beta HcG if appropriate, and INR if on Warfarin will be ordered AM of procedure, if not completed at pre-op H&P within 7 days of procedure.  T&S Pre-Procedure Review: Does not need for PVI procedures  Medical Records Pertinent for Procedure:   Echo 8/12/24  PVI ablation 12/6/2019  Iodinated Contrast Dye Allergies (Does not include Shellfish, Egg, and/or Iodine Allergy): NA  GLP-1 Protocol: If on Dulaglutide (Trulicity) (weekly)- Injection hold 7 days prior to procedure  , Exenatide extended release (Bydureon bcise) (weekly)- Injection hold 7 days prior to procedure, Exenatide (Byetta) (twice daily)- Oral Tablet hold day prior and morning of procedure and for Injection hold 7 days prior to procedure, Semaglutide (Ozempic) (weekly)- Injection and Oral hold 7 days prior to procedure, Liraglutide (Victoza, Saxenda) (daily)- Injection hold day prior and morning of procedure  Follow Up S/P: EP RN 3-4 PC Visit and EP SIM 6wk (To be scheduled at time of case scheduling)    Allergies   Allergen Reactions    Macrobid [Nitrofurantoin Anhydrous] Shortness Of Breath    Ace Inhibitors Cough    Corticosteroids      Crabby, hot/red skin       Current Outpatient Medications:     acetaminophen (TYLENOL) 650 MG CR tablet, Take 650 mg by mouth every 8 hours as needed for pain (max dose 3000mg per day.), Disp: , Rfl:     amiodarone (PACERONE) 200 MG tablet, TAKE ONE TABLET BY MOUTH ONCE DAILY AT BEDTIME, Disp: 90 tablet, Rfl: 3    apixaban ANTICOAGULANT (ELIQUIS ANTICOAGULANT) 5 MG tablet, TAKE ONE TABLET BY MOUTH TWICE DAILY, Disp: 180 tablet, Rfl: 3    atorvastatin (LIPITOR) 20 MG tablet, Take 1 tablet (20 mg) by mouth daily, Disp: 100 tablet, Rfl: 3    blood glucose (ONETOUCH VERIO IQ) test strip, Use to test blood sugars 1 times daily or as directed., Disp: 100 each, Rfl: 11    blood glucose monitoring (ONE TOUCH DELICA) lancets, Use to test blood sugars 1 times  daily or as directed., Disp: 100 each, Rfl: 3    fluticasone-salmeterol (ADVAIR) 250-50 MCG/ACT inhaler, USE 1 INHALATION EVERY 12 HOURS (Patient taking differently: as needed.), Disp: 60 each, Rfl: 11    hydrochlorothiazide (HYDRODIURIL) 25 MG tablet, Take 1 tablet (25 mg) by mouth daily, Disp: 90 tablet, Rfl: 3    losartan (COZAAR) 100 MG tablet, Take 1 tablet (100 mg) by mouth daily, Disp: 90 tablet, Rfl: 3    metFORMIN (GLUCOPHAGE) 500 MG tablet, TAKE 1 TABLET BY MOUTH DAILY WITH DINNER, Disp: 90 tablet, Rfl: 3    metoprolol succinate ER (TOPROL XL) 50 MG 24 hr tablet, TAKE ONE TABLET BY MOUTH ONCE DAILY IN THE EVENING, Disp: 90 tablet, Rfl: 3    PARoxetine (PAXIL) 10 MG tablet, Take 1 tablet (10 mg) by mouth at bedtime, Disp: 90 tablet, Rfl: 3    traZODone (DESYREL) 50 MG tablet, TAKE 1 TABLET AT BEDTIME, Disp: 90 tablet, Rfl: 1    XOPENEX HFA 45 MCG/ACT inhaler, INHALE 2 PUFFS INTO THE LUNGS EVERY 8 HOURS AS NEEDED FOR SHORTNESS OF BREATH OR DIFFICULT BREATHING Strength: 45 MCG/ACT, Disp: 15 g, Rfl: 3    Documentation Date:8/23/2024 8:05 AM  Lurdes Gurrola RN

## 2024-09-12 ENCOUNTER — OFFICE VISIT (OUTPATIENT)
Dept: URGENT CARE | Facility: URGENT CARE | Age: 79
End: 2024-09-12
Payer: COMMERCIAL

## 2024-09-12 VITALS
OXYGEN SATURATION: 97 % | TEMPERATURE: 98.2 F | DIASTOLIC BLOOD PRESSURE: 78 MMHG | HEART RATE: 51 BPM | WEIGHT: 293 LBS | RESPIRATION RATE: 16 BRPM | BODY MASS INDEX: 44.5 KG/M2 | SYSTOLIC BLOOD PRESSURE: 162 MMHG

## 2024-09-12 DIAGNOSIS — R39.9 UTI SYMPTOMS: ICD-10-CM

## 2024-09-12 DIAGNOSIS — R30.0 DYSURIA: Primary | ICD-10-CM

## 2024-09-12 LAB
BACTERIA #/AREA URNS HPF: ABNORMAL /HPF
RBC #/AREA URNS AUTO: ABNORMAL /HPF
SQUAMOUS #/AREA URNS AUTO: ABNORMAL /LPF
WBC #/AREA URNS AUTO: ABNORMAL /HPF
WBC CLUMPS #/AREA URNS HPF: PRESENT /HPF

## 2024-09-12 PROCEDURE — 87086 URINE CULTURE/COLONY COUNT: CPT | Performed by: FAMILY MEDICINE

## 2024-09-12 PROCEDURE — 81015 MICROSCOPIC EXAM OF URINE: CPT | Performed by: FAMILY MEDICINE

## 2024-09-12 PROCEDURE — 87186 SC STD MICRODIL/AGAR DIL: CPT | Performed by: FAMILY MEDICINE

## 2024-09-12 PROCEDURE — 99213 OFFICE O/P EST LOW 20 MIN: CPT | Performed by: FAMILY MEDICINE

## 2024-09-12 RX ORDER — SULFAMETHOXAZOLE/TRIMETHOPRIM 800-160 MG
1 TABLET ORAL 2 TIMES DAILY
Qty: 10 TABLET | Refills: 0 | Status: SHIPPED | OUTPATIENT
Start: 2024-09-12 | End: 2024-09-17

## 2024-09-12 NOTE — PROGRESS NOTES
Assessment & Plan     Dysuria  Differentials discussed in detail including acute cystitis.  UA findings and previous urine cultures reviewed.  Bactrim prescribed and recommended to continue well hydration.  Urine culture sent.  Follow-up if symptoms persist or worsen.  All questions answered.  - UA Microscopic with Reflex to Culture  - Urine Culture    UTI symptoms  - sulfamethoxazole-trimethoprim (BACTRIM DS) 800-160 MG tablet; Take 1 tablet by mouth 2 times daily for 5 days.      Subjective   Romy is a 79 year old, presenting for the following health issues:  Urinary Problem (Burning and frequent urination x 1 day)    HPI     Concern -   Onset: 1 day  Description: dysuria, urinary frequency  Intensity: moderate  Progression of Symptoms:  same  Accompanying Signs & Symptoms: no fever, chills, abdominal pain  Therapies tried and outcome: Azo      Review of Systems  Constitutional, HEENT, cardiovascular, pulmonary, gi and gu systems are negative, except as otherwise noted.      Objective    BP (!) 162/78   Pulse 51   Temp 98.2  F (36.8  C) (Tympanic)   Resp 16   Wt 134.7 kg (297 lb)   LMP  (LMP Unknown)   SpO2 97%   BMI 44.50 kg/m    Body mass index is 44.5 kg/m .  Physical Exam   GENERAL: alert and no distress  RESP: lungs clear to auscultation - no rales, rhonchi or wheezes  CV: regular rate and rhythm, normal S1 S2, no S3 or S4, no murmur, click or rub, no peripheral edema  ABDOMEN: soft, nontender, no hepatosplenomegaly, no masses  MS: no gross musculoskeletal defects noted, no edema  SKIN: no suspicious lesions or rashes  NEURO: Normal strength and tone, mentation intact and speech normal  PSYCH: mentation appears normal, affect normal/bright    Results for orders placed or performed in visit on 09/12/24   UA Microscopic with Reflex to Culture     Status: Abnormal   Result Value Ref Range    Bacteria Urine Moderate (A) None Seen /HPF    RBC Urine 5-10 (A) 0-2 /HPF /HPF    WBC Urine 25-50 (A) 0-5  /HPF /HPF    Squamous Epithelials Urine Few (A) None Seen /LPF    WBC Clumps Urine Present (A) None Seen /HPF           Signed Electronically by: Jonny Nava MD

## 2024-09-13 LAB
BACTERIA UR CULT: ABNORMAL
BACTERIA UR CULT: ABNORMAL

## 2024-10-24 ENCOUNTER — ALLIED HEALTH/NURSE VISIT (OUTPATIENT)
Dept: CARDIOLOGY | Facility: CLINIC | Age: 79
End: 2024-10-24
Payer: COMMERCIAL

## 2024-10-24 ENCOUNTER — LAB (OUTPATIENT)
Dept: CARDIOLOGY | Facility: CLINIC | Age: 79
End: 2024-10-24
Payer: COMMERCIAL

## 2024-10-24 ENCOUNTER — OFFICE VISIT (OUTPATIENT)
Dept: CARDIOLOGY | Facility: CLINIC | Age: 79
End: 2024-10-24
Payer: COMMERCIAL

## 2024-10-24 VITALS
RESPIRATION RATE: 16 BRPM | HEIGHT: 68 IN | SYSTOLIC BLOOD PRESSURE: 128 MMHG | WEIGHT: 293 LBS | DIASTOLIC BLOOD PRESSURE: 60 MMHG | HEART RATE: 53 BPM | BODY MASS INDEX: 44.41 KG/M2

## 2024-10-24 DIAGNOSIS — G47.33 OSA (OBSTRUCTIVE SLEEP APNEA): ICD-10-CM

## 2024-10-24 DIAGNOSIS — I10 ESSENTIAL HYPERTENSION: ICD-10-CM

## 2024-10-24 DIAGNOSIS — I48.0 PAROXYSMAL ATRIAL FIBRILLATION (H): ICD-10-CM

## 2024-10-24 DIAGNOSIS — I48.0 PAROXYSMAL ATRIAL FIBRILLATION (H): Primary | ICD-10-CM

## 2024-10-24 DIAGNOSIS — I48.19 PERSISTENT ATRIAL FIBRILLATION (H): Primary | ICD-10-CM

## 2024-10-24 LAB
ANION GAP SERPL CALCULATED.3IONS-SCNC: 10 MMOL/L (ref 7–15)
BUN SERPL-MCNC: 20.6 MG/DL (ref 8–23)
CALCIUM SERPL-MCNC: 9.2 MG/DL (ref 8.8–10.4)
CHLORIDE SERPL-SCNC: 97 MMOL/L (ref 98–107)
CREAT SERPL-MCNC: 1.23 MG/DL (ref 0.51–0.95)
EGFRCR SERPLBLD CKD-EPI 2021: 45 ML/MIN/1.73M2
ERYTHROCYTE [DISTWIDTH] IN BLOOD BY AUTOMATED COUNT: 14.6 % (ref 10–15)
GLUCOSE SERPL-MCNC: 106 MG/DL (ref 70–99)
HCO3 SERPL-SCNC: 30 MMOL/L (ref 22–29)
HCT VFR BLD AUTO: 41.4 % (ref 35–47)
HGB BLD-MCNC: 13.3 G/DL (ref 11.7–15.7)
MCH RBC QN AUTO: 29.4 PG (ref 26.5–33)
MCHC RBC AUTO-ENTMCNC: 32.1 G/DL (ref 31.5–36.5)
MCV RBC AUTO: 91 FL (ref 78–100)
PLATELET # BLD AUTO: 209 10E3/UL (ref 150–450)
POTASSIUM SERPL-SCNC: 5 MMOL/L (ref 3.4–5.3)
RBC # BLD AUTO: 4.53 10E6/UL (ref 3.8–5.2)
SODIUM SERPL-SCNC: 137 MMOL/L (ref 135–145)
WBC # BLD AUTO: 5.4 10E3/UL (ref 4–11)

## 2024-10-24 PROCEDURE — 36415 COLL VENOUS BLD VENIPUNCTURE: CPT

## 2024-10-24 PROCEDURE — G2211 COMPLEX E/M VISIT ADD ON: HCPCS | Performed by: NURSE PRACTITIONER

## 2024-10-24 PROCEDURE — 93000 ELECTROCARDIOGRAM COMPLETE: CPT | Performed by: STUDENT IN AN ORGANIZED HEALTH CARE EDUCATION/TRAINING PROGRAM

## 2024-10-24 PROCEDURE — 80048 BASIC METABOLIC PNL TOTAL CA: CPT

## 2024-10-24 PROCEDURE — 85027 COMPLETE CBC AUTOMATED: CPT

## 2024-10-24 PROCEDURE — 99214 OFFICE O/P EST MOD 30 MIN: CPT | Performed by: NURSE PRACTITIONER

## 2024-10-24 PROCEDURE — 99207 PR NO CHARGE NURSE ONLY: CPT

## 2024-10-24 RX ORDER — PANTOPRAZOLE SODIUM 40 MG/1
40 TABLET, DELAYED RELEASE ORAL DAILY
Qty: 45 TABLET | Refills: 0 | Status: SHIPPED | OUTPATIENT
Start: 2024-10-28

## 2024-10-24 NOTE — H&P (VIEW-ONLY)
HEART CARE ELECTROPHYSIOLOGY NOTE      [facility]  [phone]      Assessment/Recommendations   Assessment/Plan:  1.  Paroxysmal Atrial Fibrillation:  Initially diagnosed 2012.  Recurrent PAF after index ablation in 2019.  Symptoms consist of palpitations, fatigue, and diaphoresis.  Failed antiarrhythmic therapy with flecainide and sotalol.  Maintaining sinus rhythm with amiodarone, but do not recommend long-term use.   She is scheduled for repeat catheter ablation with Dr. Garland on 10/31/2024.  We discussed ablation, <1-2% risk for complication, expected recovery, and follow-up.  She states that her questions were answered to her satisfaction and she is ready to proceed.  -- Amiodarone held for 6 weeks prior to ablation  -- Continue metoprolol XL 50 mg daily (for HTN)  -- Start pantoprazole 40 mg daily 3 days prior to ablation, to continue for 6 weeks after ablation.     She has a [id number] score of  5 for age >75, female gender, HTN, T2DM .  HAS-BLED score of 1 for age.     -- Continue Eliquis 5 mg twice daily for stroke prophylaxis.  She reports no missed doses or bleeding issues.      2.  Hypertension: Controlled with metoprolol XL, losartan, hydrochlorothiazide     3.  Obstructive sleep apnea:  Not using CPAP.  Discussed correlation between untreated sleep apnea and atrial fibrillation.  She does not believe previous sleep study was a representative sample.  Recommend repeat evaluation and resumption of CPAP if indicated.    Telephone follow up with EP RN on 11/4/2024  Follow-up with me 12/16/2024, 6 weeks post PVI     History of Present Illness/Subjective    HPI: Romy Hurley is a 79 year old female who comes in for EP follow up of atrial fibrillation and history and physical prior to pulmonary vein isolation ablation.  She has a history of atrial fibrillation, hypertension, stage III chronic kidney disease, type 2 diabetes, and obstructive sleep apnea on CPAP.  Retired  respiratory therapist.     Arrhythmia history  Dx/date: PAF 2012.  Persistent requiring DCCV ~2013 recurrent PAF after ablation.  Sx: palpitations, fatigue, and diaphoresis   [id number] score: 5 for age >75, female gender, HTN, T2DM  HAS-BLED score of 1 for age >65   Oral anticoagulation: Eliquis 5 mg twice daily  Antiarrhythmic medications, AV chapito blocking agents: Failed AAD with flecainide due to side effects, sotalol due to breakthrough and bradycardia.  Amiodarone (8/2022-9/19/2024)  Procedures  DCCV: ~2013, 10/11/2017, 11/2/2017  Ablation: 12/6/2019 by Dr. Garland (cryo PVI + LA roofline).  Scheduled 10/31/2024 with Dr. Garland     Pat states that she feels well and has not had any recent episodes of AF.  She denies chest discomfort, palpitations, abdominal fullness/bloating or peripheral edema, shortness of breath, paroxysmal nocturnal dyspnea, orthopnea, lightheadedness, dizziness, pre-syncope, or syncope.  She jacome in [place] February to March.     Cardiographics (EKG personally reviewed):  EKG done 10/24/2024 shows sinus bradycardia at 53 bpm, first-degree AV delay,  ms, QT/QTc 458/429 ms  EKG done 5/8/2024 shows sinus rhythm at 64 bpm,  ms, QT/QTc 426/433 ms.     ECHO done 8/12/2024:  1. The left ventricle is normal in size. There is normal left ventricular wall  thickness. Left ventricular systolic function is normal. The visual ejection  fraction is 55-60%. Diastolic Doppler findings (E/E' ratio and/or other  parameters) suggest left ventricular filling pressures are normal. No regional  wall motion abnormalities noted.  2.The right ventricle is normal size. The right ventricular systolic function  is normal.  3. Trace mitral and tricuspid regurgitation.  4. No pericardial effusion.  5. No previous study for comparison.     CTA pulmonary veins done 11/4/2019:  1. Four pulmonary veins with normal configuration.   2. Esophagus is adjacent to the posterior left atrial body and left inferior  "pulmonary vein.  3. No calcified atherosclerotic plaque.    I have reviewed and updated the patient's Past Medical History, Social History, Family History and Medication List.  Outside records personally reviewed.     Physical Examination  Review of Systems   Vitals: /60 (BP Location: Right arm, Patient Position: Sitting, Cuff Size: Adult Large)   Pulse 53   Resp 16   Ht 1.727 m (5' 8\")   Wt 134.1 kg (295 lb 9.6 oz)   LMP  (LMP Unknown)   BMI 44.95 kg/m    BMI= Body mass index is 44.95 kg/m .  Wt Readings from Last 3 Encounters:   10/24/24 134.1 kg (295 lb 9.6 oz)   09/12/24 134.7 kg (297 lb)   08/21/24 134.7 kg (297 lb)       General Appearance:   Alert, well-appearing and in no acute distress.   HEENT: Atraumatic, normocephalic.  No scleral icterus, normal conjunctivae, EOMs intact, PERRL.  Mucous membranes pink and moist.     Chest/Lungs:   Chest symmetric, spine straight.  Respirations unlabored.  Lungs are clear to auscultation.   Cardiovascular:   Regular rate and rhythm.  Normal first and second heart sounds with no murmurs, rubs, or gallops; radial and posterior tibial pulses are intact, mild bilateral lower extremity edema.   Abdomen:  Soft, nondistended, bowel sounds present.   Extremities: No cyanosis or clubbing.   Musculoskeletal: Moves all extremities.     Skin: Warm, dry, intact.    Neurologic: Mood and affect are appropriate.  Alert and oriented to person, place, time, and situation.     ROS: 10 point ROS neg other than the symptoms noted above in the HPI.         Medical History  Surgical History Family History Social History   Past Medical History:   Diagnosis Date    Asthma     Atrial fibrillation (H) 01/01/2012    Diabetes mellitus (H)     Hypertension     Insomnia     Mild intermittent asthma     Obesity     SHARA on CPAP     Osteoarthritis      Past Surgical History:   Procedure Laterality Date    ARTHROPLASTY KNEE BILATERAL      ARTHROSCOPY SHOULDER RT/LT  07/06/2011    right with " subacromial decompression and rotator cuff repair, massive     SECTION      EP ABLATION AFLUTTER  2019    Procedure: EP Ablation Atrial Flutter;  Surgeon: Dorian Garland MD;  Location: [facility];  Service: Cardiology    EP ABLATION PVI Left 2019    Procedure: EP Ablation PVI;  Surgeon: Dorian Garland MD;  Location: [facility];  Service: Cardiology    HC CARDIOVERSION  2012    [facility] - done for a fib    JOINT REPLACEMTN, KNEE RT/LT  2010    LEFT    JOINT REPLACEMTN, KNEE RT/LT  2010    right    OPEN REDUCTION INTERNAL FIXATION ANKLE Right 2021    Procedure: OPEN REDUCTION INTERNAL FIXATION MEDIAL MALLEOLUS FRACTURE, ANKLE RIGHT;  Surgeon: Vidal Cohn MD;  Location: [facility];  Service: Orthopedics    REPAIR HAMMER TOE Right 2021    Procedure: FLEXOR TENOTOMIES 3RD TOE HAMMER TOE CORRECTION;  Surgeon: Vidal Cohn MD;  Location: [facility] OR;  Service: Orthopedics    REVERSE ARTHROPLASTY SHOULDER Left 2023    Procedure: Left Reverse Total Shoulder Arthroplasty;  Surgeon: Saurabh Villagran MD;  Location: [facility] OR    SHOULDER ARTHROSCOPY W/ ROTATOR CUFF REPAIR Right     ZZC TOTAL ANKLE REPLACEMENT Right 2021    Procedure: RIGHT TOTAL ANKLE ARTHOPLASTY, DEBRIDE SUBTALAR JOINT;  Surgeon: Vidal Cohn MD;  Location: [facility];  Service: Orthopedics     Family History   Problem Relation Age of Onset    Genitourinary Problems Mother         Ovarian Cancer    Gastrointestinal Disease Mother         Had gallbladder romoved    Cancer Father         Lung    Gastrointestinal Disease Father         Had gallbladder romoved    C.A.D. Paternal Grandfather         Coronary    Gastrointestinal Disease Brother         Had gallbladder romoved    Depression Sister         DDD    Ovarian Cancer Mother 82.00    Lung Cancer Father 75.00    Sleep Apnea Father     Snoring Father     Brain Cancer Sister  57.00        Brain Ca x 1 year    No Known Problems Brother     No Known Problems Sister     No Known Problems Sister         Social History     Socioeconomic History    Marital status:      Spouse name: Not on file    Number of children: 1    Years of education: Not on file    Highest education level: Not on file   Occupational History    Not on file   Tobacco Use    Smoking status: Former     Current packs/day: 0.00     Types: Cigarettes     Quit date: 1981     Years since quittin.4    Smokeless tobacco: Never   Substance and Sexual Activity    Alcohol use: Yes     Comment: Alcoholic Drinks/day: Rare (2-3 month)    Drug use: No    Sexual activity: Yes     Partners: Male     Birth control/protection: Post-menopausal     Comment: Menopause   Other Topics Concern    Parent/sibling w/ CABG, MI or angioplasty before 65F 55M? No   Social History Narrative    Not on file     Social Drivers of Health     Financial Resource Strain: Not on file   Food Insecurity: Not on file   Transportation Needs: Not on file   Physical Activity: Not on file   Stress: Not on file   Social Connections: Not on file   Interpersonal Safety: Low Risk  (3/19/2024)    Interpersonal Safety     Do you feel physically and emotionally safe where you currently live?: Yes     Within the past 12 months, have you been hit, slapped, kicked or otherwise physically hurt by someone?: No     Within the past 12 months, have you been humiliated or emotionally abused in other ways by your partner or ex-partner?: No   Housing Stability: Not on file           Medications  Allergies   Current Outpatient Medications   Medication Sig Dispense Refill    acetaminophen (TYLENOL) 650 MG CR tablet Take 650 mg by mouth every 8 hours as needed for pain (max dose 3000mg per day.)      apixaban ANTICOAGULANT (ELIQUIS ANTICOAGULANT) 5 MG tablet TAKE ONE TABLET BY MOUTH TWICE DAILY 180 tablet 3    atorvastatin (LIPITOR) 20 MG tablet Take 1 tablet (20 mg) by  mouth daily 100 tablet 3    blood glucose (ONETOUCH VERIO IQ) test strip Use to test blood sugars 1 times daily or as directed. 100 each 11    blood glucose monitoring (ONE TOUCH DELICA) lancets Use to test blood sugars 1 times daily or as directed. 100 each 3    Cranberry 600 MG TABS Take by mouth.      cyanocobalamin (VITAMIN B-12) 500 MCG SUBL sublingual tablet       fluticasone-salmeterol (ADVAIR) 250-50 MCG/ACT inhaler USE 1 INHALATION EVERY 12 HOURS (Patient taking differently: as needed.) 60 each 11    hydrochlorothiazide (HYDRODIURIL) 25 MG tablet Take 1 tablet (25 mg) by mouth daily 90 tablet 3    losartan (COZAAR) 100 MG tablet Take 1 tablet (100 mg) by mouth daily 90 tablet 3    metFORMIN (GLUCOPHAGE) 500 MG tablet TAKE 1 TABLET BY MOUTH DAILY WITH DINNER 90 tablet 3    metoprolol succinate ER (TOPROL XL) 50 MG 24 hr tablet TAKE ONE TABLET BY MOUTH ONCE DAILY IN THE EVENING 90 tablet 3    [START ON 10/28/2024] pantoprazole (PROTONIX) 40 MG EC tablet Take 1 tablet (40 mg) by mouth daily. Continue for 6 weeks after ablation 45 tablet 0    PARoxetine (PAXIL) 10 MG tablet Take 1 tablet (10 mg) by mouth at bedtime 90 tablet 3    traZODone (DESYREL) 50 MG tablet TAKE 1 TABLET AT BEDTIME 90 tablet 1    XOPENEX HFA 45 MCG/ACT inhaler INHALE 2 PUFFS INTO THE LUNGS EVERY 8 HOURS AS NEEDED FOR SHORTNESS OF BREATH OR DIFFICULT BREATHING Strength: 45 MCG/ACT 15 g 3       Allergies   Allergen Reactions    Macrobid [Nitrofurantoin Anhydrous] Shortness Of Breath    Ace Inhibitors Cough    Corticosteroids      Crabby, hot/red skin      Denies previous adverse reaction to anesthesia      Lab Results    Chemistry/lipid CBC Cardiac Enzymes/BNP/TSH/INR   Recent Labs   Lab Test 03/19/24  0754   CHOL 205*   HDL 53   *   TRIG 95     Recent Labs   Lab Test 03/19/24  0754 10/13/22  1553 07/23/21  0744   * 74 58     Recent Labs   Lab Test 05/08/24  0444      POTASSIUM 4.1   CHLORIDE 99   CO2 26   *   BUN  "19.3   CR 1.13*   GFRESTIMATED 50*   ZHAO 8.9     Recent Labs   Lab Test 05/08/24 0444 03/19/24  0754 07/27/23  0512   CR 1.13* 1.23* 1.37*     Recent Labs   Lab Test 03/19/24  0754 07/12/23  1417 10/13/22  1553   A1C 6.0* 6.1* 6.0*     BMP and CBC drawn today, results pending     Recent Labs   Lab Test 05/08/24 0444   WBC 6.5   HGB 12.2   HCT 37.7   MCV 90        Recent Labs   Lab Test 05/08/24 0444 03/19/24  0754 07/27/23  0512   HGB 12.2 13.4 11.4*    No results for input(s): \"TROPONINI\" in the last 23790 hours.  No results for input(s): \"BNP\", \"NTBNPI\", \"NTBNP\" in the last 30121 hours.  Recent Labs   Lab Test 05/21/24  1525   TSH 6.12*     No results for input(s): \"INR\" in the last 56384 hours.   The longitudinal plan of care for the diagnosis(es)/condition(s) as documented were addressed during this visit. Due to the added complexity in care, I will continue to support Romy in the subsequent management and with ongoing continuity of care.                                         "

## 2024-10-24 NOTE — LETTER
10/24/2024    Adeola Stockton MD  [address]  [facility] [address]    RE: Romy A Xavi       Dear Colleague,     I had the pleasure of seeing Romy Hur[facility] in the [facility] [place] [facility].    HEART CARE ELECTROPHYSIOLOGY NOTE      [facility]  [phone]      Assessment/Recommendations   Assessment/Plan:  1.  Paroxysmal Atrial Fibrillation:  Initially diagnosed 2012.  Recurrent PAF after index ablation in 2019.  Symptoms consist of palpitations, fatigue, and diaphoresis.  Failed antiarrhythmic therapy with flecainide and sotalol.  Maintaining sinus rhythm with amiodarone, but do not recommend long-term use.   She is scheduled for repeat catheter ablation with Dr. Garland on 10/31/2024.  We discussed ablation, <1-2% risk for complication, expected recovery, and follow-up.  She states that her questions were answered to her satisfaction and she is ready to proceed.  -- Amiodarone held for 6 weeks prior to ablation  -- Continue metoprolol XL 50 mg daily (for HTN)  -- Start pantoprazole 40 mg daily 3 days prior to ablation, to continue for 6 weeks after ablation.     She has a [id number] score of  5 for age >75, female gender, HTN, T2DM .  HAS-BLED score of 1 for age.     -- Continue Eliquis 5 mg twice daily for stroke prophylaxis.  She reports no missed doses or bleeding issues.      2.  Hypertension: Controlled with metoprolol XL, losartan, hydrochlorothiazide     3.  Obstructive sleep apnea:  Not using CPAP.  Discussed correlation between untreated sleep apnea and atrial fibrillation.  She does not believe previous sleep study was a representative sample.  Recommend repeat evaluation and resumption of CPAP if indicated.    Telephone follow up with EP RN on 11/4/2024  Follow-up with me 12/16/2024, 6 weeks post PVI     History of Present Illness/Subjective    HPI: Romy Hurley is a 79 year old female who comes in for EP follow up of atrial fibrillation and history and  physical prior to pulmonary vein isolation ablation.  She has a history of atrial fibrillation, hypertension, stage III chronic kidney disease, type 2 diabetes, and obstructive sleep apnea on CPAP.  Retired respiratory therapist.     Arrhythmia history  Dx/date: PAF 2012.  Persistent requiring DCCV ~2013 recurrent PAF after ablation.  Sx: palpitations, fatigue, and diaphoresis   [id number] score: 5 for age >75, female gender, HTN, T2DM  HAS-BLED score of 1 for age >65   Oral anticoagulation: Eliquis 5 mg twice daily  Antiarrhythmic medications, AV chapito blocking agents: Failed AAD with flecainide due to side effects, sotalol due to breakthrough and bradycardia.  Amiodarone (8/2022-9/19/2024)  Procedures  DCCV: ~2013, 10/11/2017, 11/2/2017  Ablation: 12/6/2019 by Dr. Garland (cryo PVI + LA roofline).  Scheduled 10/31/2024 with Dr. Garland     Pat states that she feels well and has not had any recent episodes of AF.  She denies chest discomfort, palpitations, abdominal fullness/bloating or peripheral edema, shortness of breath, paroxysmal nocturnal dyspnea, orthopnea, lightheadedness, dizziness, pre-syncope, or syncope.  She jacome in [place] February to March.     Cardiographics (EKG personally reviewed):  EKG done 10/24/2024 shows sinus bradycardia at 53 bpm, first-degree AV delay,  ms, QT/QTc 458/429 ms  EKG done 5/8/2024 shows sinus rhythm at 64 bpm,  ms, QT/QTc 426/433 ms.     ECHO done 8/12/2024:  1. The left ventricle is normal in size. There is normal left ventricular wall  thickness. Left ventricular systolic function is normal. The visual ejection  fraction is 55-60%. Diastolic Doppler findings (E/E' ratio and/or other  parameters) suggest left ventricular filling pressures are normal. No regional  wall motion abnormalities noted.  2.The right ventricle is normal size. The right ventricular systolic function  is normal.  3. Trace mitral and tricuspid regurgitation.  4. No pericardial  "effusion.  5. No previous study for comparison.     CTA pulmonary veins done 11/4/2019:  1. Four pulmonary veins with normal configuration.   2. Esophagus is adjacent to the posterior left atrial body and left inferior pulmonary vein.  3. No calcified atherosclerotic plaque.    I have reviewed and updated the patient's Past Medical History, Social History, Family History and Medication List.  Outside records personally reviewed.     Physical Examination  Review of Systems   Vitals: /60 (BP Location: Right arm, Patient Position: Sitting, Cuff Size: Adult Large)   Pulse 53   Resp 16   Ht 1.727 m (5' 8\")   Wt 134.1 kg (295 lb 9.6 oz)   LMP  (LMP Unknown)   BMI 44.95 kg/m    BMI= Body mass index is 44.95 kg/m .  Wt Readings from Last 3 Encounters:   10/24/24 134.1 kg (295 lb 9.6 oz)   09/12/24 134.7 kg (297 lb)   08/21/24 134.7 kg (297 lb)       General Appearance:   Alert, well-appearing and in no acute distress.   HEENT: Atraumatic, normocephalic.  No scleral icterus, normal conjunctivae, EOMs intact, PERRL.  Mucous membranes pink and moist.     Chest/Lungs:   Chest symmetric, spine straight.  Respirations unlabored.  Lungs are clear to auscultation.   Cardiovascular:   Regular rate and rhythm.  Normal first and second heart sounds with no murmurs, rubs, or gallops; radial and posterior tibial pulses are intact, mild bilateral lower extremity edema.   Abdomen:  Soft, nondistended, bowel sounds present.   Extremities: No cyanosis or clubbing.   Musculoskeletal: Moves all extremities.     Skin: Warm, dry, intact.    Neurologic: Mood and affect are appropriate.  Alert and oriented to person, place, time, and situation.     ROS: 10 point ROS neg other than the symptoms noted above in the HPI.         Medical History  Surgical History Family History Social History   Past Medical History:   Diagnosis Date     Asthma      Atrial fibrillation (H) 01/01/2012     Diabetes mellitus (H)      Hypertension      Insomnia "      Mild intermittent asthma      Obesity      SHARA on CPAP      Osteoarthritis      Past Surgical History:   Procedure Laterality Date     ARTHROPLASTY KNEE BILATERAL       ARTHROSCOPY SHOULDER RT/LT  2011    right with subacromial decompression and rotator cuff repair, massive      SECTION       EP ABLATION AFLUTTER  2019    Procedure: EP Ablation Atrial Flutter;  Surgeon: Dorian Garland MD;  Location: [facility];  Service: Cardiology     EP ABLATION PVI Left 2019    Procedure: EP Ablation PVI;  Surgeon: Dorian Garland MD;  Location: [facility] Cath Lab;  Service: Cardiology     HC CARDIOVERSION  2012    [facility] - done for a fib     JOINT REPLACEMTN, KNEE RT/LT  2010    LEFT     JOINT REPLACEMTN, KNEE RT/LT  2010    right     OPEN REDUCTION INTERNAL FIXATION ANKLE Right 2021    Procedure: OPEN REDUCTION INTERNAL FIXATION MEDIAL MALLEOLUS FRACTURE, ANKLE RIGHT;  Surgeon: Vidal Cohn MD;  Location: [facility];  Service: Orthopedics     REPAIR HAMMER TOE Right 2021    Procedure: FLEXOR TENOTOMIES 3RD TOE HAMMER TOE CORRECTION;  Surgeon: Vidal Cohn MD;  Location: [facility];  Service: Orthopedics     REVERSE ARTHROPLASTY SHOULDER Left 2023    Procedure: Left Reverse Total Shoulder Arthroplasty;  Surgeon: Saurabh Villagran MD;  Location: [facility]     SHOULDER ARTHROSCOPY W/ ROTATOR CUFF REPAIR Right      ZZC TOTAL ANKLE REPLACEMENT Right 2021    Procedure: RIGHT TOTAL ANKLE ARTHOPLASTY, DEBRIDE SUBTALAR JOINT;  Surgeon: Vidal Cohn MD;  Location: [facility] OR;  Service: Orthopedics     Family History   Problem Relation Age of Onset     Genitourinary Problems Mother         Ovarian Cancer     Gastrointestinal Disease Mother         Had gallbladder romoved     Cancer Father         Lung     Gastrointestinal Disease Father         Had gallbladder romoved     C.A.D. Paternal Grandfather          Coronary     Gastrointestinal Disease Brother         Had gallbladder romoved     Depression Sister         DDD     Ovarian Cancer Mother 82.00     Lung Cancer Father 75.00     Sleep Apnea Father      Snoring Father      Brain Cancer Sister 57.00        Brain Ca x 1 year     No Known Problems Brother      No Known Problems Sister      No Known Problems Sister         Social History     Socioeconomic History     Marital status:      Spouse name: Not on file     Number of children: 1     Years of education: Not on file     Highest education level: Not on file   Occupational History     Not on file   Tobacco Use     Smoking status: Former     Current packs/day: 0.00     Types: Cigarettes     Quit date: 1981     Years since quittin.4     Smokeless tobacco: Never   Substance and Sexual Activity     Alcohol use: Yes     Comment: Alcoholic Drinks/day: Rare (2-3 month)     Drug use: No     Sexual activity: Yes     Partners: Male     Birth control/protection: Post-menopausal     Comment: Menopause   Other Topics Concern     Parent/sibling w/ CABG, MI or angioplasty before 65F 55M? No   Social History Narrative     Not on file     Social Drivers of Health     Financial Resource Strain: Not on file   Food Insecurity: Not on file   Transportation Needs: Not on file   Physical Activity: Not on file   Stress: Not on file   Social Connections: Not on file   Interpersonal Safety: Low Risk  (3/19/2024)    Interpersonal Safety      Do you feel physically and emotionally safe where you currently live?: Yes      Within the past 12 months, have you been hit, slapped, kicked or otherwise physically hurt by someone?: No      Within the past 12 months, have you been humiliated or emotionally abused in other ways by your partner or ex-partner?: No   Housing Stability: Not on file           Medications  Allergies   Current Outpatient Medications   Medication Sig Dispense Refill     acetaminophen (TYLENOL) 650 MG CR  tablet Take 650 mg by mouth every 8 hours as needed for pain (max dose 3000mg per day.)       apixaban ANTICOAGULANT (ELIQUIS ANTICOAGULANT) 5 MG tablet TAKE ONE TABLET BY MOUTH TWICE DAILY 180 tablet 3     atorvastatin (LIPITOR) 20 MG tablet Take 1 tablet (20 mg) by mouth daily 100 tablet 3     blood glucose (ONETOUCH VERIO IQ) test strip Use to test blood sugars 1 times daily or as directed. 100 each 11     blood glucose monitoring (ONE TOUCH DELICA) lancets Use to test blood sugars 1 times daily or as directed. 100 each 3     Cranberry 600 MG TABS Take by mouth.       cyanocobalamin (VITAMIN B-12) 500 MCG SUBL sublingual tablet        fluticasone-salmeterol (ADVAIR) 250-50 MCG/ACT inhaler USE 1 INHALATION EVERY 12 HOURS (Patient taking differently: as needed.) 60 each 11     hydrochlorothiazide (HYDRODIURIL) 25 MG tablet Take 1 tablet (25 mg) by mouth daily 90 tablet 3     losartan (COZAAR) 100 MG tablet Take 1 tablet (100 mg) by mouth daily 90 tablet 3     metFORMIN (GLUCOPHAGE) 500 MG tablet TAKE 1 TABLET BY MOUTH DAILY WITH DINNER 90 tablet 3     metoprolol succinate ER (TOPROL XL) 50 MG 24 hr tablet TAKE ONE TABLET BY MOUTH ONCE DAILY IN THE EVENING 90 tablet 3     [START ON 10/28/2024] pantoprazole (PROTONIX) 40 MG EC tablet Take 1 tablet (40 mg) by mouth daily. Continue for 6 weeks after ablation 45 tablet 0     PARoxetine (PAXIL) 10 MG tablet Take 1 tablet (10 mg) by mouth at bedtime 90 tablet 3     traZODone (DESYREL) 50 MG tablet TAKE 1 TABLET AT BEDTIME 90 tablet 1     XOPENEX HFA 45 MCG/ACT inhaler INHALE 2 PUFFS INTO THE LUNGS EVERY 8 HOURS AS NEEDED FOR SHORTNESS OF BREATH OR DIFFICULT BREATHING Strength: 45 MCG/ACT 15 g 3       Allergies   Allergen Reactions     Macrobid [Nitrofurantoin Anhydrous] Shortness Of Breath     Ace Inhibitors Cough     Corticosteroids      Crabby, hot/red skin      Denies previous adverse reaction to anesthesia      Lab Results    Chemistry/lipid CBC Cardiac  "Enzymes/BNP/TSH/INR   Recent Labs   Lab Test 03/19/24  0754   CHOL 205*   HDL 53   *   TRIG 95     Recent Labs   Lab Test 03/19/24  0754 10/13/22  1553 07/23/21  0744   * 74 58     Recent Labs   Lab Test 05/08/24  0444      POTASSIUM 4.1   CHLORIDE 99   CO2 26   *   BUN 19.3   CR 1.13*   GFRESTIMATED 50*   ZHAO 8.9     Recent Labs   Lab Test 05/08/24  0444 03/19/24  0754 07/27/23  0512   CR 1.13* 1.23* 1.37*     Recent Labs   Lab Test 03/19/24  0754 07/12/23  1417 10/13/22  1553   A1C 6.0* 6.1* 6.0*     BMP and CBC drawn today, results pending     Recent Labs   Lab Test 05/08/24  0444   WBC 6.5   HGB 12.2   HCT 37.7   MCV 90        Recent Labs   Lab Test 05/08/24  0444 03/19/24  0754 07/27/23  0512   HGB 12.2 13.4 11.4*    No results for input(s): \"TROPONINI\" in the last 67523 hours.  No results for input(s): \"BNP\", \"NTBNPI\", \"NTBNP\" in the last 62459 hours.  Recent Labs   Lab Test 05/21/24  1525   TSH 6.12*     No results for input(s): \"INR\" in the last 59010 hours.   The longitudinal plan of care for the diagnosis(es)/condition(s) as documented were addressed during this visit. Due to the added complexity in care, I will continue to support Romy in the subsequent management and with ongoing continuity of care.                                             Thank you for allowing me to participate in the care of your patient.      Sincerely,     CRISTIAN Benedict CNP     [facility]  cc:   CRISTIAN Benedict CNP  [address] [facility], [address]  [place] [address]      "

## 2024-10-24 NOTE — PATIENT INSTRUCTIONS
Romy Hurley,    It was a pleasure to see you today at the [facility].     My recommendations after this visit include:    You are scheduled for pulmonary vein isolation ablation with Dr. Garland on 10/31/2024    On 10/28/2024, start pantoprazole 40 mg daily, to continue for 6 weeks after ablation    Telephone follow up with EP RN on 11/4/2024  Follow-up with me 12/16/2024, 6 weeks post PVI    Breanne Augustin, CNP  [facility], Electrophysiology  [phone]  EP nurses [phone]         
"Eliud."

## 2024-10-24 NOTE — PROGRESS NOTES
Pre-Procedure Pulmonary Vein Ablation (AF) Education    Procedure: PVI with Dr Garland on 10-31-24 with arrival time 5:30am    COVID: Pt denies COVID like symptoms, and is aware if he/she develops COVID like symptoms they would need to complete an at home with a rapid antigen COVID test 1-2 days prior to your procedure date. If COVID + pt is aware the procedure will need to be rescheduled, and to contact CV scheduling as soon as possible    Type & Screen: Is not required for PVI Ablation    Pre-Op H&P: Completed today with EP SIM- See record in Epic    Education:   Reviewed with pt in Clinic today  Pre-Procedure Instruction: NPO after midnight pre procedure, Defined NPO, Remove all jewelry and leave all valuables at home, Shower prior to arrival, Anesthesia and intubation plan/orders, Intra-procedure PVI process, Post- PVI procedure expectations/recovery, Transportation requirements and arrangements post procedure, Post-procedure follow up process, Letter sent to pt via Guroo and mail with written instructions (Refer to letters tab), Lab results would be called to pt if abnormal  Risks:   Atrial Fibrillation Ablation/Left Atrial Ablation  Cardiac Ablation  <1% Hypotension, Hemorrhage, Thrombophlebitis, Systemic or pulmonic emboli, Cardiac perforation (tamponade), Infection, Pneumothorax, Arrhythmias, Proarrhythmic effects of drugs, Radiation exposure, Catheter entrapment  <1 % Vascular injury including perforation of vein, artery or heart  1-2% Tamponade and Aortic puncture with left sided transeptal approach  1% CVA   <1% MI  <0.1% death  If external defibrillation or CV is needed, 25% risk for superficial burn  Risks associated with general anesthesia will be addressed by the Anesthesiology Department  Radiofrequency Risks:  In addition to standard risks for Radiofrequency Ablation, there is:  <2% Significant pulmonary vein stenosis  <2% Embolic events  <1% Esophageal fistula  <1% Phrenic nerve paralysis    Cryoablation Risks:  In addition to standard risks for Cryoablation Ablation, there is:  <1% Phrenic nerve paralysis  <1% Pulmonary vein stenosis  <1% Esophageal fistula    Medication:   Instructions regarding anticoagulants: Eliquis- Continue anticoagulation uninterrupted through their procedure, do not miss any doses of AC prior to procedure, importance of taking AC for stroke prevention, taking AC as prescribed, to call prior to PVI if missed a dose of AC, and if upon arrival pt reports missing a dose of AC PVI will potentially be cnx/postponed; pt reports no missed doses in the last 3 weeks  Instructions given to pt regarding antiarrhythmic medication: Amiodarone-  held 6 weeks prior, last dose on 9-19-24  Instructions given to pt regarding PPI medication: Start Protonix 40mg Daily 3 days prior, 6wk post  Instructions given to pt regarding diuretics medication: None  Instructions given to pt regarding DM/GLP-1 medication:   DM- Hold all oral diabetic medications and short acting insulin the morning of the procedure, and take 1/2 of pts scheduled doses of long acting insulin the PM prior and/or AM of procedure  GLP-1- None  Instructions for medication, other than anticoagulants and antiarrhythmics listed above, given to pt: Take all medication AM of procedure with small sips of water     Important patient information for staff: None    10/24/2024 12:20 PM  Nano Turner RN

## 2024-10-24 NOTE — PROGRESS NOTES
HEART CARE ELECTROPHYSIOLOGY NOTE      [facility]  [phone]      Assessment/Recommendations   Assessment/Plan:  1.  Paroxysmal Atrial Fibrillation:  Initially diagnosed 2012.  Recurrent PAF after index ablation in 2019.  Symptoms consist of palpitations, fatigue, and diaphoresis.  Failed antiarrhythmic therapy with flecainide and sotalol.  Maintaining sinus rhythm with amiodarone, but do not recommend long-term use.   She is scheduled for repeat catheter ablation with Dr. Garland on 10/31/2024.  We discussed ablation, <1-2% risk for complication, expected recovery, and follow-up.  She states that her questions were answered to her satisfaction and she is ready to proceed.  -- Amiodarone held for 6 weeks prior to ablation  -- Continue metoprolol XL 50 mg daily (for HTN)  -- Start pantoprazole 40 mg daily 3 days prior to ablation, to continue for 6 weeks after ablation.     She has a [id number] score of  5 for age >75, female gender, HTN, T2DM .  HAS-BLED score of 1 for age.     -- Continue Eliquis 5 mg twice daily for stroke prophylaxis.  She reports no missed doses or bleeding issues.      2.  Hypertension: Controlled with metoprolol XL, losartan, hydrochlorothiazide     3.  Obstructive sleep apnea:  Not using CPAP.  Discussed correlation between untreated sleep apnea and atrial fibrillation.  She does not believe previous sleep study was a representative sample.  Recommend repeat evaluation and resumption of CPAP if indicated.    Telephone follow up with EP RN on 11/4/2024  Follow-up with me 12/16/2024, 6 weeks post PVI     History of Present Illness/Subjective    HPI: Romy Hurley is a 79 year old female who comes in for EP follow up of atrial fibrillation and history and physical prior to pulmonary vein isolation ablation.  She has a history of atrial fibrillation, hypertension, stage III chronic kidney disease, type 2 diabetes, and obstructive sleep apnea on CPAP.  Retired  respiratory therapist.     Arrhythmia history  Dx/date: PAF 2012.  Persistent requiring DCCV ~2013 recurrent PAF after ablation.  Sx: palpitations, fatigue, and diaphoresis   [id number] score: 5 for age >75, female gender, HTN, T2DM  HAS-BLED score of 1 for age >65   Oral anticoagulation: Eliquis 5 mg twice daily  Antiarrhythmic medications, AV chapito blocking agents: Failed AAD with flecainide due to side effects, sotalol due to breakthrough and bradycardia.  Amiodarone (8/2022-9/19/2024)  Procedures  DCCV: ~2013, 10/11/2017, 11/2/2017  Ablation: 12/6/2019 by Dr. Garland (cryo PVI + LA roofline).  Scheduled 10/31/2024 with Dr. Garland     Pat states that she feels well and has not had any recent episodes of AF.  She denies chest discomfort, palpitations, abdominal fullness/bloating or peripheral edema, shortness of breath, paroxysmal nocturnal dyspnea, orthopnea, lightheadedness, dizziness, pre-syncope, or syncope.  She jacome in [place] February to March.     Cardiographics (EKG personally reviewed):  EKG done 10/24/2024 shows sinus bradycardia at 53 bpm, first-degree AV delay,  ms, QT/QTc 458/429 ms  EKG done 5/8/2024 shows sinus rhythm at 64 bpm,  ms, QT/QTc 426/433 ms.     ECHO done 8/12/2024:  1. The left ventricle is normal in size. There is normal left ventricular wall  thickness. Left ventricular systolic function is normal. The visual ejection  fraction is 55-60%. Diastolic Doppler findings (E/E' ratio and/or other  parameters) suggest left ventricular filling pressures are normal. No regional  wall motion abnormalities noted.  2.The right ventricle is normal size. The right ventricular systolic function  is normal.  3. Trace mitral and tricuspid regurgitation.  4. No pericardial effusion.  5. No previous study for comparison.     CTA pulmonary veins done 11/4/2019:  1. Four pulmonary veins with normal configuration.   2. Esophagus is adjacent to the posterior left atrial body and left inferior  "pulmonary vein.  3. No calcified atherosclerotic plaque.    I have reviewed and updated the patient's Past Medical History, Social History, Family History and Medication List.  Outside records personally reviewed.     Physical Examination  Review of Systems   Vitals: /60 (BP Location: Right arm, Patient Position: Sitting, Cuff Size: Adult Large)   Pulse 53   Resp 16   Ht 1.727 m (5' 8\")   Wt 134.1 kg (295 lb 9.6 oz)   LMP  (LMP Unknown)   BMI 44.95 kg/m    BMI= Body mass index is 44.95 kg/m .  Wt Readings from Last 3 Encounters:   10/24/24 134.1 kg (295 lb 9.6 oz)   09/12/24 134.7 kg (297 lb)   08/21/24 134.7 kg (297 lb)       General Appearance:   Alert, well-appearing and in no acute distress.   HEENT: Atraumatic, normocephalic.  No scleral icterus, normal conjunctivae, EOMs intact, PERRL.  Mucous membranes pink and moist.     Chest/Lungs:   Chest symmetric, spine straight.  Respirations unlabored.  Lungs are clear to auscultation.   Cardiovascular:   Regular rate and rhythm.  Normal first and second heart sounds with no murmurs, rubs, or gallops; radial and posterior tibial pulses are intact, mild bilateral lower extremity edema.   Abdomen:  Soft, nondistended, bowel sounds present.   Extremities: No cyanosis or clubbing.   Musculoskeletal: Moves all extremities.     Skin: Warm, dry, intact.    Neurologic: Mood and affect are appropriate.  Alert and oriented to person, place, time, and situation.     ROS: 10 point ROS neg other than the symptoms noted above in the HPI.         Medical History  Surgical History Family History Social History   Past Medical History:   Diagnosis Date    Asthma     Atrial fibrillation (H) 01/01/2012    Diabetes mellitus (H)     Hypertension     Insomnia     Mild intermittent asthma     Obesity     SHARA on CPAP     Osteoarthritis      Past Surgical History:   Procedure Laterality Date    ARTHROPLASTY KNEE BILATERAL      ARTHROSCOPY SHOULDER RT/LT  07/06/2011    right with " subacromial decompression and rotator cuff repair, massive     SECTION      EP ABLATION AFLUTTER  2019    Procedure: EP Ablation Atrial Flutter;  Surgeon: Dorian Garland MD;  Location: [facility];  Service: Cardiology    EP ABLATION PVI Left 2019    Procedure: EP Ablation PVI;  Surgeon: Dorian Garland MD;  Location: [facility];  Service: Cardiology    HC CARDIOVERSION  2012    [facility] - done for a fib    JOINT REPLACEMTN, KNEE RT/LT  2010    LEFT    JOINT REPLACEMTN, KNEE RT/LT  2010    right    OPEN REDUCTION INTERNAL FIXATION ANKLE Right 2021    Procedure: OPEN REDUCTION INTERNAL FIXATION MEDIAL MALLEOLUS FRACTURE, ANKLE RIGHT;  Surgeon: Vdial Cohn MD;  Location: [facility];  Service: Orthopedics    REPAIR HAMMER TOE Right 2021    Procedure: FLEXOR TENOTOMIES 3RD TOE HAMMER TOE CORRECTION;  Surgeon: Vidal Cohn MD;  Location: [facility] OR;  Service: Orthopedics    REVERSE ARTHROPLASTY SHOULDER Left 2023    Procedure: Left Reverse Total Shoulder Arthroplasty;  Surgeon: Saurabh Villagran MD;  Location: [facility] OR    SHOULDER ARTHROSCOPY W/ ROTATOR CUFF REPAIR Right     ZZC TOTAL ANKLE REPLACEMENT Right 2021    Procedure: RIGHT TOTAL ANKLE ARTHOPLASTY, DEBRIDE SUBTALAR JOINT;  Surgeon: Vidal Cohn MD;  Location: [facility];  Service: Orthopedics     Family History   Problem Relation Age of Onset    Genitourinary Problems Mother         Ovarian Cancer    Gastrointestinal Disease Mother         Had gallbladder romoved    Cancer Father         Lung    Gastrointestinal Disease Father         Had gallbladder romoved    C.A.D. Paternal Grandfather         Coronary    Gastrointestinal Disease Brother         Had gallbladder romoved    Depression Sister         DDD    Ovarian Cancer Mother 82.00    Lung Cancer Father 75.00    Sleep Apnea Father     Snoring Father     Brain Cancer Sister  57.00        Brain Ca x 1 year    No Known Problems Brother     No Known Problems Sister     No Known Problems Sister         Social History     Socioeconomic History    Marital status:      Spouse name: Not on file    Number of children: 1    Years of education: Not on file    Highest education level: Not on file   Occupational History    Not on file   Tobacco Use    Smoking status: Former     Current packs/day: 0.00     Types: Cigarettes     Quit date: 1981     Years since quittin.4    Smokeless tobacco: Never   Substance and Sexual Activity    Alcohol use: Yes     Comment: Alcoholic Drinks/day: Rare (2-3 month)    Drug use: No    Sexual activity: Yes     Partners: Male     Birth control/protection: Post-menopausal     Comment: Menopause   Other Topics Concern    Parent/sibling w/ CABG, MI or angioplasty before 65F 55M? No   Social History Narrative    Not on file     Social Drivers of Health     Financial Resource Strain: Not on file   Food Insecurity: Not on file   Transportation Needs: Not on file   Physical Activity: Not on file   Stress: Not on file   Social Connections: Not on file   Interpersonal Safety: Low Risk  (3/19/2024)    Interpersonal Safety     Do you feel physically and emotionally safe where you currently live?: Yes     Within the past 12 months, have you been hit, slapped, kicked or otherwise physically hurt by someone?: No     Within the past 12 months, have you been humiliated or emotionally abused in other ways by your partner or ex-partner?: No   Housing Stability: Not on file           Medications  Allergies   Current Outpatient Medications   Medication Sig Dispense Refill    acetaminophen (TYLENOL) 650 MG CR tablet Take 650 mg by mouth every 8 hours as needed for pain (max dose 3000mg per day.)      apixaban ANTICOAGULANT (ELIQUIS ANTICOAGULANT) 5 MG tablet TAKE ONE TABLET BY MOUTH TWICE DAILY 180 tablet 3    atorvastatin (LIPITOR) 20 MG tablet Take 1 tablet (20 mg) by  mouth daily 100 tablet 3    blood glucose (ONETOUCH VERIO IQ) test strip Use to test blood sugars 1 times daily or as directed. 100 each 11    blood glucose monitoring (ONE TOUCH DELICA) lancets Use to test blood sugars 1 times daily or as directed. 100 each 3    Cranberry 600 MG TABS Take by mouth.      cyanocobalamin (VITAMIN B-12) 500 MCG SUBL sublingual tablet       fluticasone-salmeterol (ADVAIR) 250-50 MCG/ACT inhaler USE 1 INHALATION EVERY 12 HOURS (Patient taking differently: as needed.) 60 each 11    hydrochlorothiazide (HYDRODIURIL) 25 MG tablet Take 1 tablet (25 mg) by mouth daily 90 tablet 3    losartan (COZAAR) 100 MG tablet Take 1 tablet (100 mg) by mouth daily 90 tablet 3    metFORMIN (GLUCOPHAGE) 500 MG tablet TAKE 1 TABLET BY MOUTH DAILY WITH DINNER 90 tablet 3    metoprolol succinate ER (TOPROL XL) 50 MG 24 hr tablet TAKE ONE TABLET BY MOUTH ONCE DAILY IN THE EVENING 90 tablet 3    [START ON 10/28/2024] pantoprazole (PROTONIX) 40 MG EC tablet Take 1 tablet (40 mg) by mouth daily. Continue for 6 weeks after ablation 45 tablet 0    PARoxetine (PAXIL) 10 MG tablet Take 1 tablet (10 mg) by mouth at bedtime 90 tablet 3    traZODone (DESYREL) 50 MG tablet TAKE 1 TABLET AT BEDTIME 90 tablet 1    XOPENEX HFA 45 MCG/ACT inhaler INHALE 2 PUFFS INTO THE LUNGS EVERY 8 HOURS AS NEEDED FOR SHORTNESS OF BREATH OR DIFFICULT BREATHING Strength: 45 MCG/ACT 15 g 3       Allergies   Allergen Reactions    Macrobid [Nitrofurantoin Anhydrous] Shortness Of Breath    Ace Inhibitors Cough    Corticosteroids      Crabby, hot/red skin      Denies previous adverse reaction to anesthesia      Lab Results    Chemistry/lipid CBC Cardiac Enzymes/BNP/TSH/INR   Recent Labs   Lab Test 03/19/24  0754   CHOL 205*   HDL 53   *   TRIG 95     Recent Labs   Lab Test 03/19/24  0754 10/13/22  1553 07/23/21  0744   * 74 58     Recent Labs   Lab Test 05/08/24  0444      POTASSIUM 4.1   CHLORIDE 99   CO2 26   *   BUN  "19.3   CR 1.13*   GFRESTIMATED 50*   ZHAO 8.9     Recent Labs   Lab Test 05/08/24 0444 03/19/24  0754 07/27/23  0512   CR 1.13* 1.23* 1.37*     Recent Labs   Lab Test 03/19/24  0754 07/12/23  1417 10/13/22  1553   A1C 6.0* 6.1* 6.0*     BMP and CBC drawn today, results pending     Recent Labs   Lab Test 05/08/24 0444   WBC 6.5   HGB 12.2   HCT 37.7   MCV 90        Recent Labs   Lab Test 05/08/24 0444 03/19/24  0754 07/27/23  0512   HGB 12.2 13.4 11.4*    No results for input(s): \"TROPONINI\" in the last 06229 hours.  No results for input(s): \"BNP\", \"NTBNPI\", \"NTBNP\" in the last 64151 hours.  Recent Labs   Lab Test 05/21/24  1525   TSH 6.12*     No results for input(s): \"INR\" in the last 49031 hours.   The longitudinal plan of care for the diagnosis(es)/condition(s) as documented were addressed during this visit. Due to the added complexity in care, I will continue to support Romy in the subsequent management and with ongoing continuity of care.                                         "

## 2024-10-25 LAB
ATRIAL RATE - MUSE: 53 BPM
DIASTOLIC BLOOD PRESSURE - MUSE: NORMAL MMHG
INTERPRETATION ECG - MUSE: NORMAL
P AXIS - MUSE: 10 DEGREES
PR INTERVAL - MUSE: 210 MS
QRS DURATION - MUSE: 100 MS
QT - MUSE: 458 MS
QTC - MUSE: 429 MS
R AXIS - MUSE: -18 DEGREES
SYSTOLIC BLOOD PRESSURE - MUSE: NORMAL MMHG
T AXIS - MUSE: 3 DEGREES
VENTRICULAR RATE- MUSE: 53 BPM

## 2024-10-31 ENCOUNTER — ANESTHESIA EVENT (OUTPATIENT)
Dept: CARDIOLOGY | Facility: HOSPITAL | Age: 79
End: 2024-10-31
Payer: COMMERCIAL

## 2024-10-31 ENCOUNTER — ANESTHESIA (OUTPATIENT)
Dept: CARDIOLOGY | Facility: HOSPITAL | Age: 79
End: 2024-10-31
Payer: COMMERCIAL

## 2024-10-31 ENCOUNTER — HOSPITAL ENCOUNTER (OUTPATIENT)
Facility: HOSPITAL | Age: 79
Discharge: HOME | End: 2024-10-31
Attending: INTERNAL MEDICINE | Admitting: INTERNAL MEDICINE
Payer: COMMERCIAL

## 2024-10-31 VITALS
DIASTOLIC BLOOD PRESSURE: 57 MMHG | WEIGHT: 293 LBS | TEMPERATURE: 99.9 F | HEIGHT: 68 IN | OXYGEN SATURATION: 93 % | HEART RATE: 56 BPM | RESPIRATION RATE: 16 BRPM | SYSTOLIC BLOOD PRESSURE: 113 MMHG | BODY MASS INDEX: 44.41 KG/M2

## 2024-10-31 DIAGNOSIS — I48.0 PAROXYSMAL ATRIAL FIBRILLATION (H): Primary | ICD-10-CM

## 2024-10-31 LAB
ACT BLD: 408 SECONDS (ref 74–150)
ACT BLD: 429 SECONDS (ref 74–150)
ACT BLD: 434 SECONDS (ref 74–150)
ACT BLD: 454 SECONDS (ref 74–150)
ACT BLD: 455 SECONDS (ref 74–150)
ATRIAL RATE - MUSE: 58 BPM
DIASTOLIC BLOOD PRESSURE - MUSE: NORMAL MMHG
GLUCOSE BLDC GLUCOMTR-MCNC: 89 MG/DL (ref 70–99)
INTERPRETATION ECG - MUSE: NORMAL
P AXIS - MUSE: 77 DEGREES
PR INTERVAL - MUSE: 252 MS
QRS DURATION - MUSE: 88 MS
QT - MUSE: 492 MS
QTC - MUSE: 482 MS
R AXIS - MUSE: -22 DEGREES
SYSTOLIC BLOOD PRESSURE - MUSE: NORMAL MMHG
T AXIS - MUSE: -21 DEGREES
VENTRICULAR RATE- MUSE: 58 BPM

## 2024-10-31 PROCEDURE — 93615 ESOPHAGEAL RECORDING: CPT | Mod: 26 | Performed by: INTERNAL MEDICINE

## 2024-10-31 PROCEDURE — C1769 GUIDE WIRE: HCPCS | Performed by: INTERNAL MEDICINE

## 2024-10-31 PROCEDURE — 99100 ANES PT EXTEME AGE<1 YR&>70: CPT | Performed by: NURSE ANESTHETIST, CERTIFIED REGISTERED

## 2024-10-31 PROCEDURE — 93656 COMPRE EP EVAL ABLTJ ATR FIB: CPT | Performed by: NURSE ANESTHETIST, CERTIFIED REGISTERED

## 2024-10-31 PROCEDURE — 93657 TX L/R ATRIAL FIB ADDL: CPT | Performed by: INTERNAL MEDICINE

## 2024-10-31 PROCEDURE — C1730 CATH, EP, 19 OR FEW ELECT: HCPCS | Performed by: INTERNAL MEDICINE

## 2024-10-31 PROCEDURE — 82962 GLUCOSE BLOOD TEST: CPT

## 2024-10-31 PROCEDURE — 93623 PRGRMD STIMJ&PACG IV RX NFS: CPT | Mod: 26 | Performed by: INTERNAL MEDICINE

## 2024-10-31 PROCEDURE — 999N000054 HC STATISTIC EKG NON-CHARGEABLE

## 2024-10-31 PROCEDURE — 93005 ELECTROCARDIOGRAM TRACING: CPT

## 2024-10-31 PROCEDURE — 370N000017 HC ANESTHESIA TECHNICAL FEE, PER MIN: Performed by: INTERNAL MEDICINE

## 2024-10-31 PROCEDURE — 93655 ICAR CATH ABLTJ DSCRT ARRHYT: CPT | Performed by: INTERNAL MEDICINE

## 2024-10-31 PROCEDURE — 93615 ESOPHAGEAL RECORDING: CPT | Performed by: INTERNAL MEDICINE

## 2024-10-31 PROCEDURE — 85347 COAGULATION TIME ACTIVATED: CPT

## 2024-10-31 PROCEDURE — 250N000011 HC RX IP 250 OP 636: Performed by: NURSE ANESTHETIST, CERTIFIED REGISTERED

## 2024-10-31 PROCEDURE — 93656 COMPRE EP EVAL ABLTJ ATR FIB: CPT | Performed by: ANESTHESIOLOGY

## 2024-10-31 PROCEDURE — 710N000010 HC RECOVERY PHASE 1, LEVEL 2, PER MIN

## 2024-10-31 PROCEDURE — 93656 COMPRE EP EVAL ABLTJ ATR FIB: CPT | Performed by: INTERNAL MEDICINE

## 2024-10-31 PROCEDURE — C1894 INTRO/SHEATH, NON-LASER: HCPCS | Performed by: INTERNAL MEDICINE

## 2024-10-31 PROCEDURE — 250N000009 HC RX 250: Performed by: INTERNAL MEDICINE

## 2024-10-31 PROCEDURE — 258N000003 HC RX IP 258 OP 636: Performed by: INTERNAL MEDICINE

## 2024-10-31 PROCEDURE — C1759 CATH, INTRA ECHOCARDIOGRAPHY: HCPCS | Performed by: INTERNAL MEDICINE

## 2024-10-31 PROCEDURE — 250N000009 HC RX 250: Performed by: NURSE ANESTHETIST, CERTIFIED REGISTERED

## 2024-10-31 PROCEDURE — 272N000001 HC OR GENERAL SUPPLY STERILE: Performed by: INTERNAL MEDICINE

## 2024-10-31 PROCEDURE — 93010 ELECTROCARDIOGRAM REPORT: CPT | Performed by: INTERNAL MEDICINE

## 2024-10-31 PROCEDURE — 250N000013 HC RX MED GY IP 250 OP 250 PS 637: Performed by: INTERNAL MEDICINE

## 2024-10-31 PROCEDURE — 250N000011 HC RX IP 250 OP 636: Performed by: INTERNAL MEDICINE

## 2024-10-31 PROCEDURE — 258N000003 HC RX IP 258 OP 636: Performed by: NURSE ANESTHETIST, CERTIFIED REGISTERED

## 2024-10-31 PROCEDURE — C2630 CATH, EP, COOL-TIP: HCPCS | Performed by: INTERNAL MEDICINE

## 2024-10-31 RX ORDER — ONDANSETRON 2 MG/ML
INJECTION INTRAMUSCULAR; INTRAVENOUS PRN
Status: DISCONTINUED | OUTPATIENT
Start: 2024-10-31 | End: 2024-10-31

## 2024-10-31 RX ORDER — OXYCODONE HYDROCHLORIDE 5 MG/1
10 TABLET ORAL EVERY 4 HOURS PRN
Status: DISCONTINUED | OUTPATIENT
Start: 2024-10-31 | End: 2024-10-31 | Stop reason: HOSPADM

## 2024-10-31 RX ORDER — OXYCODONE HYDROCHLORIDE 5 MG/1
5 TABLET ORAL EVERY 4 HOURS PRN
Status: DISCONTINUED | OUTPATIENT
Start: 2024-10-31 | End: 2024-10-31 | Stop reason: HOSPADM

## 2024-10-31 RX ORDER — SODIUM CHLORIDE 9 MG/ML
INJECTION, SOLUTION INTRAVENOUS CONTINUOUS PRN
Status: DISCONTINUED | OUTPATIENT
Start: 2024-10-31 | End: 2024-10-31

## 2024-10-31 RX ORDER — SODIUM CHLORIDE, SODIUM LACTATE, POTASSIUM CHLORIDE, CALCIUM CHLORIDE 600; 310; 30; 20 MG/100ML; MG/100ML; MG/100ML; MG/100ML
INJECTION, SOLUTION INTRAVENOUS CONTINUOUS PRN
Status: DISCONTINUED | OUTPATIENT
Start: 2024-10-31 | End: 2024-10-31

## 2024-10-31 RX ORDER — ACETAMINOPHEN 325 MG/1
650 TABLET ORAL EVERY 4 HOURS PRN
Status: DISCONTINUED | OUTPATIENT
Start: 2024-10-31 | End: 2024-10-31 | Stop reason: HOSPADM

## 2024-10-31 RX ORDER — PROTAMINE SULFATE 10 MG/ML
INJECTION, SOLUTION INTRAVENOUS PRN
Status: DISCONTINUED | OUTPATIENT
Start: 2024-10-31 | End: 2024-10-31

## 2024-10-31 RX ORDER — ASPIRIN 81 MG/1
81 TABLET ORAL DAILY
Status: SHIPPED
Start: 2024-10-31

## 2024-10-31 RX ORDER — HEPARIN SODIUM 10000 [USP'U]/100ML
INJECTION, SOLUTION INTRAVENOUS CONTINUOUS PRN
Status: DISCONTINUED | OUTPATIENT
Start: 2024-10-31 | End: 2024-10-31 | Stop reason: HOSPADM

## 2024-10-31 RX ORDER — IBUPROFEN 600 MG/1
600 TABLET, FILM COATED ORAL EVERY 6 HOURS PRN
Status: DISCONTINUED | OUTPATIENT
Start: 2024-10-31 | End: 2024-10-31 | Stop reason: HOSPADM

## 2024-10-31 RX ORDER — KETAMINE HYDROCHLORIDE 10 MG/ML
INJECTION INTRAMUSCULAR; INTRAVENOUS PRN
Status: DISCONTINUED | OUTPATIENT
Start: 2024-10-31 | End: 2024-10-31

## 2024-10-31 RX ORDER — NALOXONE HYDROCHLORIDE 0.4 MG/ML
0.4 INJECTION, SOLUTION INTRAMUSCULAR; INTRAVENOUS; SUBCUTANEOUS
Status: DISCONTINUED | OUTPATIENT
Start: 2024-10-31 | End: 2024-10-31 | Stop reason: HOSPADM

## 2024-10-31 RX ORDER — PROPOFOL 10 MG/ML
INJECTION, EMULSION INTRAVENOUS PRN
Status: DISCONTINUED | OUTPATIENT
Start: 2024-10-31 | End: 2024-10-31

## 2024-10-31 RX ORDER — HEPARIN SODIUM 1000 [USP'U]/ML
INJECTION, SOLUTION INTRAVENOUS; SUBCUTANEOUS
Status: DISCONTINUED | OUTPATIENT
Start: 2024-10-31 | End: 2024-10-31 | Stop reason: HOSPADM

## 2024-10-31 RX ORDER — EPHEDRINE SULFATE 50 MG/ML
INJECTION, SOLUTION INTRAMUSCULAR; INTRAVENOUS; SUBCUTANEOUS PRN
Status: DISCONTINUED | OUTPATIENT
Start: 2024-10-31 | End: 2024-10-31

## 2024-10-31 RX ORDER — NALOXONE HYDROCHLORIDE 0.4 MG/ML
0.2 INJECTION, SOLUTION INTRAMUSCULAR; INTRAVENOUS; SUBCUTANEOUS
Status: DISCONTINUED | OUTPATIENT
Start: 2024-10-31 | End: 2024-10-31 | Stop reason: HOSPADM

## 2024-10-31 RX ORDER — ONDANSETRON 2 MG/ML
4 INJECTION INTRAMUSCULAR; INTRAVENOUS EVERY 6 HOURS PRN
Status: DISCONTINUED | OUTPATIENT
Start: 2024-10-31 | End: 2024-10-31 | Stop reason: HOSPADM

## 2024-10-31 RX ORDER — LIDOCAINE HYDROCHLORIDE AND EPINEPHRINE 10; 10 MG/ML; UG/ML
INJECTION, SOLUTION INFILTRATION; PERINEURAL
Status: DISCONTINUED | OUTPATIENT
Start: 2024-10-31 | End: 2024-10-31 | Stop reason: HOSPADM

## 2024-10-31 RX ORDER — LIDOCAINE 40 MG/G
CREAM TOPICAL
Status: DISCONTINUED | OUTPATIENT
Start: 2024-10-31 | End: 2024-10-31 | Stop reason: HOSPADM

## 2024-10-31 RX ORDER — ONDANSETRON 4 MG/1
4 TABLET, ORALLY DISINTEGRATING ORAL EVERY 6 HOURS PRN
Status: DISCONTINUED | OUTPATIENT
Start: 2024-10-31 | End: 2024-10-31 | Stop reason: HOSPADM

## 2024-10-31 RX ORDER — SODIUM CHLORIDE 9 MG/ML
100 INJECTION, SOLUTION INTRAVENOUS CONTINUOUS
Status: DISCONTINUED | OUTPATIENT
Start: 2024-10-31 | End: 2024-10-31 | Stop reason: HOSPADM

## 2024-10-31 RX ORDER — DEXAMETHASONE SODIUM PHOSPHATE 10 MG/ML
INJECTION, SOLUTION INTRAMUSCULAR; INTRAVENOUS PRN
Status: DISCONTINUED | OUTPATIENT
Start: 2024-10-31 | End: 2024-10-31

## 2024-10-31 RX ADMIN — Medication 5 MG: at 08:04

## 2024-10-31 RX ADMIN — SODIUM CHLORIDE: 9 INJECTION, SOLUTION INTRAVENOUS at 07:06

## 2024-10-31 RX ADMIN — DEXAMETHASONE SODIUM PHOSPHATE 10 MG: 10 INJECTION, SOLUTION INTRAMUSCULAR; INTRAVENOUS at 07:31

## 2024-10-31 RX ADMIN — Medication 5 MG: at 07:50

## 2024-10-31 RX ADMIN — PROTAMINE SULFATE 100 MG: 10 INJECTION, SOLUTION INTRAVENOUS at 11:39

## 2024-10-31 RX ADMIN — PHENYLEPHRINE HYDROCHLORIDE 0.3 MCG/KG/MIN: 10 INJECTION INTRAVENOUS at 07:37

## 2024-10-31 RX ADMIN — OXYCODONE HYDROCHLORIDE 10 MG: 5 TABLET ORAL at 12:16

## 2024-10-31 RX ADMIN — KETAMINE HYDROCHLORIDE 50 MG: 10 INJECTION INTRAMUSCULAR; INTRAVENOUS at 07:19

## 2024-10-31 RX ADMIN — Medication 10 MG: at 07:38

## 2024-10-31 RX ADMIN — PROPOFOL 200 MG: 10 INJECTION, EMULSION INTRAVENOUS at 07:19

## 2024-10-31 RX ADMIN — SUGAMMADEX 200 MG: 100 INJECTION, SOLUTION INTRAVENOUS at 11:39

## 2024-10-31 RX ADMIN — ROCURONIUM BROMIDE 50 MG: 50 INJECTION, SOLUTION INTRAVENOUS at 07:19

## 2024-10-31 RX ADMIN — ONDANSETRON 4 MG: 2 INJECTION INTRAMUSCULAR; INTRAVENOUS at 11:46

## 2024-10-31 RX ADMIN — SODIUM CHLORIDE 100 ML/HR: 9 INJECTION, SOLUTION INTRAVENOUS at 07:03

## 2024-10-31 RX ADMIN — Medication 5 MG: at 07:44

## 2024-10-31 ASSESSMENT — ACTIVITIES OF DAILY LIVING (ADL)
ADLS_ACUITY_SCORE: 0

## 2024-10-31 ASSESSMENT — ENCOUNTER SYMPTOMS: DYSRHYTHMIAS: 1

## 2024-10-31 NOTE — ANESTHESIA POSTPROCEDURE EVALUATION
Patient: Romy Hurley    Procedure: Procedure(s):  Ablation Atrial Fibrillation       Anesthesia Type:  General    Note:  Disposition: Outpatient   Postop Pain Control: Uneventful            Sign Out: Well controlled pain   PONV: No   Neuro/Psych: Uneventful            Sign Out: Acceptable/Baseline neuro status   Airway/Respiratory: Uneventful            Sign Out: Acceptable/Baseline resp. status   CV/Hemodynamics: Uneventful            Sign Out: Acceptable CV status; No obvious hypovolemia; No obvious fluid overload   Other NRE: NONE   DID A NON-ROUTINE EVENT OCCUR? No           Last vitals:  Vitals Value Taken Time   /58 10/31/24 1245   Temp     Pulse 57 10/31/24 1254   Resp 20 10/31/24 1254   SpO2 98 % 10/31/24 1254   Vitals shown include unfiled device data.    Electronically Signed By: Kellie Johnston MD  October 31, 2024  2:42 PM

## 2024-10-31 NOTE — ANESTHESIA CARE TRANSFER NOTE
Patient: Romy Hurley    Procedure: Procedure(s):  Ablation Atrial Fibrillation       Diagnosis: Paroxysmal Atrial Fibrillation  Diagnosis Additional Information: No value filed.    Anesthesia Type:   General     Note:    Oropharynx: oropharynx clear of all foreign objects  Level of Consciousness: awake  Oxygen Supplementation: face mask  Level of Supplemental Oxygen (L/min / FiO2): 8  Independent Airway: airway patency satisfactory and stable  Dentition: dentition unchanged  Vital Signs Stable: post-procedure vital signs reviewed and stable  Report to RN Given: handoff report given  Patient transferred to: PACU    Handoff Report: Identifed the Patient, Identified the Reponsible Provider, Reviewed the pertinent medical history, Discussed the surgical course, Reviewed Intra-OP anesthesia mangement and issues during anesthesia, Set expectations for post-procedure period and Allowed opportunity for questions and acknowledgement of understanding      Vitals:  Vitals Value Taken Time   /68    Temp 36.6    Pulse 62    Resp     SpO2 99        Electronically Signed By: CRISTIAN Zamora CRNA  October 31, 2024  11:59 AM

## 2024-10-31 NOTE — ANESTHESIA PREPROCEDURE EVALUATION
Anesthesia Pre-Procedure Evaluation    Patient: Romy Hurley   MRN: [id number] : 1945        Procedure : Procedure(s):  Ablation Atrial Fibrillation          Past Medical History:   Diagnosis Date    Asthma     Atrial fibrillation (H) 2012    Diabetes mellitus (H)     Hypertension     Insomnia     Mild intermittent asthma     Obesity     SHARA on CPAP     Osteoarthritis       Past Surgical History:   Procedure Laterality Date    ARTHROPLASTY KNEE BILATERAL      ARTHROSCOPY SHOULDER RT/LT  2011    right with subacromial decompression and rotator cuff repair, massive     SECTION      EP ABLATION AFLUTTER  2019    Procedure: EP Ablation Atrial Flutter;  Surgeon: Dorian Garland MD;  Location: [facility];  Service: Cardiology    EP ABLATION PVI Left 2019    Procedure: EP Ablation PVI;  Surgeon: Dorian Garland MD;  Location: [facility];  Service: Cardiology    HC CARDIOVERSION  2012    [facility] - done for a fib    JOINT REPLACEMTN, KNEE RT/LT  2010    LEFT    JOINT REPLACEMTN, KNEE RT/LT  2010    right    OPEN REDUCTION INTERNAL FIXATION ANKLE Right 2021    Procedure: OPEN REDUCTION INTERNAL FIXATION MEDIAL MALLEOLUS FRACTURE, ANKLE RIGHT;  Surgeon: Vidal Cohn MD;  Location: [facility];  Service: Orthopedics    REPAIR HAMMER TOE Right 2021    Procedure: FLEXOR TENOTOMIES 3RD TOE HAMMER TOE CORRECTION;  Surgeon: Vidal Cohn MD;  Location: [facility] OR;  Service: Orthopedics    REVERSE ARTHROPLASTY SHOULDER Left 2023    Procedure: Left Reverse Total Shoulder Arthroplasty;  Surgeon: Saurabh Villagran MD;  Location: [facility]    SHOULDER ARTHROSCOPY W/ ROTATOR CUFF REPAIR Right     ZC TOTAL ANKLE REPLACEMENT Right 2021    Procedure: RIGHT TOTAL ANKLE ARTHOPLASTY, DEBRIDE SUBTALAR JOINT;  Surgeon: Vidal Cohn MD;  Location: [facility] OR;  Service: Orthopedics       Allergies   Allergen Reactions    Macrobid [Nitrofurantoin Anhydrous] Shortness Of Breath    Ace Inhibitors Cough    Corticosteroids      Crabby, hot/red skin      Social History     Tobacco Use    Smoking status: Former     Current packs/day: 0.00     Types: Cigarettes     Quit date: 1981     Years since quittin.4    Smokeless tobacco: Never   Substance Use Topics    Alcohol use: Yes     Comment: Alcoholic Drinks/day: Rare (2-3 month)      Wt Readings from Last 1 Encounters:   10/31/24 134.1 kg (295 lb 9.6 oz)        Anesthesia Evaluation   Pt has had prior anesthetic.     History of anesthetic complications       ROS/MED HX  ENT/Pulmonary:     (+) sleep apnea,                     asthma                  Neurologic:       Cardiovascular:     (+)  hypertension- -   -  - -                        dysrhythmias,        pulmonary hypertension,      METS/Exercise Tolerance:     Hematologic:       Musculoskeletal:       GI/Hepatic:       Renal/Genitourinary:     (+) renal disease,             Endo:     (+) type I DM,              Obesity,       Psychiatric/Substance Use:       Infectious Disease:       Malignancy:       Other:            Physical Exam    Airway        Mallampati: II    Neck ROM: full     Respiratory Devices and Support         Dental       (+) Minor Abnormalities - some fillings, tiny chips      Cardiovascular   cardiovascular exam normal          Pulmonary   pulmonary exam normal                OUTSIDE LABS:  CBC:   Lab Results   Component Value Date    WBC 5.4 10/24/2024    WBC 6.5 2024    HGB 13.3 10/24/2024    HGB 12.2 2024    HCT 41.4 10/24/2024    HCT 37.7 2024     10/24/2024     2024     BMP:   Lab Results   Component Value Date     10/24/2024     2024    POTASSIUM 5.0 10/24/2024    POTASSIUM 4.1 2024    CHLORIDE 97 (L) 10/24/2024    CHLORIDE 99 2024    CO2 30 (H) 10/24/2024    CO2 26 2024    BUN 20.6 10/24/2024     "BUN 19.3 05/08/2024    CR 1.23 (H) 10/24/2024    CR 1.13 (H) 05/08/2024    GLC 89 10/31/2024     (H) 10/24/2024     COAGS:   Lab Results   Component Value Date    INR 1.9 (A) 04/24/2012     POC: No results found for: \"BGM\", \"HCG\", \"HCGS\"  HEPATIC:   Lab Results   Component Value Date    ALBUMIN 3.9 03/19/2024    PROTTOTAL 7.3 03/19/2024    ALT 25 05/21/2024    AST 26 05/21/2024    ALKPHOS 74 03/19/2024    BILITOTAL 0.4 03/19/2024     OTHER:   Lab Results   Component Value Date    PH 7.48 (H) 12/06/2019    A1C 6.0 (H) 03/19/2024    ZHAO 9.2 10/24/2024    TSH 6.12 (H) 05/21/2024    T4 0.99 05/21/2024       Anesthesia Plan    ASA Status:  3       Anesthesia Type: General.     - Airway: ETT              Consents    Anesthesia Plan(s) and associated risks, benefits, and realistic alternatives discussed. Questions answered and patient/representative(s) expressed understanding.     - Discussed: Risks, Benefits and Alternatives for the PROCEDURE were discussed     - Discussed with:  Patient      - Extended Intubation/Ventilatory Support Discussed: No.      - Patient is DNR/DNI Status: No     Use of blood products discussed: No .     Postoperative Care    Pain management: Multi-modal analgesia.   PONV prophylaxis: Ondansetron (or other 5HT-3), Dexamethasone or Solumedrol     Comments:               Kellie Johnston MD    I have reviewed the pertinent notes and labs in the chart from the past 30 days and (re)examined the patient.  Any updates or changes from those notes are reflected in this note.            # Drug Induced Coagulation Defect: home medication list includes an anticoagulant medication    # Hypertension: Noted on problem list         # Severe Obesity: Estimated body mass index is 44.95 kg/m  as calculated from the following:    Height as of this encounter: 1.727 m (5' 8\").    Weight as of this encounter: 134.1 kg (295 lb 9.6 oz).       # Asthma: noted on problem list       "

## 2024-10-31 NOTE — PLAN OF CARE
Goal Outcome Evaluation:       Pt admitted for an ablation due to her hx of atrial fibrillation. Pt prepped and ready for procedure. Pt's daughter Gregoria is here for support.      Angeli Mayer RN

## 2024-10-31 NOTE — ANESTHESIA PROCEDURE NOTES
Airway         Procedure Start/Stop Times: 10/31/2024 7:28 AM  Staff -        CRNA: Jerome Duong APRN CRNA       Performed By: CRNAIndications and Patient Condition       Indications for airway management: devonte-procedural       Induction type:intravenous       Mask difficulty assessment: 1 - vent by mask    Final Airway Details       Final airway type: endotracheal airway       Successful airway: ETT - single  Endotracheal Airway Details        ETT size (mm): 7.0       Cuffed: yes       Successful intubation technique: video laryngoscopy       VL Blade Size: Glidescope 3       Grade View of Cords: 1       Adjucts: stylet       Position: Right       Measured from: lips       Secured at (cm): 21       Bite block used: None    Post intubation assessment        Placement verified by: capnometry and equal breath sounds        Number of attempts at approach: 1       Number of other approaches attempted: 0       Secured with: tape       Ease of procedure: easy       Dentition: Intact and Unchanged    Medication(s) Administered   Medication Administration Time: 10/31/2024 7:28 AM

## 2024-10-31 NOTE — INTERVAL H&P NOTE
"I have reviewed the surgical (or preoperative) H&P that is linked to this encounter, and examined the patient. There are no significant changes    CKD - GFR 45   No missed doses of Eliquis    Clinical Conditions Present on Arrival:  Clinically Significant Risk Factors Present on Admission      # Drug Induced Coagulation Defect: home medication list includes an anticoagulant medication       # Severe Obesity: Estimated body mass index is 44.95 kg/m  as calculated from the following:    Height as of this encounter: 1.727 m (5' 8\").    Weight as of this encounter: 134.1 kg (295 lb 9.6 oz).           "

## 2024-10-31 NOTE — Clinical Note
[employer] Med system 12 lead EKG, hemodynamics 5 lead, pulse oximetery, NIBP, [employer] hands off defibrillator/external pacer, with 3 monitoring leads to patient. Baseline assessment done.

## 2024-10-31 NOTE — CARE PLAN
Pt dc'ed to home via wheelchair with dtr, denies any pain, right and left groins dry and intact, no bleeding or hematoma.

## 2024-11-04 ENCOUNTER — E-VISIT (OUTPATIENT)
Dept: FAMILY MEDICINE | Facility: CLINIC | Age: 79
End: 2024-11-04
Payer: COMMERCIAL

## 2024-11-04 ENCOUNTER — TELEPHONE (OUTPATIENT)
Dept: FAMILY MEDICINE | Facility: CLINIC | Age: 79
End: 2024-11-04

## 2024-11-04 ENCOUNTER — VIRTUAL VISIT (OUTPATIENT)
Dept: CARDIOLOGY | Facility: CLINIC | Age: 79
End: 2024-11-04
Payer: COMMERCIAL

## 2024-11-04 DIAGNOSIS — Z98.890 STATUS POST ABLATION OF ATRIAL FIBRILLATION: ICD-10-CM

## 2024-11-04 DIAGNOSIS — I48.19 PERSISTENT ATRIAL FIBRILLATION (H): Primary | ICD-10-CM

## 2024-11-04 DIAGNOSIS — Z86.79 STATUS POST ABLATION OF ATRIAL FIBRILLATION: ICD-10-CM

## 2024-11-04 DIAGNOSIS — N39.0 ACUTE UTI (URINARY TRACT INFECTION): Primary | ICD-10-CM

## 2024-11-04 PROCEDURE — 99207 PR NO CHARGE NURSE ONLY: CPT | Mod: 93

## 2024-11-04 PROCEDURE — 99421 OL DIG E/M SVC 5-10 MIN: CPT | Performed by: FAMILY MEDICINE

## 2024-11-04 RX ORDER — SULFAMETHOXAZOLE AND TRIMETHOPRIM 800; 160 MG/1; MG/1
1 TABLET ORAL 2 TIMES DAILY
Qty: 6 TABLET | Refills: 0 | Status: SHIPPED | OUTPATIENT
Start: 2024-11-04 | End: 2024-11-07

## 2024-11-04 NOTE — PATIENT INSTRUCTIONS
Instructions Following your Ablation Procedure    Your anticoagulation medication Eliquis:  It is important to remain on your anticoagulation medication uninterrupted after your ablation to reduce your risk of a stroke or heart attack, do not stop this medication  Please remain on your aspirin for 1 month, then you can stop    Groin care instructions  Keep the site clean and dry, do not place a bandage over the site. If there is minor oozing, apply another bandaid and remove it after 12 hours.  Mild bruising at the access sites is normal. If you notice increased swelling, external bleeding, or have other concerns regarding your access sites please consider emergency department evaluation for significant changes and call your electrophysiology team's office.  You may experience mild discomfort at your groin sites, applying ice packs 20min 3-4 times a day can help alleviate this discomfort.    Activity recommendations  You can resume driving.  Avoid stooping or squatting more than 90 degrees at the hips, repetitive motions such as loading , vacuuming, raking or shoveling, and heavy lifting (greater than 25lbs) for 1 week  Increase your activity gradually over the next 5-10 days, working back to your normal daily activity/routine.    Post ablation instructions  Stay well hydrated, and increase your fluid intake during this recovery period  High protein foods aide in your bodies healing process  You may have some irregular heartbeats and/or atrial fibrillation following your ablation which is normal to recovery, these episodes should occur less frequently over time.   Recurrent atrial fibrillation can occur within the first 3 months post ablation while your heart recovers from the procedure. Please call the electrophysiology team's office if you have an episode lasting greater than 4 hours, or if you notice the episodes are increasing in frequency or duration  Pleuritic chest discomfort (chest pain worse with  taking deep breaths, worse with laying flat on your back) can occur after ablation, usually coming about within the first 24-48hrs post ablation. If this occurs and is severe enough to be troublesome to you, please call us and consider starting a course of ibuprofen 400mg three times daily for 5 to 7 days    Things to watch for  As with any type of procedure, please be more attentive to unusual symptoms post ablation (eg. fever, neurologic changes, pain with swallowing, loss of consciousness, etc) - we recommend ER evaluation for any such symptoms in the first few weeks post procedure.    Consider ER evaluation for the following:  Severe chest pain not relieved by Tylenol or Ibuprofen  You have chills or a fever greater than 101 F (38 C)  Neurologic changes (eg. leg, arm or face weakness or numbness, difficulties with speech or word finding, problems  walking or with your balance, vision changes)  Severe difficulty swallowing and/or you are coughing up blood  Shortness of breath  Increased groin pain or a large or growing hard lump around the site  Groin is red, swollen, hot or tender  Blood or fluid is draining from the groin site  Any numbness, coolness or changes in color in your extremities  Groin pain not relieved by Tylenol or Advil  Recurrent atrial fibrillation associated with sustained rapid heart rates or associated with additional concerning  symptoms.    Your follow up appointments are as follows:  You will be seen by the electrophysiologist nurse practitioner at 6 weeks after your ablation  At your 6 week appointment, a 3 month follow-up appointment will be arranged with either the Nurse Practitioner or the Electrophysiology provider     Sincerely,  Nano Turner RN [phone]    After hours please contact the on call service at # [phone]

## 2024-11-04 NOTE — TELEPHONE ENCOUNTER
Received call from Patient.  States that she has a bladder infection.  Having burning and frequency.  Recommended that Patient request evisit through mychart.  Verbalized understanding.  Tommie Chau RN

## 2024-11-04 NOTE — PATIENT INSTRUCTIONS
Dear Romy,    After reviewing your responses, I've been able to diagnose you with a urinary tract infection, which is a common infection of the bladder with bacteria.  This is not a sexually transmitted infection, though urinating immediately after intercourse can help prevent infections.  Drinking lots of fluids is also helpful to clear your current infection and prevent the next one.      I have sent a prescription for antibiotics to your pharmacy to treat this infection.  Bactrim DS 1 pill twice daily for three days.    It is important that you take all of your prescribed medication even if your symptoms are improving after a few doses.  Taking all of your medicine helps prevent the symptoms from returning.     If your symptoms worsen, you develop pain in your back or stomach, develop fevers, or are not improving in 5 days, please be seen so we can re-evaluate and get a urine sample.      Thanks again for choosing us as your health care partner,    Adeola Stockton MD

## 2024-11-04 NOTE — PROGRESS NOTES
Pt is s/p PVI PO day 4, today 11/4/2024 , PVI was completed on 10-31-24 with Dr. Garland and pt was discharged the same day.  PC was placed to pt, spoke to pt    General Assessment:     Weight: Pt reports weight is at baseline compared to pre procedural weight    Vital signs:  Pt reports normal heart rates and blood pressure and Pt has been afebrile.    Pain: Pt denies generalized or localized pain abnormal to healing s/p     /GI: Pt denies difficulty swallowing, denies heart burn, denies constipation, denies urinary retention/difficulty, reports normal appetite, and reports staying hydrated.    Respiratory: Pt denies SOB, denies changes/abnormal sputum, and denies any further symptoms abnormal to normal healing process s/p PVI.    Activity: Pt is tolerating advancement in activity while following physical restrictions.     Neurological:  Pt denies vision changes, changes in speech, changes in balance, and changes in strength or motor function.    Rhythm Assessment:   Pt denies palpitations and denies symptoms or sustained AF episodes.    Procedure Site Assessment:   Pt reports some bruising around sites without significant change from hospital discharge and normal to PVI recovery    Anticoagulation/Medication:  Pt remain on Eliquis without interruption  Pt confirms taking ASA 81mg Daily, and will continue taking this for 1 mo s/p    Education completed with pt at this visit:  Reviewed normal post-op PVI healing process, when to contact EP-RN/EP-MD, contact information was given to the pt for further concerns or questions, after hours contact was reviewed with patient, and pt verbalized understanding    Follow up:  AVS mailed to patient and is available through my chart.  Pt Does have a 6 week follow up scheduled with Breanne Augustin on 12-16-24 .    11/4/2024 1:00 PM  Nano Turner RN

## 2024-11-10 ENCOUNTER — HEALTH MAINTENANCE LETTER (OUTPATIENT)
Age: 79
End: 2024-11-10

## 2024-12-04 ENCOUNTER — HOSPITAL ENCOUNTER (OUTPATIENT)
Dept: MRI IMAGING | Facility: CLINIC | Age: 79
Discharge: HOME | End: 2024-12-04
Attending: STUDENT IN AN ORGANIZED HEALTH CARE EDUCATION/TRAINING PROGRAM
Payer: COMMERCIAL

## 2024-12-04 DIAGNOSIS — M54.2 NECK PAIN: ICD-10-CM

## 2024-12-04 PROCEDURE — 72141 MRI NECK SPINE W/O DYE: CPT

## 2024-12-12 ENCOUNTER — E-VISIT (OUTPATIENT)
Dept: FAMILY MEDICINE | Facility: CLINIC | Age: 79
End: 2024-12-12
Payer: COMMERCIAL

## 2024-12-12 DIAGNOSIS — N39.0 RECURRENT UTI: ICD-10-CM

## 2024-12-12 DIAGNOSIS — N95.2 ATROPHIC VAGINITIS: ICD-10-CM

## 2024-12-12 DIAGNOSIS — N39.0 URINARY TRACT INFECTION, ACUTE: Primary | ICD-10-CM

## 2024-12-12 RX ORDER — ESTRADIOL 0.1 MG/G
2 CREAM VAGINAL
Qty: 42.5 G | Refills: 5 | Status: SHIPPED | OUTPATIENT
Start: 2024-12-12

## 2024-12-12 RX ORDER — SULFAMETHOXAZOLE AND TRIMETHOPRIM 800; 160 MG/1; MG/1
1 TABLET ORAL 2 TIMES DAILY
Qty: 6 TABLET | Refills: 0 | Status: SHIPPED | OUTPATIENT
Start: 2024-12-12 | End: 2024-12-15

## 2024-12-16 ENCOUNTER — OFFICE VISIT (OUTPATIENT)
Dept: CARDIOLOGY | Facility: CLINIC | Age: 79
End: 2024-12-16
Payer: COMMERCIAL

## 2024-12-16 VITALS
BODY MASS INDEX: 45.31 KG/M2 | SYSTOLIC BLOOD PRESSURE: 130 MMHG | HEART RATE: 74 BPM | RESPIRATION RATE: 20 BRPM | WEIGHT: 293 LBS | DIASTOLIC BLOOD PRESSURE: 80 MMHG

## 2024-12-16 DIAGNOSIS — I48.0 PAROXYSMAL ATRIAL FIBRILLATION (H): Primary | ICD-10-CM

## 2024-12-16 DIAGNOSIS — N39.0 ACUTE UTI (URINARY TRACT INFECTION): Primary | ICD-10-CM

## 2024-12-16 DIAGNOSIS — I10 ESSENTIAL HYPERTENSION: ICD-10-CM

## 2024-12-16 DIAGNOSIS — Z98.890 S/P ABLATION OF ATRIAL FIBRILLATION: ICD-10-CM

## 2024-12-16 DIAGNOSIS — Z86.79 S/P ABLATION OF ATRIAL FIBRILLATION: ICD-10-CM

## 2024-12-16 PROCEDURE — G2211 COMPLEX E/M VISIT ADD ON: HCPCS | Performed by: NURSE PRACTITIONER

## 2024-12-16 PROCEDURE — 99214 OFFICE O/P EST MOD 30 MIN: CPT | Performed by: NURSE PRACTITIONER

## 2024-12-16 RX ORDER — CEFDINIR 300 MG/1
300 CAPSULE ORAL 2 TIMES DAILY
Qty: 14 CAPSULE | Refills: 0 | Status: SHIPPED | OUTPATIENT
Start: 2024-12-16 | End: 2024-12-23

## 2024-12-16 NOTE — LETTER
12/16/2024    Adeola Stockton MD  [address]  [facility] [address]    RE: Romy Hurley       Dear Colleague,     I had the pleasure of seeing Romy Hurley in the [facility].    HEART CARE ELECTROPHYSIOLOGY NOTE      [facility]  [phone]      Assessment/Recommendations   Assessment/Plan:  1.  Paroxysmal Atrial Fibrillation: No symptomatology or evidence of AF recurrence.  Uneventful recovery from ablation with no emergency department or unscheduled clinic visits.  She remains off membrane active antiarrhythmic medications.  -- Zio patch monitor x 14 days    She has a [id number] score of  5 for age >75, female gender, HTN, T2DM .  HAS-BLED score of 1 for age.     -- Continue Eliquis 5 mg twice daily for stroke prophylaxis.  She reports no missed doses or bleeding issues.   -- No interruptions in Eliquis for 3 months post ablation.  Then okay to hold for 2 to 3 days prior to steroid injection, no bridging recommended.    2.  Hypertension: Controlled with metoprolol XL, losartan, hydrochlorothiazide     3.  Obstructive sleep apnea:  Not using CPAP.  Discussed correlation between untreated sleep apnea and atrial fibrillation.  She does not believe previous sleep study was a representative sample.  Recommend repeat evaluation and resumption of CPAP if indicated.    Follow-up 3 months post ablation     History of Present Illness/Subjective    HPI: Romy Hurley is a 79 year old female who comes in for EP follow up of atrial fibrillation.  She has a history of atrial fibrillation, hypertension, stage III chronic kidney disease, type 2 diabetes, and obstructive sleep apnea on CPAP.  Retired respiratory therapist.     Arrhythmia history  Dx/date: PAF 2012.  Persistent requiring DCCV ~2013 recurrent PAF after ablation.  Sx: palpitations, fatigue, and diaphoresis   [id number] score: 5 for age >75, female gender, HTN, T2DM  HAS-BLED score of 1 for age >65    Oral anticoagulation: Eliquis 5 mg twice daily  Antiarrhythmic medications, AV chapito blocking agents: Failed AAD with flecainide due to side effects, sotalol due to breakthrough and bradycardia.  Amiodarone (8/2022-9/19/2024)  Procedures  DCCV: ~2013, 10/11/2017, 11/2/2017  Ablation: 12/6/2019 by Dr. Garland (cryo PVI + LA roofline).  10/31/2024 by Dr. Garland (RF RSPV + PWI + LISA line)     Pat states that she feels well.  She has not had any episodes of AF.  She reports an uneventful recovery from ablation with no groin site issues, significant heartburn, difficulty swallowing, or neurologic changes.  She denies chest discomfort, palpitations, abdominal fullness/bloating or peripheral edema, shortness of breath, paroxysmal nocturnal dyspnea, orthopnea, lightheadedness, dizziness, pre-syncope, or syncope.  She jacome in [place] February to March.     Cardiographics (EKG personally reviewed):  EKG done 10/31/2024 shows sinus rhythm at 50 bpm, first-degree AV delay, QRS 88 ms, QT/QTc 492/482 ms  EKG done 10/24/2024 shows sinus bradycardia at 53 bpm, first-degree AV delay,  ms, QT/QTc 458/429 ms  EKG done 5/8/2024 shows sinus rhythm at 64 bpm,  ms, QT/QTc 426/433 ms.     ECHO done 8/12/2024:  1. The left ventricle is normal in size. There is normal left ventricular wall  thickness. Left ventricular systolic function is normal. The visual ejection  fraction is 55-60%. Diastolic Doppler findings (E/E' ratio and/or other  parameters) suggest left ventricular filling pressures are normal. No regional  wall motion abnormalities noted.  2.The right ventricle is normal size. The right ventricular systolic function  is normal.  3. Trace mitral and tricuspid regurgitation.  4. No pericardial effusion.  5. No previous study for comparison.     CTA pulmonary veins done 11/4/2019:  1. Four pulmonary veins with normal configuration.   2. Esophagus is adjacent to the posterior left atrial body and left inferior  pulmonary vein.  3. No calcified atherosclerotic plaque.    I have reviewed and updated the patient's Past Medical History, Social History, Family History and Medication List.  Outside records personally reviewed.     Physical Examination  Review of Systems   Vitals: /80 (BP Location: Left arm, Patient Position: Sitting, Cuff Size: Adult Large)   Pulse 74   Resp 20   Wt 135.2 kg (298 lb)   LMP  (LMP Unknown)   BMI 45.31 kg/m    BMI= Body mass index is 45.31 kg/m .  Wt Readings from Last 3 Encounters:   12/16/24 135.2 kg (298 lb)   10/31/24 134.1 kg (295 lb 9.6 oz)   10/24/24 134.1 kg (295 lb 9.6 oz)       General Appearance:   Alert, well-appearing and in no acute distress.   HEENT: Atraumatic, normocephalic.  No scleral icterus, normal conjunctivae, EOMs intact, PERRL.  Mucous membranes pink and moist.     Chest/Lungs:   Chest symmetric, spine straight.  Respirations unlabored.  Lungs are clear to auscultation.   Cardiovascular:   Regular rate and rhythm.  Normal first and second heart sounds with no murmurs, rubs, or gallops; radial pulses are intact, mild bilateral lower extremity edema.   Abdomen:  Soft, nondistended   Extremities: No cyanosis or clubbing.   Musculoskeletal: Moves all extremities.     Skin: Warm, dry, intact.    Neurologic: Mood and affect are appropriate.  Alert and oriented to person, place, time, and situation.     ROS: 10 point ROS neg other than the symptoms noted above in the HPI.         Medical History  Surgical History Family History Social History   Past Medical History:   Diagnosis Date     Asthma      Atrial fibrillation (H) 01/01/2012     Diabetes mellitus (H)      Hypertension      Insomnia      Mild intermittent asthma      Obesity      SHARA on CPAP      Osteoarthritis      Past Surgical History:   Procedure Laterality Date     ARTHROPLASTY KNEE BILATERAL       ARTHROSCOPY SHOULDER RT/LT  07/06/2011    right with subacromial decompression and rotator cuff repair, massive       SECTION       EP ABLATION AFLUTTER  2019    Procedure: EP Ablation Atrial Flutter;  Surgeon: Dorian Garland MD;  Location: [facility];  Service: Cardiology     EP ABLATION PULMONARY VEIN ISOLATION N/A 10/31/2024    Procedure: Ablation Atrial Fibrillation;  Surgeon: Dorian Garland MD;  Location: [facility] LAB CV     EP ABLATION PVI Left 2019    Procedure: EP Ablation PVI;  Surgeon: Dorian Garland MD;  Location: [facility] Lab;  Service: Cardiology     HC CARDIOVERSION  2012    [facility] - done for a fib     JOINT REPLACEMTN, KNEE RT/LT  2010    LEFT     JOINT REPLACEMTN, KNEE RT/LT  2010    right     OPEN REDUCTION INTERNAL FIXATION ANKLE Right 2021    Procedure: OPEN REDUCTION INTERNAL FIXATION MEDIAL MALLEOLUS FRACTURE, ANKLE RIGHT;  Surgeon: Vidal Cohn MD;  Location: [facility];  Service: Orthopedics     REPAIR HAMMER TOE Right 2021    Procedure: FLEXOR TENOTOMIES 3RD TOE HAMMER TOE CORRECTION;  Surgeon: Vidal Cohn MD;  Location: [facility] OR;  Service: Orthopedics     REVERSE ARTHROPLASTY SHOULDER Left 2023    Procedure: Left Reverse Total Shoulder Arthroplasty;  Surgeon: Saurabh Villagran MD;  Location: [facility]     SHOULDER ARTHROSCOPY W/ ROTATOR CUFF REPAIR Right      ZZC TOTAL ANKLE REPLACEMENT Right 2021    Procedure: RIGHT TOTAL ANKLE ARTHOPLASTY, DEBRIDE SUBTALAR JOINT;  Surgeon: Vidal Cohn MD;  Location: [facility] OR;  Service: Orthopedics     Family History   Problem Relation Age of Onset     Genitourinary Problems Mother         Ovarian Cancer     Gastrointestinal Disease Mother         Had gallbladder romoved     Cancer Father         Lung     Gastrointestinal Disease Father         Had gallbladder romoved     C.A.D. Paternal Grandfather         Coronary     Gastrointestinal Disease Brother         Had gallbladder romoved     Depression Sister         DDD      Ovarian Cancer Mother 82.00     Lung Cancer Father 75.00     Sleep Apnea Father      Snoring Father      Brain Cancer Sister 57.00        Brain Ca x 1 year     No Known Problems Brother      No Known Problems Sister      No Known Problems Sister         Social History     Socioeconomic History     Marital status:      Spouse name: Not on file     Number of children: 1     Years of education: Not on file     Highest education level: Not on file   Occupational History     Not on file   Tobacco Use     Smoking status: Former     Current packs/day: 0.00     Types: Cigarettes     Quit date: 1981     Years since quittin.5     Smokeless tobacco: Never   Substance and Sexual Activity     Alcohol use: Yes     Comment: Alcoholic Drinks/day: Rare (2-3 month)     Drug use: No     Sexual activity: Yes     Partners: Male     Birth control/protection: Post-menopausal     Comment: Menopause   Other Topics Concern     Parent/sibling w/ CABG, MI or angioplasty before 65F 55M? No   Social History Narrative     Not on file     Social Drivers of Health     Financial Resource Strain: Not on file   Food Insecurity: Not on file   Transportation Needs: Not on file   Physical Activity: Not on file   Stress: Not on file   Social Connections: Not on file   Interpersonal Safety: High Risk (10/31/2024)    Interpersonal Safety      Do you feel physically and emotionally safe where you currently live?: No      Within the past 12 months, have you been hit, slapped, kicked or otherwise physically hurt by someone?: No      Within the past 12 months, have you been humiliated or emotionally abused in other ways by your partner or ex-partner?: No   Housing Stability: Not on file           Medications  Allergies   Current Outpatient Medications   Medication Sig Dispense Refill     acetaminophen (TYLENOL) 650 MG CR tablet Take 650 mg by mouth every 8 hours as needed for pain (max dose 3000mg per day.)       apixaban ANTICOAGULANT (ELIQUIS  ANTICOAGULANT) 5 MG tablet TAKE ONE TABLET BY MOUTH TWICE DAILY 180 tablet 3     atorvastatin (LIPITOR) 20 MG tablet Take 1 tablet (20 mg) by mouth daily 100 tablet 3     blood glucose (ONETOUCH VERIO IQ) test strip Use to test blood sugars 1 times daily or as directed. 100 each 11     blood glucose monitoring (ONE TOUCH DELICA) lancets Use to test blood sugars 1 times daily or as directed. 100 each 3     cefdinir (OMNICEF) 300 MG capsule Take 1 capsule (300 mg) by mouth 2 times daily for 7 days. 14 capsule 0     Cranberry 600 MG TABS Take by mouth.       cyanocobalamin (VITAMIN B-12) 500 MCG SUBL sublingual tablet Place 500 mcg under the tongue daily.       estradiol (ESTRACE) 0.1 MG/GM vaginal cream Place 2 g vaginally twice a week. 42.5 g 5     fluticasone-salmeterol (ADVAIR) 250-50 MCG/ACT inhaler USE 1 INHALATION EVERY 12 HOURS 60 each 11     hydrochlorothiazide (HYDRODIURIL) 25 MG tablet Take 1 tablet (25 mg) by mouth daily 90 tablet 3     losartan (COZAAR) 100 MG tablet Take 1 tablet (100 mg) by mouth daily 90 tablet 3     metFORMIN (GLUCOPHAGE) 500 MG tablet TAKE 1 TABLET BY MOUTH DAILY WITH DINNER 90 tablet 3     metoprolol succinate ER (TOPROL XL) 50 MG 24 hr tablet TAKE ONE TABLET BY MOUTH ONCE DAILY IN THE EVENING 90 tablet 3     PARoxetine (PAXIL) 10 MG tablet Take 1 tablet (10 mg) by mouth at bedtime 90 tablet 3     traZODone (DESYREL) 50 MG tablet TAKE 1 TABLET AT BEDTIME 90 tablet 1     XOPENEX HFA 45 MCG/ACT inhaler INHALE 2 PUFFS INTO THE LUNGS EVERY 8 HOURS AS NEEDED FOR SHORTNESS OF BREATH OR DIFFICULT BREATHING Strength: 45 MCG/ACT 15 g 3       Allergies   Allergen Reactions     Macrobid [Nitrofurantoin Anhydrous] Shortness Of Breath     Ace Inhibitors Cough     Corticosteroids      Crabby, hot/red skin      Denies previous adverse reaction to anesthesia      Lab Results    Chemistry/lipid CBC Cardiac Enzymes/BNP/TSH/INR   Recent Labs   Lab Test 03/19/24  0754 10/13/22  1553   CHOL 205* 149    HDL 53 47*   * 74   TRIG 95 139       Recent Labs   Lab Test 10/31/24  0616 10/24/24  1321 05/08/24  0444   NA  --  137 136   POTASSIUM  --  5.0 4.1   CHLORIDE  --  97* 99   CO2  --  30* 26   ANIONGAP  --  10 11   GLC 89 106* 114*   BUN  --  20.6 19.3   CR  --  1.23* 1.13*   ZHAO  --  9.2 8.9      CBC RESULTS:   Recent Labs   Lab Test 10/24/24  1321   WBC 5.4   RBC 4.53   HGB 13.3   HCT 41.4   MCV 91   MCH 29.4   MCHC 32.1   RDW 14.6           TSH   Date Value Ref Range Status   05/21/2024 6.12 (H) 0.30 - 4.20 uIU/mL Final   08/23/2022 1.43 0.30 - 5.00 uIU/mL Final   03/21/2019 1.59 0.40 - 4.00 mU/L Final     Free T4   Date Value Ref Range Status   05/21/2024 0.99 0.90 - 1.70 ng/dL Final       The longitudinal plan of care for the diagnosis(es)/condition(s) as documented were addressed during this visit. Due to the added complexity in care, I will continue to support Romy in the subsequent management and with ongoing continuity of care.                                             Thank you for allowing me to participate in the care of your patient.      Sincerely,     CRISTIAN Benedict CNP     [facility]  cc:   CRISTIAN Benedict CNP  [address], [address]  [place] [address]

## 2024-12-16 NOTE — PATIENT INSTRUCTIONS
Romy Hurley,    It was a pleasure to see you today at the [facility].     My recommendations after this visit include:    No interruption in Eliquis for 3 months post ablation (1/31/2025)  Stop taking aspirin    Zio patch monitor x 14 day (will be mailed to you)    Follow up in March    Breanne Augustin, CNP  [facility], Electrophysiology  [phone]  EP nurses [phone]

## 2024-12-16 NOTE — PROGRESS NOTES
HEART CARE ELECTROPHYSIOLOGY NOTE      [facility]  [phone]      Assessment/Recommendations   Assessment/Plan:  1.  Paroxysmal Atrial Fibrillation: No symptomatology or evidence of AF recurrence.  Uneventful recovery from ablation with no emergency department or unscheduled clinic visits.  She remains off membrane active antiarrhythmic medications.  -- Zio patch monitor x 14 days    She has a [id number] score of  5 for age >75, female gender, HTN, T2DM .  HAS-BLED score of 1 for age.     -- Continue Eliquis 5 mg twice daily for stroke prophylaxis.  She reports no missed doses or bleeding issues.   -- No interruptions in Eliquis for 3 months post ablation.  Then okay to hold for 2 to 3 days prior to steroid injection, no bridging recommended.    2.  Hypertension: Controlled with metoprolol XL, losartan, hydrochlorothiazide     3.  Obstructive sleep apnea:  Not using CPAP.  Discussed correlation between untreated sleep apnea and atrial fibrillation.  She does not believe previous sleep study was a representative sample.  Recommend repeat evaluation and resumption of CPAP if indicated.    Follow-up 3 months post ablation     History of Present Illness/Subjective    HPI: Romy Hurley is a 79 year old female who comes in for EP follow up of atrial fibrillation.  She has a history of atrial fibrillation, hypertension, stage III chronic kidney disease, type 2 diabetes, and obstructive sleep apnea on CPAP.  Retired respiratory therapist.     Arrhythmia history  Dx/date: PAF 2012.  Persistent requiring DCCV ~2013 recurrent PAF after ablation.  Sx: palpitations, fatigue, and diaphoresis   [id number] score: 5 for age >75, female gender, HTN, T2DM  HAS-BLED score of 1 for age >65   Oral anticoagulation: Eliquis 5 mg twice daily  Antiarrhythmic medications, AV chapito blocking agents: Failed AAD with flecainide due to side effects, sotalol due to breakthrough and bradycardia.  Amiodarone  (8/2022-9/19/2024)  Procedures  DCCV: ~2013, 10/11/2017, 11/2/2017  Ablation: 12/6/2019 by Dr. Garland (cryo PVI + LA roofline).  10/31/2024 by Dr. Garland (RF RSPV + PWI + LISA line)     Pat states that she feels well.  She has not had any episodes of AF.  She reports an uneventful recovery from ablation with no groin site issues, significant heartburn, difficulty swallowing, or neurologic changes.  She denies chest discomfort, palpitations, abdominal fullness/bloating or peripheral edema, shortness of breath, paroxysmal nocturnal dyspnea, orthopnea, lightheadedness, dizziness, pre-syncope, or syncope.  She jacome in [place] February to March.     Cardiographics (EKG personally reviewed):  EKG done 10/31/2024 shows sinus rhythm at 50 bpm, first-degree AV delay, QRS 88 ms, QT/QTc 492/482 ms  EKG done 10/24/2024 shows sinus bradycardia at 53 bpm, first-degree AV delay,  ms, QT/QTc 458/429 ms  EKG done 5/8/2024 shows sinus rhythm at 64 bpm,  ms, QT/QTc 426/433 ms.     ECHO done 8/12/2024:  1. The left ventricle is normal in size. There is normal left ventricular wall  thickness. Left ventricular systolic function is normal. The visual ejection  fraction is 55-60%. Diastolic Doppler findings (E/E' ratio and/or other  parameters) suggest left ventricular filling pressures are normal. No regional  wall motion abnormalities noted.  2.The right ventricle is normal size. The right ventricular systolic function  is normal.  3. Trace mitral and tricuspid regurgitation.  4. No pericardial effusion.  5. No previous study for comparison.     CTA pulmonary veins done 11/4/2019:  1. Four pulmonary veins with normal configuration.   2. Esophagus is adjacent to the posterior left atrial body and left inferior pulmonary vein.  3. No calcified atherosclerotic plaque.    I have reviewed and updated the patient's Past Medical History, Social History, Family History and Medication List.  Outside records personally reviewed.      Physical Examination  Review of Systems   Vitals: /80 (BP Location: Left arm, Patient Position: Sitting, Cuff Size: Adult Large)   Pulse 74   Resp 20   Wt 135.2 kg (298 lb)   LMP  (LMP Unknown)   BMI 45.31 kg/m    BMI= Body mass index is 45.31 kg/m .  Wt Readings from Last 3 Encounters:   24 135.2 kg (298 lb)   10/31/24 134.1 kg (295 lb 9.6 oz)   10/24/24 134.1 kg (295 lb 9.6 oz)       General Appearance:   Alert, well-appearing and in no acute distress.   HEENT: Atraumatic, normocephalic.  No scleral icterus, normal conjunctivae, EOMs intact, PERRL.  Mucous membranes pink and moist.     Chest/Lungs:   Chest symmetric, spine straight.  Respirations unlabored.  Lungs are clear to auscultation.   Cardiovascular:   Regular rate and rhythm.  Normal first and second heart sounds with no murmurs, rubs, or gallops; radial pulses are intact, mild bilateral lower extremity edema.   Abdomen:  Soft, nondistended   Extremities: No cyanosis or clubbing.   Musculoskeletal: Moves all extremities.     Skin: Warm, dry, intact.    Neurologic: Mood and affect are appropriate.  Alert and oriented to person, place, time, and situation.     ROS: 10 point ROS neg other than the symptoms noted above in the HPI.         Medical History  Surgical History Family History Social History   Past Medical History:   Diagnosis Date    Asthma     Atrial fibrillation (H) 2012    Diabetes mellitus (H)     Hypertension     Insomnia     Mild intermittent asthma     Obesity     SHARA on CPAP     Osteoarthritis      Past Surgical History:   Procedure Laterality Date    ARTHROPLASTY KNEE BILATERAL      ARTHROSCOPY SHOULDER RT/LT  2011    right with subacromial decompression and rotator cuff repair, massive     SECTION      EP ABLATION AFLUTTER  2019    Procedure: EP Ablation Atrial Flutter;  Surgeon: Dorian Garland MD;  Location: [facility] Cath Lab;  Service: Cardiology    EP ABLATION PULMONARY VEIN ISOLATION  N/A 10/31/2024    Procedure: Ablation Atrial Fibrillation;  Surgeon: Dorian Garland MD;  Location: [facility] CATH LAB CV    EP ABLATION PVI Left 12/06/2019    Procedure: EP Ablation PVI;  Surgeon: Dorian Garland MD;  Location: [facility] Cath Lab;  Service: Cardiology    [facility]  [facility] a [facility]    JOINT REPLACEMTN, KNEE RT/LT  02/01/2010    LEFT    JOINT REPLACEMTN, KNEE RT/LT  06/07/2010    right    OPEN REDUCTION INTERNAL FIXATION ANKLE Right 02/19/2021    Procedure: OPEN REDUCTION INTERNAL FIXATION MEDIAL MALLEOLUS FRACTURE, ANKLE RIGHT;  Surgeon: Vidal Cohn MD;  Location: [facility];  Service: Orthopedics    REPAIR HAMMER TOE Right 02/19/2021    Procedure: FLEXOR TENOTOMIES 3RD TOE HAMMER TOE CORRECTION;  Surgeon: Vidal Cohn MD;  Location: [facility] OR;  Service: Orthopedics    REVERSE ARTHROPLASTY SHOULDER Left 7/26/2023    Procedure: Left Reverse Total Shoulder Arthroplasty;  Surgeon: Saurabh Villagran MD;  Location: [facility]    SHOULDER ARTHROSCOPY W/ ROTATOR CUFF REPAIR Right     ZZC TOTAL ANKLE REPLACEMENT Right 02/19/2021    Procedure: RIGHT TOTAL ANKLE ARTHOPLASTY, DEBRIDE SUBTALAR JOINT;  Surgeon: Vidal Cohn MD;  Location: [facility] OR;  Service: Orthopedics     Family History   Problem Relation Age of Onset    Genitourinary Problems Mother         Ovarian Cancer    Gastrointestinal Disease Mother         Had gallbladder romoved    Cancer Father         Lung    Gastrointestinal Disease Father         Had gallbladder romoved    C.A.D. Paternal Grandfather         Coronary    Gastrointestinal Disease Brother         Had gallbladder romoved    Depression Sister         DDD    Ovarian Cancer Mother 82.00    Lung Cancer Father 75.00    Sleep Apnea Father     Snoring Father     Brain Cancer Sister 57.00        Brain Ca x 1 year    No Known Problems Brother     No Known Problems Sister     No Known Problems Sister          Social History     Socioeconomic History    Marital status:      Spouse name: Not on file    Number of children: 1    Years of education: Not on file    Highest education level: Not on file   Occupational History    Not on file   Tobacco Use    Smoking status: Former     Current packs/day: 0.00     Types: Cigarettes     Quit date: 1981     Years since quittin.5    Smokeless tobacco: Never   Substance and Sexual Activity    Alcohol use: Yes     Comment: Alcoholic Drinks/day: Rare (2-3 month)    Drug use: No    Sexual activity: Yes     Partners: Male     Birth control/protection: Post-menopausal     Comment: Menopause   Other Topics Concern    Parent/sibling w/ CABG, MI or angioplasty before 65F 55M? No   Social History Narrative    Not on file     Social Drivers of Health     Financial Resource Strain: Not on file   Food Insecurity: Not on file   Transportation Needs: Not on file   Physical Activity: Not on file   Stress: Not on file   Social Connections: Not on file   Interpersonal Safety: High Risk (10/31/2024)    Interpersonal Safety     Do you feel physically and emotionally safe where you currently live?: No     Within the past 12 months, have you been hit, slapped, kicked or otherwise physically hurt by someone?: No     Within the past 12 months, have you been humiliated or emotionally abused in other ways by your partner or ex-partner?: No   Housing Stability: Not on file           Medications  Allergies   Current Outpatient Medications   Medication Sig Dispense Refill    acetaminophen (TYLENOL) 650 MG CR tablet Take 650 mg by mouth every 8 hours as needed for pain (max dose 3000mg per day.)      apixaban ANTICOAGULANT (ELIQUIS ANTICOAGULANT) 5 MG tablet TAKE ONE TABLET BY MOUTH TWICE DAILY 180 tablet 3    atorvastatin (LIPITOR) 20 MG tablet Take 1 tablet (20 mg) by mouth daily 100 tablet 3    blood glucose (ONETOUCH VERIO IQ) test strip Use to test blood sugars 1 times daily or as directed.  100 each 11    blood glucose monitoring (ONE TOUCH DELICA) lancets Use to test blood sugars 1 times daily or as directed. 100 each 3    cefdinir (OMNICEF) 300 MG capsule Take 1 capsule (300 mg) by mouth 2 times daily for 7 days. 14 capsule 0    Cranberry 600 MG TABS Take by mouth.      cyanocobalamin (VITAMIN B-12) 500 MCG SUBL sublingual tablet Place 500 mcg under the tongue daily.      estradiol (ESTRACE) 0.1 MG/GM vaginal cream Place 2 g vaginally twice a week. 42.5 g 5    fluticasone-salmeterol (ADVAIR) 250-50 MCG/ACT inhaler USE 1 INHALATION EVERY 12 HOURS 60 each 11    hydrochlorothiazide (HYDRODIURIL) 25 MG tablet Take 1 tablet (25 mg) by mouth daily 90 tablet 3    losartan (COZAAR) 100 MG tablet Take 1 tablet (100 mg) by mouth daily 90 tablet 3    metFORMIN (GLUCOPHAGE) 500 MG tablet TAKE 1 TABLET BY MOUTH DAILY WITH DINNER 90 tablet 3    metoprolol succinate ER (TOPROL XL) 50 MG 24 hr tablet TAKE ONE TABLET BY MOUTH ONCE DAILY IN THE EVENING 90 tablet 3    PARoxetine (PAXIL) 10 MG tablet Take 1 tablet (10 mg) by mouth at bedtime 90 tablet 3    traZODone (DESYREL) 50 MG tablet TAKE 1 TABLET AT BEDTIME 90 tablet 1    XOPENEX HFA 45 MCG/ACT inhaler INHALE 2 PUFFS INTO THE LUNGS EVERY 8 HOURS AS NEEDED FOR SHORTNESS OF BREATH OR DIFFICULT BREATHING Strength: 45 MCG/ACT 15 g 3       Allergies   Allergen Reactions    Macrobid [Nitrofurantoin Anhydrous] Shortness Of Breath    Ace Inhibitors Cough    Corticosteroids      Crabby, hot/red skin      Denies previous adverse reaction to anesthesia      Lab Results    Chemistry/lipid CBC Cardiac Enzymes/BNP/TSH/INR   Recent Labs   Lab Test 03/19/24  0754 10/13/22  1553   CHOL 205* 149   HDL 53 47*   * 74   TRIG 95 139       Recent Labs   Lab Test 10/31/24  0616 10/24/24  1321 05/08/24  0444   NA  --  137 136   POTASSIUM  --  5.0 4.1   CHLORIDE  --  97* 99   CO2  --  30* 26   ANIONGAP  --  10 11   GLC 89 106* 114*   BUN  --  20.6 19.3   CR  --  1.23* 1.13*   ZHAO   --  9.2 8.9      CBC RESULTS:   Recent Labs   Lab Test 10/24/24  1321   WBC 5.4   RBC 4.53   HGB 13.3   HCT 41.4   MCV 91   MCH 29.4   MCHC 32.1   RDW 14.6           TSH   Date Value Ref Range Status   05/21/2024 6.12 (H) 0.30 - 4.20 uIU/mL Final   08/23/2022 1.43 0.30 - 5.00 uIU/mL Final   03/21/2019 1.59 0.40 - 4.00 mU/L Final     Free T4   Date Value Ref Range Status   05/21/2024 0.99 0.90 - 1.70 ng/dL Final       The longitudinal plan of care for the diagnosis(es)/condition(s) as documented were addressed during this visit. Due to the added complexity in care, I will continue to support Romy in the subsequent management and with ongoing continuity of care.

## 2025-01-28 ENCOUNTER — TELEPHONE (OUTPATIENT)
Dept: CARDIOLOGY | Facility: CLINIC | Age: 80
End: 2025-01-28
Payer: COMMERCIAL

## 2025-01-28 DIAGNOSIS — I48.0 PAROXYSMAL ATRIAL FIBRILLATION (H): ICD-10-CM

## 2025-01-28 RX ORDER — METOPROLOL SUCCINATE 50 MG/1
50 TABLET, EXTENDED RELEASE ORAL EVERY EVENING
Qty: 90 TABLET | Refills: 1 | Status: SHIPPED | OUTPATIENT
Start: 2025-01-28

## 2025-01-28 NOTE — TELEPHONE ENCOUNTER
[facility] Call Center    Phone Message    May a detailed message be left on voicemail: yes     Reason for Call: Medication Refill Request    Has the patient contacted the pharmacy for the refill? Yes   Name of medication being requested: metoprolol succinate ER   Provider who prescribed the medication: Dada  Pharmacy: [facility]  Date medication is needed: Pt stated she is traveling on Friday and needs to get a temporary med fill sent to [facility].  Her normal mail order pharmacy won't be able to send in time.  Pt is requesting 30 day supply sent ASAP.  Thank you!    Action Taken: Other: cardio    Travel Screening: Not Applicable     Date of Service:

## 2025-02-17 ENCOUNTER — PATIENT OUTREACH (OUTPATIENT)
Dept: CARE COORDINATION | Facility: CLINIC | Age: 80
End: 2025-02-17
Payer: COMMERCIAL

## 2025-03-03 ENCOUNTER — PATIENT OUTREACH (OUTPATIENT)
Dept: CARE COORDINATION | Facility: CLINIC | Age: 80
End: 2025-03-03
Payer: COMMERCIAL

## 2025-03-15 DIAGNOSIS — E11.9 TYPE 2 DIABETES MELLITUS WITHOUT COMPLICATION, WITHOUT LONG-TERM CURRENT USE OF INSULIN (H): ICD-10-CM

## 2025-03-15 DIAGNOSIS — I10 HTN, GOAL BELOW 140/90: ICD-10-CM

## 2025-03-15 DIAGNOSIS — F43.23 ADJUSTMENT DISORDER WITH MIXED ANXIETY AND DEPRESSED MOOD: ICD-10-CM

## 2025-03-17 ENCOUNTER — OFFICE VISIT (OUTPATIENT)
Dept: CARDIOLOGY | Facility: CLINIC | Age: 80
End: 2025-03-17
Attending: NURSE PRACTITIONER
Payer: COMMERCIAL

## 2025-03-17 VITALS
HEART RATE: 76 BPM | SYSTOLIC BLOOD PRESSURE: 138 MMHG | DIASTOLIC BLOOD PRESSURE: 74 MMHG | RESPIRATION RATE: 14 BRPM | WEIGHT: 293 LBS | BODY MASS INDEX: 45.31 KG/M2

## 2025-03-17 DIAGNOSIS — I48.0 PAROXYSMAL ATRIAL FIBRILLATION (H): ICD-10-CM

## 2025-03-17 DIAGNOSIS — Z86.79 S/P ABLATION OF ATRIAL FIBRILLATION: ICD-10-CM

## 2025-03-17 DIAGNOSIS — Z98.890 S/P ABLATION OF ATRIAL FIBRILLATION: ICD-10-CM

## 2025-03-17 PROCEDURE — 99214 OFFICE O/P EST MOD 30 MIN: CPT | Performed by: NURSE PRACTITIONER

## 2025-03-17 PROCEDURE — 3078F DIAST BP <80 MM HG: CPT | Performed by: NURSE PRACTITIONER

## 2025-03-17 PROCEDURE — G2211 COMPLEX E/M VISIT ADD ON: HCPCS | Performed by: NURSE PRACTITIONER

## 2025-03-17 PROCEDURE — 3075F SYST BP GE 130 - 139MM HG: CPT | Performed by: NURSE PRACTITIONER

## 2025-03-17 RX ORDER — PAROXETINE 10 MG/1
10 TABLET, FILM COATED ORAL AT BEDTIME
Qty: 90 TABLET | Refills: 0 | Status: SHIPPED | OUTPATIENT
Start: 2025-03-17

## 2025-03-17 RX ORDER — LOSARTAN POTASSIUM 100 MG/1
100 TABLET ORAL DAILY
Qty: 90 TABLET | Refills: 0 | Status: SHIPPED | OUTPATIENT
Start: 2025-03-17

## 2025-03-17 RX ORDER — HYDROCHLOROTHIAZIDE 25 MG/1
25 TABLET ORAL DAILY
Qty: 90 TABLET | Refills: 0 | Status: SHIPPED | OUTPATIENT
Start: 2025-03-17

## 2025-03-17 NOTE — PROGRESS NOTES
HEART CARE ELECTROPHYSIOLOGY NOTE      [facility]  [phone]      Assessment/Recommendations   Assessment/Plan:  1.  Paroxysmal Atrial Fibrillation: No symptomatology or evidence of AF recurrence. She remains off membrane active antiarrhythmic medications.  Reviewed avoidance of possible triggers.    She has a [id number] score of  5 for age >75, female gender, HTN, T2DM .  HAS-BLED score of 1 for age.     -- Continue Eliquis 5 mg twice daily for stroke prophylaxis.  She reports no missed doses or bleeding issues.   -- Okay to hold for 2 to 3 days prior to steroid injection, no bridging recommended.    2.  Hypertension: Controlled with metoprolol XL, losartan, hydrochlorothiazide     3.  Obstructive sleep apnea:  Not using CPAP.  Discussed correlation between untreated sleep apnea and atrial fibrillation.  She does not believe previous sleep study was a representative sample.  Recommend repeat evaluation and resumption of CPAP if indicated.    Follow-up 1 year post ablation     History of Present Illness/Subjective    HPI: Romy Hurley is a 79 year old female who comes in for EP follow up of atrial fibrillation.  She has a history of atrial fibrillation, hypertension, stage III chronic kidney disease, type 2 diabetes, and obstructive sleep apnea on CPAP.  Retired respiratory therapist.     Arrhythmia history  Dx/date: PAF 2012.  Persistent requiring DCCV ~2013 recurrent PAF after ablation.  Sx: palpitations, fatigue, and diaphoresis   [id number] score: 5 for age >75, female gender, HTN, T2DM  HAS-BLED score of 1 for age >65   Oral anticoagulation: Eliquis 5 mg twice daily  Antiarrhythmic medications, AV chapito blocking agents: Failed AAD with flecainide due to side effects, sotalol due to breakthrough and bradycardia.  Amiodarone (8/2022-9/19/2024)  Procedures  DCCV: ~2013, 10/11/2017, 11/2/2017  Ablation: 12/6/2019 by Dr. Garland (cryo PVI + LA roofline).  10/31/2024 by Dr. Garland (RF  RSPV + PWI + LISA line)     Pat states that she feels well.  She has not had any episodes of AF.  She denies chest discomfort, palpitations, abdominal fullness/bloating or peripheral edema, shortness of breath, paroxysmal nocturnal dyspnea, orthopnea, lightheadedness, dizziness, pre-syncope, or syncope.       Cardiographics (EKG personally reviewed):  EKG done 10/31/2024 shows sinus rhythm at 50 bpm, first-degree AV delay, QRS 88 ms, QT/QTc 492/482 ms  EKG done 10/24/2024 shows sinus bradycardia at 53 bpm, first-degree AV delay,  ms, QT/QTc 458/429 ms  EKG done 5/8/2024 shows sinus rhythm at 64 bpm,  ms, QT/QTc 426/433 ms.    Zio monitoring from 1/3/2025 to 1/15/2025 (duration 12d 1h)  Predominant rhythm was sinus rhythm, 55 to 91bpm, average 67bpm.  5 episode(s) of nonsustained supraventricular tachycardia - longest and longest 6 beats, 110bpm.  No sustained tachyarrhythmias.  No atrial fibrillation.  There were no pauses of greater than 3 seconds.  Rare supraventricular ectopic beats (isolated <1%).  Rare premature ventricular contractions (isolated <1%).  No symptoms recorded.    ECHO done 8/12/2024:  1. The left ventricle is normal in size. There is normal left ventricular wall  thickness. Left ventricular systolic function is normal. The visual ejection  fraction is 55-60%. Diastolic Doppler findings (E/E' ratio and/or other  parameters) suggest left ventricular filling pressures are normal. No regional  wall motion abnormalities noted.  2.The right ventricle is normal size. The right ventricular systolic function  is normal.  3. Trace mitral and tricuspid regurgitation.  4. No pericardial effusion.  5. No previous study for comparison.     CTA pulmonary veins done 11/4/2019:  1. Four pulmonary veins with normal configuration.   2. Esophagus is adjacent to the posterior left atrial body and left inferior pulmonary vein.  3. No calcified atherosclerotic plaque.    I have reviewed and updated the  patient's Past Medical History, Social History, Family History and Medication List.  Outside records personally reviewed.     Physical Examination  Review of Systems   Vitals: /74 (BP Location: Left arm, Patient Position: Sitting, Cuff Size: Adult Large)   Pulse 76   Resp 14   Wt 135.2 kg (298 lb)   LMP  (LMP Unknown)   BMI 45.31 kg/m    BMI= Body mass index is 45.31 kg/m .  Wt Readings from Last 3 Encounters:   25 135.2 kg (298 lb)   24 135.2 kg (298 lb)   10/31/24 134.1 kg (295 lb 9.6 oz)       General Appearance:   Alert, well-appearing and in no acute distress.   HEENT: Atraumatic, normocephalic.  No scleral icterus, normal conjunctivae, EOMs intact, PERRL.  Mucous membranes pink and moist.     Chest/Lungs:   Chest symmetric, spine straight.  Respirations unlabored.  Lungs are clear to auscultation.   Cardiovascular:   Regular rate and rhythm.  Normal first and second heart sounds with no murmurs, rubs, or gallops; radial pulses are intact, no edema.   Abdomen:  Soft, nondistended   Extremities: No cyanosis or clubbing.   Musculoskeletal: Moves all extremities.     Skin: Warm, dry, intact.    Neurologic: Mood and affect are appropriate.  Alert and oriented to person, place, time, and situation.     ROS: 10 point ROS neg other than the symptoms noted above in the HPI.         Medical History  Surgical History Family History Social History   Past Medical History:   Diagnosis Date    Asthma     Atrial fibrillation (H) 2012    Diabetes mellitus (H)     Hypertension     Insomnia     Mild intermittent asthma     Obesity     SHARA on CPAP     Osteoarthritis      Past Surgical History:   Procedure Laterality Date    ARTHROPLASTY KNEE BILATERAL      ARTHROSCOPY SHOULDER RT/LT  2011    right with subacromial decompression and rotator cuff repair, massive     SECTION      EP ABLATION AFLUTTER  2019    Procedure: EP Ablation Atrial Flutter;  Surgeon: Dorian Garland MD;   Location: [facility] Cath Lab;  Service: Cardiology    EP ABLATION PULMONARY VEIN ISOLATION N/A 10/31/2024    Procedure: Ablation Atrial Fibrillation;  Surgeon: Dorian Garland MD;  Location: [facility] CATH LAB CV    EP ABLATION PVI Left 12/06/2019    Procedure: EP Ablation PVI;  Surgeon: Dorian Garland MD;  Location: [facility] Cath Lab;  Service: Cardiology    HC CARDIOVERSION  05/31/2012    [facility] - done for a fib    JOINT REPLACEMTN, KNEE RT/LT  02/01/2010    LEFT    JOINT REPLACEMTN, KNEE RT/LT  06/07/2010    right    OPEN REDUCTION INTERNAL FIXATION ANKLE Right 02/19/2021    Procedure: OPEN REDUCTION INTERNAL FIXATION MEDIAL MALLEOLUS FRACTURE, ANKLE RIGHT;  Surgeon: Vidal Cohn MD;  Location: [facility] OR;  Service: Orthopedics    REPAIR HAMMER TOE Right 02/19/2021    Procedure: FLEXOR TENOTOMIES 3RD TOE HAMMER TOE CORRECTION;  Surgeon: Vidal Cohn MD;  Location: [facility] OR;  Service: Orthopedics    REVERSE ARTHROPLASTY SHOULDER Left 7/26/2023    Procedure: Left Reverse Total Shoulder Arthroplasty;  Surgeon: Saurabh Villagran MD;  Location: [facility]    SHOULDER ARTHROSCOPY W/ ROTATOR CUFF REPAIR Right     ZZC TOTAL ANKLE REPLACEMENT Right 02/19/2021    Procedure: RIGHT TOTAL ANKLE ARTHOPLASTY, DEBRIDE SUBTALAR JOINT;  Surgeon: Vidal Cohn MD;  Location: [facility] OR;  Service: Orthopedics     Family History   Problem Relation Age of Onset    Genitourinary Problems Mother         Ovarian Cancer    Gastrointestinal Disease Mother         Had gallbladder romoved    Cancer Father         Lung    Gastrointestinal Disease Father         Had gallbladder romoved    C.A.D. Paternal Grandfather         Coronary    Gastrointestinal Disease Brother         Had gallbladder romoved    Depression Sister         DDD    Ovarian Cancer Mother 82.00    Lung Cancer Father 75.00    Sleep Apnea Father     Snoring Father     Brain Cancer Sister 57.00        Brain Ca x 1 year     No Known Problems Brother     No Known Problems Sister     No Known Problems Sister         Social History     Socioeconomic History    Marital status:      Spouse name: Not on file    Number of children: 1    Years of education: Not on file    Highest education level: Not on file   Occupational History    Not on file   Tobacco Use    Smoking status: Former     Current packs/day: 0.00     Types: Cigarettes     Quit date: 1981     Years since quittin.8    Smokeless tobacco: Never   Substance and Sexual Activity    Alcohol use: Yes     Comment: Alcoholic Drinks/day: Rare (2-3 month)    Drug use: No    Sexual activity: Yes     Partners: Male     Birth control/protection: Post-menopausal     Comment: Menopause   Other Topics Concern    Parent/sibling w/ CABG, MI or angioplasty before 65F 55M? No   Social History Narrative    Not on file     Social Drivers of Health     Financial Resource Strain: Not on file   Food Insecurity: Not on file   Transportation Needs: Not on file   Physical Activity: Not on file   Stress: Not on file   Social Connections: Not on file   Interpersonal Safety: High Risk (10/31/2024)    Interpersonal Safety     Do you feel physically and emotionally safe where you currently live?: No     Within the past 12 months, have you been hit, slapped, kicked or otherwise physically hurt by someone?: No     Within the past 12 months, have you been humiliated or emotionally abused in other ways by your partner or ex-partner?: No   Housing Stability: Not on file           Medications  Allergies   Current Outpatient Medications   Medication Sig Dispense Refill    acetaminophen (TYLENOL) 650 MG CR tablet Take 650 mg by mouth every 8 hours as needed for pain (max dose 3000mg per day.)      apixaban ANTICOAGULANT (ELIQUIS ANTICOAGULANT) 5 MG tablet TAKE ONE TABLET BY MOUTH TWICE DAILY 180 tablet 3    atorvastatin (LIPITOR) 20 MG tablet Take 1 tablet (20 mg) by mouth daily 100 tablet 3    blood  glucose (ONETOUCH VERIO IQ) test strip Use to test blood sugars 1 times daily or as directed. 100 each 11    blood glucose monitoring (ONE TOUCH DELICA) lancets Use to test blood sugars 1 times daily or as directed. 100 each 3    Cranberry 600 MG TABS Take by mouth.      cyanocobalamin (VITAMIN B-12) 500 MCG SUBL sublingual tablet Place 500 mcg under the tongue daily.      estradiol (ESTRACE) 0.1 MG/GM vaginal cream Place 2 g vaginally twice a week. 42.5 g 5    fluticasone-salmeterol (ADVAIR) 250-50 MCG/ACT inhaler USE 1 INHALATION EVERY 12 HOURS 60 each 11    hydrochlorothiazide (HYDRODIURIL) 25 MG tablet Take 1 tablet (25 mg) by mouth daily 90 tablet 3    losartan (COZAAR) 100 MG tablet Take 1 tablet (100 mg) by mouth daily 90 tablet 3    metFORMIN (GLUCOPHAGE) 500 MG tablet TAKE 1 TABLET BY MOUTH DAILY WITH DINNER 90 tablet 3    metoprolol succinate ER (TOPROL XL) 50 MG 24 hr tablet Take 1 tablet (50 mg) by mouth every evening. 90 tablet 1    PARoxetine (PAXIL) 10 MG tablet Take 1 tablet (10 mg) by mouth at bedtime 90 tablet 3    traZODone (DESYREL) 50 MG tablet TAKE 1 TABLET AT BEDTIME 90 tablet 1    XOPENEX HFA 45 MCG/ACT inhaler INHALE 2 PUFFS INTO THE LUNGS EVERY 8 HOURS AS NEEDED FOR SHORTNESS OF BREATH OR DIFFICULT BREATHING Strength: 45 MCG/ACT 15 g 3       Allergies   Allergen Reactions    Macrobid [Nitrofurantoin Anhydrous] Shortness Of Breath    Ace Inhibitors Cough    Corticosteroids      Crabby, hot/red skin      Denies previous adverse reaction to anesthesia      Lab Results    Chemistry/lipid CBC Cardiac Enzymes/BNP/TSH/INR   Recent Labs   Lab Test 03/19/24  0754 10/13/22  1553   CHOL 205* 149   HDL 53 47*   * 74   TRIG 95 139       Recent Labs   Lab Test 10/31/24  0616 10/24/24  1321 05/08/24  0444   NA  --  137 136   POTASSIUM  --  5.0 4.1   CHLORIDE  --  97* 99   CO2  --  30* 26   ANIONGAP  --  10 11   GLC 89 106* 114*   BUN  --  20.6 19.3   CR  --  1.23* 1.13*   ZHAO  --  9.2 8.9      CBC  RESULTS:   Recent Labs   Lab Test 10/24/24  1321   WBC 5.4   RBC 4.53   HGB 13.3   HCT 41.4   MCV 91   MCH 29.4   MCHC 32.1   RDW 14.6           TSH   Date Value Ref Range Status   05/21/2024 6.12 (H) 0.30 - 4.20 uIU/mL Final   08/23/2022 1.43 0.30 - 5.00 uIU/mL Final   03/21/2019 1.59 0.40 - 4.00 mU/L Final     Free T4   Date Value Ref Range Status   05/21/2024 0.99 0.90 - 1.70 ng/dL Final       The longitudinal plan of care for the diagnosis(es)/condition(s) as documented were addressed during this visit. Due to the added complexity in care, I will continue to support Romy in the subsequent management and with ongoing continuity of care.

## 2025-03-17 NOTE — PATIENT INSTRUCTIONS
Romy Hurley,    It was a pleasure to see you today at the [facility].     My recommendations after this visit include:    Okay to hold for 2 to 3 days prior to steroid injection    Follow up in October (1 year post ablation)    Breanne Augustin, CNP  [facility], Electrophysiology  [phone]  EP nurses [phone]

## 2025-03-17 NOTE — LETTER
3/17/2025    Adeola Stockton MD  [address]  [facility] [address]    RE: Romy Hurley       Dear Colleague,     I had the pleasure of seeing Romy Hurley in the [facility].    HEART CARE ELECTROPHYSIOLOGY NOTE      [facility] [place]  [phone]      Assessment/Recommendations   Assessment/Plan:  1.  Paroxysmal Atrial Fibrillation: No symptomatology or evidence of AF recurrence. She remains off membrane active antiarrhythmic medications.  Reviewed avoidance of possible triggers.    She has a [id number] score of  5 for age >75, female gender, HTN, T2DM .  HAS-BLED score of 1 for age.     -- Continue Eliquis 5 mg twice daily for stroke prophylaxis.  She reports no missed doses or bleeding issues.   -- Okay to hold for 2 to 3 days prior to steroid injection, no bridging recommended.    2.  Hypertension: Controlled with metoprolol XL, losartan, hydrochlorothiazide     3.  Obstructive sleep apnea:  Not using CPAP.  Discussed correlation between untreated sleep apnea and atrial fibrillation.  She does not believe previous sleep study was a representative sample.  Recommend repeat evaluation and resumption of CPAP if indicated.    Follow-up 1 year post ablation     History of Present Illness/Subjective    HPI: Romy Hurley is a 79 year old female who comes in for EP follow up of atrial fibrillation.  She has a history of atrial fibrillation, hypertension, stage III chronic kidney disease, type 2 diabetes, and obstructive sleep apnea on CPAP.  Retired respiratory therapist.     Arrhythmia history  Dx/date: PAF 2012.  Persistent requiring DCCV ~2013 recurrent PAF after ablation.  Sx: palpitations, fatigue, and diaphoresis   [id number] score: 5 for age >75, female gender, HTN, T2DM  HAS-BLED score of 1 for age >65   Oral anticoagulation: Eliquis 5 mg twice daily  Antiarrhythmic medications, AV chapito blocking agents: Failed AAD with flecainide due to side effects,  sotalol due to breakthrough and bradycardia.  Amiodarone (8/2022-9/19/2024)  Procedures  DCCV: ~2013, 10/11/2017, 11/2/2017  Ablation: 12/6/2019 by Dr. Garland (cryo PVI + LA roofline).  10/31/2024 by Dr. Garland (RF RSPV + PWI + LISA line)     Pat states that she feels well.  She has not had any episodes of AF.  She denies chest discomfort, palpitations, abdominal fullness/bloating or peripheral edema, shortness of breath, paroxysmal nocturnal dyspnea, orthopnea, lightheadedness, dizziness, pre-syncope, or syncope.       Cardiographics (EKG personally reviewed):  EKG done 10/31/2024 shows sinus rhythm at 50 bpm, first-degree AV delay, QRS 88 ms, QT/QTc 492/482 ms  EKG done 10/24/2024 shows sinus bradycardia at 53 bpm, first-degree AV delay,  ms, QT/QTc 458/429 ms  EKG done 5/8/2024 shows sinus rhythm at 64 bpm,  ms, QT/QTc 426/433 ms.    Zio monitoring from 1/3/2025 to 1/15/2025 (duration 12d 1h)  Predominant rhythm was sinus rhythm, 55 to 91bpm, average 67bpm.  5 episode(s) of nonsustained supraventricular tachycardia - longest and longest 6 beats, 110bpm.  No sustained tachyarrhythmias.  No atrial fibrillation.  There were no pauses of greater than 3 seconds.  Rare supraventricular ectopic beats (isolated <1%).  Rare premature ventricular contractions (isolated <1%).  No symptoms recorded.    ECHO done 8/12/2024:  1. The left ventricle is normal in size. There is normal left ventricular wall  thickness. Left ventricular systolic function is normal. The visual ejection  fraction is 55-60%. Diastolic Doppler findings (E/E' ratio and/or other  parameters) suggest left ventricular filling pressures are normal. No regional  wall motion abnormalities noted.  2.The right ventricle is normal size. The right ventricular systolic function  is normal.  3. Trace mitral and tricuspid regurgitation.  4. No pericardial effusion.  5. No previous study for comparison.     CTA pulmonary veins done 11/4/2019:  1. Four  pulmonary veins with normal configuration.   2. Esophagus is adjacent to the posterior left atrial body and left inferior pulmonary vein.  3. No calcified atherosclerotic plaque.    I have reviewed and updated the patient's Past Medical History, Social History, Family History and Medication List.  Outside records personally reviewed.     Physical Examination  Review of Systems   Vitals: /74 (BP Location: Left arm, Patient Position: Sitting, Cuff Size: Adult Large)   Pulse 76   Resp 14   Wt 135.2 kg (298 lb)   LMP  (LMP Unknown)   BMI 45.31 kg/m    BMI= Body mass index is 45.31 kg/m .  Wt Readings from Last 3 Encounters:   03/17/25 135.2 kg (298 lb)   12/16/24 135.2 kg (298 lb)   10/31/24 134.1 kg (295 lb 9.6 oz)       General Appearance:   Alert, well-appearing and in no acute distress.   HEENT: Atraumatic, normocephalic.  No scleral icterus, normal conjunctivae, EOMs intact, PERRL.  Mucous membranes pink and moist.     Chest/Lungs:   Chest symmetric, spine straight.  Respirations unlabored.  Lungs are clear to auscultation.   Cardiovascular:   Regular rate and rhythm.  Normal first and second heart sounds with no murmurs, rubs, or gallops; radial pulses are intact, no edema.   Abdomen:  Soft, nondistended   Extremities: No cyanosis or clubbing.   Musculoskeletal: Moves all extremities.     Skin: Warm, dry, intact.    Neurologic: Mood and affect are appropriate.  Alert and oriented to person, place, time, and situation.     ROS: 10 point ROS neg other than the symptoms noted above in the HPI.         Medical History  Surgical History Family History Social History   Past Medical History:   Diagnosis Date     Asthma      Atrial fibrillation (H) 01/01/2012     Diabetes mellitus (H)      Hypertension      Insomnia      Mild intermittent asthma      Obesity      SHARA on CPAP      Osteoarthritis      Past Surgical History:   Procedure Laterality Date     ARTHROPLASTY KNEE BILATERAL       ARTHROSCOPY SHOULDER  RT/LT  2011    right with subacromial decompression and rotator cuff repair, massive      SECTION       EP ABLATION AFLUTTER  2019    Procedure: EP Ablation Atrial Flutter;  Surgeon: Dorian Garland MD;  Location: [facility] Cath Lab;  Service: Cardiology     EP ABLATION PULMONARY VEIN ISOLATION N/A 10/31/2024    Procedure: Ablation Atrial Fibrillation;  Surgeon: Dorian Garland MD;  Location: [facility] CATH LAB CV     EP ABLATION PVI Left 2019    Procedure: EP Ablation PVI;  Surgeon: Dorian Garland MD;  Location: [facility] Cath Lab;  Service: Cardiology     HC CARDIOVERSION  2012    [facility] - done for a fib     JOINT REPLACEMTN, KNEE RT/LT  2010    LEFT     JOINT REPLACEMTN, KNEE RT/LT  2010    right     OPEN REDUCTION INTERNAL FIXATION ANKLE Right 2021    Procedure: OPEN REDUCTION INTERNAL FIXATION MEDIAL MALLEOLUS FRACTURE, ANKLE RIGHT;  Surgeon: Vidal Cohn MD;  Location: [facility] OR;  Service: Orthopedics     REPAIR HAMMER TOE Right 2021    Procedure: FLEXOR TENOTOMIES 3RD TOE HAMMER TOE CORRECTION;  Surgeon: Vidal Cohn MD;  Location: [facility] OR;  Service: Orthopedics     REVERSE ARTHROPLASTY SHOULDER Left 2023    Procedure: Left Reverse Total Shoulder Arthroplasty;  Surgeon: Saurabh Villagran MD;  Location: [facility]     SHOULDER ARTHROSCOPY W/ ROTATOR CUFF REPAIR Right      ZZC TOTAL ANKLE REPLACEMENT Right 2021    Procedure: RIGHT TOTAL ANKLE ARTHOPLASTY, DEBRIDE SUBTALAR JOINT;  Surgeon: Vidal Cohn MD;  Location: [facility] OR;  Service: Orthopedics     Family History   Problem Relation Age of Onset     Genitourinary Problems Mother         Ovarian Cancer     Gastrointestinal Disease Mother         Had gallbladder romoved     Cancer Father         Lung     Gastrointestinal Disease Father         Had gallbladder romoved     C.A.D. Paternal Grandfather         Coronary      Gastrointestinal Disease Brother         Had gallbladder romoved     Depression Sister         DDD     Ovarian Cancer Mother 82.00     Lung Cancer Father 75.00     Sleep Apnea Father      Snoring Father      Brain Cancer Sister 57.00        Brain Ca x 1 year     No Known Problems Brother      No Known Problems Sister      No Known Problems Sister         Social History     Socioeconomic History     Marital status:      Spouse name: Not on file     Number of children: 1     Years of education: Not on file     Highest education level: Not on file   Occupational History     Not on file   Tobacco Use     Smoking status: Former     Current packs/day: 0.00     Types: Cigarettes     Quit date: 1981     Years since quittin.8     Smokeless tobacco: Never   Substance and Sexual Activity     Alcohol use: Yes     Comment: Alcoholic Drinks/day: Rare (2-3 month)     Drug use: No     Sexual activity: Yes     Partners: Male     Birth control/protection: Post-menopausal     Comment: Menopause   Other Topics Concern     Parent/sibling w/ CABG, MI or angioplasty before 65F 55M? No   Social History Narrative     Not on file     Social Drivers of Health     Financial Resource Strain: Not on file   Food Insecurity: Not on file   Transportation Needs: Not on file   Physical Activity: Not on file   Stress: Not on file   Social Connections: Not on file   Interpersonal Safety: High Risk (10/31/2024)    Interpersonal Safety      Do you feel physically and emotionally safe where you currently live?: No      Within the past 12 months, have you been hit, slapped, kicked or otherwise physically hurt by someone?: No      Within the past 12 months, have you been humiliated or emotionally abused in other ways by your partner or ex-partner?: No   Housing Stability: Not on file           Medications  Allergies   Current Outpatient Medications   Medication Sig Dispense Refill     acetaminophen (TYLENOL) 650 MG CR tablet Take 650 mg by  mouth every 8 hours as needed for pain (max dose 3000mg per day.)       apixaban ANTICOAGULANT (ELIQUIS ANTICOAGULANT) 5 MG tablet TAKE ONE TABLET BY MOUTH TWICE DAILY 180 tablet 3     atorvastatin (LIPITOR) 20 MG tablet Take 1 tablet (20 mg) by mouth daily 100 tablet 3     blood glucose (ONETOUCH VERIO IQ) test strip Use to test blood sugars 1 times daily or as directed. 100 each 11     blood glucose monitoring (ONE TOUCH DELICA) lancets Use to test blood sugars 1 times daily or as directed. 100 each 3     Cranberry 600 MG TABS Take by mouth.       cyanocobalamin (VITAMIN B-12) 500 MCG SUBL sublingual tablet Place 500 mcg under the tongue daily.       estradiol (ESTRACE) 0.1 MG/GM vaginal cream Place 2 g vaginally twice a week. 42.5 g 5     fluticasone-salmeterol (ADVAIR) 250-50 MCG/ACT inhaler USE 1 INHALATION EVERY 12 HOURS 60 each 11     hydrochlorothiazide (HYDRODIURIL) 25 MG tablet Take 1 tablet (25 mg) by mouth daily 90 tablet 3     losartan (COZAAR) 100 MG tablet Take 1 tablet (100 mg) by mouth daily 90 tablet 3     metFORMIN (GLUCOPHAGE) 500 MG tablet TAKE 1 TABLET BY MOUTH DAILY WITH DINNER 90 tablet 3     metoprolol succinate ER (TOPROL XL) 50 MG 24 hr tablet Take 1 tablet (50 mg) by mouth every evening. 90 tablet 1     PARoxetine (PAXIL) 10 MG tablet Take 1 tablet (10 mg) by mouth at bedtime 90 tablet 3     traZODone (DESYREL) 50 MG tablet TAKE 1 TABLET AT BEDTIME 90 tablet 1     XOPENEX HFA 45 MCG/ACT inhaler INHALE 2 PUFFS INTO THE LUNGS EVERY 8 HOURS AS NEEDED FOR SHORTNESS OF BREATH OR DIFFICULT BREATHING Strength: 45 MCG/ACT 15 g 3       Allergies   Allergen Reactions     Macrobid [Nitrofurantoin Anhydrous] Shortness Of Breath     Ace Inhibitors Cough     Corticosteroids      Crabby, hot/red skin      Denies previous adverse reaction to anesthesia      Lab Results    Chemistry/lipid CBC Cardiac Enzymes/BNP/TSH/INR   Recent Labs   Lab Test 03/19/24  0754 10/13/22  1553   CHOL 205* 149   HDL 53 47*    * 74   TRIG 95 139       Recent Labs   Lab Test 10/31/24  0616 10/24/24  1321 05/08/24  0444   NA  --  137 136   POTASSIUM  --  5.0 4.1   CHLORIDE  --  97* 99   CO2  --  30* 26   ANIONGAP  --  10 11   GLC 89 106* 114*   BUN  --  20.6 19.3   CR  --  1.23* 1.13*   ZHAO  --  9.2 8.9      CBC RESULTS:   Recent Labs   Lab Test 10/24/24  1321   WBC 5.4   RBC 4.53   HGB 13.3   HCT 41.4   MCV 91   MCH 29.4   MCHC 32.1   RDW 14.6           TSH   Date Value Ref Range Status   05/21/2024 6.12 (H) 0.30 - 4.20 uIU/mL Final   08/23/2022 1.43 0.30 - 5.00 uIU/mL Final   03/21/2019 1.59 0.40 - 4.00 mU/L Final     Free T4   Date Value Ref Range Status   05/21/2024 0.99 0.90 - 1.70 ng/dL Final       The longitudinal plan of care for the diagnosis(es)/condition(s) as documented were addressed during this visit. Due to the added complexity in care, I will continue to support Romy in the subsequent management and with ongoing continuity of care.                                             Thank you for allowing me to participate in the care of your patient.      Sincerely,     CRISTIAN Benedict CNP     [facility]  cc:   CRISTIAN Benedict CNP  [address], [address]  [place] [id number]

## 2025-04-02 ENCOUNTER — OFFICE VISIT (OUTPATIENT)
Dept: FAMILY MEDICINE | Facility: CLINIC | Age: 80
End: 2025-04-02
Attending: FAMILY MEDICINE
Payer: COMMERCIAL

## 2025-04-02 VITALS
BODY MASS INDEX: 44.41 KG/M2 | HEART RATE: 67 BPM | OXYGEN SATURATION: 96 % | WEIGHT: 293 LBS | DIASTOLIC BLOOD PRESSURE: 70 MMHG | SYSTOLIC BLOOD PRESSURE: 128 MMHG | HEIGHT: 68 IN | TEMPERATURE: 98.3 F

## 2025-04-02 DIAGNOSIS — G47.00 PERSISTENT INSOMNIA: ICD-10-CM

## 2025-04-02 DIAGNOSIS — N39.0 RECURRENT UTI: ICD-10-CM

## 2025-04-02 DIAGNOSIS — E11.22 TYPE 2 DIABETES MELLITUS WITH STAGE 3A CHRONIC KIDNEY DISEASE, WITH LONG-TERM CURRENT USE OF INSULIN (H): ICD-10-CM

## 2025-04-02 DIAGNOSIS — E66.1 CLASS 3 DRUG-INDUCED OBESITY WITH SERIOUS COMORBIDITY AND BODY MASS INDEX (BMI) OF 45.0 TO 49.9 IN ADULT (H): ICD-10-CM

## 2025-04-02 DIAGNOSIS — Z00.00 ENCOUNTER FOR MEDICARE ANNUAL WELLNESS EXAM: Primary | ICD-10-CM

## 2025-04-02 DIAGNOSIS — N18.31 TYPE 2 DIABETES MELLITUS WITH STAGE 3A CHRONIC KIDNEY DISEASE, WITH LONG-TERM CURRENT USE OF INSULIN (H): ICD-10-CM

## 2025-04-02 DIAGNOSIS — Z12.31 VISIT FOR SCREENING MAMMOGRAM: ICD-10-CM

## 2025-04-02 DIAGNOSIS — G56.03 BILATERAL CARPAL TUNNEL SYNDROME: ICD-10-CM

## 2025-04-02 DIAGNOSIS — F43.23 ADJUSTMENT DISORDER WITH MIXED ANXIETY AND DEPRESSED MOOD: ICD-10-CM

## 2025-04-02 DIAGNOSIS — I48.0 PAROXYSMAL ATRIAL FIBRILLATION (H): ICD-10-CM

## 2025-04-02 DIAGNOSIS — I10 HTN, GOAL BELOW 140/90: ICD-10-CM

## 2025-04-02 DIAGNOSIS — N95.2 ATROPHIC VAGINITIS: ICD-10-CM

## 2025-04-02 DIAGNOSIS — J45.40 MODERATE PERSISTENT ASTHMA WITHOUT COMPLICATION: ICD-10-CM

## 2025-04-02 DIAGNOSIS — R94.4 DECREASED GFR: ICD-10-CM

## 2025-04-02 DIAGNOSIS — E78.5 HYPERLIPIDEMIA LDL GOAL <100: ICD-10-CM

## 2025-04-02 DIAGNOSIS — L72.0 EPIDERMOID CYST OF FINGER OF RIGHT HAND: ICD-10-CM

## 2025-04-02 DIAGNOSIS — N18.31 STAGE 3A CHRONIC KIDNEY DISEASE (H): ICD-10-CM

## 2025-04-02 DIAGNOSIS — Z79.4 TYPE 2 DIABETES MELLITUS WITH STAGE 3A CHRONIC KIDNEY DISEASE, WITH LONG-TERM CURRENT USE OF INSULIN (H): ICD-10-CM

## 2025-04-02 DIAGNOSIS — E66.813 CLASS 3 DRUG-INDUCED OBESITY WITH SERIOUS COMORBIDITY AND BODY MASS INDEX (BMI) OF 45.0 TO 49.9 IN ADULT (H): ICD-10-CM

## 2025-04-02 LAB
ALBUMIN SERPL BCG-MCNC: 3.9 G/DL (ref 3.5–5.2)
ALP SERPL-CCNC: 78 U/L (ref 40–150)
ALT SERPL W P-5'-P-CCNC: 17 U/L (ref 0–50)
ANION GAP SERPL CALCULATED.3IONS-SCNC: 11 MMOL/L (ref 7–15)
AST SERPL W P-5'-P-CCNC: 25 U/L (ref 0–45)
BILIRUB SERPL-MCNC: 0.4 MG/DL
BUN SERPL-MCNC: 24 MG/DL (ref 8–23)
CALCIUM SERPL-MCNC: 9.3 MG/DL (ref 8.8–10.4)
CHLORIDE SERPL-SCNC: 101 MMOL/L (ref 98–107)
CHOLEST SERPL-MCNC: 141 MG/DL
CREAT SERPL-MCNC: 1.41 MG/DL (ref 0.51–0.95)
EGFRCR SERPLBLD CKD-EPI 2021: 38 ML/MIN/1.73M2
EST. AVERAGE GLUCOSE BLD GHB EST-MCNC: 131 MG/DL
FASTING STATUS PATIENT QL REPORTED: YES
FASTING STATUS PATIENT QL REPORTED: YES
GLUCOSE SERPL-MCNC: 107 MG/DL (ref 70–99)
HBA1C MFR BLD: 6.2 % (ref 0–5.6)
HCO3 SERPL-SCNC: 30 MMOL/L (ref 22–29)
HDLC SERPL-MCNC: 53 MG/DL
LDLC SERPL CALC-MCNC: 69 MG/DL
NONHDLC SERPL-MCNC: 88 MG/DL
POTASSIUM SERPL-SCNC: 4.5 MMOL/L (ref 3.4–5.3)
PROT SERPL-MCNC: 7.3 G/DL (ref 6.4–8.3)
SODIUM SERPL-SCNC: 142 MMOL/L (ref 135–145)
TRIGL SERPL-MCNC: 95 MG/DL

## 2025-04-02 RX ORDER — FLUTICASONE PROPIONATE AND SALMETEROL 250; 50 UG/1; UG/1
1 POWDER RESPIRATORY (INHALATION) 2 TIMES DAILY
Qty: 180 EACH | Refills: 3 | Status: SHIPPED | OUTPATIENT
Start: 2025-04-02

## 2025-04-02 RX ORDER — HYDROCHLOROTHIAZIDE 25 MG/1
25 TABLET ORAL DAILY
Qty: 100 TABLET | Refills: 3 | Status: SHIPPED | OUTPATIENT
Start: 2025-04-02

## 2025-04-02 RX ORDER — LOSARTAN POTASSIUM 100 MG/1
100 TABLET ORAL DAILY
Qty: 100 TABLET | Refills: 3 | Status: SHIPPED | OUTPATIENT
Start: 2025-04-02

## 2025-04-02 RX ORDER — ESTRADIOL 0.1 MG/G
2 CREAM VAGINAL
Qty: 42.5 G | Refills: 5 | Status: SHIPPED | OUTPATIENT
Start: 2025-04-03

## 2025-04-02 RX ORDER — LEVALBUTEROL TARTRATE 45 UG/1
AEROSOL, METERED ORAL
Qty: 15 G | Refills: 3 | Status: SHIPPED | OUTPATIENT
Start: 2025-04-02

## 2025-04-02 RX ORDER — TRAZODONE HYDROCHLORIDE 50 MG/1
TABLET ORAL
Qty: 100 TABLET | Refills: 3 | Status: SHIPPED | OUTPATIENT
Start: 2025-04-02

## 2025-04-02 RX ORDER — PAROXETINE 10 MG/1
10 TABLET, FILM COATED ORAL AT BEDTIME
Qty: 100 TABLET | Refills: 3 | Status: SHIPPED | OUTPATIENT
Start: 2025-04-02

## 2025-04-02 RX ORDER — ATORVASTATIN CALCIUM 20 MG/1
20 TABLET, FILM COATED ORAL DAILY
Qty: 100 TABLET | Refills: 3 | Status: SHIPPED | OUTPATIENT
Start: 2025-04-02

## 2025-04-02 SDOH — HEALTH STABILITY: PHYSICAL HEALTH: ON AVERAGE, HOW MANY DAYS PER WEEK DO YOU ENGAGE IN MODERATE TO STRENUOUS EXERCISE (LIKE A BRISK WALK)?: 1 DAY

## 2025-04-02 ASSESSMENT — ANXIETY QUESTIONNAIRES
2. NOT BEING ABLE TO STOP OR CONTROL WORRYING: NOT AT ALL
1. FEELING NERVOUS, ANXIOUS, OR ON EDGE: MORE THAN HALF THE DAYS
IF YOU CHECKED OFF ANY PROBLEMS ON THIS QUESTIONNAIRE, HOW DIFFICULT HAVE THESE PROBLEMS MADE IT FOR YOU TO DO YOUR WORK, TAKE CARE OF THINGS AT HOME, OR GET ALONG WITH OTHER PEOPLE: NOT DIFFICULT AT ALL
8. IF YOU CHECKED OFF ANY PROBLEMS, HOW DIFFICULT HAVE THESE MADE IT FOR YOU TO DO YOUR WORK, TAKE CARE OF THINGS AT HOME, OR GET ALONG WITH OTHER PEOPLE?: NOT DIFFICULT AT ALL
6. BECOMING EASILY ANNOYED OR IRRITABLE: NOT AT ALL
5. BEING SO RESTLESS THAT IT IS HARD TO SIT STILL: NOT AT ALL
GAD7 TOTAL SCORE: 3
7. FEELING AFRAID AS IF SOMETHING AWFUL MIGHT HAPPEN: NOT AT ALL
GAD7 TOTAL SCORE: 3
4. TROUBLE RELAXING: NOT AT ALL
GAD7 TOTAL SCORE: 3
7. FEELING AFRAID AS IF SOMETHING AWFUL MIGHT HAPPEN: NOT AT ALL
3. WORRYING TOO MUCH ABOUT DIFFERENT THINGS: SEVERAL DAYS

## 2025-04-02 ASSESSMENT — ASTHMA QUESTIONNAIRES
QUESTION_4 LAST FOUR WEEKS HOW OFTEN HAVE YOU USED YOUR RESCUE INHALER OR NEBULIZER MEDICATION (SUCH AS ALBUTEROL): TWO OR THREE TIMES PER WEEK
ACT_TOTALSCORE: 21
QUESTION_2 LAST FOUR WEEKS HOW OFTEN HAVE YOU HAD SHORTNESS OF BREATH: NOT AT ALL
QUESTION_3 LAST FOUR WEEKS HOW OFTEN DID YOUR ASTHMA SYMPTOMS (WHEEZING, COUGHING, SHORTNESS OF BREATH, CHEST TIGHTNESS OR PAIN) WAKE YOU UP AT NIGHT OR EARLIER THAN USUAL IN THE MORNING: NOT AT ALL
QUESTION_5 LAST FOUR WEEKS HOW WOULD YOU RATE YOUR ASTHMA CONTROL: WELL CONTROLLED
QUESTION_1 LAST FOUR WEEKS HOW MUCH OF THE TIME DID YOUR ASTHMA KEEP YOU FROM GETTING AS MUCH DONE AT WORK, SCHOOL OR AT HOME: A LITTLE OF THE TIME

## 2025-04-02 ASSESSMENT — SOCIAL DETERMINANTS OF HEALTH (SDOH): HOW OFTEN DO YOU GET TOGETHER WITH FRIENDS OR RELATIVES?: ONCE A WEEK

## 2025-04-02 ASSESSMENT — PAIN SCALES - GENERAL: PAINLEVEL_OUTOF10: NO PAIN (0)

## 2025-04-02 NOTE — PROGRESS NOTES
Preventive Care Visit  [facility]  Adeola Stockton MD, Family Medicine  Apr 2, 2025      Assessment & Plan     Encounter for Medicare annual wellness exam       Type 2 diabetes mellitus with stage 3a chronic kidney disease, with long-term current use of insulin (H)  Well controlled  Continue current metformin    - Hemoglobin A1c; Future  - Albumin Random Urine Quantitative with Creat Ratio; Future  - Comprehensive metabolic panel (BMP + Alb, Alk Phos, ALT, AST, Total. Bili, TP); Future  - FOOT EXAM  - Adult Eye  Referral; Future  - Hemoglobin A1c  - Comprehensive metabolic panel (BMP + Alb, Alk Phos, ALT, AST, Total. Bili, TP)  - Albumin Random Urine Quantitative with Creat Ratio  - metFORMIN (GLUCOPHAGE) 500 MG tablet; TAKE ONE TABLET BY MOUTH ONCE DAILY WITH DINNER    Hyperlipidemia LDL goal <100     - Lipid panel reflex to direct LDL Fasting; Future  - Comprehensive metabolic panel (BMP + Alb, Alk Phos, ALT, AST, Total. Bili, TP); Future  - Lipid panel reflex to direct LDL Fasting  - Comprehensive metabolic panel (BMP + Alb, Alk Phos, ALT, AST, Total. Bili, TP)  - atorvastatin (LIPITOR) 20 MG tablet; Take 1 tablet (20 mg) by mouth daily.    Stage 3a chronic kidney disease (H)  Continue monitoring  - Albumin Random Urine Quantitative with Creat Ratio; Future  - Comprehensive metabolic panel (BMP + Alb, Alk Phos, ALT, AST, Total. Bili, TP); Future  - Comprehensive metabolic panel (BMP + Alb, Alk Phos, ALT, AST, Total. Bili, TP)  - Albumin Random Urine Quantitative with Creat Ratio    Paroxysmal atrial fibrillation (H)  Had ablation 10/2024. Doing well. Follow up with cardiology  - Comprehensive metabolic panel (BMP + Alb, Alk Phos, ALT, AST, Total. Bili, TP); Future  - Comprehensive metabolic panel (BMP + Alb, Alk Phos, ALT, AST, Total. Bili, TP)    Class 3 drug-induced obesity with serious comorbidity and body mass index (BMI) of 45.0 to 49.9 in adult (H)   Body mass index  "(BMI) 40.0-44.9, adult (H)  Contributes to risk with hypertension, hyperlipidemia, diabetes    Moderate persistent asthma without complication  Not well controlled  Not using her advair regularly but using xopenex 5x/week or more.   Start the Advair twice daily regularly and let me know if still needing the xopenex.   - XOPENEX HFA 45 MCG/ACT inhaler; INHALE 2 PUFFS INTO THE LUNGS EVERY 8 HOURS AS NEEDED FOR SHORTNESS OF BREATH OR DIFFICULT BREATHING Strength: 45 MCG/ACT  - fluticasone-salmeterol (ADVAIR) 250-50 MCG/ACT inhaler; Inhale 1 puff into the lungs 2 times daily.    HTN, goal below 140/90  Well controlled  - hydrochlorothiazide (HYDRODIURIL) 25 MG tablet; Take 1 tablet (25 mg) by mouth daily.  - losartan (COZAAR) 100 MG tablet; Take 1 tablet (100 mg) by mouth daily.    Atrophic vaginitis  Recurrent UTI  Continue vaginal estrogen  - estradiol (ESTRACE) 0.1 MG/GM vaginal cream; Place 2 g vaginally twice a week.      Adjustment disorder with mixed anxiety and depressed mood  Stable, well managed  - PARoxetine (PAXIL) 10 MG tablet; Take 1 tablet (10 mg) by mouth at bedtime.    Persistent insomnia   stable  - traZODone (DESYREL) 50 MG tablet; TAKE 1 TABLET AT BEDTIME    Epidermoid cyst of finger of right hand  Right middle finger, growing and more painful.   - Orthopedic  Referral; Future    Bilateral carpal tunnel syndrome  L>R - will talk to ortho about this.   - Orthopedic  Referral; Future    Visit for screening mammogram    - MA Screen Bilateral w/Endy; Future    Patient has been advised of split billing requirements and indicates understanding: Yes        BMI  Estimated body mass index is 45.31 kg/m  as calculated from the following:    Height as of this encounter: 1.727 m (5' 8\").    Weight as of this encounter: 135.2 kg (298 lb).   Weight management plan: Discussed healthy diet and exercise guidelines    Counseling  Appropriate preventive services were addressed with this patient via " screening, questionnaire, or discussion as appropriate for fall prevention, nutrition, physical activity, Tobacco-use cessation, social engagement, weight loss and cognition.  Checklist reviewing preventive services available has been given to the patient.  Reviewed patient's diet, addressing concerns and/or questions.   She is at risk for lack of exercise and has been provided with information to increase physical activity for the benefit of her well-being.   The patient was instructed to see the dentist every 6 months.   She is at risk for psychosocial distress and has been provided with information to reduce risk.   The patient was provided with written information regarding signs of hearing loss.   Information on urinary incontinence and treatment options given to patient.           Amber Stanton is a 79 year old, presenting for the following:  Medicare Visit        4/2/2025     7:43 AM   Additional Questions   Roomed by Анна xiao CMA   Accompanied by Self           HPI       Diabetes Follow-up  Metformin 500mg qd  How often are you checking your blood sugar? A few times a month  What time of day are you checking your blood sugars (select all that apply)?  Before and after meals  Have you had any blood sugars above 200?  No  Have you had any blood sugars below 70?  No  What symptoms do you notice when your blood sugar is low?  None  What concerns do you have today about your diabetes? None   Do you have any of these symptoms? (Select all that apply)  Itching on feet  Have you had a diabetic eye exam in the last 12 months? No            Hyperlipidemia Follow-Up  Atorvastatin 20mg qd  Are you regularly taking any medication or supplement to lower your cholesterol?   Yes- statin  Are you having muscle aches or other side effects that you think could be caused by your cholesterol lowering medication?  No    Hypertension Follow-up  Hydrochlorothiazide 25mg every day, losartan 100mg every day, metoprolol 50mg  qhs  Do you check your blood pressure regularly outside of the clinic? No   Are you following a low salt diet? Yes  Are your blood pressures ever more than 140 on the top number (systolic) OR more   than 90 on the bottom number (diastolic), for example 140/90? N/A    BP Readings from Last 2 Encounters:   04/02/25 128/70   03/17/25 138/74     Hemoglobin A1C (%)   Date Value   03/19/2024 6.0 (H)   07/12/2023 6.1 (H)   10/01/2020 6.3 (H)   12/16/2019 6.0 (H)     LDL Cholesterol Calculated (mg/dL)   Date Value   03/19/2024 133 (H)   10/13/2022 74   10/01/2020 48   03/21/2019 53         Asthma  Patient has not had an ACT done in this encounter: Link to ACT Flowsheet       4/2/2025     8:00 AM   ACT Total Scores   ACT TOTAL SCORE (Goal Greater than or Equal to 20) 21    In the past 12 months, how many times did you visit the emergency room for your asthma without being admitted to the hospital? 0   In the past 12 months, how many times were you hospitalized overnight because of your asthma? 0       Patient-reported     Do you have any of the following symptoms? Cough  What makes your asthma/breathing worse?  Pollens and Mold  Do you want more information about how to use your inhaler? No    Advance Care Planning  Patient does not have a Health Care Directive: Discussed advance care planning with patient; however, patient declined at this time.      4/2/2025   General Health   How would you rate your overall physical health? (!) FAIR   Feel stress (tense, anxious, or unable to sleep) Rather much   (!) STRESS CONCERN      4/2/2025   Nutrition   Diet: Low salt    Low fat/cholesterol    Diabetic       Multiple values from one day are sorted in reverse-chronological order         4/2/2025   Exercise   Days per week of moderate/strenous exercise 1 day   (!) EXERCISE CONCERN      4/2/2025   Social Factors   Frequency of gathering with friends or relatives Once a week   Worry food won't last until get money to buy more No   Food  not last or not have enough money for food? No   Do you have housing? (Housing is defined as stable permanent housing and does not include staying ouside in a car, in a tent, in an abandoned building, in an overnight shelter, or couch-surfing.) Yes   Are you worried about losing your housing? No   Lack of transportation? No   Unable to get utilities (heat,electricity)? No         4/2/2025   Fall Risk   Fallen 2 or more times in the past year? No   Trouble with walking or balance? Yes   Gait Speed Test (Document in seconds) 5   Gait Speed Test Interpretation Less than or equal to 5.00 seconds - PASS          4/2/2025   Activities of Daily Living- Home Safety   Needs help with the following daily activites None of the above   Safety concerns in the home None of the above         4/2/2025   Dental   Dentist two times every year? (!) NO         4/2/2025   Hearing Screening   Hearing concerns? (!) I NEED TO ASK PEOPLE TO SPEAK UP OR REPEAT THEMSELVES.    (!) IT'S HARDER TO UNDERSTAND WOMEN'S VOICES THAN MEN'S VOICES.    (!) IT'S HARD TO FOLLOW A CONVERSATION IN A NOISY RESTAURANT OR CROWDED ROOM.    (!) TROUBLE UNDERSTANDING SOFT OR WHISPERED SPEECH.    (!) TROUBLE UNDERSTANDING SPEECH ON THE TELEPHONE       Multiple values from one day are sorted in reverse-chronological order         4/2/2025   Driving Risk Screening   Patient/family members have concerns about driving No         4/2/2025   General Alertness/Fatigue Screening   Have you been more tired than usual lately? No         4/2/2025   Urinary Incontinence Screening   Bothered by leaking urine in past 6 months Yes             Today's PHQ-2 Score:       4/2/2025     7:59 AM   PHQ-2 ( 1999 [employer])   Q1: Little interest or pleasure in doing things 0   Q2: Feeling down, depressed or hopeless 1   PHQ-2 Score 1    Q1: Little interest or pleasure in doing things Not at all   Q2: Feeling down, depressed or hopeless Several days   PHQ-2 Score 1       Patient-reported          2025   Substance Use   Alcohol more than 3/day or more than 7/wk No   Do you have a current opioid prescription? No   How severe/bad is pain from 1 to 10? 5/10   Do you use any other substances recreationally? (!) DECLINE     Social History     Tobacco Use    Smoking status: Former     Current packs/day: 0.00     Types: Cigarettes     Quit date: 1981     Years since quittin.8    Smokeless tobacco: Never   Substance Use Topics    Alcohol use: Yes     Comment: Alcoholic Drinks/day: Rare (2-3 month)    Drug use: No           2022   LAST FHS-7 RESULTS   1st degree relative breast or ovarian cancer Yes   Any relative bilateral breast cancer No   Any male have breast cancer No   Any ONE woman have BOTH breast AND ovarian cancer Yes   Any woman with breast cancer before 50yrs No   2 or more relatives with breast AND/OR ovarian cancer No   2 or more relatives with breast AND/OR bowel cancer No        Mammogram Screening - After age 74- determine frequency with patient based on health status, life expectancy and patient goals    ASCVD Risk   The 10-year ASCVD risk score (Lilly DURHAM, et al., 2019) is: 43.6%    Values used to calculate the score:      Age: 79 years      Sex: Female      Is Non- : No      Diabetic: Yes      Tobacco smoker: No      Systolic Blood Pressure: 128 mmHg      Is BP treated: Yes      HDL Cholesterol: 53 mg/dL      Total Cholesterol: 205 mg/dL            Reviewed and updated as needed this visit by Provider                      Current providers sharing in care for this patient include:  Patient Care Team:  Adeola Stockton MD as PCP - General  Adeola Stockton MD as Assigned PCP  No Ref-Primary, Physician  Dorian Garland MD as MD (Cardiovascular Disease)  Yoel Wilkes RPH as Pharmacist (Pharmacist)  Yoel Wilkes RPH as Assigned MTM Pharmacist  Breanne Augustin APRN CNP as Assigned Heart and Vascular Provider    The following  "health maintenance items are reviewed in Epic and correct as of today:  Health Maintenance   Topic Date Due    ZOSTER IMMUNIZATION (2 of 3) 02/21/2013    DTAP/TDAP/TD IMMUNIZATION (2 - Td or Tdap) 07/01/2021    EYE EXAM  06/26/2024    INFLUENZA VACCINE (1) 09/01/2024    COVID-19 Vaccine (6 - 2024-25 season) 09/01/2024    A1C  09/19/2024    ASTHMA CONTROL TEST  09/19/2024    LIPID  03/19/2025    MICROALBUMIN  03/19/2025    ASTHMA ACTION PLAN  03/06/2025    BMP  10/24/2025    HEMOGLOBIN  10/24/2025    MEDICARE ANNUAL WELLNESS VISIT  04/02/2026    DIABETIC FOOT EXAM  04/02/2026    ANNUAL REVIEW OF HM ORDERS  04/02/2026    FALL RISK ASSESSMENT  04/02/2026    ADVANCE CARE PLANNING  07/12/2028    DEXA  11/30/2037    HEPATITIS C SCREENING  Completed    PHQ-2 (once per calendar year)  Completed    Pneumococcal Vaccine: 50+ Years  Completed    URINALYSIS  Completed    RSV VACCINE  Completed    HPV IMMUNIZATION  Aged Out    MENINGITIS IMMUNIZATION  Aged Out    MAMMO SCREENING  Discontinued    COLORECTAL CANCER SCREENING  Discontinued         Review of Systems  Constitutional, HEENT, cardiovascular, pulmonary, gi and gu systems are negative, except as otherwise noted.     Objective    Exam  /70   Pulse 67   Temp 98.3  F (36.8  C) (Tympanic)   Ht 1.727 m (5' 8\")   Wt 135.2 kg (298 lb)   LMP  (LMP Unknown)   SpO2 96%   BMI 45.31 kg/m     Estimated body mass index is 45.31 kg/m  as calculated from the following:    Height as of this encounter: 1.727 m (5' 8\").    Weight as of this encounter: 135.2 kg (298 lb).    Physical Exam  GENERAL: alert and no distress  NECK: no adenopathy, no asymmetry, masses, or scars  RESP: lungs clear to auscultation - no rales, rhonchi or wheezes  CV: regular rate and rhythm, normal S1 S2, no S3 or S4, no murmur, click or rub, no peripheral edema  ABDOMEN: soft, nontender, no hepatosplenomegaly, no masses and bowel sounds normal  MS: no gross musculoskeletal defects noted, no " edema    Diabetic Foot Screen:  Any complaints of increased pain or numbness ? No  Is there a foot ulcer now or a history of foot ulcer? No  Does the foot have an abnormal shape? No  Are the nails thick, too long or ingrown? No  Are there any redness or open areas? No         Sensation Testing done at all points on the diagram with monofilament     Right Foot: Sensation Normal at all points  Left Foot: Sensation Normal at all points     Risk Category: 0- No loss of protective sensation  Performed by Adeola Stockton MD          4/2/2025   Mini Cog   Clock Draw Score 2 Normal   3 Item Recall 3 objects recalled   Mini Cog Total Score 5              Signed Electronically by: Adeola Stockton MD

## 2025-04-02 NOTE — LETTER
My Asthma Action Plan    Name: Romy Hurley   YOB: 1945  Date: 4/2/2025   My doctor: Adeola Stockton MD   My clinic: [facility]        My Control Medicine: Fluticasone propionate + salmeterol (Advair Diskus or Wixela Inhub) -  250/50 mcg    My Rescue Medicine: Levalbuterol (Xopenex HFA)     My Asthma Severity:   Moderate Persistent  Know your asthma triggers: upper respiratory infections  dust mites  pollens            GREEN ZONE   Good Control  I feel good  No cough or wheeze  Can work, sleep and play without asthma symptoms       Take your asthma control medicine every day.     If exercise triggers your asthma, take your rescue medication  15 minutes before exercise or sports, and  During exercise if you have asthma symptoms  Spacer to use with inhaler: If you have a spacer, make sure to use it with your inhaler             YELLOW ZONE Getting Worse  I have ANY of these:  I do not feel good  Cough or wheeze  Chest feels tight  Wake up at night   Keep taking your Green Zone medications  Start taking your rescue medicine:  every 20 minutes for up to 1 hour. Then every 4 hours for 24-48 hours.  If you stay in the Yellow Zone for more than 12-24 hours, contact your doctor.  If you do not return to the Green Zone in 12-24 hours or you get worse, start taking your oral steroid medicine if prescribed by your provider.           RED ZONE Medical Alert - Get Help  I have ANY of these:  I feel awful  Medicine is not helping  Breathing getting harder  Trouble walking or talking  Nose opens wide to breathe       Take your rescue medicine NOW  If your provider has prescribed an oral steroid medicine, start taking it NOW  Call your doctor NOW  If you are still in the Red Zone after 20 minutes and you have not reached your doctor:  Take your rescue medicine again and  Call 911 or go to the emergency room right away    See your regular doctor within 2 weeks of an Emergency Room or  Urgent Care visit for follow-up treatment.          Annual Reminders:  Meet with Asthma Educator,  Flu Shot in the Fall, consider Pneumonia Vaccination for patients with asthma (aged 19 and older).    Pharmacy:    [id number] - [place], [place] - [id number] - [facility] [address] AT [address]    Electronically signed by Adeola Stockton MD   Date: 04/02/25                      Asthma Triggers  How To Control Things That Make Your Asthma Worse    Triggers are things that make your asthma worse.  Look at the list below to help you find your triggers and what you can do about them.  You can help prevent asthma flare-ups by staying away from your triggers.      Trigger                                                          What you can do   Cigarette Smoke  Tobacco smoke can make asthma worse. Do not allow smoking in your home, car or around you.  Be sure no one smokes at a child s day care or school.  If you smoke, ask your health care provider for ways to help you quit.  Ask family members to quit too.  Ask your health care provider for a referral to Quit Plan to help you quit smoking, or call 8-945-836-PLAN.     Colds, Flu, Bronchitis  These are common triggers of asthma. Wash your hands often.  Don t touch your eyes, nose or mouth.  Get a flu shot every year.     Dust Mites  These are tiny bugs that live in cloth or carpet. They are too small to see. Wash sheets and blankets in hot water every week.   Encase pillows and mattress in dust mite proof covers.  Avoid having carpet if you can. If you have carpet, vacuum weekly.   Use a dust mask and HEPA vacuum.   Pollen and Outdoor Mold  Some people are allergic to trees, grass, or weed pollen, or molds. Try to keep your windows closed.  Limit time out doors when pollen count is high.   Ask you health care provider about taking medicine during allergy season.     Animal  Dander  Some people are allergic to skin flakes, urine or saliva from pets with fur or feathers. Keep pets with fur or feathers out of your home.    If you can t keep the pet outdoors, then keep the pet out of your bedroom.  Keep the bedroom door closed.  Keep pets off cloth furniture and away from stuffed toys.     Mice, Rats, and Cockroaches   Some people are allergic to the waste from these pests.   Cover food and garbage.  Clean up spills and food crumbs.  Store grease in the refrigerator.   Keep food out of the bedroom.   Indoor Mold  This can be a trigger if your home has high moisture. Fix leaking faucets, pipes, or other sources of water.   Clean moldy surfaces.  Dehumidify basement if it is damp and smelly.   Smoke, Strong Odors, and Sprays  These can reduce air quality. Stay away from strong odors and sprays, such as perfume, powder, hair spray, paints, smoke incense, paint, cleaning products, candles and new carpet.   Exercise or Sports  Some people with asthma have this trigger. Be active!  Ask your doctor about taking medicine before sports or exercise to prevent symptoms.    Warm up for 5-10 minutes before and after sports or exercise.     Other Triggers of Asthma  Cold air:  Cover your nose and mouth with a scarf.  Sometimes laughing or crying can be a trigger.  Some medicines and food can trigger asthma.

## 2025-04-02 NOTE — PATIENT INSTRUCTIONS
Patient Education   Preventive Care Advice   This is general advice given by our system to help you stay healthy. However, your care team may have specific advice just for you. Please talk to your care team about your preventive care needs.  Nutrition  Eat 5 or more servings of fruits and vegetables each day.  Try wheat bread, brown rice and whole grain pasta (instead of white bread, rice, and pasta).  Get enough calcium and vitamin D. Check the label on foods and aim for 100% of the RDA (recommended daily allowance).  Lifestyle  Exercise at least 150 minutes each week  (30 minutes a day, 5 days a week).  Do muscle strengthening activities 2 days a week. These help control your weight and prevent disease.  No smoking.  Wear sunscreen to prevent skin cancer.  Have a dental exam and cleaning every 6 months.  Yearly exams  See your health care team every year to talk about:  Any changes in your health.  Any medicines your care team has prescribed.  Preventive care, family planning, and ways to prevent chronic diseases.  Shots (vaccines)   HPV shots (up to age 26), if you've never had them before.  Hepatitis B shots (up to age 59), if you've never had them before.  COVID-19 shot: Get this shot when it's due.  Flu shot: Get a flu shot every year.  Tetanus shot: Get a tetanus shot every 10 years.  Pneumococcal, hepatitis A, and RSV shots: Ask your care team if you need these based on your risk.  Shingles shot (for age 50 and up)  General health tests  Diabetes screening:  Starting at age 35, Get screened for diabetes at least every 3 years.  If you are younger than age 35, ask your care team if you should be screened for diabetes.  Cholesterol test: At age 39, start having a cholesterol test every 5 years, or more often if advised.  Bone density scan (DEXA): At age 50, ask your care team if you should have this scan for osteoporosis (brittle bones).  Hepatitis C: Get tested at least once in your life.  STIs (sexually  transmitted infections)  Before age 24: Ask your care team if you should be screened for STIs.  After age 24: Get screened for STIs if you're at risk. You are at risk for STIs (including HIV) if:  You are sexually active with more than one person.  You don't use condoms every time.  You or a partner was diagnosed with a sexually transmitted infection.  If you are at risk for HIV, ask about PrEP medicine to prevent HIV.  Get tested for HIV at least once in your life, whether you are at risk for HIV or not.  Cancer screening tests  Cervical cancer screening: If you have a cervix, begin getting regular cervical cancer screening tests starting at age 21.  Breast cancer scan (mammogram): If you've ever had breasts, begin having regular mammograms starting at age 40. This is a scan to check for breast cancer.  Colon cancer screening: It is important to start screening for colon cancer at age 45.  Have a colonoscopy test every 10 years (or more often if you're at risk) Or, ask your provider about stool tests like a FIT test every year or Cologuard test every 3 years.  To learn more about your testing options, visit:   .  For help making a decision, visit:   https://bit.ly/yg87160.  Prostate cancer screening test: If you have a prostate, ask your care team if a prostate cancer screening test (PSA) at age 55 is right for you.  Lung cancer screening: If you are a current or former smoker ages 50 to 80, ask your care team if ongoing lung cancer screenings are right for you.  For informational purposes only. Not to replace the advice of your health care provider. Copyright   2023 [employer]. All rights reserved. Clinically reviewed by the [facility] Transitions Program. [place] [id number] - REV 01/24.

## 2025-04-02 NOTE — RESULT ENCOUNTER NOTE
Kidney function down from 6 months ago.  Could be due to fasting for labs.  Being dehydrated can make this look worse.      Avoid NSAIDS (ibuprofen/aleve) but you probably already do this due to the blood thinner.     Increase fluid intake and recheck kidney function in 1-2 months (lab only is okay).  No need to fast before lab.    Cholesterol looks good.    Adeola Stockton M.D.

## 2025-04-03 ENCOUNTER — PATIENT OUTREACH (OUTPATIENT)
Dept: CARE COORDINATION | Facility: CLINIC | Age: 80
End: 2025-04-03
Payer: COMMERCIAL

## 2025-04-03 PROBLEM — E66.813 CLASS 3 DRUG-INDUCED OBESITY WITH SERIOUS COMORBIDITY AND BODY MASS INDEX (BMI) OF 45.0 TO 49.9 IN ADULT (H): Status: ACTIVE | Noted: 2025-04-03

## 2025-04-03 PROBLEM — E66.1 CLASS 3 DRUG-INDUCED OBESITY WITH SERIOUS COMORBIDITY AND BODY MASS INDEX (BMI) OF 45.0 TO 49.9 IN ADULT (H): Status: ACTIVE | Noted: 2025-04-03

## 2025-04-07 ENCOUNTER — PATIENT OUTREACH (OUTPATIENT)
Dept: CARE COORDINATION | Facility: CLINIC | Age: 80
End: 2025-04-07
Payer: COMMERCIAL

## 2025-04-28 ENCOUNTER — TELEPHONE (OUTPATIENT)
Dept: PHARMACY | Facility: OTHER | Age: 80
End: 2025-04-28
Payer: COMMERCIAL

## 2025-04-28 NOTE — TELEPHONE ENCOUNTER
CHRISTOPHER Recruitment: [facility] insurance     Referral outreach attempt #1 on April 28, 2025      Outcome: call attempted, could not leave voicemail  and MyChart message sent    CHRISTOPHER Carrillo

## 2025-05-08 ENCOUNTER — LAB (OUTPATIENT)
Dept: LAB | Facility: CLINIC | Age: 80
End: 2025-05-08
Payer: COMMERCIAL

## 2025-05-08 ENCOUNTER — RESULTS FOLLOW-UP (OUTPATIENT)
Dept: FAMILY MEDICINE | Facility: CLINIC | Age: 80
End: 2025-05-08

## 2025-05-08 DIAGNOSIS — R94.4 DECREASED GFR: ICD-10-CM

## 2025-05-08 LAB
ANION GAP SERPL CALCULATED.3IONS-SCNC: 10 MMOL/L (ref 7–15)
BUN SERPL-MCNC: 20.4 MG/DL (ref 8–23)
CALCIUM SERPL-MCNC: 9.5 MG/DL (ref 8.8–10.4)
CHLORIDE SERPL-SCNC: 100 MMOL/L (ref 98–107)
CREAT SERPL-MCNC: 1.29 MG/DL (ref 0.51–0.95)
EGFRCR SERPLBLD CKD-EPI 2021: 42 ML/MIN/1.73M2
GLUCOSE SERPL-MCNC: 114 MG/DL (ref 70–99)
HCO3 SERPL-SCNC: 31 MMOL/L (ref 22–29)
POTASSIUM SERPL-SCNC: 4.1 MMOL/L (ref 3.4–5.3)
SODIUM SERPL-SCNC: 141 MMOL/L (ref 135–145)

## 2025-05-08 NOTE — RESULT ENCOUNTER NOTE
Pat-  Kidney function is up slightly from 1 month ago, which is good news.  Continue to avoid NSAIDS (ibuprofen/aleve) and focus on good water intake.      I would recommend we recheck in 3 months, not waiting 6-12 months.  Please follow up with me at that time.     Adeola Stockton M.D.

## 2025-06-01 ENCOUNTER — MYC REFILL (OUTPATIENT)
Dept: CARDIOLOGY | Facility: CLINIC | Age: 80
End: 2025-06-01
Payer: COMMERCIAL

## 2025-06-01 DIAGNOSIS — I48.0 PAROXYSMAL ATRIAL FIBRILLATION (H): ICD-10-CM

## 2025-06-02 RX ORDER — METOPROLOL SUCCINATE 50 MG/1
50 TABLET, EXTENDED RELEASE ORAL EVERY EVENING
Qty: 90 TABLET | Refills: 3 | Status: SHIPPED | OUTPATIENT
Start: 2025-06-02

## 2025-07-09 ENCOUNTER — ANCILLARY PROCEDURE (OUTPATIENT)
Dept: MAMMOGRAPHY | Facility: CLINIC | Age: 80
End: 2025-07-09
Attending: FAMILY MEDICINE
Payer: COMMERCIAL

## 2025-07-09 DIAGNOSIS — Z12.31 VISIT FOR SCREENING MAMMOGRAM: ICD-10-CM

## 2025-07-09 PROCEDURE — 77063 BREAST TOMOSYNTHESIS BI: CPT | Mod: TC | Performed by: RADIOLOGY

## 2025-07-09 PROCEDURE — 77067 SCR MAMMO BI INCL CAD: CPT | Mod: TC | Performed by: RADIOLOGY

## 2025-07-10 DIAGNOSIS — I48.19 PERSISTENT ATRIAL FIBRILLATION (H): ICD-10-CM

## 2025-07-10 RX ORDER — APIXABAN 5 MG/1
5 TABLET, FILM COATED ORAL 2 TIMES DAILY
Qty: 180 TABLET | Refills: 0 | Status: SHIPPED | OUTPATIENT
Start: 2025-07-10

## 2025-08-14 ENCOUNTER — OFFICE VISIT (OUTPATIENT)
Dept: OBGYN | Facility: CLINIC | Age: 80
End: 2025-08-14
Payer: COMMERCIAL

## 2025-08-14 VITALS
HEART RATE: 71 BPM | SYSTOLIC BLOOD PRESSURE: 144 MMHG | WEIGHT: 290 LBS | DIASTOLIC BLOOD PRESSURE: 79 MMHG | BODY MASS INDEX: 44.09 KG/M2

## 2025-08-14 DIAGNOSIS — N95.0 POSTMENOPAUSAL BLEEDING: Primary | ICD-10-CM

## 2025-08-18 DIAGNOSIS — N71.9 ENDOMETRITIS: Primary | ICD-10-CM

## 2025-08-18 RX ORDER — DOXYCYCLINE 100 MG/1
100 CAPSULE ORAL 2 TIMES DAILY
Qty: 20 CAPSULE | Refills: 0 | Status: SHIPPED | OUTPATIENT
Start: 2025-08-18 | End: 2025-08-19

## (undated) DEVICE — SU STRATAFIX MONOCRYL 3-0 SPIRAL PS-2 30CM SXMP1B106

## (undated) DEVICE — STOCKING SLEEVE COMPRESSION CALF LG

## (undated) DEVICE — PAD FLOOR SURGISAFE

## (undated) DEVICE — CATH, QUADRAPOLAR DEFLECTABLE EP 5MM SPACING REPROCESSED

## (undated) DEVICE — GLOVE BIOGEL PI MICRO SZ 8.0 48580

## (undated) DEVICE — SUCTION TIP POOLE K770

## (undated) DEVICE — SHEATH REPROCESSED AGILIS EP MED CURVE 71CM 408310

## (undated) DEVICE — SUCTION MANIFOLD NEPTUNE 2 SYS 4 PORT 0702-020-000

## (undated) DEVICE — DRSG AQUACEL AG 3.5X12" HYDROFIBER 420670

## (undated) DEVICE — GOWN XLG DISP 9545

## (undated) DEVICE — IMM KIT SHOULDER TMAX MASK FACE 7210559

## (undated) DEVICE — CATH ABLTN TACTIFLEX 2-2-2 MM SPACE D-F CRV L115 A-TFSE-DF

## (undated) DEVICE — DRILL BIT PERIPHERAL SCREW 3.2MM MWJ126

## (undated) DEVICE — SHEATH PINNACLE 9FR 10CM W/MARKER

## (undated) DEVICE — SOL WATER IRRIG 1000ML BOTTLE 07139-09

## (undated) DEVICE — PACK SHOULDER

## (undated) DEVICE — GLOVE BIOGEL PI MICRO INDICATOR UNDERGLOVE SZ 8.0 48980

## (undated) DEVICE — BLADE SAW SAGITTAL STRK 18X90X1.37MM HD SYS 6 6118-137-090

## (undated) DEVICE — DRAPE SHEET REV FOLD 3/4 9349

## (undated) DEVICE — CATHETER ICE VIEWFLEX XTRA

## (undated) DEVICE — GUIDEWIRE JTIP 3MM .035 180CM IQ35F180J3

## (undated) DEVICE — GUIDEWIRE TORNIER AEQUALIS PERFORM +  2.5X220MM DWD017

## (undated) DEVICE — SOL NACL 0.9% IRRIG 3000ML BAG 07972-08

## (undated) DEVICE — GLOVE BIOGEL PI MICRO SZ 6.5 48565

## (undated) DEVICE — GLOVE BIOGEL PI MICRO INDICATOR UNDERGLOVE SZ 6.5 48965

## (undated) DEVICE — ESU PENCIL SMOKE EVAC W/ROCKER SWITCH 0703-047-000

## (undated) DEVICE — SPONGE LAP 18X18" 1515

## (undated) DEVICE — GOWN LG DISP 9515

## (undated) DEVICE — SUCTION IRR SYSTEM W/TIP INTERPULSE

## (undated) DEVICE — CATH EP INQUIRY DECAPOLAR 6FR X 110CM LG CRV

## (undated) DEVICE — ELECTRODE DEFIB CADENCE 22550R

## (undated) DEVICE — CATH TRANSSEPTAL VERSACROSS 45D 63X230CM VXSK0032

## (undated) DEVICE — DRAPE ARTHROSCOPY SHOULDER BEACHCHAIR 29369

## (undated) DEVICE — SU ETHIBOND 5 V-37 4X30" MB66G

## (undated) DEVICE — SU MONOCRYL 2-0 CT-1 36" UND Y945H

## (undated) DEVICE — BONE CLEANING TIP INTERPULSE  0210-010-000

## (undated) DEVICE — CATH MAPPING ADVISOR HD GRID SE D-AVHD-DF16

## (undated) DEVICE — TUBING KIT COOL POINT

## (undated) DEVICE — GOWN IMPERVIOUS SPECIALTY XLG/XLONG 32474

## (undated) DEVICE — TRANSDUCER TRAY ARTERIAL 42646-06

## (undated) DEVICE — CUSTOM PACK EP

## (undated) DEVICE — DRAPE BACK TABLE  44X90" 8377

## (undated) DEVICE — SU ETHIBOND 0 CT-2 30" X412H

## (undated) DEVICE — PROBE TEMP CIRCA S-CATH M ESPH 12-SNSR 3D MAP CS-21EP

## (undated) DEVICE — PREP CHLORAPREP 26ML TINTED ORANGE  260815

## (undated) DEVICE — DRAPE IOBAN LG .375X23.5" 6648EZ

## (undated) DEVICE — SLING ARM XLG 79-99158

## (undated) DEVICE — INTRO MICRO MINI STICK 4FR STIFF NITINOL 45-753

## (undated) DEVICE — INTRO SHEATH TERUMO 10FRX25CM PINNACLE RSS006

## (undated) RX ORDER — ISOPROTERENOL HYDROCHLORIDE 0.2 MG/ML
INJECTION, SOLUTION INTRAVENOUS
Status: DISPENSED
Start: 2024-10-31

## (undated) RX ORDER — BUPIVACAINE HYDROCHLORIDE AND EPINEPHRINE 5; 5 MG/ML; UG/ML
INJECTION, SOLUTION EPIDURAL; INTRACAUDAL; PERINEURAL
Status: DISPENSED
Start: 2023-07-26

## (undated) RX ORDER — PROPOFOL 10 MG/ML
INJECTION, EMULSION INTRAVENOUS
Status: DISPENSED
Start: 2023-07-26

## (undated) RX ORDER — PROPOFOL 10 MG/ML
INJECTION, EMULSION INTRAVENOUS
Status: DISPENSED
Start: 2024-10-31

## (undated) RX ORDER — FENTANYL CITRATE-0.9 % NACL/PF 10 MCG/ML
PLASTIC BAG, INJECTION (ML) INTRAVENOUS
Status: DISPENSED
Start: 2023-07-26

## (undated) RX ORDER — HEPARIN SODIUM 10000 [USP'U]/100ML
INJECTION, SOLUTION INTRAVENOUS
Status: DISPENSED
Start: 2024-10-31

## (undated) RX ORDER — FENTANYL CITRATE 50 UG/ML
INJECTION, SOLUTION INTRAMUSCULAR; INTRAVENOUS
Status: DISPENSED
Start: 2023-07-26

## (undated) RX ORDER — DEXAMETHASONE SODIUM PHOSPHATE 10 MG/ML
INJECTION, SOLUTION INTRAMUSCULAR; INTRAVENOUS
Status: DISPENSED
Start: 2024-10-31

## (undated) RX ORDER — ONDANSETRON 2 MG/ML
INJECTION INTRAMUSCULAR; INTRAVENOUS
Status: DISPENSED
Start: 2024-10-31

## (undated) RX ORDER — ACETAMINOPHEN 325 MG/1
TABLET ORAL
Status: DISPENSED
Start: 2023-07-26

## (undated) RX ORDER — TRANEXAMIC ACID 650 MG/1
TABLET ORAL
Status: DISPENSED
Start: 2023-07-26

## (undated) RX ORDER — PHENYLEPHRINE HYDROCHLORIDE 10 MG/ML
INJECTION INTRAVENOUS
Status: DISPENSED
Start: 2024-10-31

## (undated) RX ORDER — LIDOCAINE HYDROCHLORIDE AND EPINEPHRINE 10; 10 MG/ML; UG/ML
INJECTION, SOLUTION INFILTRATION; PERINEURAL
Status: DISPENSED
Start: 2024-10-31

## (undated) RX ORDER — FENTANYL CITRATE 50 UG/ML
INJECTION, SOLUTION INTRAMUSCULAR; INTRAVENOUS
Status: DISPENSED
Start: 2024-10-31

## (undated) RX ORDER — LIDOCAINE HYDROCHLORIDE 10 MG/ML
INJECTION, SOLUTION EPIDURAL; INFILTRATION; INTRACAUDAL; PERINEURAL
Status: DISPENSED
Start: 2024-10-31

## (undated) RX ORDER — EPHEDRINE SULFATE 50 MG/ML
INJECTION, SOLUTION INTRAMUSCULAR; INTRAVENOUS; SUBCUTANEOUS
Status: DISPENSED
Start: 2024-10-31

## (undated) RX ORDER — EPHEDRINE SULFATE 50 MG/ML
INJECTION, SOLUTION INTRAMUSCULAR; INTRAVENOUS; SUBCUTANEOUS
Status: DISPENSED
Start: 2023-07-26

## (undated) RX ORDER — CEFAZOLIN SODIUM/WATER 3 G/30 ML
SYRINGE (ML) INTRAVENOUS
Status: DISPENSED
Start: 2023-07-26

## (undated) RX ORDER — ADENOSINE 3 MG/ML
INJECTION, SOLUTION INTRAVENOUS
Status: DISPENSED
Start: 2024-10-31

## (undated) RX ORDER — BUPIVACAINE HYDROCHLORIDE 5 MG/ML
INJECTION, SOLUTION EPIDURAL; INTRACAUDAL
Status: DISPENSED
Start: 2023-07-26

## (undated) RX ORDER — OXYCODONE HYDROCHLORIDE 5 MG/1
TABLET ORAL
Status: DISPENSED
Start: 2024-10-31